# Patient Record
Sex: MALE | Race: OTHER | HISPANIC OR LATINO | URBAN - METROPOLITAN AREA
[De-identification: names, ages, dates, MRNs, and addresses within clinical notes are randomized per-mention and may not be internally consistent; named-entity substitution may affect disease eponyms.]

---

## 2017-05-12 ENCOUNTER — INPATIENT (INPATIENT)
Facility: HOSPITAL | Age: 62
LOS: 1 days | Discharge: HOME | DRG: 446 | End: 2017-05-14
Attending: SURGERY | Admitting: SURGERY
Payer: COMMERCIAL

## 2017-05-12 VITALS
HEART RATE: 80 BPM | OXYGEN SATURATION: 100 % | SYSTOLIC BLOOD PRESSURE: 153 MMHG | HEIGHT: 68 IN | DIASTOLIC BLOOD PRESSURE: 64 MMHG | TEMPERATURE: 98 F | RESPIRATION RATE: 17 BRPM | WEIGHT: 160.06 LBS

## 2017-05-12 DIAGNOSIS — K81.0 ACUTE CHOLECYSTITIS: ICD-10-CM

## 2017-05-12 DIAGNOSIS — Z98.890 OTHER SPECIFIED POSTPROCEDURAL STATES: Chronic | ICD-10-CM

## 2017-05-12 LAB
ALBUMIN SERPL ELPH-MCNC: 2.6 G/DL — LOW (ref 3.5–5)
ALP SERPL-CCNC: 240 U/L — HIGH (ref 40–120)
ALT FLD-CCNC: 25 U/L DA — SIGNIFICANT CHANGE UP (ref 10–60)
ANION GAP SERPL CALC-SCNC: 8 MMOL/L — SIGNIFICANT CHANGE UP (ref 5–17)
APTT BLD: 38.7 SEC — HIGH (ref 27.5–37.4)
AST SERPL-CCNC: 24 U/L — SIGNIFICANT CHANGE UP (ref 10–40)
BILIRUB SERPL-MCNC: 0.4 MG/DL — SIGNIFICANT CHANGE UP (ref 0.2–1.2)
BUN SERPL-MCNC: 24 MG/DL — HIGH (ref 7–18)
CALCIUM SERPL-MCNC: 8.8 MG/DL — SIGNIFICANT CHANGE UP (ref 8.4–10.5)
CHLORIDE SERPL-SCNC: 103 MMOL/L — SIGNIFICANT CHANGE UP (ref 96–108)
CO2 SERPL-SCNC: 27 MMOL/L — SIGNIFICANT CHANGE UP (ref 22–31)
CREAT SERPL-MCNC: 1.65 MG/DL — HIGH (ref 0.5–1.3)
GLUCOSE SERPL-MCNC: 151 MG/DL — HIGH (ref 70–99)
HCT VFR BLD CALC: 31.5 % — LOW (ref 39–50)
HGB BLD-MCNC: 10.3 G/DL — LOW (ref 13–17)
INR BLD: 1.26 RATIO — HIGH (ref 0.88–1.16)
LIDOCAIN IGE QN: 295 U/L — SIGNIFICANT CHANGE UP (ref 73–393)
MCHC RBC-ENTMCNC: 29.2 PG — SIGNIFICANT CHANGE UP (ref 27–34)
MCHC RBC-ENTMCNC: 32.7 GM/DL — SIGNIFICANT CHANGE UP (ref 32–36)
MCV RBC AUTO: 89.4 FL — SIGNIFICANT CHANGE UP (ref 80–100)
PLATELET # BLD AUTO: 448 K/UL — HIGH (ref 150–400)
POTASSIUM SERPL-MCNC: 4.1 MMOL/L — SIGNIFICANT CHANGE UP (ref 3.5–5.3)
POTASSIUM SERPL-SCNC: 4.1 MMOL/L — SIGNIFICANT CHANGE UP (ref 3.5–5.3)
PROT SERPL-MCNC: 7.2 G/DL — SIGNIFICANT CHANGE UP (ref 6–8.3)
PROTHROM AB SERPL-ACNC: 13.8 SEC — HIGH (ref 9.8–12.7)
RBC # BLD: 3.52 M/UL — LOW (ref 4.2–5.8)
RBC # FLD: 12.4 % — SIGNIFICANT CHANGE UP (ref 10.3–14.5)
SODIUM SERPL-SCNC: 138 MMOL/L — SIGNIFICANT CHANGE UP (ref 135–145)
WBC # BLD: 13.8 K/UL — HIGH (ref 3.8–10.5)
WBC # FLD AUTO: 13.8 K/UL — HIGH (ref 3.8–10.5)

## 2017-05-12 PROCEDURE — 99285 EMERGENCY DEPT VISIT HI MDM: CPT

## 2017-05-12 PROCEDURE — 76705 ECHO EXAM OF ABDOMEN: CPT | Mod: 26

## 2017-05-12 PROCEDURE — 99223 1ST HOSP IP/OBS HIGH 75: CPT | Mod: AI

## 2017-05-12 RX ORDER — METRONIDAZOLE 500 MG
500 TABLET ORAL ONCE
Qty: 0 | Refills: 0 | Status: COMPLETED | OUTPATIENT
Start: 2017-05-12 | End: 2017-05-12

## 2017-05-12 RX ORDER — MORPHINE SULFATE 50 MG/1
2 CAPSULE, EXTENDED RELEASE ORAL EVERY 4 HOURS
Qty: 0 | Refills: 0 | Status: DISCONTINUED | OUTPATIENT
Start: 2017-05-12 | End: 2017-05-14

## 2017-05-12 RX ORDER — INSULIN LISPRO 100/ML
VIAL (ML) SUBCUTANEOUS
Qty: 0 | Refills: 0 | Status: DISCONTINUED | OUTPATIENT
Start: 2017-05-12 | End: 2017-05-14

## 2017-05-12 RX ORDER — METRONIDAZOLE 500 MG
TABLET ORAL
Qty: 0 | Refills: 0 | Status: DISCONTINUED | OUTPATIENT
Start: 2017-05-12 | End: 2017-05-14

## 2017-05-12 RX ORDER — DEXTROSE 50 % IN WATER 50 %
1 SYRINGE (ML) INTRAVENOUS ONCE
Qty: 0 | Refills: 0 | Status: DISCONTINUED | OUTPATIENT
Start: 2017-05-12 | End: 2017-05-14

## 2017-05-12 RX ORDER — PIPERACILLIN AND TAZOBACTAM 4; .5 G/20ML; G/20ML
3.38 INJECTION, POWDER, LYOPHILIZED, FOR SOLUTION INTRAVENOUS ONCE
Qty: 0 | Refills: 0 | Status: COMPLETED | OUTPATIENT
Start: 2017-05-12 | End: 2017-05-12

## 2017-05-12 RX ORDER — GLUCAGON INJECTION, SOLUTION 0.5 MG/.1ML
1 INJECTION, SOLUTION SUBCUTANEOUS ONCE
Qty: 0 | Refills: 0 | Status: DISCONTINUED | OUTPATIENT
Start: 2017-05-12 | End: 2017-05-14

## 2017-05-12 RX ORDER — SODIUM CHLORIDE 9 MG/ML
1000 INJECTION, SOLUTION INTRAVENOUS
Qty: 0 | Refills: 0 | Status: DISCONTINUED | OUTPATIENT
Start: 2017-05-12 | End: 2017-05-14

## 2017-05-12 RX ORDER — DEXTROSE 50 % IN WATER 50 %
25 SYRINGE (ML) INTRAVENOUS ONCE
Qty: 0 | Refills: 0 | Status: DISCONTINUED | OUTPATIENT
Start: 2017-05-12 | End: 2017-05-14

## 2017-05-12 RX ORDER — PANTOPRAZOLE SODIUM 20 MG/1
40 TABLET, DELAYED RELEASE ORAL
Qty: 0 | Refills: 0 | Status: DISCONTINUED | OUTPATIENT
Start: 2017-05-12 | End: 2017-05-14

## 2017-05-12 RX ORDER — ENOXAPARIN SODIUM 100 MG/ML
40 INJECTION SUBCUTANEOUS EVERY 24 HOURS
Qty: 0 | Refills: 0 | Status: DISCONTINUED | OUTPATIENT
Start: 2017-05-12 | End: 2017-05-14

## 2017-05-12 RX ORDER — DEXTROSE 50 % IN WATER 50 %
12.5 SYRINGE (ML) INTRAVENOUS ONCE
Qty: 0 | Refills: 0 | Status: DISCONTINUED | OUTPATIENT
Start: 2017-05-12 | End: 2017-05-14

## 2017-05-12 RX ORDER — METRONIDAZOLE 500 MG
500 TABLET ORAL EVERY 8 HOURS
Qty: 0 | Refills: 0 | Status: DISCONTINUED | OUTPATIENT
Start: 2017-05-13 | End: 2017-05-14

## 2017-05-12 RX ORDER — CIPROFLOXACIN LACTATE 400MG/40ML
VIAL (ML) INTRAVENOUS
Qty: 0 | Refills: 0 | Status: DISCONTINUED | OUTPATIENT
Start: 2017-05-12 | End: 2017-05-14

## 2017-05-12 RX ORDER — METRONIDAZOLE 500 MG
500 TABLET ORAL EVERY 8 HOURS
Qty: 0 | Refills: 0 | Status: DISCONTINUED | OUTPATIENT
Start: 2017-05-12 | End: 2017-05-12

## 2017-05-12 RX ORDER — CIPROFLOXACIN LACTATE 400MG/40ML
400 VIAL (ML) INTRAVENOUS ONCE
Qty: 0 | Refills: 0 | Status: COMPLETED | OUTPATIENT
Start: 2017-05-12 | End: 2017-05-12

## 2017-05-12 RX ORDER — ONDANSETRON 8 MG/1
4 TABLET, FILM COATED ORAL EVERY 6 HOURS
Qty: 0 | Refills: 0 | Status: DISCONTINUED | OUTPATIENT
Start: 2017-05-12 | End: 2017-05-14

## 2017-05-12 RX ORDER — SODIUM CHLORIDE 9 MG/ML
1000 INJECTION INTRAMUSCULAR; INTRAVENOUS; SUBCUTANEOUS
Qty: 0 | Refills: 0 | Status: DISCONTINUED | OUTPATIENT
Start: 2017-05-12 | End: 2017-05-14

## 2017-05-12 RX ORDER — CIPROFLOXACIN LACTATE 400MG/40ML
400 VIAL (ML) INTRAVENOUS EVERY 12 HOURS
Qty: 0 | Refills: 0 | Status: DISCONTINUED | OUTPATIENT
Start: 2017-05-13 | End: 2017-05-14

## 2017-05-12 RX ADMIN — PIPERACILLIN AND TAZOBACTAM 200 GRAM(S): 4; .5 INJECTION, POWDER, LYOPHILIZED, FOR SOLUTION INTRAVENOUS at 18:47

## 2017-05-12 RX ADMIN — SODIUM CHLORIDE 1000 MILLILITER(S): 9 INJECTION INTRAMUSCULAR; INTRAVENOUS; SUBCUTANEOUS at 17:02

## 2017-05-12 RX ADMIN — Medication 100 MILLIGRAM(S): at 21:02

## 2017-05-12 RX ADMIN — Medication 200 MILLIGRAM(S): at 21:07

## 2017-05-12 NOTE — ED PROVIDER NOTE - MEDICAL DECISION MAKING DETAILS
3 d of ruq abd pain, nontender abdomen now. sono shows acute danica. labs show leukocytosis and transaminitis. gave zosyn. admit to surgery for further mgmt

## 2017-05-12 NOTE — H&P ADULT - PROBLEM SELECTOR PLAN 1
1- admit to Dr Farmer, Surgery  2- npo/ivf  3- cipro/flagyl  4- observation over the weekend  5- possible lap danica monday 5/15 if infection is improved  6- d/w dr farmer and agrees

## 2017-05-12 NOTE — ED PROVIDER NOTE - OBJECTIVE STATEMENT
61 y/o M pt with PMHx of R inguinal hernia repair presents to the ED c/o epigastric and RUQ pain x days. Pt also notes multiple episodes of diarrhea. [facility] reports a possible inflammation of the gallbladder seen on an US. Pt denies nausea, vomiting, fever, chills or any other complaints at this time. NKDA. 63 y/o M pt with PMHx of R inguinal hernia repair presents to the ED c/o epigastric and RUQ pain x 3 days. [facility] reports a possible inflammation of the gallbladder seen on an US done as outpt. Pt denies nausea, vomiting, fever, chills or any other complaints at this time. NKDA.

## 2017-05-12 NOTE — H&P ADULT - NSHPLABSRESULTS_GEN_ALL_CORE
10.3   13.8  )-----------( 448      ( 12 May 2017 16:42 )             31.5   05-12    138  |  103  |  24<H>  ----------------------------<  151<H>  4.1   |  27  |  1.65<H>    Ca    8.8      12 May 2017 16:42    TPro  7.2  /  Alb  2.6<L>  /  TBili  0.4  /  DBili  x   /  AST  24  /  ALT  25  /  AlkPhos  240<H>  05-12    ultrasound:  IMPRESSION: Gallstones and gallbladder sludge identified. There is   gallbladder wall thickening measuring up to8 mm and gallbladder wall   edema is noted. A significant volume of pericholecystic fluid is   identified. Findings are highly suspicious for cholecystitis; correlate   clinically.

## 2017-05-12 NOTE — ED PROVIDER NOTE - CONDUCTED A DETAILED DISCUSSION WITH PATIENT AND/OR GUARDIAN REGARDING, MDM
return to ED if symptoms worsen, persist or questions arise radiology results/need to admit/lab results

## 2017-05-12 NOTE — H&P ADULT - HISTORY OF PRESENT ILLNESS
62 year old male presents c/o RUQ abd pain for the last 10 days. 10 days ago he was admitted to a hospital [facility] for acute danica, and was discharged after 3 days. He came back to [facility] but pain has still persisted.    pmhx includes HTN and DM, however he does not recall all of his home meds and has been instructed to have a family member bring them to the hospital

## 2017-05-12 NOTE — PATIENT PROFILE ADULT. - VISION (WITH CORRECTIVE LENSES IF THE PATIENT USUALLY WEARS THEM):
Partially impaired: cannot see medication labels or newsprint, but can see obstacles in path, and the surrounding layout; can count fingers at arm's length/blurry cant see fine print

## 2017-05-13 DIAGNOSIS — I10 ESSENTIAL (PRIMARY) HYPERTENSION: ICD-10-CM

## 2017-05-13 DIAGNOSIS — E11.9 TYPE 2 DIABETES MELLITUS WITHOUT COMPLICATIONS: ICD-10-CM

## 2017-05-13 LAB
ALBUMIN SERPL ELPH-MCNC: 2.2 G/DL — LOW (ref 3.5–5)
ALP SERPL-CCNC: 211 U/L — HIGH (ref 40–120)
ALT FLD-CCNC: 18 U/L DA — SIGNIFICANT CHANGE UP (ref 10–60)
ANION GAP SERPL CALC-SCNC: 8 MMOL/L — SIGNIFICANT CHANGE UP (ref 5–17)
AST SERPL-CCNC: 16 U/L — SIGNIFICANT CHANGE UP (ref 10–40)
BASOPHILS # BLD AUTO: 0.1 K/UL — SIGNIFICANT CHANGE UP (ref 0–0.2)
BASOPHILS NFR BLD AUTO: 0.8 % — SIGNIFICANT CHANGE UP (ref 0–2)
BILIRUB SERPL-MCNC: 0.4 MG/DL — SIGNIFICANT CHANGE UP (ref 0.2–1.2)
BUN SERPL-MCNC: 17 MG/DL — SIGNIFICANT CHANGE UP (ref 7–18)
CALCIUM SERPL-MCNC: 8.3 MG/DL — LOW (ref 8.4–10.5)
CHLORIDE SERPL-SCNC: 108 MMOL/L — SIGNIFICANT CHANGE UP (ref 96–108)
CO2 SERPL-SCNC: 27 MMOL/L — SIGNIFICANT CHANGE UP (ref 22–31)
CREAT SERPL-MCNC: 1.27 MG/DL — SIGNIFICANT CHANGE UP (ref 0.5–1.3)
EOSINOPHIL # BLD AUTO: 0.5 K/UL — SIGNIFICANT CHANGE UP (ref 0–0.5)
EOSINOPHIL NFR BLD AUTO: 4.8 % — SIGNIFICANT CHANGE UP (ref 0–6)
GLUCOSE SERPL-MCNC: 100 MG/DL — HIGH (ref 70–99)
HBA1C BLD-MCNC: 7 % — HIGH (ref 4–5.6)
HCT VFR BLD CALC: 28.7 % — LOW (ref 39–50)
HGB BLD-MCNC: 9.5 G/DL — LOW (ref 13–17)
LYMPHOCYTES # BLD AUTO: 1.8 K/UL — SIGNIFICANT CHANGE UP (ref 1–3.3)
LYMPHOCYTES # BLD AUTO: 15.9 % — SIGNIFICANT CHANGE UP (ref 13–44)
MCHC RBC-ENTMCNC: 30 PG — SIGNIFICANT CHANGE UP (ref 27–34)
MCHC RBC-ENTMCNC: 33.1 GM/DL — SIGNIFICANT CHANGE UP (ref 32–36)
MCV RBC AUTO: 90.8 FL — SIGNIFICANT CHANGE UP (ref 80–100)
MONOCYTES # BLD AUTO: 0.7 K/UL — SIGNIFICANT CHANGE UP (ref 0–0.9)
MONOCYTES NFR BLD AUTO: 6.1 % — SIGNIFICANT CHANGE UP (ref 2–14)
NEUTROPHILS # BLD AUTO: 8.2 K/UL — HIGH (ref 1.8–7.4)
NEUTROPHILS NFR BLD AUTO: 72.4 % — SIGNIFICANT CHANGE UP (ref 43–77)
PLATELET # BLD AUTO: 425 K/UL — HIGH (ref 150–400)
POTASSIUM SERPL-MCNC: 3.9 MMOL/L — SIGNIFICANT CHANGE UP (ref 3.5–5.3)
POTASSIUM SERPL-SCNC: 3.9 MMOL/L — SIGNIFICANT CHANGE UP (ref 3.5–5.3)
PROT SERPL-MCNC: 6.1 G/DL — SIGNIFICANT CHANGE UP (ref 6–8.3)
RBC # BLD: 3.17 M/UL — LOW (ref 4.2–5.8)
RBC # FLD: 12.4 % — SIGNIFICANT CHANGE UP (ref 10.3–14.5)
SODIUM SERPL-SCNC: 143 MMOL/L — SIGNIFICANT CHANGE UP (ref 135–145)
WBC # BLD: 11.4 K/UL — HIGH (ref 3.8–10.5)
WBC # FLD AUTO: 11.4 K/UL — HIGH (ref 3.8–10.5)

## 2017-05-13 PROCEDURE — 99232 SBSQ HOSP IP/OBS MODERATE 35: CPT

## 2017-05-13 RX ADMIN — Medication 100 MILLIGRAM(S): at 05:35

## 2017-05-13 RX ADMIN — Medication 100 MILLIGRAM(S): at 21:43

## 2017-05-13 RX ADMIN — Medication 200 MILLIGRAM(S): at 17:00

## 2017-05-13 RX ADMIN — Medication 100 MILLIGRAM(S): at 13:14

## 2017-05-13 RX ADMIN — ENOXAPARIN SODIUM 40 MILLIGRAM(S): 100 INJECTION SUBCUTANEOUS at 05:35

## 2017-05-13 RX ADMIN — Medication 200 MILLIGRAM(S): at 05:35

## 2017-05-13 RX ADMIN — PANTOPRAZOLE SODIUM 40 MILLIGRAM(S): 20 TABLET, DELAYED RELEASE ORAL at 05:36

## 2017-05-13 NOTE — PROGRESS NOTE ADULT - ASSESSMENT
62M with DM, HTN 2 weeks into cholecystitis. Was sent in by the radiologist who performed the ultrasound based on findings. He currently denies any pain. He had no nausea or vomiting for fever or chills at home. He is in subacute phase of the inflammatory process. I explained to him that it would be safer to try to manage non-operatively at this time and perform interval cholecystectomy in a few weeks. Currently, he is asymptomatic. Surgery at this time carried significantly increased risk of bleeding, open procedure and other complications such as bile duct injury. He agrees with non-operative plan. C/o bilateral calcaneal pain/tingling. Denies trauma.    1) continue cipro flagyl  2) clears  3) xray feet  4) ambulate  3) ambulate

## 2017-05-13 NOTE — PROGRESS NOTE ADULT - SUBJECTIVE AND OBJECTIVE BOX
SUBJECTIVE    C/o bilateral mild calcaneal pain--denies trauma, fall, jump.  Nausea: [ ] YES [X] NO           Vomiting: [ ] YES [X] NO  Flatus: [X] YES [ ] NO             Voiding normally: [ ]YES [X]NO  Currently has NO pain  NPO  Ambulated: [X] YES [ ]NO    VITALS  Vital Signs Last 24 Hrs  T(C): 36.8, Max: 36.8 (05-12 @ 19:32)  T(F): 98.2, Max: 98.3 (05-12 @ 19:32)  HR: 73 (73 - 86)  BP: 135/62 (135/62 - 183/74)  BP(mean): --  RR: 17 (17 - 18)  SpO2: 100% (98% - 100%)    I&O's Summary      PHYSICAL EXAMINATION    Abdomen: soft, NT, ND  Extremities: bilateral feet without any skin changes or signs of trauma      LABS                        9.5    11.4  )-----------( 425      ( 13 May 2017 06:25 )             28.7     05-13    143  |  108  |  17  ----------------------------<  100<H>  3.9   |  27  |  1.27    Ca    8.3<L>      13 May 2017 06:25    TPro  6.1  /  Alb  2.2<L>  /  TBili  0.4  /  DBili  x   /  AST  16  /  ALT  18  /  AlkPhos  211<H>  05-13     LIVER FUNCTIONS - ( 13 May 2017 06:25 )  Alb: 2.2 g/dL / Pro: 6.1 g/dL / ALK PHOS: 211 U/L / ALT: 18 U/L DA / AST: 16 U/L / GGT: x             MEDICATIONS  MEDICATIONS  (STANDING):  sodium chloride 0.9%. 1000milliLiter(s) IV Continuous <Continuous>  enoxaparin Injectable 40milliGRAM(s) SubCutaneous every 24 hours  lactated ringers. 1000milliLiter(s) IV Continuous <Continuous>  pantoprazole    Tablet 40milliGRAM(s) Oral before breakfast  ciprofloxacin   IVPB  IV Intermittent   insulin lispro (HumaLOG) corrective regimen sliding scale  SubCutaneous three times a day before meals  dextrose 5%. 1000milliLiter(s) IV Continuous <Continuous>  dextrose 50% Injectable 12.5Gram(s) IV Push once  ciprofloxacin   IVPB 400milliGRAM(s) IV Intermittent every 12 hours  metroNIDAZOLE  IVPB 500milliGRAM(s) IV Intermittent every 8 hours  dextrose 50% Injectable 25Gram(s) IV Push once  dextrose 50% Injectable 25Gram(s) IV Push once  metroNIDAZOLE  IVPB  IV Intermittent     MEDICATIONS  (PRN):  oxyCODONE  5 mG/acetaminophen 325 mG 1Tablet(s) Oral every 4 hours PRN Moderate Pain  ondansetron Injectable 4milliGRAM(s) IV Push every 6 hours PRN Nausea  morphine  - Injectable 2milliGRAM(s) IV Push every 4 hours PRN Severe Pain (7 - 10)  dextrose Gel 1Dose(s) Oral once PRN Blood Glucose LESS THAN 70 milliGRAM(s)/deciliter  glucagon  Injectable 1milliGRAM(s) IntraMuscular once PRN Glucose LESS THAN 70 milligrams/deciliter

## 2017-05-14 VITALS
SYSTOLIC BLOOD PRESSURE: 164 MMHG | RESPIRATION RATE: 17 BRPM | TEMPERATURE: 99 F | OXYGEN SATURATION: 100 % | HEART RATE: 74 BPM | DIASTOLIC BLOOD PRESSURE: 75 MMHG

## 2017-05-14 DIAGNOSIS — E11.9 TYPE 2 DIABETES MELLITUS WITHOUT COMPLICATIONS: ICD-10-CM

## 2017-05-14 PROCEDURE — 85730 THROMBOPLASTIN TIME PARTIAL: CPT

## 2017-05-14 PROCEDURE — 83036 HEMOGLOBIN GLYCOSYLATED A1C: CPT

## 2017-05-14 PROCEDURE — 76705 ECHO EXAM OF ABDOMEN: CPT

## 2017-05-14 PROCEDURE — 85027 COMPLETE CBC AUTOMATED: CPT

## 2017-05-14 PROCEDURE — 99285 EMERGENCY DEPT VISIT HI MDM: CPT | Mod: 25

## 2017-05-14 PROCEDURE — 86900 BLOOD TYPING SEROLOGIC ABO: CPT

## 2017-05-14 PROCEDURE — 80053 COMPREHEN METABOLIC PANEL: CPT

## 2017-05-14 PROCEDURE — 86901 BLOOD TYPING SEROLOGIC RH(D): CPT

## 2017-05-14 PROCEDURE — 83690 ASSAY OF LIPASE: CPT

## 2017-05-14 PROCEDURE — 86850 RBC ANTIBODY SCREEN: CPT

## 2017-05-14 PROCEDURE — 99232 SBSQ HOSP IP/OBS MODERATE 35: CPT

## 2017-05-14 PROCEDURE — 85610 PROTHROMBIN TIME: CPT

## 2017-05-14 RX ORDER — METRONIDAZOLE 500 MG
1 TABLET ORAL
Qty: 42 | Refills: 0
Start: 2017-05-14 | End: 2017-05-28

## 2017-05-14 RX ORDER — MOXIFLOXACIN HYDROCHLORIDE TABLETS, 400 MG 400 MG/1
1 TABLET, FILM COATED ORAL
Qty: 28 | Refills: 0
Start: 2017-05-14 | End: 2017-05-28

## 2017-05-14 RX ORDER — MOXIFLOXACIN HYDROCHLORIDE TABLETS, 400 MG 400 MG/1
1 TABLET, FILM COATED ORAL
Qty: 28 | Refills: 0 | OUTPATIENT
Start: 2017-05-14 | End: 2017-05-28

## 2017-05-14 RX ORDER — METRONIDAZOLE 500 MG
1 TABLET ORAL
Qty: 42 | Refills: 0 | OUTPATIENT
Start: 2017-05-14 | End: 2017-05-28

## 2017-05-14 RX ADMIN — Medication 100 MILLIGRAM(S): at 05:12

## 2017-05-14 RX ADMIN — ENOXAPARIN SODIUM 40 MILLIGRAM(S): 100 INJECTION SUBCUTANEOUS at 05:11

## 2017-05-14 RX ADMIN — PANTOPRAZOLE SODIUM 40 MILLIGRAM(S): 20 TABLET, DELAYED RELEASE ORAL at 08:07

## 2017-05-14 RX ADMIN — SODIUM CHLORIDE 125 MILLILITER(S): 9 INJECTION, SOLUTION INTRAVENOUS at 05:12

## 2017-05-14 RX ADMIN — Medication 200 MILLIGRAM(S): at 17:53

## 2017-05-14 RX ADMIN — Medication 200 MILLIGRAM(S): at 05:12

## 2017-05-14 RX ADMIN — Medication 100 MILLIGRAM(S): at 13:37

## 2017-05-14 RX ADMIN — Medication 2: at 16:53

## 2017-05-14 NOTE — DISCHARGE NOTE ADULT - CARE PLAN
Principal Discharge DX:	Acute cholecystitis  Goal:	Disease improvement  Instructions for follow-up, activity and diet:	Eat Low fat diet  Continue antibiotics x 2 weeks  Follow up as outpatient 1 - 2 weeks.

## 2017-05-14 NOTE — PROGRESS NOTE ADULT - SUBJECTIVE AND OBJECTIVE BOX
SUBJECTIVE    Nausea: [ ] YES [X ] NO           Vomiting: [ ] YES [X ] NO  Flatus: [X ] YES [ ] NO             Voiding normally: [X ]YES [ ]NO  No Pain  NPO  Ambulated: [X ] YES [ ]NO      VITALS  Vital Signs Last 24 Hrs  T(C): 36.6, Max: 37.1 (05-13 @ 14:49)  T(F): 97.8, Max: 98.8 (05-13 @ 14:49)  HR: 82 (78 - 82)  BP: 162/71 (155/70 - 162/71)  BP(mean): --  RR: 18 (17 - 18)  SpO2: 97% (97% - 99%)    I&O's Summary    I & Os for current day (as of 14 May 2017 08:48)  =============================================  IN: 1575 ml / OUT: 0 ml / NET: 1575 ml      PHYSICAL EXAMINATION    Abdomen: soft, NT, ND      LABS                        9.5    11.4  )-----------( 425      ( 13 May 2017 06:25 )             28.7     05-13    143  |  108  |  17  ----------------------------<  100<H>  3.9   |  27  |  1.27    Ca    8.3<L>      13 May 2017 06:25    TPro  6.1  /  Alb  2.2<L>  /  TBili  0.4  /  DBili  x   /  AST  16  /  ALT  18  /  AlkPhos  211<H>  05-13     LIVER FUNCTIONS - ( 13 May 2017 06:25 )  Alb: 2.2 g/dL / Pro: 6.1 g/dL / ALK PHOS: 211 U/L / ALT: 18 U/L DA / AST: 16 U/L / GGT: x             MEDICATIONS  MEDICATIONS  (STANDING):  sodium chloride 0.9%. 1000milliLiter(s) IV Continuous <Continuous>  enoxaparin Injectable 40milliGRAM(s) SubCutaneous every 24 hours  lactated ringers. 1000milliLiter(s) IV Continuous <Continuous>  pantoprazole    Tablet 40milliGRAM(s) Oral before breakfast  ciprofloxacin   IVPB  IV Intermittent   insulin lispro (HumaLOG) corrective regimen sliding scale  SubCutaneous three times a day before meals  dextrose 5%. 1000milliLiter(s) IV Continuous <Continuous>  dextrose 50% Injectable 12.5Gram(s) IV Push once  ciprofloxacin   IVPB 400milliGRAM(s) IV Intermittent every 12 hours  metroNIDAZOLE  IVPB 500milliGRAM(s) IV Intermittent every 8 hours  dextrose 50% Injectable 25Gram(s) IV Push once  dextrose 50% Injectable 25Gram(s) IV Push once  metroNIDAZOLE  IVPB  IV Intermittent     MEDICATIONS  (PRN):  oxyCODONE  5 mG/acetaminophen 325 mG 1Tablet(s) Oral every 4 hours PRN Moderate Pain  ondansetron Injectable 4milliGRAM(s) IV Push every 6 hours PRN Nausea  morphine  - Injectable 2milliGRAM(s) IV Push every 4 hours PRN Severe Pain (7 - 10)  dextrose Gel 1Dose(s) Oral once PRN Blood Glucose LESS THAN 70 milliGRAM(s)/deciliter  glucagon  Injectable 1milliGRAM(s) IntraMuscular once PRN Glucose LESS THAN 70 milligrams/deciliter

## 2017-05-14 NOTE — DISCHARGE NOTE ADULT - PLAN OF CARE
Disease improvement Eat Low fat diet  Continue antibiotics x 2 weeks  Follow up as outpatient 1 - 2 weeks.

## 2017-05-14 NOTE — DISCHARGE NOTE ADULT - VISION (WITH CORRECTIVE LENSES IF THE PATIENT USUALLY WEARS THEM):
blurry cant see fine print/Partially impaired: cannot see medication labels or newsprint, but can see obstacles in path, and the surrounding layout; can count fingers at arm's length

## 2017-05-14 NOTE — PROGRESS NOTE ADULT - ASSESSMENT
62M with resolving acute on chronic cholecystitis.   1) advance diet  2) if tolerates with no pain, D/C on cipro flagyl for 2 weeks  3) interval cholecystectomy in 4 - 6 weeks.

## 2017-05-14 NOTE — DISCHARGE NOTE ADULT - PATIENT PORTAL LINK FT
“You can access the [facility] Patient Portal, offered by [facility], by registering with the following website: [email or web address]”

## 2017-05-14 NOTE — DISCHARGE NOTE ADULT - MEDICATION SUMMARY - MEDICATIONS TO TAKE
I will START or STAY ON the medications listed below when I get home from the hospital:    Flagyl 500 mg oral tablet  -- 1 tab(s) by mouth every 8 hours  -- Do not drink alcoholic beverages when taking this medication.  Finish all this medication unless otherwise directed by prescriber.  May discolor urine or feces.    -- Indication: For Acute cholecystitis    Cipro 500 mg oral tablet  -- 1 tab(s) by mouth every 12 hours  -- Avoid prolonged or excessive exposure to direct and/or artificial sunlight while taking this medication.  Check with your doctor before becoming pregnant.  Do not take dairy products, antacids, or iron preparations within one hour of this medication.  Finish all this medication unless otherwise directed by prescriber.  Medication should be taken with plenty of water.    -- Indication: For Acute cholecystitis

## 2017-05-14 NOTE — DISCHARGE NOTE ADULT - MEDICATION SUMMARY - MEDICATIONS TO CHANGE
I will SWITCH the dose or number of times a day I take the medications listed below when I get home from the hospital:    Cipro 500 mg oral tablet  -- 1 tab(s) by mouth every 12 hours  -- Avoid prolonged or excessive exposure to direct and/or artificial sunlight while taking this medication.  Check with your doctor before becoming pregnant.  Do not take dairy products, antacids, or iron preparations within one hour of this medication.  Finish all this medication unless otherwise directed by prescriber.  Medication should be taken with plenty of water.    Flagyl 500 mg oral tablet  -- 1 tab(s) by mouth every 8 hours for 2 weeks  -- Do not drink alcoholic beverages when taking this medication.  Finish all this medication unless otherwise directed by prescriber.  May discolor urine or feces.

## 2017-05-14 NOTE — DISCHARGE NOTE ADULT - CARE PROVIDER_API CALL
Vinay Luevano (MD), Surgery  [address] [place] [address]  Phone: [phone]  Fax: [phone]

## 2017-05-16 DIAGNOSIS — K81.1 CHRONIC CHOLECYSTITIS: ICD-10-CM

## 2017-05-16 DIAGNOSIS — K81.0 ACUTE CHOLECYSTITIS: ICD-10-CM

## 2017-05-16 DIAGNOSIS — I10 ESSENTIAL (PRIMARY) HYPERTENSION: ICD-10-CM

## 2017-05-16 DIAGNOSIS — E11.9 TYPE 2 DIABETES MELLITUS WITHOUT COMPLICATIONS: ICD-10-CM

## 2017-06-27 ENCOUNTER — TRANSCRIPTION ENCOUNTER (OUTPATIENT)
Age: 62
End: 2017-06-27

## 2017-06-27 ENCOUNTER — OUTPATIENT (OUTPATIENT)
Dept: OUTPATIENT SERVICES | Facility: HOSPITAL | Age: 62
LOS: 1 days | End: 2017-06-27
Payer: COMMERCIAL

## 2017-06-27 VITALS
DIASTOLIC BLOOD PRESSURE: 74 MMHG | HEIGHT: 67 IN | HEART RATE: 72 BPM | TEMPERATURE: 98 F | RESPIRATION RATE: 18 BRPM | OXYGEN SATURATION: 99 % | SYSTOLIC BLOOD PRESSURE: 149 MMHG | WEIGHT: 177.91 LBS

## 2017-06-27 DIAGNOSIS — K81.0 ACUTE CHOLECYSTITIS: ICD-10-CM

## 2017-06-27 DIAGNOSIS — Z98.890 OTHER SPECIFIED POSTPROCEDURAL STATES: Chronic | ICD-10-CM

## 2017-06-27 DIAGNOSIS — Z01.818 ENCOUNTER FOR OTHER PREPROCEDURAL EXAMINATION: ICD-10-CM

## 2017-06-27 PROCEDURE — G0463: CPT

## 2017-06-27 NOTE — H&P PST ADULT - NSALCOHOLUSECOMMENT_GEN_ALL_CORE_FT
Patient state he used to drink on the weekends 1-2botlles but has since quit alcoholic beverages in April 2017

## 2017-06-27 NOTE — H&P PST ADULT - NSANTHOSAYNRD_GEN_A_CORE
No. KRISH screening performed.  STOP BANG Legend: 0-2 = LOW Risk; 3-4 = INTERMEDIATE Risk; 5-8 = HIGH Risk

## 2017-06-27 NOTE — H&P PST ADULT - HISTORY OF PRESENT ILLNESS
62year old male with IDDM Type 2, hypertension, hypothyroidism and elevated PTT levels presents today for presurgical testing for scheduled laparoscopic cholecystectomy, possible open on 6/28/17

## 2017-06-27 NOTE — H&P PST ADULT - VTE RISK COMMENTS
DVT Prophylaxis - Patient with hx of elevated PTT levels  Had hematology consult/clearance presurgery

## 2017-06-28 ENCOUNTER — INPATIENT (INPATIENT)
Facility: HOSPITAL | Age: 62
LOS: 5 days | Discharge: HOME | DRG: 415 | End: 2017-07-04
Attending: SURGERY | Admitting: SURGERY
Payer: COMMERCIAL

## 2017-06-28 VITALS
HEART RATE: 73 BPM | HEIGHT: 67 IN | TEMPERATURE: 97 F | DIASTOLIC BLOOD PRESSURE: 57 MMHG | WEIGHT: 177.91 LBS | RESPIRATION RATE: 14 BRPM | SYSTOLIC BLOOD PRESSURE: 143 MMHG | OXYGEN SATURATION: 100 %

## 2017-06-28 DIAGNOSIS — E11.9 TYPE 2 DIABETES MELLITUS WITHOUT COMPLICATIONS: ICD-10-CM

## 2017-06-28 DIAGNOSIS — K81.0 ACUTE CHOLECYSTITIS: ICD-10-CM

## 2017-06-28 DIAGNOSIS — Z98.890 OTHER SPECIFIED POSTPROCEDURAL STATES: Chronic | ICD-10-CM

## 2017-06-28 DIAGNOSIS — E03.9 HYPOTHYROIDISM, UNSPECIFIED: ICD-10-CM

## 2017-06-28 DIAGNOSIS — K81.9 CHOLECYSTITIS, UNSPECIFIED: ICD-10-CM

## 2017-06-28 PROCEDURE — 88304 TISSUE EXAM BY PATHOLOGIST: CPT | Mod: 26

## 2017-06-28 PROCEDURE — 47562 LAPAROSCOPIC CHOLECYSTECTOMY: CPT | Mod: AS

## 2017-06-28 RX ORDER — MORPHINE SULFATE 50 MG/1
4 CAPSULE, EXTENDED RELEASE ORAL EVERY 4 HOURS
Qty: 0 | Refills: 0 | Status: DISCONTINUED | OUTPATIENT
Start: 2017-06-28 | End: 2017-06-30

## 2017-06-28 RX ORDER — CEFAZOLIN SODIUM 1 G
2000 VIAL (EA) INJECTION EVERY 8 HOURS
Qty: 0 | Refills: 0 | Status: COMPLETED | OUTPATIENT
Start: 2017-06-28 | End: 2017-06-28

## 2017-06-28 RX ORDER — SODIUM CHLORIDE 9 MG/ML
1000 INJECTION, SOLUTION INTRAVENOUS
Qty: 0 | Refills: 0 | Status: DISCONTINUED | OUTPATIENT
Start: 2017-06-28 | End: 2017-07-04

## 2017-06-28 RX ORDER — SODIUM CHLORIDE 9 MG/ML
1000 INJECTION, SOLUTION INTRAVENOUS
Qty: 0 | Refills: 0 | Status: DISCONTINUED | OUTPATIENT
Start: 2017-06-28 | End: 2017-06-28

## 2017-06-28 RX ORDER — ONDANSETRON 8 MG/1
4 TABLET, FILM COATED ORAL EVERY 6 HOURS
Qty: 0 | Refills: 0 | Status: DISCONTINUED | OUTPATIENT
Start: 2017-06-28 | End: 2017-07-04

## 2017-06-28 RX ORDER — LEVOTHYROXINE SODIUM 125 MCG
50 TABLET ORAL DAILY
Qty: 0 | Refills: 0 | Status: DISCONTINUED | OUTPATIENT
Start: 2017-06-28 | End: 2017-07-04

## 2017-06-28 RX ORDER — ACETAMINOPHEN 500 MG
650 TABLET ORAL EVERY 6 HOURS
Qty: 0 | Refills: 0 | Status: DISCONTINUED | OUTPATIENT
Start: 2017-06-28 | End: 2017-07-04

## 2017-06-28 RX ORDER — DEXTROSE 50 % IN WATER 50 %
25 SYRINGE (ML) INTRAVENOUS ONCE
Qty: 0 | Refills: 0 | Status: DISCONTINUED | OUTPATIENT
Start: 2017-06-28 | End: 2017-07-04

## 2017-06-28 RX ORDER — GLUCAGON INJECTION, SOLUTION 0.5 MG/.1ML
1 INJECTION, SOLUTION SUBCUTANEOUS ONCE
Qty: 0 | Refills: 0 | Status: DISCONTINUED | OUTPATIENT
Start: 2017-06-28 | End: 2017-07-04

## 2017-06-28 RX ORDER — DEXTROSE 50 % IN WATER 50 %
12.5 SYRINGE (ML) INTRAVENOUS ONCE
Qty: 0 | Refills: 0 | Status: DISCONTINUED | OUTPATIENT
Start: 2017-06-28 | End: 2017-07-04

## 2017-06-28 RX ORDER — INSULIN LISPRO 100/ML
VIAL (ML) SUBCUTANEOUS
Qty: 0 | Refills: 0 | Status: DISCONTINUED | OUTPATIENT
Start: 2017-06-28 | End: 2017-07-04

## 2017-06-28 RX ORDER — DEXTROSE 50 % IN WATER 50 %
1 SYRINGE (ML) INTRAVENOUS ONCE
Qty: 0 | Refills: 0 | Status: DISCONTINUED | OUTPATIENT
Start: 2017-06-28 | End: 2017-07-04

## 2017-06-28 RX ORDER — INSULIN LISPRO 100/ML
10 VIAL (ML) SUBCUTANEOUS
Qty: 0 | Refills: 0 | Status: DISCONTINUED | OUTPATIENT
Start: 2017-06-28 | End: 2017-07-04

## 2017-06-28 RX ORDER — HEPARIN SODIUM 5000 [USP'U]/ML
5000 INJECTION INTRAVENOUS; SUBCUTANEOUS EVERY 8 HOURS
Qty: 0 | Refills: 0 | Status: DISCONTINUED | OUTPATIENT
Start: 2017-06-28 | End: 2017-07-04

## 2017-06-28 RX ORDER — FENTANYL CITRATE 50 UG/ML
25 INJECTION INTRAVENOUS
Qty: 0 | Refills: 0 | Status: DISCONTINUED | OUTPATIENT
Start: 2017-06-28 | End: 2017-06-28

## 2017-06-28 RX ORDER — SODIUM CHLORIDE 9 MG/ML
1000 INJECTION, SOLUTION INTRAVENOUS
Qty: 0 | Refills: 0 | Status: DISCONTINUED | OUTPATIENT
Start: 2017-06-28 | End: 2017-06-30

## 2017-06-28 RX ADMIN — SODIUM CHLORIDE 125 MILLILITER(S): 9 INJECTION, SOLUTION INTRAVENOUS at 10:55

## 2017-06-28 RX ADMIN — Medication 100 MILLIGRAM(S): at 14:58

## 2017-06-28 RX ADMIN — FENTANYL CITRATE 25 MICROGRAM(S): 50 INJECTION INTRAVENOUS at 10:58

## 2017-06-28 RX ADMIN — HEPARIN SODIUM 5000 UNIT(S): 5000 INJECTION INTRAVENOUS; SUBCUTANEOUS at 21:13

## 2017-06-28 RX ADMIN — MORPHINE SULFATE 4 MILLIGRAM(S): 50 CAPSULE, EXTENDED RELEASE ORAL at 19:57

## 2017-06-28 RX ADMIN — FENTANYL CITRATE 25 MICROGRAM(S): 50 INJECTION INTRAVENOUS at 11:19

## 2017-06-28 RX ADMIN — Medication 50 MICROGRAM(S): at 17:33

## 2017-06-28 RX ADMIN — Medication 10 UNIT(S): at 17:32

## 2017-06-28 RX ADMIN — MORPHINE SULFATE 4 MILLIGRAM(S): 50 CAPSULE, EXTENDED RELEASE ORAL at 20:20

## 2017-06-28 RX ADMIN — FENTANYL CITRATE 25 MICROGRAM(S): 50 INJECTION INTRAVENOUS at 12:22

## 2017-06-28 RX ADMIN — Medication 2: at 17:31

## 2017-06-28 RX ADMIN — FENTANYL CITRATE 25 MICROGRAM(S): 50 INJECTION INTRAVENOUS at 11:13

## 2017-06-28 RX ADMIN — HEPARIN SODIUM 5000 UNIT(S): 5000 INJECTION INTRAVENOUS; SUBCUTANEOUS at 14:58

## 2017-06-28 NOTE — PROGRESS NOTE ADULT - SUBJECTIVE AND OBJECTIVE BOX
Surgery Post Op    Pt resting comfortably. c/o incisional pain. Tolerating clear liquid diet. Denies N/V. Voided small amount     Vital Signs Last 24 Hrs  T(C): 36.9 (28 Jun 2017 14:51), Max: 36.9 (28 Jun 2017 14:51)  T(F): 98.5 (28 Jun 2017 14:51), Max: 98.5 (28 Jun 2017 14:51)  HR: 96 (28 Jun 2017 14:51) (73 - 96)  BP: 116/54 (28 Jun 2017 14:51) (116/54 - 169/85)  BP(mean): 100 (28 Jun 2017 12:19) (100 - 108)  RR: 16 (28 Jun 2017 14:51) (11 - 16)  SpO2: 95% (28 Jun 2017 14:51) (95% - 100%)    Physical:  General: A&Ox3. NAD.  Abdomen: Soft nondistended, Dressing C/D/I. THEODORE dressing with good suction.  CAMILO: serosanguinous fluid

## 2017-06-28 NOTE — ASU PATIENT PROFILE, ADULT - PAIN SCALE PREFERRED, PROFILE
none Patient has dementia, currently confused and agitated, posing risk to himself and others, requiring haldol for sedation with minimal effect.    dx: delirium superimposed on dementia, with behavioral disturbance    recommend:  1) continue haldol 5mg IM q6h prn agitation  2) start ativan 1mg IM/IV q6h prn agitation (to be given with haldol), can prolong delirium but may be required for short term use for bouts of aggression  3) continue risperidone 1mg po bid

## 2017-06-28 NOTE — ASU PATIENT PROFILE, ADULT - PMH
Cholecystitis    DM (diabetes mellitus)    HTN (hypertension)    Hypothyroidism    Unilateral inguinal hernia without obstruction or gangrene, recurrence not specified

## 2017-06-29 DIAGNOSIS — R10.9 UNSPECIFIED ABDOMINAL PAIN: ICD-10-CM

## 2017-06-29 DIAGNOSIS — Z90.49 ACQUIRED ABSENCE OF OTHER SPECIFIED PARTS OF DIGESTIVE TRACT: ICD-10-CM

## 2017-06-29 LAB
ANION GAP SERPL CALC-SCNC: 9 MMOL/L — SIGNIFICANT CHANGE UP (ref 5–17)
BASOPHILS # BLD AUTO: 0.1 K/UL — SIGNIFICANT CHANGE UP (ref 0–0.2)
BASOPHILS NFR BLD AUTO: 0.8 % — SIGNIFICANT CHANGE UP (ref 0–2)
BUN SERPL-MCNC: 18 MG/DL — SIGNIFICANT CHANGE UP (ref 7–18)
CALCIUM SERPL-MCNC: 8.9 MG/DL — SIGNIFICANT CHANGE UP (ref 8.4–10.5)
CHLORIDE SERPL-SCNC: 104 MMOL/L — SIGNIFICANT CHANGE UP (ref 96–108)
CO2 SERPL-SCNC: 27 MMOL/L — SIGNIFICANT CHANGE UP (ref 22–31)
CREAT SERPL-MCNC: 1.19 MG/DL — SIGNIFICANT CHANGE UP (ref 0.5–1.3)
EOSINOPHIL # BLD AUTO: 0.3 K/UL — SIGNIFICANT CHANGE UP (ref 0–0.5)
EOSINOPHIL NFR BLD AUTO: 3.1 % — SIGNIFICANT CHANGE UP (ref 0–6)
GLUCOSE SERPL-MCNC: 110 MG/DL — HIGH (ref 70–99)
HCT VFR BLD CALC: 33.6 % — LOW (ref 39–50)
HGB BLD-MCNC: 11.2 G/DL — LOW (ref 13–17)
LYMPHOCYTES # BLD AUTO: 2.2 K/UL — SIGNIFICANT CHANGE UP (ref 1–3.3)
LYMPHOCYTES # BLD AUTO: 21.8 % — SIGNIFICANT CHANGE UP (ref 13–44)
MCHC RBC-ENTMCNC: 29.2 PG — SIGNIFICANT CHANGE UP (ref 27–34)
MCHC RBC-ENTMCNC: 33.3 GM/DL — SIGNIFICANT CHANGE UP (ref 32–36)
MCV RBC AUTO: 87.5 FL — SIGNIFICANT CHANGE UP (ref 80–100)
MONOCYTES # BLD AUTO: 0.8 K/UL — SIGNIFICANT CHANGE UP (ref 0–0.9)
MONOCYTES NFR BLD AUTO: 7.8 % — SIGNIFICANT CHANGE UP (ref 2–14)
NEUTROPHILS # BLD AUTO: 6.8 K/UL — SIGNIFICANT CHANGE UP (ref 1.8–7.4)
NEUTROPHILS NFR BLD AUTO: 66.5 % — SIGNIFICANT CHANGE UP (ref 43–77)
PLATELET # BLD AUTO: 212 K/UL — SIGNIFICANT CHANGE UP (ref 150–400)
POTASSIUM SERPL-MCNC: 4.4 MMOL/L — SIGNIFICANT CHANGE UP (ref 3.5–5.3)
POTASSIUM SERPL-SCNC: 4.4 MMOL/L — SIGNIFICANT CHANGE UP (ref 3.5–5.3)
RBC # BLD: 3.83 M/UL — LOW (ref 4.2–5.8)
RBC # FLD: 13.7 % — SIGNIFICANT CHANGE UP (ref 10.3–14.5)
SODIUM SERPL-SCNC: 140 MMOL/L — SIGNIFICANT CHANGE UP (ref 135–145)
WBC # BLD: 10.2 K/UL — SIGNIFICANT CHANGE UP (ref 3.8–10.5)
WBC # FLD AUTO: 10.2 K/UL — SIGNIFICANT CHANGE UP (ref 3.8–10.5)

## 2017-06-29 PROCEDURE — 99232 SBSQ HOSP IP/OBS MODERATE 35: CPT

## 2017-06-29 RX ORDER — LISINOPRIL 2.5 MG/1
10 TABLET ORAL DAILY
Qty: 0 | Refills: 0 | Status: DISCONTINUED | OUTPATIENT
Start: 2017-06-29 | End: 2017-06-30

## 2017-06-29 RX ADMIN — Medication 10 UNIT(S): at 16:30

## 2017-06-29 RX ADMIN — MORPHINE SULFATE 4 MILLIGRAM(S): 50 CAPSULE, EXTENDED RELEASE ORAL at 01:40

## 2017-06-29 RX ADMIN — MORPHINE SULFATE 4 MILLIGRAM(S): 50 CAPSULE, EXTENDED RELEASE ORAL at 22:20

## 2017-06-29 RX ADMIN — HEPARIN SODIUM 5000 UNIT(S): 5000 INJECTION INTRAVENOUS; SUBCUTANEOUS at 21:58

## 2017-06-29 RX ADMIN — Medication 50 MICROGRAM(S): at 05:08

## 2017-06-29 RX ADMIN — MORPHINE SULFATE 4 MILLIGRAM(S): 50 CAPSULE, EXTENDED RELEASE ORAL at 01:25

## 2017-06-29 RX ADMIN — LISINOPRIL 10 MILLIGRAM(S): 2.5 TABLET ORAL at 10:00

## 2017-06-29 RX ADMIN — HEPARIN SODIUM 5000 UNIT(S): 5000 INJECTION INTRAVENOUS; SUBCUTANEOUS at 12:45

## 2017-06-29 RX ADMIN — Medication 10 UNIT(S): at 07:30

## 2017-06-29 RX ADMIN — HEPARIN SODIUM 5000 UNIT(S): 5000 INJECTION INTRAVENOUS; SUBCUTANEOUS at 05:08

## 2017-06-29 RX ADMIN — MORPHINE SULFATE 4 MILLIGRAM(S): 50 CAPSULE, EXTENDED RELEASE ORAL at 21:57

## 2017-06-29 NOTE — PROGRESS NOTE ADULT - SUBJECTIVE AND OBJECTIVE BOX
Chief Complaint: abdominal pain    HPI:   62y Male s/p open choley, POD#1.  Pt c/o lower abdominal pain.  No nausea or vomiting  Pt ambulating around unit to no assist.        PAIN SCORE:    4/10   SCALE USED: (1-10 VNRS)    Allergies    No Known Allergies    Intolerances      MEDICATIONS  (STANDING):  heparin  Injectable 5000 Unit(s) SubCutaneous every 8 hours  sodium chloride 0.45%. 1000 milliLiter(s) (125 mL/Hr) IV Continuous <Continuous>  levothyroxine 50 MICROGram(s) Oral daily  insulin lispro Injectable (HumaLOG) 10 Unit(s) SubCutaneous three times a day before meals  insulin lispro (HumaLOG) corrective regimen sliding scale   SubCutaneous three times a day before meals  dextrose 5%. 1000 milliLiter(s) (50 mL/Hr) IV Continuous <Continuous>  dextrose 50% Injectable 12.5 Gram(s) IV Push once  dextrose 50% Injectable 25 Gram(s) IV Push once  dextrose 50% Injectable 25 Gram(s) IV Push once  lisinopril 10 milliGRAM(s) Oral daily    MEDICATIONS  (PRN):  acetaminophen   Tablet 650 milliGRAM(s) Oral every 6 hours PRN For Temp greater than 38.5 C (101.3 F)  oxyCODONE  5 mG/acetaminophen 325 mG 1 Tablet(s) Oral every 4 hours PRN Moderate Pain  morphine  - Injectable 4 milliGRAM(s) IV Push every 4 hours PRN Severe Pain  ondansetron Injectable 4 milliGRAM(s) IV Push every 6 hours PRN Nausea  dextrose Gel 1 Dose(s) Oral once PRN Blood Glucose LESS THAN 70 milliGRAM(s)/deciliter  glucagon  Injectable 1 milliGRAM(s) IntraMuscular once PRN Glucose LESS THAN 70 milligrams/deciliter      PHYSICAL EXAM:    Vital Signs Last 24 Hrs  T(C): 36.7 (29 Jun 2017 14:43), Max: 37.4 (29 Jun 2017 05:12)  T(F): 98 (29 Jun 2017 14:43), Max: 99.3 (29 Jun 2017 05:12)  HR: 78 (29 Jun 2017 14:43) (78 - 96)  BP: 135/54 (29 Jun 2017 14:43) (135/54 - 178/85)  BP(mean): --  RR: 17 (29 Jun 2017 14:43) (16 - 18)  SpO2: 100% (29 Jun 2017 14:43) (98% - 100%)             LABS:                          11.2   10.2  )-----------( 212      ( 29 Jun 2017 07:43 )             33.6     06-29    140  |  104  |  18  ----------------------------<  110<H>  4.4   |  27  |  1.19    Ca    8.9      29 Jun 2017 07:43            Drug Screen:        RADIOLOGY:

## 2017-06-29 NOTE — PROGRESS NOTE ADULT - SUBJECTIVE AND OBJECTIVE BOX
POD#1  62y Male with no overnight complaints. Tolerating clears. tang was inserted last night after pt was retaining urine    Vital Signs:  T(C): 37.4 (06-29-17 @ 05:12), Max: 37.4 (06-29-17 @ 05:12)  HR: 93 (06-29-17 @ 05:12) (82 - 96)  BP: 178/85 (06-29-17 @ 05:12) (116/54 - 178/85)  RR: 18 (06-29-17 @ 05:12) (11 - 18)  SpO2: 100% (06-29-17 @ 05:12) (95% - 100%)  Wt(kg): --    Physical Exam:  General: NAD comfortable  Abdomen: THEODORE dressing in place. clean and dry. CAMILO drain to self suction    Ins/Outs:    Bulb: 80 mL    Indwelling Catheter - Urethral: 2050 mL

## 2017-06-30 LAB
ALBUMIN SERPL ELPH-MCNC: 3.1 G/DL — LOW (ref 3.5–5)
ALP SERPL-CCNC: 158 U/L — HIGH (ref 40–120)
ALT FLD-CCNC: 21 U/L DA — SIGNIFICANT CHANGE UP (ref 10–60)
ANION GAP SERPL CALC-SCNC: 9 MMOL/L — SIGNIFICANT CHANGE UP (ref 5–17)
AST SERPL-CCNC: 20 U/L — SIGNIFICANT CHANGE UP (ref 10–40)
BILIRUB SERPL-MCNC: 0.6 MG/DL — SIGNIFICANT CHANGE UP (ref 0.2–1.2)
BUN SERPL-MCNC: 22 MG/DL — HIGH (ref 7–18)
CALCIUM SERPL-MCNC: 9.1 MG/DL — SIGNIFICANT CHANGE UP (ref 8.4–10.5)
CHLORIDE SERPL-SCNC: 101 MMOL/L — SIGNIFICANT CHANGE UP (ref 96–108)
CO2 SERPL-SCNC: 27 MMOL/L — SIGNIFICANT CHANGE UP (ref 22–31)
CREAT SERPL-MCNC: 1.38 MG/DL — HIGH (ref 0.5–1.3)
GLUCOSE SERPL-MCNC: 148 MG/DL — HIGH (ref 70–99)
HCT VFR BLD CALC: 34 % — LOW (ref 39–50)
HGB BLD-MCNC: 11.7 G/DL — LOW (ref 13–17)
MCHC RBC-ENTMCNC: 29.9 PG — SIGNIFICANT CHANGE UP (ref 27–34)
MCHC RBC-ENTMCNC: 34.5 GM/DL — SIGNIFICANT CHANGE UP (ref 32–36)
MCV RBC AUTO: 86.8 FL — SIGNIFICANT CHANGE UP (ref 80–100)
PLATELET # BLD AUTO: 231 K/UL — SIGNIFICANT CHANGE UP (ref 150–400)
POTASSIUM SERPL-MCNC: 4.5 MMOL/L — SIGNIFICANT CHANGE UP (ref 3.5–5.3)
POTASSIUM SERPL-SCNC: 4.5 MMOL/L — SIGNIFICANT CHANGE UP (ref 3.5–5.3)
PROT SERPL-MCNC: 7.6 G/DL — SIGNIFICANT CHANGE UP (ref 6–8.3)
RBC # BLD: 3.91 M/UL — LOW (ref 4.2–5.8)
RBC # FLD: 13.4 % — SIGNIFICANT CHANGE UP (ref 10.3–14.5)
SODIUM SERPL-SCNC: 137 MMOL/L — SIGNIFICANT CHANGE UP (ref 135–145)
SURGICAL PATHOLOGY FINAL REPORT - CH: SIGNIFICANT CHANGE UP
WBC # BLD: 11 K/UL — HIGH (ref 3.8–10.5)
WBC # FLD AUTO: 11 K/UL — HIGH (ref 3.8–10.5)

## 2017-06-30 PROCEDURE — 78226 HEPATOBILIARY SYSTEM IMAGING: CPT | Mod: 26

## 2017-06-30 RX ORDER — LISINOPRIL 2.5 MG/1
20 TABLET ORAL DAILY
Qty: 0 | Refills: 0 | Status: DISCONTINUED | OUTPATIENT
Start: 2017-06-30 | End: 2017-06-30

## 2017-06-30 RX ORDER — LISINOPRIL 2.5 MG/1
10 TABLET ORAL ONCE
Qty: 0 | Refills: 0 | Status: COMPLETED | OUTPATIENT
Start: 2017-06-30 | End: 2017-06-30

## 2017-06-30 RX ORDER — KETOROLAC TROMETHAMINE 30 MG/ML
30 SYRINGE (ML) INJECTION EVERY 8 HOURS
Qty: 0 | Refills: 0 | Status: DISCONTINUED | OUTPATIENT
Start: 2017-06-30 | End: 2017-07-04

## 2017-06-30 RX ORDER — LISINOPRIL 2.5 MG/1
20 TABLET ORAL DAILY
Qty: 0 | Refills: 0 | Status: DISCONTINUED | OUTPATIENT
Start: 2017-07-01 | End: 2017-07-04

## 2017-06-30 RX ORDER — AMLODIPINE BESYLATE 2.5 MG/1
5 TABLET ORAL DAILY
Qty: 0 | Refills: 0 | Status: DISCONTINUED | OUTPATIENT
Start: 2017-06-30 | End: 2017-07-04

## 2017-06-30 RX ADMIN — HEPARIN SODIUM 5000 UNIT(S): 5000 INJECTION INTRAVENOUS; SUBCUTANEOUS at 15:02

## 2017-06-30 RX ADMIN — Medication 30 MILLIGRAM(S): at 21:45

## 2017-06-30 RX ADMIN — Medication 10 UNIT(S): at 17:17

## 2017-06-30 RX ADMIN — HEPARIN SODIUM 5000 UNIT(S): 5000 INJECTION INTRAVENOUS; SUBCUTANEOUS at 21:44

## 2017-06-30 RX ADMIN — LISINOPRIL 10 MILLIGRAM(S): 2.5 TABLET ORAL at 05:20

## 2017-06-30 RX ADMIN — AMLODIPINE BESYLATE 5 MILLIGRAM(S): 2.5 TABLET ORAL at 17:17

## 2017-06-30 RX ADMIN — HEPARIN SODIUM 5000 UNIT(S): 5000 INJECTION INTRAVENOUS; SUBCUTANEOUS at 05:20

## 2017-06-30 RX ADMIN — LISINOPRIL 10 MILLIGRAM(S): 2.5 TABLET ORAL at 10:14

## 2017-06-30 RX ADMIN — Medication 50 MICROGRAM(S): at 05:20

## 2017-06-30 RX ADMIN — Medication 30 MILLIGRAM(S): at 22:20

## 2017-06-30 RX ADMIN — Medication 10 UNIT(S): at 07:53

## 2017-06-30 NOTE — CONSULT NOTE ADULT - SUBJECTIVE AND OBJECTIVE BOX
62y old  Male s/p lap to open cholecystectomy leon drainage moderate bilious volume, with + hida scan for bile leak.  Stable with no major complaints at present.  No nausea, vomiting or diarrhea.  No melena.  No pruritus    PAST MEDICAL & SURGICAL HISTORY:  Unilateral inguinal hernia without obstruction or gangrene, recurrence not specified  Cholecystitis  Hypothyroidism  HTN (hypertension)  DM (diabetes mellitus)  H/O right inguinal hernia repair      MEDICATIONS  (STANDING):  heparin  Injectable 5000 Unit(s) SubCutaneous every 8 hours  levothyroxine 50 MICROGram(s) Oral daily  insulin lispro Injectable (HumaLOG) 10 Unit(s) SubCutaneous three times a day before meals  insulin lispro (HumaLOG) corrective regimen sliding scale   SubCutaneous three times a day before meals  dextrose 5%. 1000 milliLiter(s) (50 mL/Hr) IV Continuous <Continuous>  dextrose 50% Injectable 12.5 Gram(s) IV Push once  dextrose 50% Injectable 25 Gram(s) IV Push once  dextrose 50% Injectable 25 Gram(s) IV Push once    MEDICATIONS  (PRN):  acetaminophen   Tablet 650 milliGRAM(s) Oral every 6 hours PRN For Temp greater than 38.5 C (101.3 F)  oxyCODONE  5 mG/acetaminophen 325 mG 1 Tablet(s) Oral every 4 hours PRN Moderate Pain  morphine  - Injectable 4 milliGRAM(s) IV Push every 4 hours PRN Severe Pain  ondansetron Injectable 4 milliGRAM(s) IV Push every 6 hours PRN Nausea  dextrose Gel 1 Dose(s) Oral once PRN Blood Glucose LESS THAN 70 milliGRAM(s)/deciliter  glucagon  Injectable 1 milliGRAM(s) IntraMuscular once PRN Glucose LESS THAN 70 milligrams/deciliter      Allergies    No Known Allergies    Intolerances        SOCIAL HISTORY:    FAMILY HISTORY:  No pertinent family history in first degree relatives      REVIEW OF SYSTEMS:    CONSTITUTIONAL: No weakness, fevers or chills  EYES/ENT: No visual changes;  No vertigo or throat pain   NECK: No pain or stiffness  RESPIRATORY: No cough, wheezing, hemoptysis; No shortness of breath  CARDIOVASCULAR: No chest pain or palpitations  GENITOURINARY: No dysuria, frequency or hematuria  NEUROLOGICAL: No numbness or weakness  SKIN: No itching, burning, rashes, or lesions   All other review of systems is negative unless indicated above.    Vital Signs Last 24 Hrs  T(C): 37.4 (30 Jun 2017 13:18), Max: 37.4 (30 Jun 2017 13:18)  T(F): 99.4 (30 Jun 2017 13:18), Max: 99.4 (30 Jun 2017 13:18)  HR: 83 (30 Jun 2017 13:18) (83 - 88)  BP: 125/67 (30 Jun 2017 13:18) (125/67 - 166/73)  BP(mean): --  RR: 17 (30 Jun 2017 13:18) (16 - 17)  SpO2: 96% (30 Jun 2017 13:18) (96% - 100%)    PHYSICAL EXAM:    Constitutional: NAD  Respiratory: CTA and P  Cardiovascular: S1 and S2,  Gastrointestinal: BS+, soft, leon drain + bilious  Extremities: No peripheral edema, neg clubing, cyanosis  Vascular: 2+ peripheral pulses  Neurological: A/O x 3,     LABS:  CBC Full  -  ( 30 Jun 2017 10:28 )  WBC Count : 11.0 K/uL  Hemoglobin : 11.7 g/dL  Hematocrit : 34.0 %  Platelet Count - Automated : 231 K/uL  Mean Cell Volume : 86.8 fl  Mean Cell Hemoglobin : 29.9 pg  Mean Cell Hemoglobin Concentration : 34.5 gm/dL  Auto Neutrophil # : x  Auto Lymphocyte # : x  Auto Monocyte # : x  Auto Eosinophil # : x  Auto Basophil # : x  Auto Neutrophil % : x  Auto Lymphocyte % : x  Auto Monocyte % : x  Auto Eosinophil % : x  Auto Basophil % : x    06-30    137  |  101  |  22<H>  ----------------------------<  148<H>  4.5   |  27  |  1.38<H>    Ca    9.1      30 Jun 2017 10:28    TPro  7.6  /  Alb  3.1<L>  /  TBili  0.6  /  DBili  x   /  AST  20  /  ALT  21  /  AlkPhos  158<H>  06-30            RADIOLOGY & ADDITIONAL STUDIES:

## 2017-06-30 NOTE — PROGRESS NOTE ADULT - ASSESSMENT
s/p lap to open cholecystectemy  leon drainage bile tinged  wounds healing well. no evidence of infection or hematoma  tolerating diet

## 2017-06-30 NOTE — PROGRESS NOTE ADULT - SUBJECTIVE AND OBJECTIVE BOX
s/p attempted laparoscopic cholecystectomy converted to open cholecystectomy - gangrenous GB.  No c/o except incisional pain.    VSS: stable; afebrile.    Abdomen:  soft; non distended; incisional tenderness in RUQ; CAMILO drain with serosanguinous bile tinged.  Ext:  No edema or calf tenderness.    WBC 11.2K  Lytes stable.    HIDA scan reveals a bile leak from GB fossa; ? ducts; there is flow into the duodenum.

## 2017-06-30 NOTE — PROGRESS NOTE ADULT - SUBJECTIVE AND OBJECTIVE BOX
pt s- new complaints. no cp no sob. abd pain improved  VSS a/f  A&Ox3  abd: soft minimal incisional tenderness. wounds CDI, THEODORE dressing in place.  CAMILO=65cc bile colored serous drainage.  Ext: No calf swelling or erythema

## 2017-06-30 NOTE — CONSULT NOTE ADULT - ASSESSMENT
Bile leak s/p Open Cholecystectomy s/p CAMILO drain    - npo after 12 am sunday  - ercp monday  - abx  - risks and benefits discussed

## 2017-07-01 LAB
ALBUMIN SERPL ELPH-MCNC: 2.9 G/DL — LOW (ref 3.5–5)
ALP SERPL-CCNC: 135 U/L — HIGH (ref 40–120)
ALT FLD-CCNC: 19 U/L DA — SIGNIFICANT CHANGE UP (ref 10–60)
ANION GAP SERPL CALC-SCNC: 10 MMOL/L — SIGNIFICANT CHANGE UP (ref 5–17)
AST SERPL-CCNC: 14 U/L — SIGNIFICANT CHANGE UP (ref 10–40)
BILIRUB SERPL-MCNC: 0.5 MG/DL — SIGNIFICANT CHANGE UP (ref 0.2–1.2)
BUN SERPL-MCNC: 25 MG/DL — HIGH (ref 7–18)
CALCIUM SERPL-MCNC: 9 MG/DL — SIGNIFICANT CHANGE UP (ref 8.4–10.5)
CHLORIDE SERPL-SCNC: 103 MMOL/L — SIGNIFICANT CHANGE UP (ref 96–108)
CO2 SERPL-SCNC: 24 MMOL/L — SIGNIFICANT CHANGE UP (ref 22–31)
CREAT SERPL-MCNC: 1.38 MG/DL — HIGH (ref 0.5–1.3)
GLUCOSE SERPL-MCNC: 111 MG/DL — HIGH (ref 70–99)
POTASSIUM SERPL-MCNC: 4.3 MMOL/L — SIGNIFICANT CHANGE UP (ref 3.5–5.3)
POTASSIUM SERPL-SCNC: 4.3 MMOL/L — SIGNIFICANT CHANGE UP (ref 3.5–5.3)
PROT SERPL-MCNC: 7.1 G/DL — SIGNIFICANT CHANGE UP (ref 6–8.3)
SODIUM SERPL-SCNC: 137 MMOL/L — SIGNIFICANT CHANGE UP (ref 135–145)

## 2017-07-01 RX ADMIN — Medication 10 UNIT(S): at 16:05

## 2017-07-01 RX ADMIN — HEPARIN SODIUM 5000 UNIT(S): 5000 INJECTION INTRAVENOUS; SUBCUTANEOUS at 06:21

## 2017-07-01 RX ADMIN — Medication: at 16:05

## 2017-07-01 RX ADMIN — HEPARIN SODIUM 5000 UNIT(S): 5000 INJECTION INTRAVENOUS; SUBCUTANEOUS at 21:12

## 2017-07-01 RX ADMIN — Medication 30 MILLIGRAM(S): at 21:48

## 2017-07-01 RX ADMIN — HEPARIN SODIUM 5000 UNIT(S): 5000 INJECTION INTRAVENOUS; SUBCUTANEOUS at 13:01

## 2017-07-01 RX ADMIN — Medication 10 UNIT(S): at 09:23

## 2017-07-01 RX ADMIN — Medication 50 MICROGRAM(S): at 06:21

## 2017-07-01 RX ADMIN — Medication 10 UNIT(S): at 11:45

## 2017-07-01 RX ADMIN — LISINOPRIL 20 MILLIGRAM(S): 2.5 TABLET ORAL at 06:21

## 2017-07-01 RX ADMIN — Medication: at 11:45

## 2017-07-01 RX ADMIN — Medication 30 MILLIGRAM(S): at 21:12

## 2017-07-01 RX ADMIN — AMLODIPINE BESYLATE 5 MILLIGRAM(S): 2.5 TABLET ORAL at 06:21

## 2017-07-01 NOTE — CHART NOTE - NSCHARTNOTEFT_GEN_A_CORE
ERCP on Monday.    Pt w/o complaint. Tolerating clear liquid diet. ERCP on Monday.    Pt w/o complaint. Tolerating reg diet.

## 2017-07-02 LAB
ANION GAP SERPL CALC-SCNC: 9 MMOL/L — SIGNIFICANT CHANGE UP (ref 5–17)
BASOPHILS # BLD AUTO: 0.1 K/UL — SIGNIFICANT CHANGE UP (ref 0–0.2)
BASOPHILS NFR BLD AUTO: 0.9 % — SIGNIFICANT CHANGE UP (ref 0–2)
BUN SERPL-MCNC: 31 MG/DL — HIGH (ref 7–18)
CALCIUM SERPL-MCNC: 8.8 MG/DL — SIGNIFICANT CHANGE UP (ref 8.4–10.5)
CHLORIDE SERPL-SCNC: 105 MMOL/L — SIGNIFICANT CHANGE UP (ref 96–108)
CO2 SERPL-SCNC: 26 MMOL/L — SIGNIFICANT CHANGE UP (ref 22–31)
CREAT SERPL-MCNC: 1.53 MG/DL — HIGH (ref 0.5–1.3)
EOSINOPHIL # BLD AUTO: 0.7 K/UL — HIGH (ref 0–0.5)
EOSINOPHIL NFR BLD AUTO: 6.4 % — HIGH (ref 0–6)
GLUCOSE SERPL-MCNC: 104 MG/DL — HIGH (ref 70–99)
HCT VFR BLD CALC: 30.9 % — LOW (ref 39–50)
HGB BLD-MCNC: 10.6 G/DL — LOW (ref 13–17)
LYMPHOCYTES # BLD AUTO: 19.8 % — SIGNIFICANT CHANGE UP (ref 13–44)
LYMPHOCYTES # BLD AUTO: 2.2 K/UL — SIGNIFICANT CHANGE UP (ref 1–3.3)
MCHC RBC-ENTMCNC: 29.9 PG — SIGNIFICANT CHANGE UP (ref 27–34)
MCHC RBC-ENTMCNC: 34.2 GM/DL — SIGNIFICANT CHANGE UP (ref 32–36)
MCV RBC AUTO: 87.5 FL — SIGNIFICANT CHANGE UP (ref 80–100)
MONOCYTES # BLD AUTO: 0.9 K/UL — SIGNIFICANT CHANGE UP (ref 0–0.9)
MONOCYTES NFR BLD AUTO: 8.4 % — SIGNIFICANT CHANGE UP (ref 2–14)
NEUTROPHILS # BLD AUTO: 7.2 K/UL — SIGNIFICANT CHANGE UP (ref 1.8–7.4)
NEUTROPHILS NFR BLD AUTO: 64.5 % — SIGNIFICANT CHANGE UP (ref 43–77)
PLATELET # BLD AUTO: 221 K/UL — SIGNIFICANT CHANGE UP (ref 150–400)
POTASSIUM SERPL-MCNC: 4.5 MMOL/L — SIGNIFICANT CHANGE UP (ref 3.5–5.3)
POTASSIUM SERPL-SCNC: 4.5 MMOL/L — SIGNIFICANT CHANGE UP (ref 3.5–5.3)
RBC # BLD: 3.54 M/UL — LOW (ref 4.2–5.8)
RBC # FLD: 13.3 % — SIGNIFICANT CHANGE UP (ref 10.3–14.5)
SODIUM SERPL-SCNC: 140 MMOL/L — SIGNIFICANT CHANGE UP (ref 135–145)
WBC # BLD: 11.1 K/UL — HIGH (ref 3.8–10.5)
WBC # FLD AUTO: 11.1 K/UL — HIGH (ref 3.8–10.5)

## 2017-07-02 RX ADMIN — Medication 30 MILLIGRAM(S): at 16:45

## 2017-07-02 RX ADMIN — Medication 50 MICROGRAM(S): at 05:20

## 2017-07-02 RX ADMIN — HEPARIN SODIUM 5000 UNIT(S): 5000 INJECTION INTRAVENOUS; SUBCUTANEOUS at 13:46

## 2017-07-02 RX ADMIN — HEPARIN SODIUM 5000 UNIT(S): 5000 INJECTION INTRAVENOUS; SUBCUTANEOUS at 21:50

## 2017-07-02 RX ADMIN — Medication 10 UNIT(S): at 11:37

## 2017-07-02 RX ADMIN — AMLODIPINE BESYLATE 5 MILLIGRAM(S): 2.5 TABLET ORAL at 05:20

## 2017-07-02 RX ADMIN — HEPARIN SODIUM 5000 UNIT(S): 5000 INJECTION INTRAVENOUS; SUBCUTANEOUS at 05:20

## 2017-07-02 RX ADMIN — Medication 30 MILLIGRAM(S): at 17:51

## 2017-07-02 RX ADMIN — Medication 10 UNIT(S): at 07:58

## 2017-07-02 RX ADMIN — Medication 10 UNIT(S): at 16:35

## 2017-07-02 RX ADMIN — LISINOPRIL 20 MILLIGRAM(S): 2.5 TABLET ORAL at 05:20

## 2017-07-02 NOTE — PROGRESS NOTE ADULT - SUBJECTIVE AND OBJECTIVE BOX
Post Operative Day #: 5    SUBJECTIVE: 62y Male s/p lap converted to open cholecystectomy 6/28 with leak on HIDA.    INTERVAL HPI/OVERNIGHT EVENTS:    Flatus:        y    Bowel Movement: y    Pain Control Adequate: y    Nausea:      n     Vomiting: n    Diet: reg, tolerating      MEDICATIONS  (STANDING):  heparin  Injectable 5000 Unit(s) SubCutaneous every 8 hours  levothyroxine 50 MICROGram(s) Oral daily  insulin lispro Injectable (HumaLOG) 10 Unit(s) SubCutaneous three times a day before meals  insulin lispro (HumaLOG) corrective regimen sliding scale   SubCutaneous three times a day before meals  dextrose 5%. 1000 milliLiter(s) (50 mL/Hr) IV Continuous <Continuous>  dextrose 50% Injectable 12.5 Gram(s) IV Push once  dextrose 50% Injectable 25 Gram(s) IV Push once  dextrose 50% Injectable 25 Gram(s) IV Push once  lisinopril 20 milliGRAM(s) Oral daily  amLODIPine   Tablet 5 milliGRAM(s) Oral daily    MEDICATIONS  (PRN):  acetaminophen   Tablet 650 milliGRAM(s) Oral every 6 hours PRN For Temp greater than 38.5 C (101.3 F)  ondansetron Injectable 4 milliGRAM(s) IV Push every 6 hours PRN Nausea  dextrose Gel 1 Dose(s) Oral once PRN Blood Glucose LESS THAN 70 milliGRAM(s)/deciliter  glucagon  Injectable 1 milliGRAM(s) IntraMuscular once PRN Glucose LESS THAN 70 milligrams/deciliter  ketorolac   Injectable 30 milliGRAM(s) IV Push every 8 hours PRN Moderate Pain (4 - 6)      OBJECTIVE:  Vital Signs Last 24 Hrs  T(C): 36.7 (02 Jul 2017 06:00), Max: 37.3 (01 Jul 2017 22:17)  T(F): 98.1 (02 Jul 2017 06:00), Max: 99.2 (01 Jul 2017 22:17)  HR: 82 (02 Jul 2017 06:00) (82 - 84)  BP: 152/57 (02 Jul 2017 06:00) (141/62 - 152/57)  BP(mean): --  RR: 16 (02 Jul 2017 06:00) (16 - 16)  SpO2: 98% (02 Jul 2017 06:00) (98% - 100%)  I&O's Detail    01 Jul 2017 07:01  -  02 Jul 2017 07:00  --------------------------------------------------------  IN:  Total IN: 0 mL    OUT:    Bulb: 180 mL  Total OUT: 180 mL    Total NET: -180 mL                                10.6   11.1  )-----------( 221      ( 02 Jul 2017 07:44 )             30.9     02 Jul 2017 07:44    140    |  105    |  31     ----------------------------<  104    4.5     |  26     |  1.53   01 Jul 2017 08:24    137    |  103    |  25     ----------------------------<  111    4.3     |  24     |  1.38   30 Jun 2017 10:28    137    |  101    |  22     ----------------------------<  148    4.5     |  27     |  1.38     Ca    8.8        02 Jul 2017 07:44  Ca    9.0        01 Jul 2017 08:24  Ca    9.1        30 Jun 2017 10:28    TPro  7.1    /  Alb  2.9    /  TBili  0.5    /  DBili  x      /  AST  14     /  ALT  19     /  AlkPhos  135    01 Jul 2017 08:24  TPro  7.6    /  Alb  3.1    /  TBili  0.6    /  DBili  x      /  AST  20     /  ALT  21     /  AlkPhos  158    30 Jun 2017 10:28        Physical Exam:    Gen: AAOx 3  Abdomen: THEODORE dressing in place, surgical wounds dressed. CAMILO bilious 180ml  Extremities: warm to touch no c/ce Post Operative Day #: 4    SUBJECTIVE: 62y Male s/p lap converted to open cholecystectomy 6/28 with leak on HIDA.    INTERVAL HPI/OVERNIGHT EVENTS:    Flatus:        y    Bowel Movement: y    Pain Control Adequate: y    Nausea:      n     Vomiting: n    Diet: reg, tolerating      MEDICATIONS  (STANDING):  heparin  Injectable 5000 Unit(s) SubCutaneous every 8 hours  levothyroxine 50 MICROGram(s) Oral daily  insulin lispro Injectable (HumaLOG) 10 Unit(s) SubCutaneous three times a day before meals  insulin lispro (HumaLOG) corrective regimen sliding scale   SubCutaneous three times a day before meals  dextrose 5%. 1000 milliLiter(s) (50 mL/Hr) IV Continuous <Continuous>  dextrose 50% Injectable 12.5 Gram(s) IV Push once  dextrose 50% Injectable 25 Gram(s) IV Push once  dextrose 50% Injectable 25 Gram(s) IV Push once  lisinopril 20 milliGRAM(s) Oral daily  amLODIPine   Tablet 5 milliGRAM(s) Oral daily    MEDICATIONS  (PRN):  acetaminophen   Tablet 650 milliGRAM(s) Oral every 6 hours PRN For Temp greater than 38.5 C (101.3 F)  ondansetron Injectable 4 milliGRAM(s) IV Push every 6 hours PRN Nausea  dextrose Gel 1 Dose(s) Oral once PRN Blood Glucose LESS THAN 70 milliGRAM(s)/deciliter  glucagon  Injectable 1 milliGRAM(s) IntraMuscular once PRN Glucose LESS THAN 70 milligrams/deciliter  ketorolac   Injectable 30 milliGRAM(s) IV Push every 8 hours PRN Moderate Pain (4 - 6)      OBJECTIVE:  Vital Signs Last 24 Hrs  T(C): 36.7 (02 Jul 2017 06:00), Max: 37.3 (01 Jul 2017 22:17)  T(F): 98.1 (02 Jul 2017 06:00), Max: 99.2 (01 Jul 2017 22:17)  HR: 82 (02 Jul 2017 06:00) (82 - 84)  BP: 152/57 (02 Jul 2017 06:00) (141/62 - 152/57)  BP(mean): --  RR: 16 (02 Jul 2017 06:00) (16 - 16)  SpO2: 98% (02 Jul 2017 06:00) (98% - 100%)  I&O's Detail    01 Jul 2017 07:01  -  02 Jul 2017 07:00  --------------------------------------------------------  IN:  Total IN: 0 mL    OUT:    Bulb: 180 mL  Total OUT: 180 mL    Total NET: -180 mL                                10.6   11.1  )-----------( 221      ( 02 Jul 2017 07:44 )             30.9     02 Jul 2017 07:44    140    |  105    |  31     ----------------------------<  104    4.5     |  26     |  1.53   01 Jul 2017 08:24    137    |  103    |  25     ----------------------------<  111    4.3     |  24     |  1.38   30 Jun 2017 10:28    137    |  101    |  22     ----------------------------<  148    4.5     |  27     |  1.38     Ca    8.8        02 Jul 2017 07:44  Ca    9.0        01 Jul 2017 08:24  Ca    9.1        30 Jun 2017 10:28    TPro  7.1    /  Alb  2.9    /  TBili  0.5    /  DBili  x      /  AST  14     /  ALT  19     /  AlkPhos  135    01 Jul 2017 08:24  TPro  7.6    /  Alb  3.1    /  TBili  0.6    /  DBili  x      /  AST  20     /  ALT  21     /  AlkPhos  158    30 Jun 2017 10:28        Physical Exam:    Gen: AAOx 3  Abdomen: THEODORE dressing in place, surgical wounds dressed. CAMILO bilious 180ml  Extremities: warm to touch no c/ce

## 2017-07-03 LAB
ANION GAP SERPL CALC-SCNC: 10 MMOL/L — SIGNIFICANT CHANGE UP (ref 5–17)
APTT BLD: 36.5 SEC — SIGNIFICANT CHANGE UP (ref 27.5–37.4)
BASOPHILS # BLD AUTO: 0.1 K/UL — SIGNIFICANT CHANGE UP (ref 0–0.2)
BASOPHILS NFR BLD AUTO: 0.8 % — SIGNIFICANT CHANGE UP (ref 0–2)
BUN SERPL-MCNC: 36 MG/DL — HIGH (ref 7–18)
CALCIUM SERPL-MCNC: 8.7 MG/DL — SIGNIFICANT CHANGE UP (ref 8.4–10.5)
CHLORIDE SERPL-SCNC: 106 MMOL/L — SIGNIFICANT CHANGE UP (ref 96–108)
CO2 SERPL-SCNC: 23 MMOL/L — SIGNIFICANT CHANGE UP (ref 22–31)
CREAT SERPL-MCNC: 1.52 MG/DL — HIGH (ref 0.5–1.3)
EOSINOPHIL # BLD AUTO: 0.8 K/UL — HIGH (ref 0–0.5)
EOSINOPHIL NFR BLD AUTO: 7.6 % — HIGH (ref 0–6)
GLUCOSE SERPL-MCNC: 89 MG/DL — SIGNIFICANT CHANGE UP (ref 70–99)
HCT VFR BLD CALC: 31.8 % — LOW (ref 39–50)
HGB BLD-MCNC: 10.8 G/DL — LOW (ref 13–17)
INR BLD: 1.04 RATIO — SIGNIFICANT CHANGE UP (ref 0.88–1.16)
LYMPHOCYTES # BLD AUTO: 2.3 K/UL — SIGNIFICANT CHANGE UP (ref 1–3.3)
LYMPHOCYTES # BLD AUTO: 23.1 % — SIGNIFICANT CHANGE UP (ref 13–44)
MCHC RBC-ENTMCNC: 29.6 PG — SIGNIFICANT CHANGE UP (ref 27–34)
MCHC RBC-ENTMCNC: 33.9 GM/DL — SIGNIFICANT CHANGE UP (ref 32–36)
MCV RBC AUTO: 87.4 FL — SIGNIFICANT CHANGE UP (ref 80–100)
MONOCYTES # BLD AUTO: 0.7 K/UL — SIGNIFICANT CHANGE UP (ref 0–0.9)
MONOCYTES NFR BLD AUTO: 7.2 % — SIGNIFICANT CHANGE UP (ref 2–14)
NEUTROPHILS # BLD AUTO: 6.2 K/UL — SIGNIFICANT CHANGE UP (ref 1.8–7.4)
NEUTROPHILS NFR BLD AUTO: 61.3 % — SIGNIFICANT CHANGE UP (ref 43–77)
PLATELET # BLD AUTO: 226 K/UL — SIGNIFICANT CHANGE UP (ref 150–400)
POTASSIUM SERPL-MCNC: 4.5 MMOL/L — SIGNIFICANT CHANGE UP (ref 3.5–5.3)
POTASSIUM SERPL-SCNC: 4.5 MMOL/L — SIGNIFICANT CHANGE UP (ref 3.5–5.3)
PROTHROM AB SERPL-ACNC: 11.4 SEC — SIGNIFICANT CHANGE UP (ref 9.8–12.7)
RBC # BLD: 3.64 M/UL — LOW (ref 4.2–5.8)
RBC # FLD: 13.6 % — SIGNIFICANT CHANGE UP (ref 10.3–14.5)
SODIUM SERPL-SCNC: 139 MMOL/L — SIGNIFICANT CHANGE UP (ref 135–145)
WBC # BLD: 10.1 K/UL — SIGNIFICANT CHANGE UP (ref 3.8–10.5)
WBC # FLD AUTO: 10.1 K/UL — SIGNIFICANT CHANGE UP (ref 3.8–10.5)

## 2017-07-03 RX ORDER — GLUCAGON INJECTION, SOLUTION 0.5 MG/.1ML
1 INJECTION, SOLUTION SUBCUTANEOUS ONCE
Qty: 0 | Refills: 0 | Status: DISCONTINUED | OUTPATIENT
Start: 2017-07-03 | End: 2017-07-04

## 2017-07-03 RX ORDER — HYDROMORPHONE HYDROCHLORIDE 2 MG/ML
0.5 INJECTION INTRAMUSCULAR; INTRAVENOUS; SUBCUTANEOUS
Qty: 0 | Refills: 0 | Status: DISCONTINUED | OUTPATIENT
Start: 2017-07-03 | End: 2017-07-03

## 2017-07-03 RX ORDER — ONDANSETRON 8 MG/1
4 TABLET, FILM COATED ORAL ONCE
Qty: 0 | Refills: 0 | Status: DISCONTINUED | OUTPATIENT
Start: 2017-07-03 | End: 2017-07-03

## 2017-07-03 RX ORDER — HYDRALAZINE HCL 50 MG
7.5 TABLET ORAL ONCE
Qty: 0 | Refills: 0 | Status: COMPLETED | OUTPATIENT
Start: 2017-07-03 | End: 2017-07-03

## 2017-07-03 RX ORDER — SODIUM CHLORIDE 9 MG/ML
1000 INJECTION, SOLUTION INTRAVENOUS
Qty: 0 | Refills: 0 | Status: DISCONTINUED | OUTPATIENT
Start: 2017-07-03 | End: 2017-07-04

## 2017-07-03 RX ORDER — INDOMETHACIN 50 MG
100 CAPSULE ORAL ONCE
Qty: 0 | Refills: 0 | Status: COMPLETED | OUTPATIENT
Start: 2017-07-03 | End: 2017-07-03

## 2017-07-03 RX ORDER — SODIUM CHLORIDE 9 MG/ML
1000 INJECTION INTRAMUSCULAR; INTRAVENOUS; SUBCUTANEOUS
Qty: 0 | Refills: 0 | Status: DISCONTINUED | OUTPATIENT
Start: 2017-07-03 | End: 2017-07-03

## 2017-07-03 RX ADMIN — Medication 2: at 17:49

## 2017-07-03 RX ADMIN — LISINOPRIL 20 MILLIGRAM(S): 2.5 TABLET ORAL at 05:37

## 2017-07-03 RX ADMIN — Medication 100 MILLIGRAM(S): at 15:48

## 2017-07-03 RX ADMIN — HEPARIN SODIUM 5000 UNIT(S): 5000 INJECTION INTRAVENOUS; SUBCUTANEOUS at 05:37

## 2017-07-03 RX ADMIN — HYDROMORPHONE HYDROCHLORIDE 0.5 MILLIGRAM(S): 2 INJECTION INTRAMUSCULAR; INTRAVENOUS; SUBCUTANEOUS at 15:46

## 2017-07-03 RX ADMIN — HYDROMORPHONE HYDROCHLORIDE 0.5 MILLIGRAM(S): 2 INJECTION INTRAMUSCULAR; INTRAVENOUS; SUBCUTANEOUS at 15:10

## 2017-07-03 RX ADMIN — SODIUM CHLORIDE 125 MILLILITER(S): 9 INJECTION, SOLUTION INTRAVENOUS at 20:57

## 2017-07-03 RX ADMIN — SODIUM CHLORIDE 100 MILLILITER(S): 9 INJECTION INTRAMUSCULAR; INTRAVENOUS; SUBCUTANEOUS at 15:46

## 2017-07-03 RX ADMIN — Medication 7.5 MILLIGRAM(S): at 16:18

## 2017-07-03 RX ADMIN — AMLODIPINE BESYLATE 5 MILLIGRAM(S): 2.5 TABLET ORAL at 05:37

## 2017-07-03 RX ADMIN — Medication 50 MICROGRAM(S): at 05:37

## 2017-07-03 RX ADMIN — Medication 30 MILLIGRAM(S): at 18:30

## 2017-07-03 RX ADMIN — Medication 30 MILLIGRAM(S): at 17:50

## 2017-07-03 RX ADMIN — HEPARIN SODIUM 5000 UNIT(S): 5000 INJECTION INTRAVENOUS; SUBCUTANEOUS at 21:18

## 2017-07-03 NOTE — PROGRESS NOTE ADULT - SUBJECTIVE AND OBJECTIVE BOX
Post Operative Day #: 5    SUBJECTIVE: 62y Male s/p lap converted to open cholecystectomy 6/28 with leak on HIDA.    INTERVAL HPI/OVERNIGHT EVENTS:    Flatus:        y    Bowel Movement: y    Pain Control Adequate: y    Nausea:      n     Vomiting: n    Diet: reg, tolerating      MEDICATIONS  (STANDING):  heparin  Injectable 5000 Unit(s) SubCutaneous every 8 hours  levothyroxine 50 MICROGram(s) Oral daily  insulin lispro Injectable (HumaLOG) 10 Unit(s) SubCutaneous three times a day before meals  insulin lispro (HumaLOG) corrective regimen sliding scale   SubCutaneous three times a day before meals  dextrose 5%. 1000 milliLiter(s) (50 mL/Hr) IV Continuous <Continuous>  dextrose 50% Injectable 12.5 Gram(s) IV Push once  dextrose 50% Injectable 25 Gram(s) IV Push once  dextrose 50% Injectable 25 Gram(s) IV Push once  lisinopril 20 milliGRAM(s) Oral daily  amLODIPine   Tablet 5 milliGRAM(s) Oral daily    MEDICATIONS  (PRN):  acetaminophen   Tablet 650 milliGRAM(s) Oral every 6 hours PRN For Temp greater than 38.5 C (101.3 F)  ondansetron Injectable 4 milliGRAM(s) IV Push every 6 hours PRN Nausea  dextrose Gel 1 Dose(s) Oral once PRN Blood Glucose LESS THAN 70 milliGRAM(s)/deciliter  glucagon  Injectable 1 milliGRAM(s) IntraMuscular once PRN Glucose LESS THAN 70 milligrams/deciliter  ketorolac   Injectable 30 milliGRAM(s) IV Push every 8 hours PRN Moderate Pain (4 - 6)      OBJECTIVE:  Vital Signs Last 24 Hrs  T(C): 36.9 (03 Jul 2017 05:55), Max: 36.9 (02 Jul 2017 14:14)  T(F): 98.5 (03 Jul 2017 05:55), Max: 98.5 (03 Jul 2017 05:55)  HR: 79 (03 Jul 2017 05:55) (73 - 79)  BP: 156/65 (03 Jul 2017 05:55) (114/57 - 156/65)  BP(mean): --  RR: 16 (03 Jul 2017 05:55) (16 - 16)  SpO2: 97% (03 Jul 2017 05:55) (97% - 100%)                              10.8   10.1  )-----------( 226      ( 03 Jul 2017 06:25 )             31.8   07-03    139  |  106  |  36<H>  ----------------------------<  89  4.5   |  23  |  1.52<H>    Ca    8.7      03 Jul 2017 06:25    TPro  7.1  /  Alb  2.9<L>  /  TBili  0.5  /  DBili  x   /  AST  14  /  ALT  19  /  AlkPhos  135<H>  07-01        Physical Exam:    Gen: AAOx 3  Abdomen: THEODORE dressing removed, surgical wounds dressed. CAMILO bilious 140ml  Extremities: warm to touch no c/ce

## 2017-07-04 VITALS
TEMPERATURE: 99 F | OXYGEN SATURATION: 100 % | RESPIRATION RATE: 17 BRPM | DIASTOLIC BLOOD PRESSURE: 63 MMHG | HEART RATE: 80 BPM | SYSTOLIC BLOOD PRESSURE: 160 MMHG

## 2017-07-04 LAB
ALBUMIN SERPL ELPH-MCNC: 3.1 G/DL — LOW (ref 3.5–5)
ALP SERPL-CCNC: 236 U/L — HIGH (ref 40–120)
ALT FLD-CCNC: 37 U/L DA — SIGNIFICANT CHANGE UP (ref 10–60)
ANION GAP SERPL CALC-SCNC: 9 MMOL/L — SIGNIFICANT CHANGE UP (ref 5–17)
AST SERPL-CCNC: 26 U/L — SIGNIFICANT CHANGE UP (ref 10–40)
BILIRUB SERPL-MCNC: 0.5 MG/DL — SIGNIFICANT CHANGE UP (ref 0.2–1.2)
BUN SERPL-MCNC: 24 MG/DL — HIGH (ref 7–18)
CALCIUM SERPL-MCNC: 8.8 MG/DL — SIGNIFICANT CHANGE UP (ref 8.4–10.5)
CHLORIDE SERPL-SCNC: 103 MMOL/L — SIGNIFICANT CHANGE UP (ref 96–108)
CO2 SERPL-SCNC: 24 MMOL/L — SIGNIFICANT CHANGE UP (ref 22–31)
CREAT SERPL-MCNC: 1.24 MG/DL — SIGNIFICANT CHANGE UP (ref 0.5–1.3)
GLUCOSE SERPL-MCNC: 175 MG/DL — HIGH (ref 70–99)
HCT VFR BLD CALC: 31.5 % — LOW (ref 39–50)
HGB BLD-MCNC: 10.7 G/DL — LOW (ref 13–17)
MCHC RBC-ENTMCNC: 29.6 PG — SIGNIFICANT CHANGE UP (ref 27–34)
MCHC RBC-ENTMCNC: 34.1 GM/DL — SIGNIFICANT CHANGE UP (ref 32–36)
MCV RBC AUTO: 86.8 FL — SIGNIFICANT CHANGE UP (ref 80–100)
PLATELET # BLD AUTO: 288 K/UL — SIGNIFICANT CHANGE UP (ref 150–400)
POTASSIUM SERPL-MCNC: 4.2 MMOL/L — SIGNIFICANT CHANGE UP (ref 3.5–5.3)
POTASSIUM SERPL-SCNC: 4.2 MMOL/L — SIGNIFICANT CHANGE UP (ref 3.5–5.3)
PROT SERPL-MCNC: 7.3 G/DL — SIGNIFICANT CHANGE UP (ref 6–8.3)
RBC # BLD: 3.62 M/UL — LOW (ref 4.2–5.8)
RBC # FLD: 13 % — SIGNIFICANT CHANGE UP (ref 10.3–14.5)
SODIUM SERPL-SCNC: 136 MMOL/L — SIGNIFICANT CHANGE UP (ref 135–145)
WBC # BLD: 10.3 K/UL — SIGNIFICANT CHANGE UP (ref 3.8–10.5)
WBC # FLD AUTO: 10.3 K/UL — SIGNIFICANT CHANGE UP (ref 3.8–10.5)

## 2017-07-04 PROCEDURE — 76000 FLUOROSCOPY <1 HR PHYS/QHP: CPT

## 2017-07-04 PROCEDURE — 85027 COMPLETE CBC AUTOMATED: CPT

## 2017-07-04 PROCEDURE — A9537: CPT

## 2017-07-04 PROCEDURE — 85610 PROTHROMBIN TIME: CPT

## 2017-07-04 PROCEDURE — 85730 THROMBOPLASTIN TIME PARTIAL: CPT

## 2017-07-04 PROCEDURE — 80053 COMPREHEN METABOLIC PANEL: CPT

## 2017-07-04 PROCEDURE — C1769: CPT

## 2017-07-04 PROCEDURE — C1889: CPT

## 2017-07-04 PROCEDURE — 78226 HEPATOBILIARY SYSTEM IMAGING: CPT

## 2017-07-04 PROCEDURE — C1874: CPT

## 2017-07-04 PROCEDURE — 80048 BASIC METABOLIC PNL TOTAL CA: CPT

## 2017-07-04 PROCEDURE — 88304 TISSUE EXAM BY PATHOLOGIST: CPT

## 2017-07-04 RX ORDER — OXYCODONE HYDROCHLORIDE 5 MG/1
1 TABLET ORAL
Qty: 16 | Refills: 0
Start: 2017-07-04 | End: 2017-07-08

## 2017-07-04 RX ADMIN — LISINOPRIL 20 MILLIGRAM(S): 2.5 TABLET ORAL at 05:52

## 2017-07-04 RX ADMIN — SODIUM CHLORIDE 125 MILLILITER(S): 9 INJECTION, SOLUTION INTRAVENOUS at 05:55

## 2017-07-04 RX ADMIN — Medication 10 UNIT(S): at 08:08

## 2017-07-04 RX ADMIN — Medication 2: at 08:08

## 2017-07-04 RX ADMIN — HEPARIN SODIUM 5000 UNIT(S): 5000 INJECTION INTRAVENOUS; SUBCUTANEOUS at 05:52

## 2017-07-04 RX ADMIN — Medication 50 MICROGRAM(S): at 05:52

## 2017-07-04 RX ADMIN — AMLODIPINE BESYLATE 5 MILLIGRAM(S): 2.5 TABLET ORAL at 05:52

## 2017-07-04 NOTE — DISCHARGE NOTE ADULT - PLAN OF CARE
resume diet and nl activities f/u with Dr. Sellers on Monday at [place] [facility]. You can walk in. No need for an appointment.  Ambulate as tolerated. Avoid heavy lifting x 3 weeks. Empty and measure CAMILO contents daily. Eat a healthy well balanced diet

## 2017-07-04 NOTE — DISCHARGE NOTE ADULT - MEDICATION SUMMARY - MEDICATIONS TO TAKE
I will START or STAY ON the medications listed below when I get home from the hospital:    acetaminophen-oxycodone 325 mg-5 mg oral tablet  -- 1 tab(s) by mouth every 6 hours, As Needed -for moderate pain MDD:4 tablets  -- Caution federal law prohibits the transfer of this drug to any person other  than the person for whom it was prescribed.  May cause drowsiness.  Alcohol may intensify this effect.  Use care when operating dangerous machinery.  This prescription cannot be refilled.  This product contains acetaminophen.  Do not use  with any other product containing acetaminophen to prevent possible liver damage.  Using more of this medication than prescribed may cause serious breathing problems.    -- Indication: For Acute cholecystitis    insulin aspart 100 units/mL subcutaneous solution  -- 10 unit(s) subcutaneous 3 times a day (with meals)  -- Indication: For As prescribed    Januvia 50 mg oral tablet  -- 1 tab(s) by mouth once a day  -- Indication: For As prescribed    levothyroxine 50 mcg (0.05 mg) oral tablet  -- 1 tab(s) by mouth once a day  -- Indication: For As prescribed

## 2017-07-04 NOTE — PROGRESS NOTE ADULT - ASSESSMENT
h/o gangrenous cholecystitis s/p lap converted to open cholecystectomy 6/28 c/b bile leak s/p ERCP 7/3. Decreased output from CAMILO

## 2017-07-04 NOTE — DISCHARGE NOTE ADULT - CARE PLAN
Principal Discharge DX:	Cholecystitis  Goal:	resume diet and nl activities  Instructions for follow-up, activity and diet:	f/u with Dr. Sellers on Monday at [place] [facility]. You can walk in. No need for an appointment.  Ambulate as tolerated. Avoid heavy lifting x 3 weeks. Empty and measure CAMILO contents daily. Eat a healthy well balanced diet Principal Discharge DX:	Cholecystitis  Goal:	resume diet and nl activities  Instructions for follow-up, activity and diet:	f/u with Dr. Sellers on Monday at [place] [facility]. You can walk in. No need for an appointment.  Ambulate as tolerated. Avoid heavy lifting x 3 weeks. Empty and measure CAMILO contents daily. Eat a healthy well balanced diet

## 2017-07-04 NOTE — DISCHARGE NOTE ADULT - CARE PROVIDER_API CALL
Oswald Sellers), ColonRectal Surgery; Surgery  [id number] [address] [place] [address]  Phone: [phone]  Fax: [phone]

## 2017-07-04 NOTE — DISCHARGE NOTE ADULT - HOSPITAL COURSE
h/o cholecystitis s/p lap converted to open cholecystectomy for gangrenous cholecystitis on 6/28. Pt developed hepatobiliary leak post-operatively diagnosed via HIDA scan. Pt is s/p ERCP with sphincterotomy and stent placement.    Pt tolerated regular diet and is stable for discharge home with  CAMILO

## 2017-07-04 NOTE — DISCHARGE NOTE ADULT - PATIENT PORTAL LINK FT
“You can access the Follow[facility] Patient Portal, offered by [facility], by registering with the following website: [email or web address]”

## 2017-07-04 NOTE — PROGRESS NOTE ADULT - SUBJECTIVE AND OBJECTIVE BOX
INTERVAL HPI/OVERNIGHT EVENTS:  s/p ERCP with sphincterotomy and wall stent placement 7/3  Pt states feeling well. Tolerating diet. +flatus/BM      MEDICATIONS  (STANDING):  heparin  Injectable 5000 Unit(s) SubCutaneous every 8 hours  levothyroxine 50 MICROGram(s) Oral daily  insulin lispro Injectable (HumaLOG) 10 Unit(s) SubCutaneous three times a day before meals  insulin lispro (HumaLOG) corrective regimen sliding scale   SubCutaneous three times a day before meals  dextrose 5%. 1000 milliLiter(s) (50 mL/Hr) IV Continuous <Continuous>  dextrose 50% Injectable 12.5 Gram(s) IV Push once  dextrose 50% Injectable 25 Gram(s) IV Push once  dextrose 50% Injectable 25 Gram(s) IV Push once  lisinopril 20 milliGRAM(s) Oral daily  amLODIPine   Tablet 5 milliGRAM(s) Oral daily  dextrose 5% + sodium chloride 0.45% 1000 milliLiter(s) (125 mL/Hr) IV Continuous <Continuous>  glucagon  Injectable 1 milliGRAM(s) IV Push once    MEDICATIONS  (PRN):  acetaminophen   Tablet 650 milliGRAM(s) Oral every 6 hours PRN For Temp greater than 38.5 C (101.3 F)  ondansetron Injectable 4 milliGRAM(s) IV Push every 6 hours PRN Nausea  dextrose Gel 1 Dose(s) Oral once PRN Blood Glucose LESS THAN 70 milliGRAM(s)/deciliter  glucagon  Injectable 1 milliGRAM(s) IntraMuscular once PRN Glucose LESS THAN 70 milligrams/deciliter  ketorolac   Injectable 30 milliGRAM(s) IV Push every 8 hours PRN Moderate Pain (4 - 6)      OBJECTIVE:  Vital Signs Last 24 Hrs  T(C): 36.8 (04 Jul 2017 05:16), Max: 37 (03 Jul 2017 14:15)  T(F): 98.2 (04 Jul 2017 05:16), Max: 98.6 (03 Jul 2017 14:15)  HR: 71 (04 Jul 2017 05:16) (71 - 93)  BP: 163/69 (04 Jul 2017 05:16) (142/59 - 179/78)  BP(mean): --  RR: 16 (04 Jul 2017 05:16) (14 - 19)  SpO2: 98% (04 Jul 2017 05:16) (98% - 100%)  I&O's Detail    03 Jul 2017 07:01  -  04 Jul 2017 07:00  --------------------------------------------------------  IN:    Lactated Ringers IV Bolus: 700 mL  Total IN: 700 mL    OUT:    Bulb: 100 mL  Total OUT: 100 mL    Total NET: 600 mL                                10.7   10.3  )-----------( 288      ( 04 Jul 2017 07:32 )             31.5     04 Jul 2017 07:32    136    |  103    |  24     ----------------------------<  175    4.2     |  24     |  1.24   03 Jul 2017 06:25    139    |  106    |  36     ----------------------------<  89     4.5     |  23     |  1.52   02 Jul 2017 07:44    140    |  105    |  31     ----------------------------<  104    4.5     |  26     |  1.53     Ca    8.8        04 Jul 2017 07:32  Ca    8.7        03 Jul 2017 06:25  Ca    8.8        02 Jul 2017 07:44    TPro  7.3    /  Alb  3.1    /  TBili  0.5    /  DBili  x      /  AST  26     /  ALT  37     /  AlkPhos  236    04 Jul 2017 07:32    PT/INR - ( 03 Jul 2017 06:25 )   PT: 11.4 sec;   INR: 1.04 ratio         PTT - ( 03 Jul 2017 06:25 )  PTT:36.5 sec    Physical Exam:    Gen: awake, alert oriented NAD  Abdomen: CAMILO in place bilious output (100cc), Abdominal wound stapled c/d/i  Extremities: warm to touch no c/c/e

## 2017-07-04 NOTE — PROGRESS NOTE ADULT - PROBLEM SELECTOR PLAN 1
1. NPO  2.ERCP  3.IVF hydration for acute renal insufficiency  4 DVT prophylaxis
1- d/c tang for TOV  2- clear liquids, possibly adv later today  3- oob/ambulate
1. check coags in AM  2. NPO after midnight  3. keep CAMILO  4 DVT prophylaxis
1. for discharge home today with CAMILO  2. f/u with Dr Arreola (GI) in 2 months  3. d/w Dr. Sellers and agreed.
Clear liquid diet, advance In AM  Pain control prn  incentive spirometry  DVT ppx  keep THEODORE dressing to self suction  CAMILO drain care
f/u labs  may GI consult   consider HIDA to r/o bile leak
- percocet po prn  - oob and ambulate  - stool softeners prn

## 2017-07-04 NOTE — PROGRESS NOTE ADULT - PROBLEM SELECTOR PROBLEM 1
S/P cholecystectomy
Cholecystitis
Cholecystitis
S/P cholecystectomy
Postoperative abdominal pain

## 2017-07-10 DIAGNOSIS — I10 ESSENTIAL (PRIMARY) HYPERTENSION: ICD-10-CM

## 2017-07-10 DIAGNOSIS — K40.90 UNILATERAL INGUINAL HERNIA, WITHOUT OBSTRUCTION OR GANGRENE, NOT SPECIFIED AS RECURRENT: ICD-10-CM

## 2017-07-10 DIAGNOSIS — K81.0 ACUTE CHOLECYSTITIS: ICD-10-CM

## 2017-07-10 DIAGNOSIS — R10.9 UNSPECIFIED ABDOMINAL PAIN: ICD-10-CM

## 2017-07-10 DIAGNOSIS — E11.9 TYPE 2 DIABETES MELLITUS WITHOUT COMPLICATIONS: ICD-10-CM

## 2017-07-10 DIAGNOSIS — E03.9 HYPOTHYROIDISM, UNSPECIFIED: ICD-10-CM

## 2017-07-13 DIAGNOSIS — K91.89 OTHER POSTPROCEDURAL COMPLICATIONS AND DISORDERS OF DIGESTIVE SYSTEM: ICD-10-CM
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
## 2017-07-13 DIAGNOSIS — N28.9 DISORDER OF KIDNEY AND URETER, UNSPECIFIED: ICD-10-CM

## 2017-07-13 DIAGNOSIS — K82.8 OTHER SPECIFIED DISEASES OF GALLBLADDER: ICD-10-CM

## 2017-12-04 NOTE — ED ADULT NURSE NOTE - ED STAT RN HANDOFF DETAILS
HPI: Pt seen and examined this am in follow up for sx and GOC. Pt notes she is feeling ok. She has some pain, 5/10 in her back, not aware that she could have pain medications q4h (though admits that she likely forgot this). She is open to having dose after encounter. She denies any other complaints.     Pt asked to call  during encounter as he was waiting to speak with team. Spoke to Farzad and he noted he wants to move forward with home hospice referral for VNS. Reminded him that we held off because he wanted to speak with Dr. Warren first. he acknowledged this, but also said that he and his wife understand the nature of her disease and believe that taking her home and focusing on her comfort for whatever time she has is a preferred plan. Thus, he was ok with us placing referral today for plan to dc home as soon as any equipment that is needed can be delivered. Pt agreed with this plan, noting that she had preference of where hospital bed should go, as she would like to be in living room around all her family, recalling recent remodeling of house would allow for this. Reassured her that we would work with her family to ensure best plan moving forward.       PAIN: yes  Location- back  Intensity- mild- mod  Quality- aching  Aggravating Factors- certain movements, sometimes nothing  Alleviating Factors- pain meds  Timing- sporadic    DYSPNEA: denies      ROS:    Anxiety- at times  Constipation- denies, last bm yesterday, no issues  Fatigue- yes  Weakness- yes    All other systems reviewed and negative      PHYSICAL EXAM:    Vital Signs Last 24 Hrs  T(C): 36.7 (04 Dec 2017 05:29), Max: 36.7 (04 Dec 2017 05:29)  T(F): 98 (04 Dec 2017 05:29), Max: 98 (04 Dec 2017 05:29)  HR: 68 (04 Dec 2017 05:29) (68 - 90)  BP: 167/70 (04 Dec 2017 05:29) (129/62 - 167/70)  BP(mean): --  RR: 16 (04 Dec 2017 05:29) (16 - 16)  SpO2: 97% (04 Dec 2017 05:29) (97% - 98%)  Daily     Daily     PPSV2:  30 %  FAST: 4    General: Elderly female, thin, sitting up in bed, pleasant, NAD  Mental Status: AOx3   HEENT: dmm, perrl, eomi, some temporal wasting  Lungs: dec at bases  Cardiac: +s1 s2 rrr  GI: soft nt nd +bs  : voids independent  Ext: no edema  Neuro: 4/5 strength LE    LABS:                        8.5    10.2  )-----------( 276      ( 03 Dec 2017 06:14 )             24.8       Albumin: Albumin, Serum: 2.5 g/dL (11-29 @ 06:29)      Allergies    erythromycin (Unknown)  latex (Rash)    Intolerances      MEDICATIONS  (STANDING):  artificial  tears Solution 1 Drop(s) Both EYES two times a day  dexamethasone     Tablet 4 milliGRAM(s) Oral every 8 hours  enoxaparin Injectable 40 milliGRAM(s) SubCutaneous every 24 hours  HYDROmorphone  Injectable 0.5 milliGRAM(s) IV Push once  influenza   Vaccine 0.5 milliLiter(s) IntraMuscular once  levothyroxine 112 MICROGram(s) Oral daily  metoprolol succinate ER 50 milliGRAM(s) Oral daily  pantoprazole    Tablet 40 milliGRAM(s) Oral two times a day before meals  sertraline 50 milliGRAM(s) Oral daily  sodium chloride 0.9%. 1000 milliLiter(s) (50 mL/Hr) IV Continuous <Continuous>    MEDICATIONS  (PRN):  ALPRAZolam 0.25 milliGRAM(s) Oral two times a day PRN anxiety  oxyCODONE    5 mG/acetaminophen 325 mG 1 Tablet(s) Oral every 6 hours PRN Moderate Pain (4 - 6)  oxyCODONE    IR 5 milliGRAM(s) Oral every 4 hours PRN Severe Pain (7 - 10)      RADIOLOGY: pt Ao x3 no acute distress, admitted, endorsed to nurse Hemphill in [facility], monitoring continues.

## 2019-12-10 NOTE — ASU PREOP CHECKLIST - WARM FLUIDS/WARM BLANKETS
Neurology Outpateint Follow-up Note    Navdeep Garcia is a 38year old female. HPI:     Patient being seen in follow-up. I saw her in clinic last in February. Unfortunately, headaches have not improved. In fact, they have become worse.   They ar Application topically daily. Apply a thin film to the affected area(s). 45 g 0   • Multiple Vitamins-Minerals (MULTI-VITAMIN/MINERALS) Oral Tab Take 1 tablet by mouth daily.          Allergies:    Sulfa Antibiotics       RASH      ROS:   GENERAL: no weight (around 3/10/2020).     Sergio Damian MD no

## 2020-02-21 PROBLEM — Z00.00 ENCOUNTER FOR PREVENTIVE HEALTH EXAMINATION: Status: ACTIVE | Noted: 2020-02-21

## 2020-02-22 PROBLEM — K40.90 UNILATERAL INGUINAL HERNIA, WITHOUT OBSTRUCTION OR GANGRENE, NOT SPECIFIED AS RECURRENT: Chronic | Status: ACTIVE | Noted: 2017-06-27

## 2020-02-22 PROBLEM — I10 ESSENTIAL (PRIMARY) HYPERTENSION: Chronic | Status: ACTIVE | Noted: 2017-05-12

## 2020-02-22 PROBLEM — E03.9 HYPOTHYROIDISM, UNSPECIFIED: Chronic | Status: ACTIVE | Noted: 2017-06-27

## 2020-02-22 PROBLEM — K81.9 CHOLECYSTITIS, UNSPECIFIED: Chronic | Status: ACTIVE | Noted: 2017-06-27

## 2020-04-14 ENCOUNTER — APPOINTMENT (OUTPATIENT)
Dept: UROLOGY | Facility: CLINIC | Age: 65
End: 2020-04-14

## 2020-06-11 ENCOUNTER — APPOINTMENT (OUTPATIENT)
Dept: UROLOGY | Facility: CLINIC | Age: 65
End: 2020-06-11
Payer: COMMERCIAL

## 2020-06-11 VITALS
BODY MASS INDEX: 28.72 KG/M2 | TEMPERATURE: 98.3 F | HEART RATE: 76 BPM | WEIGHT: 183 LBS | SYSTOLIC BLOOD PRESSURE: 168 MMHG | HEIGHT: 67 IN | DIASTOLIC BLOOD PRESSURE: 70 MMHG

## 2020-06-11 DIAGNOSIS — Z86.39 PERSONAL HISTORY OF OTHER ENDOCRINE, NUTRITIONAL AND METABOLIC DISEASE: ICD-10-CM

## 2020-06-11 DIAGNOSIS — Z78.9 OTHER SPECIFIED HEALTH STATUS: ICD-10-CM

## 2020-06-11 PROCEDURE — 99204 OFFICE O/P NEW MOD 45 MIN: CPT

## 2020-06-11 RX ORDER — FUROSEMIDE 40 MG/1
40 TABLET ORAL
Refills: 0 | Status: ACTIVE | COMMUNITY

## 2020-06-11 RX ORDER — FERRIC CITRATE 210 MG/1
1 GM TABLET, COATED ORAL
Refills: 0 | Status: ACTIVE | COMMUNITY

## 2020-06-11 RX ORDER — BACITRACIN ZINC, NEOMYCIN SULFATE, POLYMYXIN B SULFATE 400; 3.5; 5 [IU]/G; MG/G; [IU]/G
3.5-400-5 OINTMENT TOPICAL
Refills: 0 | Status: ACTIVE | COMMUNITY

## 2020-06-11 RX ORDER — FERROUS SULFATE 325(65) MG
325 TABLET ORAL
Refills: 0 | Status: ACTIVE | COMMUNITY

## 2020-06-11 RX ORDER — NIFEDIPINE 60 MG
60 TABLET, EXTENDED RELEASE ORAL
Refills: 0 | Status: ACTIVE | COMMUNITY

## 2020-06-11 RX ORDER — BETAMETHASONE DIPROPIONATE 0.5 MG/G
0.05 CREAM TOPICAL
Refills: 0 | Status: ACTIVE | COMMUNITY

## 2020-06-11 RX ORDER — MOMETASONE FUROATE 1 MG/G
0.1 CREAM TOPICAL
Refills: 0 | Status: ACTIVE | COMMUNITY

## 2020-06-11 RX ORDER — LISINOPRIL 10 MG/1
10 TABLET ORAL
Refills: 0 | Status: ACTIVE | COMMUNITY

## 2020-06-11 RX ORDER — LEVOTHYROXINE SODIUM 0.17 MG/1
TABLET ORAL
Refills: 0 | Status: ACTIVE | COMMUNITY

## 2020-06-11 RX ORDER — HYDRALAZINE HYDROCHLORIDE 100 MG/1
100 TABLET ORAL
Refills: 0 | Status: ACTIVE | COMMUNITY

## 2020-06-11 RX ORDER — METRONIDAZOLE 7.5 MG/G
0.75 GEL TOPICAL
Refills: 0 | Status: ACTIVE | COMMUNITY

## 2020-06-11 RX ORDER — ATORVASTATIN CALCIUM 40 MG/1
40 TABLET, FILM COATED ORAL
Refills: 0 | Status: ACTIVE | COMMUNITY

## 2020-06-11 RX ORDER — ALLOPURINOL 100 MG/1
100 TABLET ORAL
Refills: 0 | Status: ACTIVE | COMMUNITY

## 2020-06-11 NOTE — REVIEW OF SYSTEMS
[Negative] : Heme/Lymph [Pain during urination] : denies pain during urination [Urine Infection (bladder/kidney)] : denies bladder/kidney infections [Pain at onset of urination] : denies pain during onset of urination [Pain after urination] : denies pain after urination [Told you have blood in urine on a urine test] : denies being told that blood was present in a urine test [Blood in urine that you can see] : denies seeing blood in urine [History of kidney stones] : denies history of kidney stones [Discharge from urine canal] : denies discharge from urine canal [Urine retention] : denies urine retention [Wake up at night to urinate  How many times?  ___] : denies waking up at night to urinate [Bladder pressure] : denies bladder pressure [Strong urge to urinate] : denies strong urge to urinate [Strain or push to urinate] : denies straining or pushing to urinate [Wait a long time to urinate] : denies waiting a long time to urinate [Interrupted urine stream] : denies interrupted urine stream [Slow urine stream] : denies slow urine stream [Bladder fullness after urinating] : denies bladder fullness after urinating [Increased pain/discomfort with bladder filling] : denies increased pain/discomfort with bladder filling [Bladder problems as child. If yes, describe..] : denies bladder problems as child [Leakage of urine with straining, coughing, laughing] : denies leakage of urine with straining, coughing, and/or laughing [Unaware of when urine is leaking] : aware of when urine is leaking

## 2020-06-11 NOTE — HISTORY OF PRESENT ILLNESS
[FreeTextEntry1] : Elevated PSA \par Patient is here with an elevated PSA. He has no personal history and no family history of prostate cancer.He has no prior genitourinary history of hematuria, hematospermia, prostatitis, UTI, erectile dysfunction, urolithiasis, epididymal orchitis. \par C/o frequency nocturia x4 slow flow but empties well \par No dysuria or hematuria\par \par psa 4.92\par 2018 3.42

## 2020-06-11 NOTE — PHYSICAL EXAM
[General Appearance - Well Developed] : well developed [General Appearance - Well Nourished] : well nourished [Normal Appearance] : normal appearance [General Appearance - In No Acute Distress] : no acute distress [Well Groomed] : well groomed [Edema] : no peripheral edema [Exaggerated Use Of Accessory Muscles For Inspiration] : no accessory muscle use [Respiration, Rhythm And Depth] : normal respiratory rhythm and effort [Abdomen Soft] : soft [Abdomen Tenderness] : non-tender [Costovertebral Angle Tenderness] : no ~M costovertebral angle tenderness [Urethral Meatus] : meatus normal [Scrotum] : the scrotum was normal [Urinary Bladder Findings] : the bladder was normal on palpation [Testes Mass (___cm)] : there were no testicular masses [No Prostate Nodules] : no prostate nodules [Normal Station and Gait] : the gait and station were normal for the patient's age [] : no rash [No Focal Deficits] : no focal deficits [Oriented To Time, Place, And Person] : oriented to person, place, and time [Affect] : the affect was normal [Not Anxious] : not anxious [Mood] : the mood was normal [No Palpable Adenopathy] : no palpable adenopathy

## 2020-06-15 LAB — BACTERIA UR CULT: ABNORMAL

## 2020-06-15 RX ORDER — CIPROFLOXACIN HYDROCHLORIDE 500 MG/1
500 TABLET, FILM COATED ORAL TWICE DAILY
Qty: 10 | Refills: 0 | Status: ACTIVE | COMMUNITY
Start: 2020-06-15 | End: 1900-01-01

## 2020-06-18 ENCOUNTER — APPOINTMENT (OUTPATIENT)
Dept: UROLOGY | Facility: CLINIC | Age: 65
End: 2020-06-18

## 2020-07-14 ENCOUNTER — APPOINTMENT (OUTPATIENT)
Dept: UROLOGY | Facility: CLINIC | Age: 65
End: 2020-07-14
Payer: COMMERCIAL

## 2020-07-14 DIAGNOSIS — N30.90 CYSTITIS, UNSPECIFIED W/OUT HEMATURIA: ICD-10-CM

## 2020-07-14 PROCEDURE — 99213 OFFICE O/P EST LOW 20 MIN: CPT

## 2020-07-23 PROBLEM — N30.90 CYSTITIS: Status: ACTIVE | Noted: 2020-07-23

## 2020-07-23 LAB
BACTERIA UR CULT: NORMAL
PSA SERPL-MCNC: 4.71 NG/ML

## 2020-07-23 NOTE — PHYSICAL EXAM
[General Appearance - Well Developed] : well developed [General Appearance - Well Nourished] : well nourished [Normal Appearance] : normal appearance [Well Groomed] : well groomed [General Appearance - In No Acute Distress] : no acute distress [Abdomen Soft] : soft [Abdomen Tenderness] : non-tender [Costovertebral Angle Tenderness] : no ~M costovertebral angle tenderness [Urethral Meatus] : meatus normal [Urinary Bladder Findings] : the bladder was normal on palpation [Scrotum] : the scrotum was normal [Testes Mass (___cm)] : there were no testicular masses [Edema] : no peripheral edema [] : no respiratory distress [Exaggerated Use Of Accessory Muscles For Inspiration] : no accessory muscle use [Respiration, Rhythm And Depth] : normal respiratory rhythm and effort [Mood] : the mood was normal [Oriented To Time, Place, And Person] : oriented to person, place, and time [Affect] : the affect was normal [Not Anxious] : not anxious [Normal Station and Gait] : the gait and station were normal for the patient's age [No Focal Deficits] : no focal deficits [No Palpable Adenopathy] : no palpable adenopathy

## 2020-07-23 NOTE — HISTORY OF PRESENT ILLNESS
[FreeTextEntry1] : Elevated PSA \par Patient is here with an elevated PSA. He has no personal history and no family history of prostate cancer.He has no prior genitourinary history of hematuria, hematospermia, prostatitis, UTI, erectile dysfunction, urolithiasis, epididymal orchitis.  \par No dysuria or hematuria\par ucx low count ecoli completed abx \par \par psa 4.92\par 2018 3.42

## 2020-08-12 ENCOUNTER — APPOINTMENT (OUTPATIENT)
Dept: UROLOGY | Facility: CLINIC | Age: 65
End: 2020-08-12
Payer: MEDICARE

## 2020-08-12 ENCOUNTER — APPOINTMENT (OUTPATIENT)
Dept: UROLOGY | Facility: CLINIC | Age: 65
End: 2020-08-12

## 2020-08-12 VITALS
SYSTOLIC BLOOD PRESSURE: 176 MMHG | HEART RATE: 65 BPM | DIASTOLIC BLOOD PRESSURE: 65 MMHG | RESPIRATION RATE: 15 BRPM | TEMPERATURE: 98.8 F

## 2020-08-12 VITALS — DIASTOLIC BLOOD PRESSURE: 72 MMHG | SYSTOLIC BLOOD PRESSURE: 164 MMHG

## 2020-08-12 PROCEDURE — 76872 US TRANSRECTAL: CPT

## 2020-08-12 PROCEDURE — 55700: CPT

## 2020-08-12 PROCEDURE — 76942 ECHO GUIDE FOR BIOPSY: CPT | Mod: 59

## 2020-08-18 ENCOUNTER — APPOINTMENT (OUTPATIENT)
Dept: UROLOGY | Facility: CLINIC | Age: 65
End: 2020-08-18
Payer: MEDICARE

## 2020-08-18 PROCEDURE — 99213 OFFICE O/P EST LOW 20 MIN: CPT

## 2020-08-18 NOTE — ASSESSMENT
[FreeTextEntry1] : path reviewed\par benign \par reviewed risk of ca not found on biopsy \par will repeat psa in 4 months if still elevated will get MRI\par

## 2020-08-19 LAB — CORE LAB BIOPSY: NORMAL

## 2021-01-20 ENCOUNTER — INPATIENT (INPATIENT)
Facility: HOSPITAL | Age: 66
LOS: 1 days | Discharge: HOME | DRG: 812 | End: 2021-01-22
Attending: INTERNAL MEDICINE | Admitting: INTERNAL MEDICINE
Payer: COMMERCIAL

## 2021-01-20 VITALS
TEMPERATURE: 98 F | HEART RATE: 92 BPM | OXYGEN SATURATION: 98 % | DIASTOLIC BLOOD PRESSURE: 50 MMHG | WEIGHT: 180.78 LBS | SYSTOLIC BLOOD PRESSURE: 157 MMHG | RESPIRATION RATE: 18 BRPM | HEIGHT: 67 IN

## 2021-01-20 DIAGNOSIS — E11.9 TYPE 2 DIABETES MELLITUS WITHOUT COMPLICATIONS: ICD-10-CM

## 2021-01-20 DIAGNOSIS — E03.9 HYPOTHYROIDISM, UNSPECIFIED: ICD-10-CM

## 2021-01-20 DIAGNOSIS — I10 ESSENTIAL (PRIMARY) HYPERTENSION: ICD-10-CM

## 2021-01-20 DIAGNOSIS — Z29.9 ENCOUNTER FOR PROPHYLACTIC MEASURES, UNSPECIFIED: ICD-10-CM

## 2021-01-20 DIAGNOSIS — Z98.890 OTHER SPECIFIED POSTPROCEDURAL STATES: Chronic | ICD-10-CM

## 2021-01-20 DIAGNOSIS — D64.9 ANEMIA, UNSPECIFIED: ICD-10-CM

## 2021-01-20 DIAGNOSIS — N18.9 CHRONIC KIDNEY DISEASE, UNSPECIFIED: ICD-10-CM

## 2021-01-20 LAB
ALBUMIN SERPL ELPH-MCNC: 3.3 G/DL — LOW (ref 3.5–5)
ALP SERPL-CCNC: 124 U/L — HIGH (ref 40–120)
ALT FLD-CCNC: 21 U/L DA — SIGNIFICANT CHANGE UP (ref 10–60)
ANION GAP SERPL CALC-SCNC: 9 MMOL/L — SIGNIFICANT CHANGE UP (ref 5–17)
ANISOCYTOSIS BLD QL: SLIGHT — SIGNIFICANT CHANGE UP
APPEARANCE UR: CLEAR — SIGNIFICANT CHANGE UP
AST SERPL-CCNC: 8 U/L — LOW (ref 10–40)
BACTERIA # UR AUTO: ABNORMAL /HPF
BASOPHILS # BLD AUTO: 0.04 K/UL — SIGNIFICANT CHANGE UP (ref 0–0.2)
BASOPHILS NFR BLD AUTO: 0.6 % — SIGNIFICANT CHANGE UP (ref 0–2)
BILIRUB SERPL-MCNC: 0.2 MG/DL — SIGNIFICANT CHANGE UP (ref 0.2–1.2)
BILIRUB UR-MCNC: NEGATIVE — SIGNIFICANT CHANGE UP
BLD GP AB SCN SERPL QL: SIGNIFICANT CHANGE UP
BUN SERPL-MCNC: 46 MG/DL — HIGH (ref 7–18)
CALCIUM SERPL-MCNC: 8.2 MG/DL — LOW (ref 8.4–10.5)
CHLORIDE SERPL-SCNC: 104 MMOL/L — SIGNIFICANT CHANGE UP (ref 96–108)
CO2 SERPL-SCNC: 26 MMOL/L — SIGNIFICANT CHANGE UP (ref 22–31)
COLOR SPEC: YELLOW — SIGNIFICANT CHANGE UP
CREAT SERPL-MCNC: 3.06 MG/DL — HIGH (ref 0.5–1.3)
DIFF PNL FLD: NEGATIVE — SIGNIFICANT CHANGE UP
ELLIPTOCYTES BLD QL SMEAR: SLIGHT — SIGNIFICANT CHANGE UP
EOSINOPHIL # BLD AUTO: 0.31 K/UL — SIGNIFICANT CHANGE UP (ref 0–0.5)
EOSINOPHIL NFR BLD AUTO: 5 % — SIGNIFICANT CHANGE UP (ref 0–6)
EPI CELLS # UR: SIGNIFICANT CHANGE UP /HPF
GLUCOSE SERPL-MCNC: 192 MG/DL — HIGH (ref 70–99)
GLUCOSE UR QL: NEGATIVE — SIGNIFICANT CHANGE UP
HCT VFR BLD CALC: 17.7 % — CRITICAL LOW (ref 39–50)
HCT VFR BLD CALC: 24 % — LOW (ref 39–50)
HGB BLD-MCNC: 5.1 G/DL — CRITICAL LOW (ref 13–17)
HGB BLD-MCNC: 7.5 G/DL — LOW (ref 13–17)
HYPOCHROMIA BLD QL: SIGNIFICANT CHANGE UP
IMM GRANULOCYTES NFR BLD AUTO: 0.3 % — SIGNIFICANT CHANGE UP (ref 0–1.5)
INR BLD: 1.22 RATIO — HIGH (ref 0.88–1.16)
IRON SATN MFR SERPL: 10 UG/DL — LOW (ref 65–170)
IRON SATN MFR SERPL: 3 % — LOW (ref 20–55)
KETONES UR-MCNC: NEGATIVE — SIGNIFICANT CHANGE UP
LEUKOCYTE ESTERASE UR-ACNC: NEGATIVE — SIGNIFICANT CHANGE UP
LYMPHOCYTES # BLD AUTO: 1.16 K/UL — SIGNIFICANT CHANGE UP (ref 1–3.3)
LYMPHOCYTES # BLD AUTO: 18.8 % — SIGNIFICANT CHANGE UP (ref 13–44)
MANUAL SMEAR VERIFICATION: SIGNIFICANT CHANGE UP
MCHC RBC-ENTMCNC: 23.5 PG — LOW (ref 27–34)
MCHC RBC-ENTMCNC: 25.3 PG — LOW (ref 27–34)
MCHC RBC-ENTMCNC: 28.8 GM/DL — LOW (ref 32–36)
MCHC RBC-ENTMCNC: 31.3 GM/DL — LOW (ref 32–36)
MCV RBC AUTO: 81.1 FL — SIGNIFICANT CHANGE UP (ref 80–100)
MCV RBC AUTO: 81.6 FL — SIGNIFICANT CHANGE UP (ref 80–100)
MICROCYTES BLD QL: SLIGHT — SIGNIFICANT CHANGE UP
MONOCYTES # BLD AUTO: 0.55 K/UL — SIGNIFICANT CHANGE UP (ref 0–0.9)
MONOCYTES NFR BLD AUTO: 8.9 % — SIGNIFICANT CHANGE UP (ref 2–14)
NEUTROPHILS # BLD AUTO: 4.08 K/UL — SIGNIFICANT CHANGE UP (ref 1.8–7.4)
NEUTROPHILS NFR BLD AUTO: 66.4 % — SIGNIFICANT CHANGE UP (ref 43–77)
NITRITE UR-MCNC: NEGATIVE — SIGNIFICANT CHANGE UP
NRBC # BLD: 0 /100 WBCS — SIGNIFICANT CHANGE UP (ref 0–0)
NRBC # BLD: 0 /100 WBCS — SIGNIFICANT CHANGE UP (ref 0–0)
OVALOCYTES BLD QL SMEAR: SLIGHT — SIGNIFICANT CHANGE UP
PH UR: 7 — SIGNIFICANT CHANGE UP (ref 5–8)
PLAT MORPH BLD: NORMAL — SIGNIFICANT CHANGE UP
PLATELET # BLD AUTO: 269 K/UL — SIGNIFICANT CHANGE UP (ref 150–400)
PLATELET # BLD AUTO: 275 K/UL — SIGNIFICANT CHANGE UP (ref 150–400)
PLATELET COUNT - ESTIMATE: NORMAL — SIGNIFICANT CHANGE UP
POIKILOCYTOSIS BLD QL AUTO: SLIGHT — SIGNIFICANT CHANGE UP
POLYCHROMASIA BLD QL SMEAR: SLIGHT — SIGNIFICANT CHANGE UP
POTASSIUM SERPL-MCNC: 3.9 MMOL/L — SIGNIFICANT CHANGE UP (ref 3.5–5.3)
POTASSIUM SERPL-SCNC: 3.9 MMOL/L — SIGNIFICANT CHANGE UP (ref 3.5–5.3)
PROT SERPL-MCNC: 6.5 G/DL — SIGNIFICANT CHANGE UP (ref 6–8.3)
PROT UR-MCNC: 100
PROTHROM AB SERPL-ACNC: 14.4 SEC — HIGH (ref 10.6–13.6)
RBC # BLD: 2.12 M/UL — LOW (ref 4.2–5.8)
RBC # BLD: 2.17 M/UL — LOW (ref 4.2–5.8)
RBC # BLD: 2.96 M/UL — LOW (ref 4.2–5.8)
RBC # FLD: 14.8 % — HIGH (ref 10.3–14.5)
RBC # FLD: 16.1 % — HIGH (ref 10.3–14.5)
RBC BLD AUTO: ABNORMAL
RBC CASTS # UR COMP ASSIST: ABNORMAL /HPF (ref 0–2)
RETICS #: 52.8 K/UL — SIGNIFICANT CHANGE UP (ref 25–125)
RETICS/RBC NFR: 2.5 % — SIGNIFICANT CHANGE UP (ref 0.5–2.5)
SARS-COV-2 RNA SPEC QL NAA+PROBE: SIGNIFICANT CHANGE UP
SCHISTOCYTES BLD QL AUTO: SLIGHT — SIGNIFICANT CHANGE UP
SODIUM SERPL-SCNC: 139 MMOL/L — SIGNIFICANT CHANGE UP (ref 135–145)
SP GR SPEC: 1 — LOW (ref 1.01–1.02)
TIBC SERPL-MCNC: 372 UG/DL — SIGNIFICANT CHANGE UP (ref 250–450)
UIBC SERPL-MCNC: 362 UG/DL — SIGNIFICANT CHANGE UP (ref 110–370)
UROBILINOGEN FLD QL: NEGATIVE — SIGNIFICANT CHANGE UP
WBC # BLD: 6.16 K/UL — SIGNIFICANT CHANGE UP (ref 3.8–10.5)
WBC # BLD: 7.15 K/UL — SIGNIFICANT CHANGE UP (ref 3.8–10.5)
WBC # FLD AUTO: 6.16 K/UL — SIGNIFICANT CHANGE UP (ref 3.8–10.5)
WBC # FLD AUTO: 7.15 K/UL — SIGNIFICANT CHANGE UP (ref 3.8–10.5)
WBC UR QL: SIGNIFICANT CHANGE UP /HPF (ref 0–5)

## 2021-01-20 PROCEDURE — 99291 CRITICAL CARE FIRST HOUR: CPT | Mod: 25

## 2021-01-20 PROCEDURE — 99223 1ST HOSP IP/OBS HIGH 75: CPT | Mod: GC

## 2021-01-20 RX ORDER — GLUCAGON INJECTION, SOLUTION 0.5 MG/.1ML
1 INJECTION, SOLUTION SUBCUTANEOUS ONCE
Refills: 0 | Status: DISCONTINUED | OUTPATIENT
Start: 2021-01-20 | End: 2021-01-22

## 2021-01-20 RX ORDER — DEXTROSE 50 % IN WATER 50 %
12.5 SYRINGE (ML) INTRAVENOUS ONCE
Refills: 0 | Status: DISCONTINUED | OUTPATIENT
Start: 2021-01-20 | End: 2021-01-20

## 2021-01-20 RX ORDER — HYDRALAZINE HCL 50 MG
10 TABLET ORAL THREE TIMES A DAY
Refills: 0 | Status: DISCONTINUED | OUTPATIENT
Start: 2021-01-20 | End: 2021-01-22

## 2021-01-20 RX ORDER — DEXTROSE 50 % IN WATER 50 %
25 SYRINGE (ML) INTRAVENOUS ONCE
Refills: 0 | Status: DISCONTINUED | OUTPATIENT
Start: 2021-01-20 | End: 2021-01-20

## 2021-01-20 RX ORDER — INSULIN LISPRO 100/ML
VIAL (ML) SUBCUTANEOUS AT BEDTIME
Refills: 0 | Status: DISCONTINUED | OUTPATIENT
Start: 2021-01-20 | End: 2021-01-22

## 2021-01-20 RX ORDER — SODIUM CHLORIDE 9 MG/ML
1000 INJECTION, SOLUTION INTRAVENOUS
Refills: 0 | Status: DISCONTINUED | OUTPATIENT
Start: 2021-01-20 | End: 2021-01-20

## 2021-01-20 RX ORDER — PANTOPRAZOLE SODIUM 20 MG/1
40 TABLET, DELAYED RELEASE ORAL DAILY
Refills: 0 | Status: DISCONTINUED | OUTPATIENT
Start: 2021-01-20 | End: 2021-01-22

## 2021-01-20 RX ORDER — LEVOTHYROXINE SODIUM 125 MCG
50 TABLET ORAL DAILY
Refills: 0 | Status: DISCONTINUED | OUTPATIENT
Start: 2021-01-20 | End: 2021-01-22

## 2021-01-20 RX ORDER — INSULIN LISPRO 100/ML
VIAL (ML) SUBCUTANEOUS
Refills: 0 | Status: DISCONTINUED | OUTPATIENT
Start: 2021-01-20 | End: 2021-01-22

## 2021-01-20 RX ORDER — FUROSEMIDE 40 MG
40 TABLET ORAL DAILY
Refills: 0 | Status: DISCONTINUED | OUTPATIENT
Start: 2021-01-20 | End: 2021-01-22

## 2021-01-20 RX ORDER — DEXTROSE 50 % IN WATER 50 %
15 SYRINGE (ML) INTRAVENOUS ONCE
Refills: 0 | Status: DISCONTINUED | OUTPATIENT
Start: 2021-01-20 | End: 2021-01-20

## 2021-01-20 NOTE — H&P ADULT - HISTORY OF PRESENT ILLNESS
65yo M from home  year old male with  PMHx of HTN, HLD, DM, CKD, and anemia of chronic disease with iron deficiency and PSHx of cholecystectomy was sent in for abnormal  hemoglobin levels at around 5. Patient labs were normal a couple of months ago around 9-10. patinet denies any active bleeding from anywhere, (blood in stool , hematuria, achymosis , petechia),  fever , weight loss,--------    last Colonoscopy done 6 months ago also normal. Denies any active bleeding anywhere.         ED course :   Vital Signs Last 24 Hrs  T(C): 35.6 (20 Jan 2021 14:03), Max: 36.7 (20 Jan 2021 10:36)  T(F): 96.1 (20 Jan 2021 14:03), Max: 98 (20 Jan 2021 10:36)  HR: 80 (20 Jan 2021 14:03) (79 - 92)  BP: 165/75 (20 Jan 2021 14:03) (157/50 - 166/74)  BP(mean): --  RR: 20 (20 Jan 2021 14:03) (18 - 20)  SpO2: 99% (20 Jan 2021 14:03) (98% - 99%)    Anemia panel was sent and pt  received 2 PRBC  67yo M from home  year old male with  PMHx of HTN, HLD, DM, CKD, and anemia of chronic disease with iron deficiency and PSHx of cholecystectomy was sent in for abnormal  hemoglobin levels at around 5. Patient labs were normal a couple of months ago around 9-10. patinet denies any active bleeding from anywhere, (blood in stool , hematuria, achymosis , petechia),  fever , weight loss or any other acute complaints. patient received Venofer  weekly over past 2-3 month on weekly basis. BMBx / BMA has not been done for patient in past.   last Colonoscopy done 6 months ago also normal. Denies any active bleeding anywhere.         ED course :   Vital Signs Last 24 Hrs  T(C): 35.6 (20 Jan 2021 14:03), Max: 36.7 (20 Jan 2021 10:36)  T(F): 96.1 (20 Jan 2021 14:03), Max: 98 (20 Jan 2021 10:36)  HR: 80 (20 Jan 2021 14:03) (79 - 92)  BP: 165/75 (20 Jan 2021 14:03) (157/50 - 166/74)  BP(mean): --  RR: 20 (20 Jan 2021 14:03) (18 - 20)  SpO2: 99% (20 Jan 2021 14:03) (98% - 99%)    Anemia panel was sent and pt  received 2 PRBC     GOC : FULL CODE 65yo M from home  year old male with  PMHx of HTN, HLD, DM, CKD, and anemia of chronic disease with iron deficiency and PSHx of cholecystectomy was sent in for abnormal  hemoglobin levels at around 5. Patient labs were normal a couple of months ago around 9-10. patinet denies any active bleeding from anywhere, (blood in stool , hematuria, achymosis , petechia),  fever , weight loss or any other acute complaints. patient received Venofer weekly for 5 times. BMBx / BMA has not been done for patient in past.   Denies any active bleeding anywhere.         ED course :   Vital Signs Last 24 Hrs  T(C): 35.6 (20 Jan 2021 14:03), Max: 36.7 (20 Jan 2021 10:36)  T(F): 96.1 (20 Jan 2021 14:03), Max: 98 (20 Jan 2021 10:36)  HR: 80 (20 Jan 2021 14:03) (79 - 92)  BP: 165/75 (20 Jan 2021 14:03) (157/50 - 166/74)  BP(mean): --  RR: 20 (20 Jan 2021 14:03) (18 - 20)  SpO2: 99% (20 Jan 2021 14:03) (98% - 99%)    Anemia panel was sent and pt  received 2 PRBC     GOC : FULL CODE

## 2021-01-20 NOTE — H&P ADULT - PROBLEM SELECTOR PLAN 2
CKD stage 4 likely due to DM/HTN  -   -Concitnue renal diet  -Avoid Nephrotoxic Meds/ Agents such as (NSAIDs, IV contrast, Aminoglycosides such as gentamicin, -Gadolinium contrast, Phosphate containing enemas) CKD stage 4 likely due to DM/HTN  - Outpatient nephrology Dr Lara  - baseline Scr unknown ,primary team to obtain record from PCP or Dr Rod  -Continue renal diet   - Monitor BMP and electrolytes  -Avoid Nephrotoxic Meds/ Agents such as (NSAIDs, IV contrast, Aminoglycosides such as gentamicin, -Gadolinium contrast, Phosphate containing enemas)

## 2021-01-20 NOTE — H&P ADULT - NSHPPHYSICALEXAM_GEN_ALL_CORE
CONSTITUTIONAL: pale   ENMT: Airway patent, Nasal mucosa clear. Mouth with normal mucosa. Throat has no vesicles, no oropharyngeal exudates and uvula is midline.  EYES: Clear bilaterally, pupils equal, round and reactive to light. EOMI.  CARDIAC: Normal rate, regular rhythm.  Heart sounds S1, S2.  No murmurs, rubs or gallops   RESPIRATORY: Breath sounds clear and equal bilaterally. No wheezes, rhales or rhonchi  MUSCULOSKELETAL: Spine appears normal, range of motion is not limited, no muscle or joint tenderness  EXTREMITIES: No edema, cyanosis or deformity   NEUROLOGICAL: Alert and oriented, no focal deficits, no motor or sensory deficits.  SKIN: No rash, skin turgor    ABD : NT , ND , Soft

## 2021-01-20 NOTE — ED PROVIDER NOTE - OBJECTIVE STATEMENT
66 year old male with PSHx of cholecystectomy and PMHx of HTN, HLD, DM, CKD, and anemia of chronic disease with iron deficiency per Dr Romero sent in for hemoglobin levels at around 5. Patient labs were normal a couple of months ago. Colonoscopy done a few months ago also normal. Denies any active bleeding anywhere. No reported fevers, chills, bloody stools, or any other acute complaints.

## 2021-01-20 NOTE — ED PROVIDER NOTE - CRITICAL CARE PROVIDED
direct patient care (not related to procedure)/additional history taking/consultation with other physicians

## 2021-01-20 NOTE — ED PROVIDER NOTE - NS_ATTENDINGSCRIBE_ED_ALL_ED
MODERATE
I personally performed the service described in the documentation recorded by the scribe in my presence, and it accurately and completely records my words and actions.

## 2021-01-20 NOTE — H&P ADULT - PROBLEM SELECTOR PLAN 3
Primary team to follow up with the pharmacy in the morning to complete medication reconciliation.   will resume synthroid per old med rec Primary team to follow up with the pharmacy in the morning to complete medication reconciliation.   will resume synthroid

## 2021-01-20 NOTE — H&P ADULT - PROBLEM SELECTOR PLAN 6
will hold off given anemia for now will hold off given anemia for now    Protonix for GI prophylaxis

## 2021-01-20 NOTE — H&P ADULT - ASSESSMENT
65yo M from home  year old male with  PMHx of HTN, HLD, DM, CKD, and anemia of chronic disease with iron deficiency and PSHx of cholecystectomy was sent in for abnormal  hemoglobin levels at around 5. Patient labs were normal a couple of months ago around 9-10.admitted for blood transfusion and Anemia work up.     Attempts made to call wife multiple times.   Primary team to follow up with wife at [phone] or pharmacy , emergency contact is patient's father, does not know about medication.    67yo M from home  year old male with  PMHx of HTN, HLD, DM, CKD, and anemia of chronic disease with iron deficiency and PSHx of cholecystectomy was sent in for abnormal  hemoglobin levels at around 5. Patient labs were normal a couple of months ago around 9-10.admitted for blood transfusion and Anemia work up.     Attempts made to call wife multiple times.   Primary team to follow up with wife at [phone] or pharmacy , emergency contact is patient's father, does not know about medication.

## 2021-01-20 NOTE — ED PROVIDER NOTE - CLINICAL SUMMARY MEDICAL DECISION MAKING FREE TEXT BOX
66 year old male presenting to the ED with anemia. Unknown cause of acute drop in hemoglobin levels. Will order basic blood and admit.

## 2021-01-20 NOTE — H&P ADULT - NSICDXPASTMEDICALHX_GEN_ALL_CORE_FT
PAST MEDICAL HISTORY:  Cholecystitis     DM (diabetes mellitus)     HTN (hypertension)     Hypothyroidism     Unilateral inguinal hernia without obstruction or gangrene, recurrence not specified

## 2021-01-20 NOTE — H&P ADULT - ATTENDING COMMENTS
Patient seen and examined in Ed. discussed with admitting resident Dr. Muñiz.    67yo M from home with  PMHx of HTN, HLD, T2DM, CKD, and anemia of chronic renal disease was sent in for low hb by pt's hematologist Dr. Romero. Pts been on procrit previously and his baseline hb around 8, his recent labs consistent with Iron deficiency, had received IV iron with Dr. Romero, underwent EGD about 5 months ago which was unremarkable and undergone colonoscopy in 2018, found to have polyp, suppose to follow up with repeat colonoscopy but has not done. Labs in ED consistent with iron deficiency anemia. Received 2 PRBC in ED. Severe iron deficiency with component of anemia of CKD asymptomatic at present likely due to chronic blood loss , warrants GI evaluation, occult blood, IV Protonix, CT A/P, close monitoring of CBC.

## 2021-01-20 NOTE — H&P ADULT - PROBLEM SELECTOR PLAN 1
p/w HB 5.1 , hemodynamic stable , no active bleeding, likely in the setting of anemia of chronic disease in setting of CKD mixed with iron deficiency anemia  - last EGD , colonoscopy 6month ago unremarkable  - f/u FOBT  - Anemia panel compatible with AKD +HORACE , Retic percent 2.5 . retic index<2  c/w hyperproliferation   - will consult hem-onc Dr erwin      - s/p 2PRBC in ED   - f/u post transfusion H/H p/w HB 5.1 , hemodynamic stable , no active bleeding, likely in the setting of anemia of chronic disease in setting of CKD mixed with iron deficiency anemia  - pt will need GI work up   - f/u FOBT  - Anemia panel compatible with AKD +HORACE , Retic percent 2.5 . retic index<2  c/w hyperproliferation   - will consult hem-onc Dr erwin      - s/p 2PRBC in ED   - f/u post transfusion H/H

## 2021-01-20 NOTE — ED ADULT NURSE REASSESSMENT NOTE - NS ED NURSE REASSESS COMMENT FT1
Patient A&OX4, breathing regular and unlabored. Received patient with blood transfusion infusing, denies any c/o itchiness, back pain or SOB. Transfusion of PRBC's completed. no adverse reaction noted.

## 2021-01-21 DIAGNOSIS — E78.5 HYPERLIPIDEMIA, UNSPECIFIED: ICD-10-CM

## 2021-01-21 LAB
A1C WITH ESTIMATED AVERAGE GLUCOSE RESULT: 5.7 % — HIGH (ref 4–5.6)
ALBUMIN SERPL ELPH-MCNC: 3.1 G/DL — LOW (ref 3.5–5)
ALP SERPL-CCNC: 124 U/L — HIGH (ref 40–120)
ALT FLD-CCNC: 19 U/L DA — SIGNIFICANT CHANGE UP (ref 10–60)
ANION GAP SERPL CALC-SCNC: 9 MMOL/L — SIGNIFICANT CHANGE UP (ref 5–17)
APTT BLD: 38.5 SEC — HIGH (ref 27.5–35.5)
AST SERPL-CCNC: 9 U/L — LOW (ref 10–40)
BASOPHILS # BLD AUTO: 0.08 K/UL — SIGNIFICANT CHANGE UP (ref 0–0.2)
BASOPHILS NFR BLD AUTO: 1.4 % — SIGNIFICANT CHANGE UP (ref 0–2)
BILIRUB SERPL-MCNC: 0.4 MG/DL — SIGNIFICANT CHANGE UP (ref 0.2–1.2)
BUN SERPL-MCNC: 37 MG/DL — HIGH (ref 7–18)
CALCIUM SERPL-MCNC: 8.7 MG/DL — SIGNIFICANT CHANGE UP (ref 8.4–10.5)
CHLORIDE SERPL-SCNC: 108 MMOL/L — SIGNIFICANT CHANGE UP (ref 96–108)
CHOLEST SERPL-MCNC: 122 MG/DL — SIGNIFICANT CHANGE UP
CO2 SERPL-SCNC: 25 MMOL/L — SIGNIFICANT CHANGE UP (ref 22–31)
CREAT SERPL-MCNC: 2.66 MG/DL — HIGH (ref 0.5–1.3)
EOSINOPHIL # BLD AUTO: 0.46 K/UL — SIGNIFICANT CHANGE UP (ref 0–0.5)
EOSINOPHIL NFR BLD AUTO: 8 % — HIGH (ref 0–6)
ESTIMATED AVERAGE GLUCOSE: 117 MG/DL — HIGH (ref 68–114)
FERRITIN SERPL-MCNC: 11 NG/ML — LOW (ref 30–400)
FERRITIN SERPL-MCNC: 9 NG/ML — LOW (ref 30–400)
FOLATE SERPL-MCNC: 9.4 NG/ML — SIGNIFICANT CHANGE UP
FOLATE SERPL-MCNC: 9.6 NG/ML — SIGNIFICANT CHANGE UP
GLUCOSE BLDC GLUCOMTR-MCNC: 133 MG/DL — HIGH (ref 70–99)
GLUCOSE BLDC GLUCOMTR-MCNC: 138 MG/DL — HIGH (ref 70–99)
GLUCOSE BLDC GLUCOMTR-MCNC: 150 MG/DL — HIGH (ref 70–99)
GLUCOSE BLDC GLUCOMTR-MCNC: 164 MG/DL — HIGH (ref 70–99)
GLUCOSE SERPL-MCNC: 87 MG/DL — SIGNIFICANT CHANGE UP (ref 70–99)
HCT VFR BLD CALC: 24.5 % — LOW (ref 39–50)
HCT VFR BLD CALC: 27.3 % — LOW (ref 39–50)
HCV AB S/CO SERPL IA: 0.06 S/CO — SIGNIFICANT CHANGE UP (ref 0–0.99)
HCV AB SERPL-IMP: SIGNIFICANT CHANGE UP
HDLC SERPL-MCNC: 33 MG/DL — LOW
HGB BLD-MCNC: 7.6 G/DL — LOW (ref 13–17)
HGB BLD-MCNC: 8.3 G/DL — LOW (ref 13–17)
IMM GRANULOCYTES NFR BLD AUTO: 0.2 % — SIGNIFICANT CHANGE UP (ref 0–1.5)
INR BLD: 1.19 RATIO — HIGH (ref 0.88–1.16)
IRON SATN MFR SERPL: 11 UG/DL — LOW (ref 65–170)
IRON SATN MFR SERPL: 3 % — LOW (ref 20–55)
LIPID PNL WITH DIRECT LDL SERPL: 59 MG/DL — SIGNIFICANT CHANGE UP
LYMPHOCYTES # BLD AUTO: 1.11 K/UL — SIGNIFICANT CHANGE UP (ref 1–3.3)
LYMPHOCYTES # BLD AUTO: 19.3 % — SIGNIFICANT CHANGE UP (ref 13–44)
MAGNESIUM SERPL-MCNC: 2.1 MG/DL — SIGNIFICANT CHANGE UP (ref 1.6–2.6)
MCHC RBC-ENTMCNC: 25 PG — LOW (ref 27–34)
MCHC RBC-ENTMCNC: 25.2 PG — LOW (ref 27–34)
MCHC RBC-ENTMCNC: 30.4 GM/DL — LOW (ref 32–36)
MCHC RBC-ENTMCNC: 31 GM/DL — LOW (ref 32–36)
MCV RBC AUTO: 81.4 FL — SIGNIFICANT CHANGE UP (ref 80–100)
MCV RBC AUTO: 82.2 FL — SIGNIFICANT CHANGE UP (ref 80–100)
MONOCYTES # BLD AUTO: 0.58 K/UL — SIGNIFICANT CHANGE UP (ref 0–0.9)
MONOCYTES NFR BLD AUTO: 10.1 % — SIGNIFICANT CHANGE UP (ref 2–14)
NEUTROPHILS # BLD AUTO: 3.52 K/UL — SIGNIFICANT CHANGE UP (ref 1.8–7.4)
NEUTROPHILS NFR BLD AUTO: 61 % — SIGNIFICANT CHANGE UP (ref 43–77)
NON HDL CHOLESTEROL: 89 MG/DL — SIGNIFICANT CHANGE UP
NRBC # BLD: 0 /100 WBCS — SIGNIFICANT CHANGE UP (ref 0–0)
NRBC # BLD: 0 /100 WBCS — SIGNIFICANT CHANGE UP (ref 0–0)
OB PNL STL: NEGATIVE — SIGNIFICANT CHANGE UP
PHOSPHATE SERPL-MCNC: 4.3 MG/DL — SIGNIFICANT CHANGE UP (ref 2.5–4.5)
PLATELET # BLD AUTO: 272 K/UL — SIGNIFICANT CHANGE UP (ref 150–400)
PLATELET # BLD AUTO: 299 K/UL — SIGNIFICANT CHANGE UP (ref 150–400)
POTASSIUM SERPL-MCNC: 3.8 MMOL/L — SIGNIFICANT CHANGE UP (ref 3.5–5.3)
POTASSIUM SERPL-SCNC: 3.8 MMOL/L — SIGNIFICANT CHANGE UP (ref 3.5–5.3)
PROT SERPL-MCNC: 6.3 G/DL — SIGNIFICANT CHANGE UP (ref 6–8.3)
PROTHROM AB SERPL-ACNC: 14.1 SEC — HIGH (ref 10.6–13.6)
RBC # BLD: 3.01 M/UL — LOW (ref 4.2–5.8)
RBC # BLD: 3.32 M/UL — LOW (ref 4.2–5.8)
RBC # FLD: 15.1 % — HIGH (ref 10.3–14.5)
RBC # FLD: 15.3 % — HIGH (ref 10.3–14.5)
SARS-COV-2 RNA SPEC QL NAA+PROBE: SIGNIFICANT CHANGE UP
SODIUM SERPL-SCNC: 142 MMOL/L — SIGNIFICANT CHANGE UP (ref 135–145)
TIBC SERPL-MCNC: 362 UG/DL — SIGNIFICANT CHANGE UP (ref 250–450)
TRIGL SERPL-MCNC: 149 MG/DL — SIGNIFICANT CHANGE UP
TROPONIN I SERPL-MCNC: <0.015 NG/ML — SIGNIFICANT CHANGE UP (ref 0–0.04)
TSH SERPL-MCNC: 2.77 UU/ML — SIGNIFICANT CHANGE UP (ref 0.34–4.82)
UIBC SERPL-MCNC: 351 UG/DL — SIGNIFICANT CHANGE UP (ref 110–370)
VIT B12 SERPL-MCNC: 494 PG/ML — SIGNIFICANT CHANGE UP (ref 232–1245)
VIT B12 SERPL-MCNC: 545 PG/ML — SIGNIFICANT CHANGE UP (ref 232–1245)
WBC # BLD: 5.76 K/UL — SIGNIFICANT CHANGE UP (ref 3.8–10.5)
WBC # BLD: 7.01 K/UL — SIGNIFICANT CHANGE UP (ref 3.8–10.5)
WBC # FLD AUTO: 5.76 K/UL — SIGNIFICANT CHANGE UP (ref 3.8–10.5)
WBC # FLD AUTO: 7.01 K/UL — SIGNIFICANT CHANGE UP (ref 3.8–10.5)

## 2021-01-21 PROCEDURE — 74176 CT ABD & PELVIS W/O CONTRAST: CPT | Mod: 26

## 2021-01-21 PROCEDURE — 99232 SBSQ HOSP IP/OBS MODERATE 35: CPT

## 2021-01-21 PROCEDURE — 99233 SBSQ HOSP IP/OBS HIGH 50: CPT

## 2021-01-21 RX ORDER — IRON SUCROSE 20 MG/ML
100 INJECTION, SOLUTION INTRAVENOUS
Refills: 0 | Status: COMPLETED | OUTPATIENT
Start: 2021-01-21 | End: 2021-01-22

## 2021-01-21 RX ORDER — SITAGLIPTIN 50 MG/1
1 TABLET, FILM COATED ORAL
Qty: 0 | Refills: 0 | DISCHARGE

## 2021-01-21 RX ORDER — INSULIN ASPART 100 [IU]/ML
10 INJECTION, SOLUTION SUBCUTANEOUS
Qty: 0 | Refills: 0 | DISCHARGE

## 2021-01-21 RX ORDER — CHLORHEXIDINE GLUCONATE 213 G/1000ML
1 SOLUTION TOPICAL
Refills: 0 | Status: DISCONTINUED | OUTPATIENT
Start: 2021-01-21 | End: 2021-01-21

## 2021-01-21 RX ORDER — SOD SULF/SODIUM/NAHCO3/KCL/PEG
1 SOLUTION, RECONSTITUTED, ORAL ORAL ONCE
Refills: 0 | Status: COMPLETED | OUTPATIENT
Start: 2021-01-22 | End: 2021-01-22

## 2021-01-21 RX ORDER — SOD SULF/SODIUM/NAHCO3/KCL/PEG
1 SOLUTION, RECONSTITUTED, ORAL ORAL ONCE
Refills: 0 | Status: COMPLETED | OUTPATIENT
Start: 2021-01-21 | End: 2021-01-21

## 2021-01-21 RX ADMIN — Medication 1: at 17:15

## 2021-01-21 RX ADMIN — PANTOPRAZOLE SODIUM 40 MILLIGRAM(S): 20 TABLET, DELAYED RELEASE ORAL at 12:57

## 2021-01-21 RX ADMIN — Medication 10 MILLIGRAM(S): at 21:45

## 2021-01-21 RX ADMIN — Medication 50 MICROGRAM(S): at 06:08

## 2021-01-21 RX ADMIN — Medication 10 MILLIGRAM(S): at 00:38

## 2021-01-21 RX ADMIN — CHLORHEXIDINE GLUCONATE 1 APPLICATION(S): 213 SOLUTION TOPICAL at 06:08

## 2021-01-21 RX ADMIN — Medication 10 MILLIGRAM(S): at 16:17

## 2021-01-21 RX ADMIN — Medication 1 LITER(S): at 18:32

## 2021-01-21 RX ADMIN — Medication 40 MILLIGRAM(S): at 06:08

## 2021-01-21 RX ADMIN — IRON SUCROSE 210 MILLIGRAM(S): 20 INJECTION, SOLUTION INTRAVENOUS at 18:05

## 2021-01-21 RX ADMIN — Medication 10 MILLIGRAM(S): at 06:08

## 2021-01-21 NOTE — PROGRESS NOTE ADULT - PROBLEM SELECTOR PLAN 6
will hold off given anemia for now    Protonix for GI prophylaxis - on Januvia, last time he refilled this prescription was in 2019  - A1C 5.7   - continue ISS per protocol  - Accuchecks AC, HS  - BGL's remain in the 160's - 130's

## 2021-01-21 NOTE — PROGRESS NOTE ADULT - ATTENDING COMMENTS
Patient seen and examined. Plan of care discussed with NP.   Appropriate response to PRBC transfusion. Hemodynamically stable without evidence of active plan. Seen by GI, plan for EGD and colonoscopy in am. repeat CBC in the evening.   Rest of plan as outlined as above.

## 2021-01-21 NOTE — PROGRESS NOTE ADULT - SUBJECTIVE AND OBJECTIVE BOX
NP Note discussed with  Primary Attending    Patient is a 66y old  Male who presents with a chief complaint of Anemia (2021 10:12)      INTERVAL HPI/OVERNIGHT EVENTS: no new complaints    MEDICATIONS  (STANDING):  furosemide    Tablet 40 milliGRAM(s) Oral daily  glucagon  Injectable 1 milliGRAM(s) IntraMuscular once  hydrALAZINE 10 milliGRAM(s) Oral three times a day  insulin lispro (ADMELOG) corrective regimen sliding scale   SubCutaneous three times a day before meals  insulin lispro (ADMELOG) corrective regimen sliding scale   SubCutaneous at bedtime  iron sucrose IVPB 100 milliGRAM(s) IV Intermittent <User Schedule>  levothyroxine 50 MICROGram(s) Oral daily  pantoprazole  Injectable 40 milliGRAM(s) IV Push daily    MEDICATIONS  (PRN):      __________________________________________________  REVIEW OF SYSTEMS:    CONSTITUTIONAL: No fever,   EYES: no acute visual disturbances  NECK: No pain or stiffness  RESPIRATORY: No cough; No shortness of breath  CARDIOVASCULAR: No chest pain, no palpitations  GASTROINTESTINAL: No pain. No nausea or vomiting; No diarrhea   NEUROLOGICAL: No headache or numbness, no tremors  MUSCULOSKELETAL: No joint pain, no muscle pain  GENITOURINARY: no dysuria, no frequency, no hesitancy  PSYCHIATRY: no depression , no anxiety  ALL OTHER  ROS negative        Vital Signs Last 24 Hrs  T(C): 36.7 (2021 14:00), Max: 36.9 (2021 05:11)  T(F): 98.1 (2021 14:00), Max: 98.5 (2021 05:11)  HR: 81 (2021 14:00) (72 - 81)  BP: 156/52 (2021 14:00) (156/52 - 175/83)  BP(mean): 79 (2021 14:00) (79 - 79)  RR: 16 (2021 14:00) (16 - 20)  SpO2: 100% (2021 14:00) (97% - 100%)    ________________________________________________  PHYSICAL EXAM:  GENERAL: NAD  HEENT: Normocephalic;  conjunctivae and sclerae clear; moist mucous membranes;   NECK : supple  CHEST/LUNG: Clear to auscultation bilaterally with good air entry   HEART: S1 S2  regular; no murmurs, gallops or rubs  ABDOMEN: Soft, Nontender, Nondistended; Bowel sounds present  EXTREMITIES: no cyanosis; no edema; no calf tenderness  SKIN: warm and dry; no rash  NERVOUS SYSTEM:  Awake and alert; Oriented  to place, person and time ; no new deficits    _________________________________________________  LABS:                        7.6    5.76  )-----------( 272      ( 2021 07:14 )             24.5         142  |  108  |  37<H>  ----------------------------<  87  3.8   |  25  |  2.66<H>    Ca    8.7      2021 07:14  Phos  4.3       Mg     2.1         TPro  6.3  /  Alb  3.1<L>  /  TBili  0.4  /  DBili  x   /  AST  9<L>  /  ALT  19  /  AlkPhos  124<H>      PT/INR - ( 2021 07:14 )   PT: 14.1 sec;   INR: 1.19 ratio         PTT - ( 2021 07:14 )  PTT:38.5 sec  Urinalysis Basic - ( 2021 17:04 )    Color: Yellow / Appearance: Clear / S.005 / pH: x  Gluc: x / Ketone: Negative  / Bili: Negative / Urobili: Negative   Blood: x / Protein: 100 / Nitrite: Negative   Leuk Esterase: Negative / RBC: 2-5 /HPF / WBC 0-2 /HPF   Sq Epi: x / Non Sq Epi: Few /HPF / Bacteria: Trace /HPF      CAPILLARY BLOOD GLUCOSE      POCT Blood Glucose.: 138 mg/dL (2021 11:50)  POCT Blood Glucose.: 150 mg/dL (2021 08:30)        RADIOLOGY & ADDITIONAL TESTS:    Imaging  Reviewed:  YES  < from: CT Abdomen and Pelvis No Cont (21 @ 11:17) >  IMPRESSION:  Limited evaluation for GI bleed without IV contrast.     Grossly unremarkable bowel.    Enlarged prostate.    Incidental right iliopsoas bursitis.    < end of copied text >      Consultant(s) Notes Reviewed:   YES      Plan of care was discussed with patient and /or primary care giver; all questions and concerns were addressed  NP Note discussed with  Primary Attending    Patient is a 66y old  Male who presents with a chief complaint of Anemia (2021 10:12)      INTERVAL HPI/OVERNIGHT EVENTS: no new complaints    MEDICATIONS  (STANDING):  furosemide    Tablet 40 milliGRAM(s) Oral daily  glucagon  Injectable 1 milliGRAM(s) IntraMuscular once  hydrALAZINE 10 milliGRAM(s) Oral three times a day  insulin lispro (ADMELOG) corrective regimen sliding scale   SubCutaneous three times a day before meals  insulin lispro (ADMELOG) corrective regimen sliding scale   SubCutaneous at bedtime  iron sucrose IVPB 100 milliGRAM(s) IV Intermittent <User Schedule>  levothyroxine 50 MICROGram(s) Oral daily  pantoprazole  Injectable 40 milliGRAM(s) IV Push daily    MEDICATIONS  (PRN):      __________________________________________________  REVIEW OF SYSTEMS:    CONSTITUTIONAL: No fever,   EYES: no acute visual disturbances  NECK: No pain or stiffness  RESPIRATORY: No cough; No shortness of breath  CARDIOVASCULAR: No chest pain, no palpitations  GASTROINTESTINAL: No pain. No nausea or vomiting; No diarrhea   NEUROLOGICAL: No headache or numbness, no tremors  MUSCULOSKELETAL: No joint pain, no muscle pain  GENITOURINARY: no dysuria, no frequency, no hesitancy  PSYCHIATRY: no depression , no anxiety  ALL OTHER  ROS negative        Vital Signs Last 24 Hrs  T(C): 36.7 (2021 14:00), Max: 36.9 (2021 05:11)  T(F): 98.1 (2021 14:00), Max: 98.5 (2021 05:11)  HR: 81 (2021 14:00) (72 - 81)  BP: 156/52 (2021 14:00) (156/52 - 175/83)  BP(mean): 79 (2021 14:00) (79 - 79)  RR: 16 (2021 14:00) (16 - 20)  SpO2: 100% (2021 14:00) (97% - 100%)    ________________________________________________  PHYSICAL EXAM:  GENERAL: well developed, well nourished male lying comfortable in hospital bed  HEENT: Normocephalic;  conjunctivae and sclerae clear; moist mucous membranes;   NECK : supple  CHEST/LUNG: Clear to auscultation bilaterally with good air entry   HEART: S1 S2  regular; no murmurs, gallops or rubs  ABDOMEN: obese, Soft, Nontender, Nondistended; Bowel sounds present  EXTREMITIES: no cyanosis; no edema; no calf tenderness  SKIN: warm and dry; no rash  NERVOUS SYSTEM:  Awake and alert; Oriented  to place, person and time ; no new deficits    _________________________________________________  LABS:                        7.6    5.76  )-----------( 272      ( 2021 07:14 )             24.5         142  |  108  |  37<H>  ----------------------------<  87  3.8   |  25  |  2.66<H>    Ca    8.7      2021 07:14  Phos  4.3       Mg     2.1         TPro  6.3  /  Alb  3.1<L>  /  TBili  0.4  /  DBili  x   /  AST  9<L>  /  ALT  19  /  AlkPhos  124<H>      PT/INR - ( 2021 07:14 )   PT: 14.1 sec;   INR: 1.19 ratio         PTT - ( 2021 07:14 )  PTT:38.5 sec  Urinalysis Basic - ( 2021 17:04 )    Color: Yellow / Appearance: Clear / S.005 / pH: x  Gluc: x / Ketone: Negative  / Bili: Negative / Urobili: Negative   Blood: x / Protein: 100 / Nitrite: Negative   Leuk Esterase: Negative / RBC: 2-5 /HPF / WBC 0-2 /HPF   Sq Epi: x / Non Sq Epi: Few /HPF / Bacteria: Trace /HPF      CAPILLARY BLOOD GLUCOSE      POCT Blood Glucose.: 138 mg/dL (2021 11:50)  POCT Blood Glucose.: 150 mg/dL (2021 08:30)        RADIOLOGY & ADDITIONAL TESTS:    Imaging  Reviewed:  YES  < from: CT Abdomen and Pelvis No Cont (21 @ 11:17) >  IMPRESSION:  Limited evaluation for GI bleed without IV contrast.     Grossly unremarkable bowel.    Enlarged prostate.    Incidental right iliopsoas bursitis.    < end of copied text >      Consultant(s) Notes Reviewed:   YES      Plan of care was discussed with patient and /or primary care giver; all questions and concerns were addressed  NP Note discussed with  Primary Attending    Patient is a 66y old  Male who presents with a chief complaint of Anemia (2021 10:12)      INTERVAL HPI/OVERNIGHT EVENTS: no new complaints    MEDICATIONS  (STANDING):  furosemide    Tablet 40 milliGRAM(s) Oral daily  glucagon  Injectable 1 milliGRAM(s) IntraMuscular once  hydrALAZINE 10 milliGRAM(s) Oral three times a day  insulin lispro (ADMELOG) corrective regimen sliding scale   SubCutaneous three times a day before meals  insulin lispro (ADMELOG) corrective regimen sliding scale   SubCutaneous at bedtime  iron sucrose IVPB 100 milliGRAM(s) IV Intermittent <User Schedule>  levothyroxine 50 MICROGram(s) Oral daily  pantoprazole  Injectable 40 milliGRAM(s) IV Push daily    MEDICATIONS  (PRN):      __________________________________________________  REVIEW OF SYSTEMS:    CONSTITUTIONAL: No fever,   EYES: no acute visual disturbances  NECK: No pain or stiffness  RESPIRATORY: No cough; No shortness of breath  CARDIOVASCULAR: No chest pain, no palpitations  GASTROINTESTINAL: No pain. No nausea or vomiting; No diarrhea   NEUROLOGICAL: No headache or numbness, no tremors  MUSCULOSKELETAL: No joint pain, no muscle pain  GENITOURINARY: no dysuria, no frequency, no hesitancy  PSYCHIATRY: no depression , no anxiety  ALL OTHER  ROS negative        Vital Signs Last 24 Hrs  T(C): 36.7 (2021 14:00), Max: 36.9 (2021 05:11)  T(F): 98.1 (2021 14:00), Max: 98.5 (2021 05:11)  HR: 81 (2021 14:00) (72 - 81)  BP: 156/52 (2021 14:00) (156/52 - 175/83)  BP(mean): 79 (2021 14:00) (79 - 79)  RR: 16 (2021 14:00) (16 - 20)  SpO2: 100% (2021 14:00) (97% - 100%)    ________________________________________________  PHYSICAL EXAM:  GENERAL: well developed, well nourished male lying comfortable in hospital bed  HEENT: Normocephalic;  conjunctivae pale and sclerae clear; moist mucous membranes;   NECK : supple  CHEST/LUNG: Clear to auscultation bilaterally with good air entry   HEART: S1 S2  regular; no murmurs, gallops or rubs  ABDOMEN: obese, Soft, Nontender, Nondistended; Bowel sounds present  EXTREMITIES: no cyanosis; no edema; no calf tenderness  SKIN: warm and dry; no rash  NERVOUS SYSTEM:  Awake and alert; Oriented  to place, person and time ; no new deficits    _________________________________________________  LABS:                        7.6    5.76  )-----------( 272      ( 2021 07:14 )             24.5         142  |  108  |  37<H>  ----------------------------<  87  3.8   |  25  |  2.66<H>    Ca    8.7      2021 07:14  Phos  4.3       Mg     2.1         TPro  6.3  /  Alb  3.1<L>  /  TBili  0.4  /  DBili  x   /  AST  9<L>  /  ALT  19  /  AlkPhos  124<H>      PT/INR - ( 2021 07:14 )   PT: 14.1 sec;   INR: 1.19 ratio         PTT - ( 2021 07:14 )  PTT:38.5 sec  Urinalysis Basic - ( 2021 17:04 )    Color: Yellow / Appearance: Clear / S.005 / pH: x  Gluc: x / Ketone: Negative  / Bili: Negative / Urobili: Negative   Blood: x / Protein: 100 / Nitrite: Negative   Leuk Esterase: Negative / RBC: 2-5 /HPF / WBC 0-2 /HPF   Sq Epi: x / Non Sq Epi: Few /HPF / Bacteria: Trace /HPF      CAPILLARY BLOOD GLUCOSE      POCT Blood Glucose.: 138 mg/dL (2021 11:50)  POCT Blood Glucose.: 150 mg/dL (2021 08:30)        RADIOLOGY & ADDITIONAL TESTS:    Imaging  Reviewed:  YES  < from: CT Abdomen and Pelvis No Cont (21 @ 11:17) >  IMPRESSION:  Limited evaluation for GI bleed without IV contrast.     Grossly unremarkable bowel.    Enlarged prostate.    Incidental right iliopsoas bursitis.    < end of copied text >      Consultant(s) Notes Reviewed:   YES      Plan of care was discussed with patient and /or primary care giver; all questions and concerns were addressed

## 2021-01-21 NOTE — PROGRESS NOTE ADULT - PROBLEM SELECTOR PLAN 2
CKD stage 4 likely due to DM/HTN  - Outpatient nephrology Dr Lara  - baseline Scr unknown ,primary team to obtain record from PCP or Dr Rod  -Continue renal diet   - Monitor BMP and electrolytes  -Avoid Nephrotoxic Meds/ Agents such as (NSAIDs, IV contrast, Aminoglycosides such as gentamicin, -Gadolinium contrast, Phosphate containing enemas) - CKD stage 4 likely hypertensive nephropathy   - baseline Scr unknown, PCP Dr. Rod  -Continue renal diet  - Monitor BMP and electrolytes  - will send urine lytes   - Avoid nephrotoxic agents, renally dose   - Outpatient nephrology Dr Lara

## 2021-01-21 NOTE — PROGRESS NOTE ADULT - ASSESSMENT
67yo M from home  year old male with  PMHx of HTN, HLD, DM, CKD, and anemia of chronic disease with iron deficiency and PSHx of cholecystectomy was sent in for abnormal  hemoglobin levels at around 5. Patient labs were normal a couple of months ago around 9-10. patinet denies any active bleeding from anywhere, (blood in stool , hematuria, achymosis , petechia),  fever , weight loss or any other acute complaints. patient received Venofer weekly for 5 times. BMBx / BMA has not been done for patient in past.   Denies any active bleeding anywhere.     <<<<INCOMPLETE>>>>> 66 year old male with a past medical history of HTN, HLD, DM, CKD, and anemia of chronic disease/iron deficiency, and cholecystectomy who presented to the Emergency Department on 1/20 after having routine lab work with his hematologist Dr. Romero which revealed a hemoglobin of 5. Patient denies any associated symptoms including shortness of breath, dizziness, melena, hematemesis or BRBPR. He reports a normal EGD approximately 5 months ago and a colonoscopy in 2018 with polyps. He has never had a bone marrow biopsy , but he has received Venofer infusions in the past. He is now s/p 2 units of PRBC's with improvement of his H/H to 7/24. GI has been consulted and patient will have EGD/Colonoscopy tomorrow for evaluation of this significant anemia.      66 year old male with a past medical history of HTN, HLD, DM, CKD, and anemia of chronic disease/iron deficiency, and cholecystectomy who presented to the Emergency Department on 1/20 after having routine lab work with his hematologist Dr. Romero which revealed a hemoglobin of 5. Patient denies any associated symptoms including shortness of breath, dizziness, melena, hematemesis or BRBPR. He reports a normal EGD approximately 5 months ago and a colonoscopy in 2018 with polyps. He has never had a bone marrow biopsy , but he has received Venofer infusions in the past. He is now s/p 2 units of PRBC's with improvement of his H/H to 7/24. GI has been consulted and patient will have EGD/Colonoscopy tomorrow for evaluation of this significant anemia. Non-contrast CT A/P unremarkable.

## 2021-01-21 NOTE — PROGRESS NOTE ADULT - PROBLEM SELECTOR PLAN 3
Primary team to follow up with the pharmacy in the morning to complete medication reconciliation.   will resume synthroid - continue home synthroid  - TSH normal

## 2021-01-21 NOTE — CONSULT NOTE ADULT - ASSESSMENT
67yo M from home with  PMHx of HTN, HLD, DM, CKD, anemia of chronic disease with iron deficiency and PSHx of cholecystectomy sent in by Dr. Romero for abnormal  hemoglobin level 5.1. Admitted for blood transfusion and Anemia work up. Patient seen and examined sitting in bed in NAD eating breakfast. Denies chest pain, dizziness, SOB or palpitations. He denies bloody stools, urine or vomitus. He states he take iron tabs and so therefore his stools are  always dark.

## 2021-01-21 NOTE — PROGRESS NOTE ADULT - PROBLEM SELECTOR PLAN 5
home meds needs to be confirmed   will start on HSS   Monitor Fs and check A1c - resume home Lipitor 80 mg

## 2021-01-21 NOTE — CONSULT NOTE ADULT - PROBLEM SELECTOR RECOMMENDATION 2
CKD stage 4 likely due to DM/HTN  Continue renal diet  Monitor BMP and electrolytes  Avoid Nephrotoxic Meds/ Agents such as (NSAIDs, IV contrast, Aminoglycosides such as gentamicin, -Gadolinium contrast, Phosphate containing enemas). CKD stage 4 likely due to DM/HTN  as per patient he receives weekly procrit injections   Continue renal diet  Monitor BMP and electrolytes  Avoid Nephrotoxic Meds/ Agents such as (NSAIDs, IV contrast, Aminoglycosides such as gentamicin, -Gadolinium contrast, Phosphate containing enemas).

## 2021-01-21 NOTE — PROGRESS NOTE ADULT - PROBLEM SELECTOR PLAN 4
home meds needs to be confirmed    will resume meds per old med rec for now    monitor BP - confirmed home meds with patient's pharmacy  - home regimen: lisinopril, amlodipine, hydralazine, nifedipine & furosemide   - will resume furosemide & hydralazine   - holding ACEi given renal function   - hold meds for SBP < 110 mm/Hg - confirmed home meds with patient's pharmacy  - home regimen: lisinopril, amlodipine, hydralazine, nifedipine & furosemide   - will resume furosemide & hydralazine   - holding ACEi given renal function, creatinine clearance 14  - hold meds for SBP < 110 mm/Hg - confirmed home meds with patient's pharmacy  - home regimen: lisinopril, amlodipine, hydralazine, nifedipine & furosemide   - will resume furosemide & hydralazine   - holding ACEi given renal function, creatinine clearance 27  - hold meds for SBP < 110 mm/Hg

## 2021-01-21 NOTE — CONSULT NOTE ADULT - PROBLEM SELECTOR RECOMMENDATION 9
Anemia in the setting of anemia of chronic disease (CKD-IV) mixed with iron deficiency anemia  hgb 5.1-->7.5-->7.6 s/p 2 units PRBC's  no obvious signs of bleeding  f/u FOBT Anemia in the setting of anemia of chronic disease (CKD-IV) mixed with iron deficiency anemia - needs IV iron  hgb 5.1-->7.5-->7.6 s/p 2 units PRBC's  no obvious signs of bleeding  f/u FOBT  plan for EGD/colon tomorrow AM  Clear liquid diet  NPO at 7A tomorrow

## 2021-01-21 NOTE — PROGRESS NOTE ADULT - PROBLEM SELECTOR PLAN 1
p/w HB 5.1 , hemodynamic stable , no active bleeding, likely in the setting of anemia of chronic disease in setting of CKD mixed with iron deficiency anemia  - pt will need GI work up   - f/u FOBT  - Anemia panel compatible with AKD +HORACE , Retic percent 2.5 . retic index<2  c/w hyperproliferation   - will consult hem-onc Dr erwin      - s/p 2PRBC in ED   - f/u post transfusion H/H - hemoglobin 5.1, now 7 after two units PRBC's  - likely 2/2 iron deficiency and CKD  - GI consulted, plan for EGD/Colonoscopy tomorrow  - bowel prep ordered  - CT A/P with no acute findings   - Anemia panel compatible with AKD +HORACE , Retic percent 2.5 . retic index<2  c/w hyperproliferation  - TIBC: 362, Total Iron 11, Ferritin 9, MCV 81.4  - started on Venofer infusions   - Heme/Onc Dr. Nascimento

## 2021-01-21 NOTE — CONSULT NOTE ADULT - SUBJECTIVE AND OBJECTIVE BOX
[facility]    Patient is a 66y old  Male who presents with a chief complaint of Anemia (20 Jan 2021 15:49)    HPI:  67yo M from home  year old male with  PMHx of HTN, HLD, DM, CKD, and anemia of chronic disease with iron deficiency and PSHx of cholecystectomy was sent in for abnormal  hemoglobin levels at around 5. Patient labs were normal a couple of months ago around 9-10. patinet denies any active bleeding from anywhere, (blood in stool , hematuria, achymosis , petechia),  fever , weight loss or any other acute complaints. patient received Venofer weekly for 5 times. BMBx / BMA has not been done for patient in past.   Denies any active bleeding anywhere.         ED course :   Vital Signs Last 24 Hrs  T(C): 35.6 (20 Jan 2021 14:03), Max: 36.7 (20 Jan 2021 10:36)  T(F): 96.1 (20 Jan 2021 14:03), Max: 98 (20 Jan 2021 10:36)  HR: 80 (20 Jan 2021 14:03) (79 - 92)  BP: 165/75 (20 Jan 2021 14:03) (157/50 - 166/74)  BP(mean): --  RR: 20 (20 Jan 2021 14:03) (18 - 20)  SpO2: 99% (20 Jan 2021 14:03) (98% - 99%)    Anemia panel was sent and pt  received 2 PRBC     GOC : FULL CODE (20 Jan 2021 15:49)    PMH/PSH:  PAST MEDICAL & SURGICAL HISTORY:  Unilateral inguinal hernia without obstruction or gangrene, recurrence not specified    Cholecystitis    Hypothyroidism    HTN (hypertension)    DM (diabetes mellitus)    H/O right inguinal hernia repair      FH:  FAMILY HISTORY:  No pertinent family history in first degree relatives        MEDS:  MEDICATIONS  (STANDING):  furosemide    Tablet 40 milliGRAM(s) Oral daily  glucagon  Injectable 1 milliGRAM(s) IntraMuscular once  hydrALAZINE 10 milliGRAM(s) Oral three times a day  insulin lispro (ADMELOG) corrective regimen sliding scale   SubCutaneous three times a day before meals  insulin lispro (ADMELOG) corrective regimen sliding scale   SubCutaneous at bedtime  levothyroxine 50 MICROGram(s) Oral daily  pantoprazole  Injectable 40 milliGRAM(s) IV Push daily    MEDICATIONS  (PRN):    Allergies    No Known Allergies    Intolerances            CONSTITUTIONAL:  No weight loss, fever, chills, weakness or fatigue.  HEENT:  Eyes:  No visual loss, blurred vision, double vision or yellow sclerae. Ears, Nose, Throat:  No hearing loss, sneezing, congestion, runny nose or sore throat.  SKIN:  No rash or itching.  CARDIOVASCULAR:  No chest pain, chest pressure or chest discomfort. No palpitations or edema.  RESPIRATORY:  No shortness of breath, cough or sputum.  GASTROINTESTINAL:  SEE HPI  GENITOURINARY:  No dysuria, hematuria, urinary frequency  NEUROLOGICAL:  No headache, dizziness, syncope, paralysis, ataxia, numbness or tingling in the extremities. No change in bowel or bladder control.  MUSCULOSKELETAL:  No muscle, back pain, joint pain or stiffness.  HEMATOLOGIC:  No anemia, bleeding or bruising.  LYMPHATICS:  No enlarged nodes. No history of splenectomy.  PSYCHIATRIC:  No history of depression or anxiety.  ENDOCRINOLOGIC:  No reports of sweating, cold or heat intolerance. No polyuria or polydipsia.      ______________________________________________________________________  PHYSICAL EXAM:  T(C): 36.9 (01-21-21 @ 05:11), Max: 36.9 (01-21-21 @ 05:11)  HR: 74 (01-21-21 @ 05:11)  BP: 159/62 (01-21-21 @ 05:11)  RR: 18 (01-21-21 @ 05:11)  SpO2: 97% (01-21-21 @ 05:11)  Wt(kg): --      GEN: NAD, normocephalic  CVS: S1S2+  CHEST: clear to auscultation  ABD: soft , nontender, nondistended, bowel sounds present  EXTR: no cyanosis, no clubbing, no edema  NEURO: Awake and alert; oriented .....  SKIN:  warm;  non icteric    ______________________________________________________________________  LABS:                        7.6    5.76  )-----------( 272      ( 21 Jan 2021 07:14 )             24.5     01-21    142  |  108  |  37<H>  ----------------------------<  87  3.8   |  25  |  2.66<H>    Ca    8.7      21 Jan 2021 07:14  Phos  4.3     01-21  Mg     2.1     01-21    TPro  6.3  /  Alb  3.1<L>  /  TBili  0.4  /  DBili  x   /  AST  9<L>  /  ALT  19  /  AlkPhos  124<H>  01-21    LIVER FUNCTIONS - ( 21 Jan 2021 07:14 )  Alb: 3.1 g/dL / Pro: 6.3 g/dL / ALK PHOS: 124 U/L / ALT: 19 U/L DA / AST: 9 U/L / GGT: x           PT/INR - ( 21 Jan 2021 07:14 )   PT: 14.1 sec;   INR: 1.19 ratio         PTT - ( 21 Jan 2021 07:14 )  PTT:38.5 sec  ____________________________________________              [facility]    Patient is a 66y old  Male who presents with a chief complaint of Anemia (20 Jan 2021 15:49)    HPI:  67yo M from home  year old male with  PMHx of HTN, HLD, DM, CKD, and anemia of chronic disease with iron deficiency and PSHx of cholecystectomy was sent in for abnormal  hemoglobin levels at around 5. Patient labs were normal a couple of months ago around 9-10. patinet denies any active bleeding from anywhere, (blood in stool , hematuria, achymosis , petechia),  fever , weight loss or any other acute complaints. patient received Venofer weekly for 5 times. BMBx / BMA has not been done for patient in past.   Denies any active bleeding anywhere.         ED course :   Vital Signs Last 24 Hrs  T(C): 35.6 (20 Jan 2021 14:03), Max: 36.7 (20 Jan 2021 10:36)  T(F): 96.1 (20 Jan 2021 14:03), Max: 98 (20 Jan 2021 10:36)  HR: 80 (20 Jan 2021 14:03) (79 - 92)  BP: 165/75 (20 Jan 2021 14:03) (157/50 - 166/74)  BP(mean): --  RR: 20 (20 Jan 2021 14:03) (18 - 20)  SpO2: 99% (20 Jan 2021 14:03) (98% - 99%)    Anemia panel was sent and pt  received 2 PRBC     GOC : FULL CODE (20 Jan 2021 15:49)    PMH/PSH:  PAST MEDICAL & SURGICAL HISTORY:  Unilateral inguinal hernia without obstruction or gangrene, recurrence not specified    Cholecystitis    Hypothyroidism    HTN (hypertension)    DM (diabetes mellitus)    H/O right inguinal hernia repair      FH:  FAMILY HISTORY:  No pertinent family history in first degree relatives        MEDS:  MEDICATIONS  (STANDING):  furosemide    Tablet 40 milliGRAM(s) Oral daily  glucagon  Injectable 1 milliGRAM(s) IntraMuscular once  hydrALAZINE 10 milliGRAM(s) Oral three times a day  insulin lispro (ADMELOG) corrective regimen sliding scale   SubCutaneous three times a day before meals  insulin lispro (ADMELOG) corrective regimen sliding scale   SubCutaneous at bedtime  levothyroxine 50 MICROGram(s) Oral daily  pantoprazole  Injectable 40 milliGRAM(s) IV Push daily    MEDICATIONS  (PRN):    Allergies    No Known Allergies    Intolerances            CONSTITUTIONAL:  No weight loss, fever, chills, weakness or fatigue.  HEENT:  Eyes:  No visual loss, blurred vision, double vision or yellow sclerae. Ears, Nose, Throat:  No hearing loss, sneezing, congestion, runny nose or sore throat.  SKIN:  No rash or itching.  CARDIOVASCULAR:  No chest pain, chest pressure or chest discomfort. No palpitations or edema.  RESPIRATORY:  No shortness of breath, cough or sputum.  GASTROINTESTINAL:  SEE HPI  GENITOURINARY:  No dysuria, hematuria, urinary frequency  NEUROLOGICAL:  No headache, dizziness, syncope, paralysis, ataxia, numbness or tingling in the extremities. No change in bowel or bladder control.  MUSCULOSKELETAL:  No muscle, back pain, joint pain or stiffness.  HEMATOLOGIC:  No anemia, bleeding or bruising.  LYMPHATICS:  No enlarged nodes. No history of splenectomy.  PSYCHIATRIC:  No history of depression or anxiety.  ENDOCRINOLOGIC:  No reports of sweating, cold or heat intolerance. No polyuria or polydipsia.      ______________________________________________________________________  PHYSICAL EXAM:  T(C): 36.9 (01-21-21 @ 05:11), Max: 36.9 (01-21-21 @ 05:11)  HR: 74 (01-21-21 @ 05:11)  BP: 159/62 (01-21-21 @ 05:11)  RR: 18 (01-21-21 @ 05:11)  SpO2: 97% (01-21-21 @ 05:11)  Wt(kg): --      GEN: NAD, normocephalic  CVS: S1S2+  CHEST: clear to auscultation  ABD: soft , nontender, nondistended, bowel sounds present  EXTR: no cyanosis, no clubbing, no edema  NEURO: Awake and alert; oriented to person, place time and situation   SKIN:  warm;  non icteric    ______________________________________________________________________  LABS:                        7.6    5.76  )-----------( 272      ( 21 Jan 2021 07:14 )             24.5     01-21    142  |  108  |  37<H>  ----------------------------<  87  3.8   |  25  |  2.66<H>    Ca    8.7      21 Jan 2021 07:14  Phos  4.3     01-21  Mg     2.1     01-21    TPro  6.3  /  Alb  3.1<L>  /  TBili  0.4  /  DBili  x   /  AST  9<L>  /  ALT  19  /  AlkPhos  124<H>  01-21    LIVER FUNCTIONS - ( 21 Jan 2021 07:14 )  Alb: 3.1 g/dL / Pro: 6.3 g/dL / ALK PHOS: 124 U/L / ALT: 19 U/L DA / AST: 9 U/L / GGT: x           PT/INR - ( 21 Jan 2021 07:14 )   PT: 14.1 sec;   INR: 1.19 ratio         PTT - ( 21 Jan 2021 07:14 )  PTT:38.5 sec  ____________________________________________

## 2021-01-22 ENCOUNTER — RESULT REVIEW (OUTPATIENT)
Age: 66
End: 2021-01-22

## 2021-01-22 ENCOUNTER — TRANSCRIPTION ENCOUNTER (OUTPATIENT)
Age: 66
End: 2021-01-22

## 2021-01-22 VITALS
DIASTOLIC BLOOD PRESSURE: 54 MMHG | OXYGEN SATURATION: 100 % | SYSTOLIC BLOOD PRESSURE: 160 MMHG | RESPIRATION RATE: 18 BRPM | HEART RATE: 82 BPM

## 2021-01-22 LAB
ALBUMIN SERPL ELPH-MCNC: 3.2 G/DL — LOW (ref 3.5–5)
ALP SERPL-CCNC: 125 U/L — HIGH (ref 40–120)
ALT FLD-CCNC: 19 U/L DA — SIGNIFICANT CHANGE UP (ref 10–60)
ANION GAP SERPL CALC-SCNC: 9 MMOL/L — SIGNIFICANT CHANGE UP (ref 5–17)
APTT BLD: 37.5 SEC — HIGH (ref 27.5–35.5)
AST SERPL-CCNC: 8 U/L — LOW (ref 10–40)
BASOPHILS # BLD AUTO: 0.06 K/UL — SIGNIFICANT CHANGE UP (ref 0–0.2)
BASOPHILS NFR BLD AUTO: 0.8 % — SIGNIFICANT CHANGE UP (ref 0–2)
BILIRUB SERPL-MCNC: 0.3 MG/DL — SIGNIFICANT CHANGE UP (ref 0.2–1.2)
BUN SERPL-MCNC: 32 MG/DL — HIGH (ref 7–18)
CALCIUM SERPL-MCNC: 8.5 MG/DL — SIGNIFICANT CHANGE UP (ref 8.4–10.5)
CHLORIDE SERPL-SCNC: 106 MMOL/L — SIGNIFICANT CHANGE UP (ref 96–108)
CHLORIDE UR-SCNC: 71 MMOL/L — SIGNIFICANT CHANGE UP
CO2 SERPL-SCNC: 27 MMOL/L — SIGNIFICANT CHANGE UP (ref 22–31)
CREAT ?TM UR-MCNC: 74 MG/DL — SIGNIFICANT CHANGE UP
CREAT SERPL-MCNC: 2.62 MG/DL — HIGH (ref 0.5–1.3)
EOSINOPHIL # BLD AUTO: 0.49 K/UL — SIGNIFICANT CHANGE UP (ref 0–0.5)
EOSINOPHIL NFR BLD AUTO: 6.7 % — HIGH (ref 0–6)
GLUCOSE BLDC GLUCOMTR-MCNC: 107 MG/DL — HIGH (ref 70–99)
GLUCOSE BLDC GLUCOMTR-MCNC: 126 MG/DL — HIGH (ref 70–99)
GLUCOSE BLDC GLUCOMTR-MCNC: 191 MG/DL — HIGH (ref 70–99)
GLUCOSE SERPL-MCNC: 95 MG/DL — SIGNIFICANT CHANGE UP (ref 70–99)
HCT VFR BLD CALC: 27.4 % — LOW (ref 39–50)
HGB BLD-MCNC: 8.5 G/DL — LOW (ref 13–17)
IMM GRANULOCYTES NFR BLD AUTO: 0.3 % — SIGNIFICANT CHANGE UP (ref 0–1.5)
INR BLD: 1.16 RATIO — SIGNIFICANT CHANGE UP (ref 0.88–1.16)
LYMPHOCYTES # BLD AUTO: 1.79 K/UL — SIGNIFICANT CHANGE UP (ref 1–3.3)
LYMPHOCYTES # BLD AUTO: 24.4 % — SIGNIFICANT CHANGE UP (ref 13–44)
MAGNESIUM SERPL-MCNC: 2 MG/DL — SIGNIFICANT CHANGE UP (ref 1.6–2.6)
MCHC RBC-ENTMCNC: 25.5 PG — LOW (ref 27–34)
MCHC RBC-ENTMCNC: 31 GM/DL — LOW (ref 32–36)
MCV RBC AUTO: 82.3 FL — SIGNIFICANT CHANGE UP (ref 80–100)
MONOCYTES # BLD AUTO: 0.74 K/UL — SIGNIFICANT CHANGE UP (ref 0–0.9)
MONOCYTES NFR BLD AUTO: 10.1 % — SIGNIFICANT CHANGE UP (ref 2–14)
NEUTROPHILS # BLD AUTO: 4.24 K/UL — SIGNIFICANT CHANGE UP (ref 1.8–7.4)
NEUTROPHILS NFR BLD AUTO: 57.7 % — SIGNIFICANT CHANGE UP (ref 43–77)
NRBC # BLD: 0 /100 WBCS — SIGNIFICANT CHANGE UP (ref 0–0)
PHOSPHATE SERPL-MCNC: 3.6 MG/DL — SIGNIFICANT CHANGE UP (ref 2.5–4.5)
PLATELET # BLD AUTO: 308 K/UL — SIGNIFICANT CHANGE UP (ref 150–400)
POTASSIUM SERPL-MCNC: 4 MMOL/L — SIGNIFICANT CHANGE UP (ref 3.5–5.3)
POTASSIUM SERPL-SCNC: 4 MMOL/L — SIGNIFICANT CHANGE UP (ref 3.5–5.3)
PROT SERPL-MCNC: 6.6 G/DL — SIGNIFICANT CHANGE UP (ref 6–8.3)
PROTHROM AB SERPL-ACNC: 13.7 SEC — HIGH (ref 10.6–13.6)
RBC # BLD: 3.33 M/UL — LOW (ref 4.2–5.8)
RBC # FLD: 15.4 % — HIGH (ref 10.3–14.5)
SARS-COV-2 IGG SERPL QL IA: NEGATIVE — SIGNIFICANT CHANGE UP
SARS-COV-2 IGM SERPL IA-ACNC: <0.1 INDEX — SIGNIFICANT CHANGE UP
SODIUM SERPL-SCNC: 142 MMOL/L — SIGNIFICANT CHANGE UP (ref 135–145)
SODIUM UR-SCNC: 86 MMOL/L — SIGNIFICANT CHANGE UP
WBC # BLD: 7.34 K/UL — SIGNIFICANT CHANGE UP (ref 3.8–10.5)
WBC # FLD AUTO: 7.34 K/UL — SIGNIFICANT CHANGE UP (ref 3.8–10.5)

## 2021-01-22 PROCEDURE — 86923 COMPATIBILITY TEST ELECTRIC: CPT

## 2021-01-22 PROCEDURE — 85730 THROMBOPLASTIN TIME PARTIAL: CPT

## 2021-01-22 PROCEDURE — 84300 ASSAY OF URINE SODIUM: CPT

## 2021-01-22 PROCEDURE — 99239 HOSP IP/OBS DSCHRG MGMT >30: CPT

## 2021-01-22 PROCEDURE — 83036 HEMOGLOBIN GLYCOSYLATED A1C: CPT

## 2021-01-22 PROCEDURE — 83735 ASSAY OF MAGNESIUM: CPT

## 2021-01-22 PROCEDURE — 83550 IRON BINDING TEST: CPT

## 2021-01-22 PROCEDURE — 82570 ASSAY OF URINE CREATININE: CPT

## 2021-01-22 PROCEDURE — 82272 OCCULT BLD FECES 1-3 TESTS: CPT

## 2021-01-22 PROCEDURE — 36415 COLL VENOUS BLD VENIPUNCTURE: CPT

## 2021-01-22 PROCEDURE — 45385 COLONOSCOPY W/LESION REMOVAL: CPT

## 2021-01-22 PROCEDURE — 86850 RBC ANTIBODY SCREEN: CPT

## 2021-01-22 PROCEDURE — 84443 ASSAY THYROID STIM HORMONE: CPT

## 2021-01-22 PROCEDURE — 93005 ELECTROCARDIOGRAM TRACING: CPT

## 2021-01-22 PROCEDURE — 80061 LIPID PANEL: CPT

## 2021-01-22 PROCEDURE — 74176 CT ABD & PELVIS W/O CONTRAST: CPT

## 2021-01-22 PROCEDURE — 88305 TISSUE EXAM BY PATHOLOGIST: CPT | Mod: 26

## 2021-01-22 PROCEDURE — 88312 SPECIAL STAINS GROUP 1: CPT | Mod: 26

## 2021-01-22 PROCEDURE — 86769 SARS-COV-2 COVID-19 ANTIBODY: CPT

## 2021-01-22 PROCEDURE — 82607 VITAMIN B-12: CPT

## 2021-01-22 PROCEDURE — 86901 BLOOD TYPING SEROLOGIC RH(D): CPT

## 2021-01-22 PROCEDURE — U0005: CPT

## 2021-01-22 PROCEDURE — 85027 COMPLETE CBC AUTOMATED: CPT

## 2021-01-22 PROCEDURE — 82962 GLUCOSE BLOOD TEST: CPT

## 2021-01-22 PROCEDURE — 80053 COMPREHEN METABOLIC PANEL: CPT

## 2021-01-22 PROCEDURE — 43239 EGD BIOPSY SINGLE/MULTIPLE: CPT | Mod: 59

## 2021-01-22 PROCEDURE — 85610 PROTHROMBIN TIME: CPT

## 2021-01-22 PROCEDURE — 82746 ASSAY OF FOLIC ACID SERUM: CPT

## 2021-01-22 PROCEDURE — 86803 HEPATITIS C AB TEST: CPT

## 2021-01-22 PROCEDURE — 81001 URINALYSIS AUTO W/SCOPE: CPT

## 2021-01-22 PROCEDURE — 84484 ASSAY OF TROPONIN QUANT: CPT

## 2021-01-22 PROCEDURE — U0003: CPT

## 2021-01-22 PROCEDURE — 99291 CRITICAL CARE FIRST HOUR: CPT

## 2021-01-22 PROCEDURE — 88305 TISSUE EXAM BY PATHOLOGIST: CPT

## 2021-01-22 PROCEDURE — 84100 ASSAY OF PHOSPHORUS: CPT

## 2021-01-22 PROCEDURE — 85025 COMPLETE CBC W/AUTO DIFF WBC: CPT

## 2021-01-22 PROCEDURE — 87635 SARS-COV-2 COVID-19 AMP PRB: CPT

## 2021-01-22 PROCEDURE — 36430 TRANSFUSION BLD/BLD COMPNT: CPT

## 2021-01-22 PROCEDURE — 82728 ASSAY OF FERRITIN: CPT

## 2021-01-22 PROCEDURE — 82436 ASSAY OF URINE CHLORIDE: CPT

## 2021-01-22 PROCEDURE — P9040: CPT

## 2021-01-22 PROCEDURE — C1889: CPT

## 2021-01-22 PROCEDURE — 86900 BLOOD TYPING SEROLOGIC ABO: CPT

## 2021-01-22 PROCEDURE — 88312 SPECIAL STAINS GROUP 1: CPT

## 2021-01-22 PROCEDURE — 83540 ASSAY OF IRON: CPT

## 2021-01-22 PROCEDURE — 85045 AUTOMATED RETICULOCYTE COUNT: CPT

## 2021-01-22 PROCEDURE — 43251 EGD REMOVE LESION SNARE: CPT

## 2021-01-22 RX ORDER — PANTOPRAZOLE SODIUM 20 MG/1
1 TABLET, DELAYED RELEASE ORAL
Qty: 30 | Refills: 0
Start: 2021-01-22 | End: 2021-02-20

## 2021-01-22 RX ORDER — FUROSEMIDE 40 MG
1 TABLET ORAL
Qty: 0 | Refills: 0 | DISCHARGE
Start: 2021-01-22

## 2021-01-22 RX ADMIN — Medication 50 MICROGRAM(S): at 05:39

## 2021-01-22 RX ADMIN — IRON SUCROSE 210 MILLIGRAM(S): 20 INJECTION, SOLUTION INTRAVENOUS at 17:36

## 2021-01-22 RX ADMIN — Medication 10 MILLIGRAM(S): at 13:01

## 2021-01-22 RX ADMIN — Medication 1 LITER(S): at 05:39

## 2021-01-22 RX ADMIN — Medication 10 MILLIGRAM(S): at 05:39

## 2021-01-22 RX ADMIN — Medication 1: at 16:45

## 2021-01-22 RX ADMIN — Medication 40 MILLIGRAM(S): at 05:39

## 2021-01-22 RX ADMIN — PANTOPRAZOLE SODIUM 40 MILLIGRAM(S): 20 TABLET, DELAYED RELEASE ORAL at 11:46

## 2021-01-22 NOTE — DISCHARGE NOTE PROVIDER - HOSPITAL COURSE
66 year old male with a past medical history of HTN, HLD, DM, CKD, and anemia of chronic disease/iron deficiency, and cholecystectomy who presented to the Emergency Department on 1/20 after having routine lab work with his hematologist Dr. Romero which revealed a hemoglobin of 5. Patient denies any associated symptoms including shortness of breath, dizziness, melena, hematemesis or BRBPR. He reports a normal EGD approximately 5 months ago and a colonoscopy in 2018 with polyps. He has never had a bone marrow biopsy , but he has received Venofer infusions in the past. He is now s/p 2 units of PRBC's with improvement of his H/H to 7/24. GI has been consulted and patient will have EGD/Colonoscopy tomorrow for evaluation of this significant anemia. Non-contrast CT A/P unremarkable. EGD with gastric and duodenal nodules - biopsied, colonoscopy with polypectomy, no active signs of gastrointestinal bleeding. Patient will need to follow up with GI & hematology as outpatient for biopsy results and management of his iron deficiency anemia. Tolerating diet after procedure, plan for discharge home today.    Discussed above with attending and agrees with plan. 66 year old male with a past medical history of HTN, HLD, DM, CKD, and anemia of chronic disease/iron deficiency, and cholecystectomy who presented to the Emergency Department on 1/20 after having routine lab work with his hematologist Dr. Romero which revealed a hemoglobin of 5. Patient denies any associated symptoms including shortness of breath, dizziness, melena, hematemesis or BRBPR. He reports a normal EGD approximately 5 months ago and a colonoscopy in 2018 with polyps. He has never had a bone marrow biopsy , but he has received Venofer infusions in the past. He is now s/p 2 units of PRBC's with improvement of his H/H to 7/24. GI has been consulted. Non-contrast CT A/P unremarkable. EGD with gastric and duodenal nodules - biopsied, colonoscopy with polypectomy, no active signs of gastrointestinal bleeding. Patient will need to follow up with GI & hematology as outpatient for biopsy results and management of his iron deficiency anemia. Tolerating diet after procedure, plan for discharge home today.    Discussed above with attending and agrees with plan.

## 2021-01-22 NOTE — DISCHARGE NOTE PROVIDER - ATTENDING COMMENTS
Seen and examined. s/p EGD and Colonoscopy today. EGD noted with 2 nodules and biopsied. Colonoscopy with 3 cm multilobulated sessile polyp in Rt colon. Patient advised follow up with GI for biopsy results and repeat colonoscopy and Hematology Dr. Handy for IV iron and further work up.

## 2021-01-22 NOTE — PROGRESS NOTE ADULT - SUBJECTIVE AND OBJECTIVE BOX
Esophagogastroduodenoscopy Report  Indication: Anemia  Referring MD:   Instrument:  #[id number]  Anesthesia: MAC  Consent:  Informed consent was obtained from the patient after providing any opportunity for questions  Procedure: The gastroscope was gently passed through the incisoral orifice into the oral cavity and under direct visualization the esophagus was intubated. The endoscope was passed down the esophagus, through the stomach and into proximal jejunum. Color, texture, mucosa and anatomy of the esophagus, stomach, and duodenum were carefully examined with the scope. The patient tolerated the procedure well. After completion of the examination, the patient was transferred to the recovery room.   Preparation: NPO   Findings:   Oropharynx	Normal  Esophagus	Normal  EG-junction	Normal  Cardia	normal  Body	Gastric nodule biopsy and fulgarated   Antrum	Normal  Pylorus	Normal.  Duodenal Bulb	Normal   2nd portion	Small nodule with ? avm on it biopsied and fulgarated with APC,duodenum biopsied as well  3rd portion	Normal.  Date and time:  EBL:0  Impression: Gastric and duodenal nodules, Fulgarated    Plan: F/u biopsies and f/u as outpatient for SB workup                              Procedure Start Time: 9:30  Procedure End Time:  9:38                 Vicente Caballero MD

## 2021-01-22 NOTE — DISCHARGE NOTE NURSING/CASE MANAGEMENT/SOCIAL WORK - PATIENT PORTAL LINK FT
You can access the FollowMyHealth Patient Portal offered by [facility] by registering at the following website: [email or web address]. By joining [facility]’s FollowMyHealth portal, you will also be able to view your health information using other applications (apps) compatible with our system.

## 2021-01-22 NOTE — DISCHARGE NOTE PROVIDER - NSDCMRMEDTOKEN_GEN_ALL_CORE_FT
allopurinol 100 mg oral tablet: 1 tab(s) orally once a day, As Needed  amLODIPine 5 mg oral tablet: 1 tab(s) orally once a day  atorvastatin 80 mg oral tablet: 1 tab(s) orally once a day  hydrALAZINE 100 mg oral tablet: 1 tab(s) orally 3 times a day  Januvia 25 mg oral tablet: 1 tab(s) orally once a day  levothyroxine 50 mcg (0.05 mg) oral tablet: 1 tab(s) orally once a day  lisinopril 10 mg oral tablet: 1 tab(s) orally once a day  NIFEdipine 60 mg oral tablet, extended release: 1 tab(s) orally once a day   allopurinol 100 mg oral tablet: 1 tab(s) orally once a day, As Needed  amLODIPine 5 mg oral tablet: 1 tab(s) orally once a day  atorvastatin 80 mg oral tablet: 1 tab(s) orally once a day  furosemide 40 mg oral tablet: 1 tab(s) orally once a day  hydrALAZINE 100 mg oral tablet: 1 tab(s) orally 3 times a day  Januvia 25 mg oral tablet: 1 tab(s) orally once a day  levothyroxine 50 mcg (0.05 mg) oral tablet: 1 tab(s) orally once a day  lisinopril 10 mg oral tablet: 1 tab(s) orally once a day  NIFEdipine 60 mg oral tablet, extended release: 1 tab(s) orally once a day  Protonix 40 mg oral delayed release tablet: 1 tab(s) orally once a day

## 2021-01-22 NOTE — PROGRESS NOTE ADULT - SUBJECTIVE AND OBJECTIVE BOX
Colonoscopy Report  Indication: Anemia  Referring:   Instrument:  [id number]  Anesthesia: MAC  Consent:  Informed consent was obtained from the patient after providing any opportunity for questions  Procedure: After placing the patient in the left lateral decubitus position, the colonoscope was gently inserted into the rectum and advanced to the cecum. Color, texture, mucosa, and anatomy of the colon were carefully examined with the scope. The patient tolerated the procedure well. After completion of the exam, the patient was transferred to the recovery room.     Preparation:  Findings:   Anal Canal	Normal  Rectum	Normal  Sigmoid Colon 	Normal  Descending Colon	Normal  Splenic Flexure	Normal  Transverse Colon	Normal  Hepatic Flexure	Normal  Ascending Colon  3cm multilobulated sessile polyp lifted with ORISE and removed in piecemeal fashion. Bottom of polyp fulgarated with APC and area tattooed with ink  Cecum	Normal  Ileo-cecal Valve	Normal  Ileum 	Normal  Date and time:   EBL:0    Impression:  3CM polyp removed , tattooed and fulgarated in Right colon. remainder of colon normal    Plan   repeat colon 3 months as f/u  SB w/u for anemia as outpatient  IV iron here AND AS OUTPATIENT  Re check cbc as out patient                      Procedure Start Time:  9:49  Cecum Reached Time: 9:53  Procedure End Time:10:33     Total Withdrawal Time:                 Vicente Caballero MD

## 2021-01-22 NOTE — DISCHARGE NOTE PROVIDER - PROVIDER TOKENS
PROVIDER:[TOKEN:[[id number]],FOLLOWUP:[1 week]],PROVIDER:[TOKEN:[[id number]],FOLLOWUP:[2 weeks]] PROVIDER:[TOKEN:[[id number]],FOLLOWUP:[1 week]],PROVIDER:[TOKEN:[[id number]],FOLLOWUP:[2 weeks]],PROVIDER:[TOKEN:[[id number]],FOLLOWUP:[1 week]]

## 2021-01-22 NOTE — DISCHARGE NOTE PROVIDER - CARE PROVIDER_API CALL
Kirill Rod  INTERNAL MEDICINE  [address] [place] [address]  Phone: [phone]  Fax: [phone]  Follow Up Time: 1 week    Vicente Caballero)  Gastroenterology  [address] [place] [address]  Phone: [phone]  Fax: [phone]  Follow Up Time: 2 weeks   Kirill Rod  INTERNAL MEDICINE  [address] [place] [address]  Phone: [phone]  Fax: [phone]  Follow Up Time: 1 week    Vicente Caballero)  Gastroenterology  [address] [place] [address]  Phone: [phone]  Fax: [phone]  Follow Up Time: 2 weeks    Sima Romero)  Internal Medicine; Medical Oncology  [address] [place] [address]  Phone: [phone]  Fax: [phone]  Follow Up Time: 1 week

## 2021-01-22 NOTE — DISCHARGE NOTE PROVIDER - CARE PROVIDERS DIRECT ADDRESSES
[email or web address]

## 2021-01-26 LAB — SURGICAL PATHOLOGY STUDY: SIGNIFICANT CHANGE UP

## 2021-04-22 ENCOUNTER — APPOINTMENT (OUTPATIENT)
Dept: GASTROENTEROLOGY | Facility: CLINIC | Age: 66
End: 2021-04-22

## 2021-04-26 ENCOUNTER — INPATIENT (INPATIENT)
Facility: HOSPITAL | Age: 66
LOS: 7 days | Discharge: HOME | DRG: 811 | End: 2021-05-04
Attending: GENERAL PRACTICE | Admitting: GENERAL PRACTICE
Payer: COMMERCIAL

## 2021-04-26 VITALS
HEART RATE: 72 BPM | TEMPERATURE: 98 F | DIASTOLIC BLOOD PRESSURE: 63 MMHG | SYSTOLIC BLOOD PRESSURE: 151 MMHG | HEIGHT: 67 IN | RESPIRATION RATE: 18 BRPM | OXYGEN SATURATION: 98 % | WEIGHT: 190.26 LBS

## 2021-04-26 DIAGNOSIS — K92.2 GASTROINTESTINAL HEMORRHAGE, UNSPECIFIED: ICD-10-CM

## 2021-04-26 DIAGNOSIS — N17.9 ACUTE KIDNEY FAILURE, UNSPECIFIED: ICD-10-CM

## 2021-04-26 DIAGNOSIS — U07.1 COVID-19: ICD-10-CM

## 2021-04-26 DIAGNOSIS — I10 ESSENTIAL (PRIMARY) HYPERTENSION: ICD-10-CM

## 2021-04-26 DIAGNOSIS — D64.9 ANEMIA, UNSPECIFIED: ICD-10-CM

## 2021-04-26 DIAGNOSIS — E03.9 HYPOTHYROIDISM, UNSPECIFIED: ICD-10-CM

## 2021-04-26 DIAGNOSIS — Z71.89 OTHER SPECIFIED COUNSELING: ICD-10-CM

## 2021-04-26 DIAGNOSIS — Z29.9 ENCOUNTER FOR PROPHYLACTIC MEASURES, UNSPECIFIED: ICD-10-CM

## 2021-04-26 DIAGNOSIS — E11.9 TYPE 2 DIABETES MELLITUS WITHOUT COMPLICATIONS: ICD-10-CM

## 2021-04-26 DIAGNOSIS — Z98.890 OTHER SPECIFIED POSTPROCEDURAL STATES: Chronic | ICD-10-CM

## 2021-04-26 LAB
ALBUMIN SERPL ELPH-MCNC: 2.6 G/DL — LOW (ref 3.5–5)
ALP SERPL-CCNC: 202 U/L — HIGH (ref 40–120)
ALT FLD-CCNC: 49 U/L DA — SIGNIFICANT CHANGE UP (ref 10–60)
ANION GAP SERPL CALC-SCNC: 9 MMOL/L — SIGNIFICANT CHANGE UP (ref 5–17)
APPEARANCE UR: CLEAR — SIGNIFICANT CHANGE UP
APTT BLD: 38.3 SEC — HIGH (ref 27.5–35.5)
AST SERPL-CCNC: 25 U/L — SIGNIFICANT CHANGE UP (ref 10–40)
BASOPHILS # BLD AUTO: 0.02 K/UL — SIGNIFICANT CHANGE UP (ref 0–0.2)
BASOPHILS NFR BLD AUTO: 0.3 % — SIGNIFICANT CHANGE UP (ref 0–2)
BILIRUB SERPL-MCNC: 0.3 MG/DL — SIGNIFICANT CHANGE UP (ref 0.2–1.2)
BILIRUB UR-MCNC: NEGATIVE — SIGNIFICANT CHANGE UP
BUN SERPL-MCNC: 84 MG/DL — HIGH (ref 7–18)
CALCIUM SERPL-MCNC: 8 MG/DL — LOW (ref 8.4–10.5)
CHLORIDE SERPL-SCNC: 106 MMOL/L — SIGNIFICANT CHANGE UP (ref 96–108)
CK SERPL-CCNC: 123 U/L — SIGNIFICANT CHANGE UP (ref 35–232)
CO2 SERPL-SCNC: 21 MMOL/L — LOW (ref 22–31)
COLOR SPEC: YELLOW — SIGNIFICANT CHANGE UP
CREAT ?TM UR-MCNC: 65 MG/DL — SIGNIFICANT CHANGE UP
CREAT SERPL-MCNC: 3.21 MG/DL — HIGH (ref 0.5–1.3)
DIFF PNL FLD: NEGATIVE — SIGNIFICANT CHANGE UP
EOSINOPHIL # BLD AUTO: 0.73 K/UL — HIGH (ref 0–0.5)
EOSINOPHIL NFR BLD AUTO: 9.2 % — HIGH (ref 0–6)
GLUCOSE SERPL-MCNC: 185 MG/DL — HIGH (ref 70–99)
GLUCOSE UR QL: NEGATIVE — SIGNIFICANT CHANGE UP
HCT VFR BLD CALC: 12.8 % — CRITICAL LOW (ref 39–50)
HCT VFR BLD CALC: 17.6 % — CRITICAL LOW (ref 39–50)
HGB BLD-MCNC: 3.9 G/DL — CRITICAL LOW (ref 13–17)
HGB BLD-MCNC: 5.7 G/DL — CRITICAL LOW (ref 13–17)
IMM GRANULOCYTES NFR BLD AUTO: 0.4 % — SIGNIFICANT CHANGE UP (ref 0–1.5)
INR BLD: 1.19 RATIO — HIGH (ref 0.88–1.16)
KETONES UR-MCNC: NEGATIVE — SIGNIFICANT CHANGE UP
LACTATE SERPL-SCNC: 0.8 MMOL/L — SIGNIFICANT CHANGE UP (ref 0.7–2)
LEUKOCYTE ESTERASE UR-ACNC: NEGATIVE — SIGNIFICANT CHANGE UP
LYMPHOCYTES # BLD AUTO: 1.68 K/UL — SIGNIFICANT CHANGE UP (ref 1–3.3)
LYMPHOCYTES # BLD AUTO: 21.1 % — SIGNIFICANT CHANGE UP (ref 13–44)
MCHC RBC-ENTMCNC: 25.8 PG — LOW (ref 27–34)
MCHC RBC-ENTMCNC: 27.7 PG — SIGNIFICANT CHANGE UP (ref 27–34)
MCHC RBC-ENTMCNC: 30.5 GM/DL — LOW (ref 32–36)
MCHC RBC-ENTMCNC: 32.4 GM/DL — SIGNIFICANT CHANGE UP (ref 32–36)
MCV RBC AUTO: 84.8 FL — SIGNIFICANT CHANGE UP (ref 80–100)
MCV RBC AUTO: 85.4 FL — SIGNIFICANT CHANGE UP (ref 80–100)
MONOCYTES # BLD AUTO: 0.63 K/UL — SIGNIFICANT CHANGE UP (ref 0–0.9)
MONOCYTES NFR BLD AUTO: 7.9 % — SIGNIFICANT CHANGE UP (ref 2–14)
NEUTROPHILS # BLD AUTO: 4.88 K/UL — SIGNIFICANT CHANGE UP (ref 1.8–7.4)
NEUTROPHILS NFR BLD AUTO: 61.1 % — SIGNIFICANT CHANGE UP (ref 43–77)
NITRITE UR-MCNC: NEGATIVE — SIGNIFICANT CHANGE UP
NRBC # BLD: 0 /100 WBCS — SIGNIFICANT CHANGE UP (ref 0–0)
NRBC # BLD: 0 /100 WBCS — SIGNIFICANT CHANGE UP (ref 0–0)
OB PNL STL: POSITIVE
PH UR: 5 — SIGNIFICANT CHANGE UP (ref 5–8)
PLATELET # BLD AUTO: 232 K/UL — SIGNIFICANT CHANGE UP (ref 150–400)
PLATELET # BLD AUTO: 235 K/UL — SIGNIFICANT CHANGE UP (ref 150–400)
POTASSIUM SERPL-MCNC: 5.1 MMOL/L — SIGNIFICANT CHANGE UP (ref 3.5–5.3)
POTASSIUM SERPL-SCNC: 5.1 MMOL/L — SIGNIFICANT CHANGE UP (ref 3.5–5.3)
PROT SERPL-MCNC: 6.2 G/DL — SIGNIFICANT CHANGE UP (ref 6–8.3)
PROT UR-MCNC: 100
PROTHROM AB SERPL-ACNC: 14 SEC — HIGH (ref 10.6–13.6)
RBC # BLD: 1.51 M/UL — LOW (ref 4.2–5.8)
RBC # BLD: 2.06 M/UL — LOW (ref 4.2–5.8)
RBC # FLD: 17.2 % — HIGH (ref 10.3–14.5)
RBC # FLD: 20.7 % — HIGH (ref 10.3–14.5)
SARS-COV-2 RNA SPEC QL NAA+PROBE: DETECTED
SODIUM SERPL-SCNC: 136 MMOL/L — SIGNIFICANT CHANGE UP (ref 135–145)
SODIUM UR-SCNC: 42 MMOL/L — SIGNIFICANT CHANGE UP
SP GR SPEC: 1.01 — SIGNIFICANT CHANGE UP (ref 1.01–1.02)
TROPONIN I SERPL-MCNC: <0.015 NG/ML — SIGNIFICANT CHANGE UP (ref 0–0.04)
UROBILINOGEN FLD QL: NEGATIVE — SIGNIFICANT CHANGE UP
WBC # BLD: 6.75 K/UL — SIGNIFICANT CHANGE UP (ref 3.8–10.5)
WBC # BLD: 7.97 K/UL — SIGNIFICANT CHANGE UP (ref 3.8–10.5)
WBC # FLD AUTO: 6.75 K/UL — SIGNIFICANT CHANGE UP (ref 3.8–10.5)
WBC # FLD AUTO: 7.97 K/UL — SIGNIFICANT CHANGE UP (ref 3.8–10.5)

## 2021-04-26 PROCEDURE — 71045 X-RAY EXAM CHEST 1 VIEW: CPT | Mod: 26

## 2021-04-26 PROCEDURE — 99291 CRITICAL CARE FIRST HOUR: CPT

## 2021-04-26 RX ORDER — AMLODIPINE BESYLATE 2.5 MG/1
1 TABLET ORAL
Qty: 0 | Refills: 0 | DISCHARGE

## 2021-04-26 RX ORDER — INSULIN LISPRO 100/ML
VIAL (ML) SUBCUTANEOUS
Refills: 0 | Status: DISCONTINUED | OUTPATIENT
Start: 2021-04-26 | End: 2021-04-27

## 2021-04-26 RX ORDER — HYDRALAZINE HCL 50 MG
100 TABLET ORAL THREE TIMES A DAY
Refills: 0 | Status: DISCONTINUED | OUTPATIENT
Start: 2021-04-26 | End: 2021-05-04

## 2021-04-26 RX ORDER — ALLOPURINOL 300 MG
100 TABLET ORAL DAILY
Refills: 0 | Status: DISCONTINUED | OUTPATIENT
Start: 2021-04-26 | End: 2021-05-04

## 2021-04-26 RX ORDER — SODIUM CHLORIDE 9 MG/ML
1000 INJECTION INTRAMUSCULAR; INTRAVENOUS; SUBCUTANEOUS
Refills: 0 | Status: DISCONTINUED | OUTPATIENT
Start: 2021-04-26 | End: 2021-05-02

## 2021-04-26 RX ORDER — AMLODIPINE BESYLATE 2.5 MG/1
10 TABLET ORAL DAILY
Refills: 0 | Status: DISCONTINUED | OUTPATIENT
Start: 2021-04-26 | End: 2021-05-04

## 2021-04-26 RX ORDER — PANTOPRAZOLE SODIUM 20 MG/1
8 TABLET, DELAYED RELEASE ORAL
Qty: 80 | Refills: 0 | Status: DISCONTINUED | OUTPATIENT
Start: 2021-04-26 | End: 2021-04-28

## 2021-04-26 RX ORDER — SITAGLIPTIN 50 MG/1
1 TABLET, FILM COATED ORAL
Qty: 0 | Refills: 0 | DISCHARGE
End: 2019-11-30

## 2021-04-26 RX ORDER — CARVEDILOL PHOSPHATE 80 MG/1
12.5 CAPSULE, EXTENDED RELEASE ORAL EVERY 12 HOURS
Refills: 0 | Status: DISCONTINUED | OUTPATIENT
Start: 2021-04-26 | End: 2021-05-04

## 2021-04-26 RX ORDER — ACETAMINOPHEN 500 MG
650 TABLET ORAL EVERY 6 HOURS
Refills: 0 | Status: DISCONTINUED | OUTPATIENT
Start: 2021-04-26 | End: 2021-05-04

## 2021-04-26 RX ORDER — ATORVASTATIN CALCIUM 80 MG/1
80 TABLET, FILM COATED ORAL AT BEDTIME
Refills: 0 | Status: DISCONTINUED | OUTPATIENT
Start: 2021-04-26 | End: 2021-05-04

## 2021-04-26 RX ORDER — ONDANSETRON 8 MG/1
4 TABLET, FILM COATED ORAL EVERY 6 HOURS
Refills: 0 | Status: DISCONTINUED | OUTPATIENT
Start: 2021-04-26 | End: 2021-05-04

## 2021-04-26 RX ORDER — SODIUM CHLORIDE 9 MG/ML
2700 INJECTION INTRAMUSCULAR; INTRAVENOUS; SUBCUTANEOUS ONCE
Refills: 0 | Status: COMPLETED | OUTPATIENT
Start: 2021-04-26 | End: 2021-04-26

## 2021-04-26 RX ORDER — LEVOTHYROXINE SODIUM 125 MCG
50 TABLET ORAL DAILY
Refills: 0 | Status: DISCONTINUED | OUTPATIENT
Start: 2021-04-26 | End: 2021-05-04

## 2021-04-26 RX ORDER — PANTOPRAZOLE SODIUM 20 MG/1
80 TABLET, DELAYED RELEASE ORAL ONCE
Refills: 0 | Status: COMPLETED | OUTPATIENT
Start: 2021-04-26 | End: 2021-04-26

## 2021-04-26 RX ADMIN — PANTOPRAZOLE SODIUM 10 MG/HR: 20 TABLET, DELAYED RELEASE ORAL at 12:12

## 2021-04-26 RX ADMIN — Medication 100 MILLIGRAM(S): at 21:34

## 2021-04-26 RX ADMIN — Medication 1: at 17:52

## 2021-04-26 RX ADMIN — PANTOPRAZOLE SODIUM 80 MILLIGRAM(S): 20 TABLET, DELAYED RELEASE ORAL at 12:01

## 2021-04-26 RX ADMIN — ATORVASTATIN CALCIUM 80 MILLIGRAM(S): 80 TABLET, FILM COATED ORAL at 21:34

## 2021-04-26 RX ADMIN — CARVEDILOL PHOSPHATE 12.5 MILLIGRAM(S): 80 CAPSULE, EXTENDED RELEASE ORAL at 18:17

## 2021-04-26 RX ADMIN — SODIUM CHLORIDE 80 MILLILITER(S): 9 INJECTION INTRAMUSCULAR; INTRAVENOUS; SUBCUTANEOUS at 21:34

## 2021-04-26 RX ADMIN — SODIUM CHLORIDE 2700 MILLILITER(S): 9 INJECTION INTRAMUSCULAR; INTRAVENOUS; SUBCUTANEOUS at 11:00

## 2021-04-26 NOTE — H&P ADULT - NSHPSOCIALHISTORY_GEN_ALL_CORE
Patient lives at home with his wife. Former smoker. Occasional alcohol use. Denies any illicit drug use.

## 2021-04-26 NOTE — H&P ADULT - NSICDXPASTMEDICALHX_GEN_ALL_CORE_FT
PAST MEDICAL HISTORY:  Anemia     Cholecystitis     DM (diabetes mellitus)     HTN (hypertension)     Hypothyroidism     Unilateral inguinal hernia without obstruction or gangrene, recurrence not specified

## 2021-04-26 NOTE — H&P ADULT - PROBLEM SELECTOR PLAN 6
start SSI while in hospital  f/u A1c  monitor FS q6 while NPO  monitor BS and adjust insulin as needed

## 2021-04-26 NOTE — ED PROVIDER NOTE - CONSTITUTIONAL, MLM
normal... Well appearing, awake, alert, oriented to person, place, time/situation and in no apparent distress. mildly ill appearing with malaise, awake, alert, oriented to person, place, time/situation and in no apparent distress.

## 2021-04-26 NOTE — H&P ADULT - PROBLEM SELECTOR PLAN 4
noted to have creatinine of 3.21 on admission noted to have creatinine of 3.21 on admission  patient has history of CKD  last creatinine was 2.62  patient given 2.7L NS bolus in ED   f/u urine lytes  hold home med of lisinopril  monitor BMP daily noted to have creatinine of 3.21 on admission  patient has history of CKD  last creatinine was 2.62  patient given 2.7L NS bolus in ED   f/u urine lytes  hold home med of lisinopril  monitor BMP daily  if not better: renal eval

## 2021-04-26 NOTE — ED ADULT NURSE NOTE - OBJECTIVE STATEMENT
Pt AOx4, ambulatory, c/o BL LE weakness x 3 days, pt endorses dark stools. EKG done, pt placed on cardiac monitor. Pt denies fever, recent travel, CP, SOB, cough, and/or sick contacts. No neuro deficit or distress noted.

## 2021-04-26 NOTE — H&P ADULT - ATTENDING COMMENTS
Patient seen, examined, and interviewed by me, case discussed with me, chart reviewed, agree with above H/P as reviewed.  See  full note above.  Dr. GABI Manuel ([phone])

## 2021-04-26 NOTE — H&P ADULT - NSTOBACCOSCREENHP_GEN_A_CS
LLE swelling, Missed HD ESRD , Missed HD LLE swelling, Missed HD LLE swelling, Missed HD LLE swelling, Missed HD LLE swelling, Missed HD LLE swelling, Missed HD LLE swelling, Missed HD LLE swelling, Missed HD LLE swelling, Missed HD No

## 2021-04-26 NOTE — CHART NOTE - NSCHARTNOTEFT_GEN_A_CORE
EVENT: Notified by nurse re: hgb 5.7 s/p 2 units PRBC    SUBJECTIVE:    OBJECTIVE:  Vital Signs Last 24 Hrs  T(C): 36.5 (26 Apr 2021 19:20), Max: 36.8 (26 Apr 2021 09:29)  T(F): 97.7 (26 Apr 2021 19:20), Max: 98.3 (26 Apr 2021 09:29)  HR: 59 (26 Apr 2021 19:20) (59 - 72)  BP: 151/63 (26 Apr 2021 19:20) (149/74 - 157/63)  BP(mean): --  RR: 18 (26 Apr 2021 19:20) (18 - 19)  SpO2: 98% (26 Apr 2021 19:20) (96% - 100%)    LABS:                        5.7    6.75  )-----------( 232      ( 26 Apr 2021 22:34 )             17.6   CARDIAC MARKERS ( 26 Apr 2021 11:13 )  <0.015 ng/mL / x     / 123 U/L / x     / x        04-26    136  |  106  |  84<H>  ----------------------------<  185<H>  5.1   |  21<L>  |  3.21<H>    Ca    8.0<L>      26 Apr 2021 11:13    TPro  6.2  /  Alb  2.6<L>  /  TBili  0.3  /  DBili  x   /  AST  25  /  ALT  49  /  AlkPhos  202<H>  04-26      EKG:   IMGAGING:    ASSESSMENT:  HPI:  Patient is a 66 year old male from home AAO x3, with PMH of DM, anemia, CKD, HTN and hypothyroidism, who presented to the ED due to generalized weakness.   Patient states that he recently completed his second dose of the Moderna vaccine. States that since he received the first dose, he has been feeling weak. Patient completed second dose last Monday. States that this morning he was feeling so weak that he wasn't able to get up from bed. Patient also states that he has been having on and off black stools for the past 6 years and last had an episode of black stool about 3-4 days ago. Denies any hematochezia, hematemesis or coughing up blood. Patient denies any history of COVID or exposure to COVID. Denies any respiratory issues. Of note, patient was in ED in January 2021 for anemia with Hgb of 5 and received 2 PRBCs and had EGD/colonoscopy done by Dr. Caballero which showed 2 nodules with possible AVM on EGD and 3cm multilobulated sessile polyp in right colon noted on colonoscopy.   in ED pt with Hemoglobin: 3.9, and + COVID-19    (26 Apr 2021 15:25)      PLAN: EVENT: Notified by nurse re: hgb 5.7 s/p 2 units PRBC    Patient is a 66 year old male from home AAO x3, with PMH of DM, anemia, CKD, HTN and hypothyroidism, who presented to the ED due to generalized weakness. Hgb noted to be 3.9. Creatinine of 3.21. FOBT positive. Lactate of 0.8. Troponin negative x1. COVID positive. CXR clear. Given 2.7L NS bolus, protonix 80mg and started on PPI drip. Patient was in ED in January 2021 for anemia with Hgb of 5 and received 2 PRBCs and had EGD/colonoscopy done by Dr. Caballero which showed 2 nodules with possible AVM on EGD and 3cm multilobulated sessile polyp in right colon noted on colonoscopy. Patient admitted for GI bleed.   4/26 - Post-transfusion CBC resulted 5.7.      OBJECTIVE:  Vital Signs Last 24 Hrs  T(C): 36.5 (26 Apr 2021 19:20), Max: 36.8 (26 Apr 2021 09:29)  T(F): 97.7 (26 Apr 2021 19:20), Max: 98.3 (26 Apr 2021 09:29)  HR: 59 (26 Apr 2021 19:20) (59 - 72)  BP: 151/63 (26 Apr 2021 19:20) (149/74 - 157/63)  BP(mean): --  RR: 18 (26 Apr 2021 19:20) (18 - 19)  SpO2: 98% (26 Apr 2021 19:20) (96% - 100%)    LABS:                        5.7    6.75  )-----------( 232      ( 26 Apr 2021 22:34 )             17.6   CARDIAC MARKERS ( 26 Apr 2021 11:13 )  <0.015 ng/mL / x     / 123 U/L / x     / x        04-26    136  |  106  |  84<H>  ----------------------------<  185<H>  5.1   |  21<L>  |  3.21<H>    Ca    8.0<L>      26 Apr 2021 11:13    TPro  6.2  /  Alb  2.6<L>  /  TBili  0.3  /  DBili  x   /  AST  25  /  ALT  49  /  AlkPhos  202<H>  04-26      EKG:   IMGAGING:    PROBLEM: GI bleed - post transfusion hgb 5.7  1 unit PRBC ordered   f/u post transfusion CBC   monitor CBC closely

## 2021-04-26 NOTE — H&P ADULT - PROBLEM SELECTOR PLAN 5
patient on lisinopril, coreg, norvasc, hydralazine and nifedipine  will hold lisinopril in setting of CLOTILDE   continue coreg, norvasc and hydralazine  monitor BP and adjust meds as needed

## 2021-04-26 NOTE — H&P ADULT - PROBLEM SELECTOR PLAN 3
noted to be COVID positive   saturating well on room air   monitor O2 sats  isolation precautions   f/u COVID ab and markers  hold off on decadron since not on oxygen

## 2021-04-26 NOTE — H&P ADULT - NSHPPHYSICALEXAM_GEN_ALL_CORE
T(C): 36.5 (04-26-21 @ 19:20), Max: 36.8 (04-26-21 @ 09:29)  HR: 59 (04-26-21 @ 19:20) (59 - 72)  BP: 151/63 (04-26-21 @ 19:20) (149/74 - 157/63)  BP(mean): --  RR: 18 (04-26-21 @ 19:20) (18 - 19)  SpO2: 98% (04-26-21 @ 19:20) (96% - 100%)     CAPILLARY BLOOD GLUCOSE  167 (26 Apr 2021 16:10)      POCT Blood Glucose.: 236 mg/dL (26 Apr 2021 09:51)    I&O's Summary    26 Apr 2021 07:01  -  26 Apr 2021 22:20  --------------------------------------------------------  IN: 0 mL / OUT: 400 mL / NET: -400 mL

## 2021-04-26 NOTE — H&P ADULT - HISTORY OF PRESENT ILLNESS
Patient is a 66 year old male from home AAO x3, with PMH of DM, anemia, CKD, HTN and hypothyroidism, who presented to the ED due to weakness.  Patient is a 66 year old male from home AAO x3, with PMH of DM, anemia, CKD, HTN and hypothyroidism, who presented to the ED due to weakness. Patient states that he recently completed his second dose of the Moderna vaccine. States that since he received the first dose, he has been feeling weak. Patient completed second dose last Monday. States that this morning he was feeling so weak that he wasn't able to get up from bed. Patient also states that he has been having on and off black stools for the past 6 years and last had an episode of black stool about 3-4 days ago. Denies any hematochezia, hematemesis or coughing up blood. Patient denies any history of COVID or exposure to COVID. Denies any respiratory issues. Of note, patient was in ED in January 2021 for anemia with Hgb of 5 and received 2 PRBCs and had EGD/colonoscopy done by Dr. Caballero which showed 2 nodules with possible AVM on EGD and 3cm multilobulated sessile polyp in right colon noted on colonoscopy.  Patient is a 66 year old male from home AAO x3, with PMH of DM, anemia, CKD, HTN and hypothyroidism, who presented to the ED due to generalized weakness.   Patient states that he recently completed his second dose of the Moderna vaccine. States that since he received the first dose, he has been feeling weak. Patient completed second dose last Monday. States that this morning he was feeling so weak that he wasn't able to get up from bed. Patient also states that he has been having on and off black stools for the past 6 years and last had an episode of black stool about 3-4 days ago. Denies any hematochezia, hematemesis or coughing up blood. Patient denies any history of COVID or exposure to COVID. Denies any respiratory issues. Of note, patient was in ED in January 2021 for anemia with Hgb of 5 and received 2 PRBCs and had EGD/colonoscopy done by Dr. Caballero which showed 2 nodules with possible AVM on EGD and 3cm multilobulated sessile polyp in right colon noted on colonoscopy.   in ED pt with Hemoglobin: 3.9, and + COVID-19

## 2021-04-26 NOTE — ED PROVIDER NOTE - OBJECTIVE STATEMENT
65 y/o M pt with a PMH of cholecystitis, DM, HTN, hypothyroidism presents to the ED with complaints of leg weakness from yesterday. Notes he received the 2nd Moderna vaccine last week. Patient reports this morning he was unable to get up because he felt weak in the legs. Notes he had 1 episode of black stool 3 days ago. Patient denies chest pain, dizziness, back pain, abdominal pain, nausea, vomiting, or diarrhea. 67 y/o M pt with a PMH of cholecystitis, DM, HTN, hypothyroidism presents to the ED with complaints of bilateral leg weakness from yesterday. Notes he received the 2nd Moderna vaccine last week. Patient reports this morning he was unable to get up because he felt weak in the legs. Notes he had 1 episode of black stool 3 days ago. Patient denies chest pain, dizziness, back pain, abdominal pain, nausea, vomiting, or diarrhea.

## 2021-04-26 NOTE — ED PROVIDER NOTE - CLINICAL SUMMARY MEDICAL DECISION MAKING FREE TEXT BOX
Patient with generalized weakness complaining specifically of leg weakness. No objective findings on exam. Patient with overall malaise. Will do labs and reassess.

## 2021-04-26 NOTE — H&P ADULT - ASSESSMENT
Patient is a 66 year old male from home AAO x3, with PMH of DM, anemia, CKD, HTN and hypothyroidism, who presented to the ED due to weakness.  Patient is a 66 year old male from home AAO x3, with PMH of DM, anemia, CKD, HTN and hypothyroidism, who presented to the ED due to weakness.     Hgb noted to be 3.9. Creatinine of 3.21. FOBT positive. Lactate of 0.8. Troponin negative x1. COVID positive. CXR clear. Given 2.7L NS bolus, protonix 80mg and started on PPI drip.  Patient is a 66 year old male from home AAO x3, with PMH of DM, anemia, CKD, HTN and hypothyroidism, who presented to the ED due to generalized weakness.     Hgb noted to be 3.9. Creatinine of 3.21. FOBT positive. Lactate of 0.8. Troponin negative x1. COVID positive. CXR clear. Given 2.7L NS bolus, protonix 80mg and started on PPI drip.

## 2021-04-26 NOTE — ED PROVIDER NOTE - PROGRESS NOTE DETAILS
Patients hemoglobin is critically low. Will transfuse and admit. Patients hemoglobin is critically low. Will transfuse and admit. Patient took an aspirin yesterday. No bowel movement in 3 days. rectal exam with trace amount of dark stool

## 2021-04-27 LAB
24R-OH-CALCIDIOL SERPL-MCNC: 13.2 NG/ML — LOW (ref 30–80)
A1C WITH ESTIMATED AVERAGE GLUCOSE RESULT: 6 % — HIGH (ref 4–5.6)
ANION GAP SERPL CALC-SCNC: 11 MMOL/L — SIGNIFICANT CHANGE UP (ref 5–17)
APTT BLD: 37 SEC — HIGH (ref 27.5–35.5)
BASOPHILS # BLD AUTO: 0.04 K/UL — SIGNIFICANT CHANGE UP (ref 0–0.2)
BASOPHILS NFR BLD AUTO: 0.6 % — SIGNIFICANT CHANGE UP (ref 0–2)
BUN SERPL-MCNC: 65 MG/DL — HIGH (ref 7–18)
BURR CELLS BLD QL SMEAR: PRESENT — SIGNIFICANT CHANGE UP
CALCIUM SERPL-MCNC: 7.7 MG/DL — LOW (ref 8.4–10.5)
CHLORIDE SERPL-SCNC: 111 MMOL/L — HIGH (ref 96–108)
CHOLEST SERPL-MCNC: 102 MG/DL — SIGNIFICANT CHANGE UP
CO2 SERPL-SCNC: 17 MMOL/L — LOW (ref 22–31)
COVID-19 NUCLEOCAPSID GAM AB INTERP: POSITIVE
COVID-19 NUCLEOCAPSID TOTAL GAM ANTIBODY RESULT: 19.9 INDEX — HIGH
COVID-19 SPIKE DOMAIN AB INTERP: POSITIVE
COVID-19 SPIKE DOMAIN ANTIBODY RESULT: >250 U/ML — HIGH
CREAT SERPL-MCNC: 2.79 MG/DL — HIGH (ref 0.5–1.3)
CRP SERPL-MCNC: 20 MG/L — HIGH
CULTURE RESULTS: SIGNIFICANT CHANGE UP
ELLIPTOCYTES BLD QL SMEAR: SLIGHT — SIGNIFICANT CHANGE UP
EOSINOPHIL # BLD AUTO: 0.79 K/UL — HIGH (ref 0–0.5)
EOSINOPHIL NFR BLD AUTO: 12 % — HIGH (ref 0–6)
ERYTHROCYTE [SEDIMENTATION RATE] IN BLOOD: 34 MM/HR — HIGH (ref 0–20)
ESTIMATED AVERAGE GLUCOSE: 126 MG/DL — HIGH (ref 68–114)
FERRITIN SERPL-MCNC: 34 NG/ML — SIGNIFICANT CHANGE UP (ref 30–400)
FOLATE SERPL-MCNC: 8.2 NG/ML — SIGNIFICANT CHANGE UP
GLUCOSE BLDC GLUCOMTR-MCNC: 142 MG/DL — HIGH (ref 70–99)
GLUCOSE BLDC GLUCOMTR-MCNC: 189 MG/DL — HIGH (ref 70–99)
GLUCOSE BLDC GLUCOMTR-MCNC: 189 MG/DL — HIGH (ref 70–99)
GLUCOSE BLDC GLUCOMTR-MCNC: 234 MG/DL — HIGH (ref 70–99)
GLUCOSE SERPL-MCNC: 128 MG/DL — HIGH (ref 70–99)
HCT VFR BLD CALC: 20.6 % — CRITICAL LOW (ref 39–50)
HCT VFR BLD CALC: 25.4 % — LOW (ref 39–50)
HDLC SERPL-MCNC: 28 MG/DL — LOW
HGB BLD-MCNC: 6.7 G/DL — CRITICAL LOW (ref 13–17)
HGB BLD-MCNC: 8.3 G/DL — LOW (ref 13–17)
HYPOCHROMIA BLD QL: SLIGHT — SIGNIFICANT CHANGE UP
IMM GRANULOCYTES NFR BLD AUTO: 0.5 % — SIGNIFICANT CHANGE UP (ref 0–1.5)
INR BLD: 1.19 RATIO — HIGH (ref 0.88–1.16)
IRON SATN MFR SERPL: 16 UG/DL — LOW (ref 65–170)
IRON SATN MFR SERPL: 6 % — LOW (ref 20–55)
LG PLATELETS BLD QL AUTO: SLIGHT — SIGNIFICANT CHANGE UP
LIPID PNL WITH DIRECT LDL SERPL: 51 MG/DL — SIGNIFICANT CHANGE UP
LYMPHOCYTES # BLD AUTO: 1.37 K/UL — SIGNIFICANT CHANGE UP (ref 1–3.3)
LYMPHOCYTES # BLD AUTO: 20.9 % — SIGNIFICANT CHANGE UP (ref 13–44)
MACROCYTES BLD QL: SLIGHT — SIGNIFICANT CHANGE UP
MAGNESIUM SERPL-MCNC: 2.5 MG/DL — SIGNIFICANT CHANGE UP (ref 1.6–2.6)
MANUAL SMEAR VERIFICATION: SIGNIFICANT CHANGE UP
MCHC RBC-ENTMCNC: 27.9 PG — SIGNIFICANT CHANGE UP (ref 27–34)
MCHC RBC-ENTMCNC: 28 PG — SIGNIFICANT CHANGE UP (ref 27–34)
MCHC RBC-ENTMCNC: 32.5 GM/DL — SIGNIFICANT CHANGE UP (ref 32–36)
MCHC RBC-ENTMCNC: 32.7 GM/DL — SIGNIFICANT CHANGE UP (ref 32–36)
MCV RBC AUTO: 85.2 FL — SIGNIFICANT CHANGE UP (ref 80–100)
MCV RBC AUTO: 86.2 FL — SIGNIFICANT CHANGE UP (ref 80–100)
MICROCYTES BLD QL: SLIGHT — SIGNIFICANT CHANGE UP
MONOCYTES # BLD AUTO: 0.46 K/UL — SIGNIFICANT CHANGE UP (ref 0–0.9)
MONOCYTES NFR BLD AUTO: 7 % — SIGNIFICANT CHANGE UP (ref 2–14)
NEUTROPHILS # BLD AUTO: 3.88 K/UL — SIGNIFICANT CHANGE UP (ref 1.8–7.4)
NEUTROPHILS NFR BLD AUTO: 59 % — SIGNIFICANT CHANGE UP (ref 43–77)
NON HDL CHOLESTEROL: 74 MG/DL — SIGNIFICANT CHANGE UP
NRBC # BLD: 0 /100 WBCS — SIGNIFICANT CHANGE UP (ref 0–0)
NRBC # BLD: 0 /100 WBCS — SIGNIFICANT CHANGE UP (ref 0–0)
OVALOCYTES BLD QL SMEAR: SLIGHT — SIGNIFICANT CHANGE UP
PHOSPHATE SERPL-MCNC: 4.6 MG/DL — HIGH (ref 2.5–4.5)
PLAT MORPH BLD: NORMAL — SIGNIFICANT CHANGE UP
PLATELET # BLD AUTO: 227 K/UL — SIGNIFICANT CHANGE UP (ref 150–400)
PLATELET # BLD AUTO: 275 K/UL — SIGNIFICANT CHANGE UP (ref 150–400)
PLATELET COUNT - ESTIMATE: NORMAL — SIGNIFICANT CHANGE UP
POIKILOCYTOSIS BLD QL AUTO: SLIGHT — SIGNIFICANT CHANGE UP
POLYCHROMASIA BLD QL SMEAR: SLIGHT — SIGNIFICANT CHANGE UP
POTASSIUM SERPL-MCNC: 4.6 MMOL/L — SIGNIFICANT CHANGE UP (ref 3.5–5.3)
POTASSIUM SERPL-SCNC: 4.6 MMOL/L — SIGNIFICANT CHANGE UP (ref 3.5–5.3)
PROCALCITONIN SERPL-MCNC: 0.21 NG/ML — HIGH (ref 0.02–0.1)
PROTHROM AB SERPL-ACNC: 14 SEC — HIGH (ref 10.6–13.6)
RBC # BLD: 2.39 M/UL — LOW (ref 4.2–5.8)
RBC # BLD: 2.98 M/UL — LOW (ref 4.2–5.8)
RBC # FLD: 16.6 % — HIGH (ref 10.3–14.5)
RBC # FLD: 16.9 % — HIGH (ref 10.3–14.5)
RBC BLD AUTO: ABNORMAL
SARS-COV-2 IGG+IGM SERPL QL IA: 19.9 INDEX — HIGH
SARS-COV-2 IGG+IGM SERPL QL IA: >250 U/ML — HIGH
SARS-COV-2 IGG+IGM SERPL QL IA: POSITIVE
SARS-COV-2 IGG+IGM SERPL QL IA: POSITIVE
SODIUM SERPL-SCNC: 139 MMOL/L — SIGNIFICANT CHANGE UP (ref 135–145)
SPECIMEN SOURCE: SIGNIFICANT CHANGE UP
TIBC SERPL-MCNC: 283 UG/DL — SIGNIFICANT CHANGE UP (ref 250–450)
TRIGL SERPL-MCNC: 117 MG/DL — SIGNIFICANT CHANGE UP
TSH SERPL-MCNC: 3.66 UU/ML — SIGNIFICANT CHANGE UP (ref 0.34–4.82)
UIBC SERPL-MCNC: 267 UG/DL — SIGNIFICANT CHANGE UP (ref 110–370)
VIT B12 SERPL-MCNC: 787 PG/ML — SIGNIFICANT CHANGE UP (ref 232–1245)
WBC # BLD: 6.57 K/UL — SIGNIFICANT CHANGE UP (ref 3.8–10.5)
WBC # BLD: 8.52 K/UL — SIGNIFICANT CHANGE UP (ref 3.8–10.5)
WBC # FLD AUTO: 6.57 K/UL — SIGNIFICANT CHANGE UP (ref 3.8–10.5)
WBC # FLD AUTO: 8.52 K/UL — SIGNIFICANT CHANGE UP (ref 3.8–10.5)

## 2021-04-27 PROCEDURE — 99221 1ST HOSP IP/OBS SF/LOW 40: CPT

## 2021-04-27 RX ORDER — FERROUS SULFATE 325(65) MG
325 TABLET ORAL DAILY
Refills: 0 | Status: DISCONTINUED | OUTPATIENT
Start: 2021-04-27 | End: 2021-04-28

## 2021-04-27 RX ORDER — INSULIN LISPRO 100/ML
VIAL (ML) SUBCUTANEOUS
Refills: 0 | Status: DISCONTINUED | OUTPATIENT
Start: 2021-04-27 | End: 2021-05-04

## 2021-04-27 RX ADMIN — AMLODIPINE BESYLATE 10 MILLIGRAM(S): 2.5 TABLET ORAL at 05:54

## 2021-04-27 RX ADMIN — ATORVASTATIN CALCIUM 80 MILLIGRAM(S): 80 TABLET, FILM COATED ORAL at 21:17

## 2021-04-27 RX ADMIN — Medication 100 MILLIGRAM(S): at 11:57

## 2021-04-27 RX ADMIN — Medication 50 MICROGRAM(S): at 05:54

## 2021-04-27 RX ADMIN — Medication 2: at 21:18

## 2021-04-27 RX ADMIN — PANTOPRAZOLE SODIUM 10 MG/HR: 20 TABLET, DELAYED RELEASE ORAL at 05:16

## 2021-04-27 RX ADMIN — Medication 100 MILLIGRAM(S): at 13:13

## 2021-04-27 RX ADMIN — Medication 100 MILLIGRAM(S): at 21:17

## 2021-04-27 RX ADMIN — Medication 100 MILLIGRAM(S): at 05:54

## 2021-04-27 RX ADMIN — CARVEDILOL PHOSPHATE 12.5 MILLIGRAM(S): 80 CAPSULE, EXTENDED RELEASE ORAL at 05:54

## 2021-04-27 RX ADMIN — CARVEDILOL PHOSPHATE 12.5 MILLIGRAM(S): 80 CAPSULE, EXTENDED RELEASE ORAL at 17:00

## 2021-04-27 RX ADMIN — PANTOPRAZOLE SODIUM 10 MG/HR: 20 TABLET, DELAYED RELEASE ORAL at 17:01

## 2021-04-27 NOTE — CONSULT NOTE ADULT - SUBJECTIVE AND OBJECTIVE BOX
[facility]    Patient is a 66y old  Male who presents with a chief complaint of weakness (27 Apr 2021 07:36)    HPI:  Patient is a 66 year old male from home AAO x3, with PMH of DM, anemia, CKD, HTN and hypothyroidism, who presented to the ED due to generalized weakness.   Patient states that he recently completed his second dose of the Moderna vaccine. States that since he received the first dose, he has been feeling weak. Patient completed second dose last Monday. States that this morning he was feeling so weak that he wasn't able to get up from bed. Patient also states that he has been having on and off black stools for the past 6 years and last had an episode of black stool about 3-4 days ago. Denies any hematochezia, hematemesis or coughing up blood. Patient denies any history of COVID or exposure to COVID. Denies any respiratory issues. Of note, patient was in ED in January 2021 for anemia with Hgb of 5 and received 2 PRBCs and had EGD/colonoscopy done by Dr. Caballero which showed 2 nodules with possible AVM on EGD and 3cm multilobulated sessile polyp in right colon noted on colonoscopy.   in ED pt with Hemoglobin: 3.9, and + COVID-19    (26 Apr 2021 15:25)    PMH/PSH:  PAST MEDICAL & SURGICAL HISTORY:  DM (diabetes mellitus)    HTN (hypertension)    Hypothyroidism    Cholecystitis    Unilateral inguinal hernia without obstruction or gangrene, recurrence not specified    Anemia    H/O right inguinal hernia repair      FH:  FAMILY HISTORY:      MEDS:  MEDICATIONS  (STANDING):  allopurinol 100 milliGRAM(s) Oral daily  amLODIPine   Tablet 10 milliGRAM(s) Oral daily  atorvastatin 80 milliGRAM(s) Oral at bedtime  carvedilol 12.5 milliGRAM(s) Oral every 12 hours  hydrALAZINE 100 milliGRAM(s) Oral three times a day  insulin lispro (ADMELOG) corrective regimen sliding scale   SubCutaneous Before meals and at bedtime  levothyroxine 50 MICROGram(s) Oral daily  pantoprazole Infusion 8 mG/Hr (10 mL/Hr) IV Continuous <Continuous>  sodium chloride 0.9%. 1000 milliLiter(s) (80 mL/Hr) IV Continuous <Continuous>    MEDICATIONS  (PRN):  acetaminophen   Tablet .. 650 milliGRAM(s) Oral every 6 hours PRN Temp greater or equal to 38C (100.4F), Moderate Pain (4 - 6)  ondansetron Injectable 4 milliGRAM(s) IV Push every 6 hours PRN Nausea and/or Vomiting    Allergies    No Known Allergies    Intolerances    GI HPI:   65yo M from home with PMHx of HTN, HLD, DM, CKD, anemia of chronic disease with iron deficiency and PSHx of cholecystectomy presented to the ED secondary to generalized weakness.  Denies any hematochezia, hematemesis or coughing up blood. In the ED patient found to have Hemoglobin: 3.9, and + COVID-19. He denies chest pain, dizziness, SOB or palpitations. Patient reports having black stool on and off for past couple of years which he associates with taking iron.  This patient is known to GI service as he had EGD/colonoscopy in January 2021. He was advised to f/u for  repeat colonoscopy in 3 months and Small bowel w/u for anemia as outpatient. He received IV iron during that admission and wIV iron here AND AS OUTPATIENT                CONSTITUTIONAL:  No weight loss, fever, chills, weakness or fatigue.  HEENT:  Eyes:  No visual loss, blurred vision, double vision or yellow sclerae. Ears, Nose, Throat:  No hearing loss, sneezing, congestion, runny nose or sore throat.  SKIN:  No rash or itching.  CARDIOVASCULAR:  No chest pain, chest pressure or chest discomfort. No palpitations or edema.  RESPIRATORY:  No shortness of breath, cough or sputum.  GASTROINTESTINAL:  SEE HPI  GENITOURINARY:  No dysuria, hematuria, urinary frequency  NEUROLOGICAL:  No headache, dizziness, syncope, paralysis, ataxia, numbness or tingling in the extremities. No change in bowel or bladder control.  MUSCULOSKELETAL:  No muscle, back pain, joint pain or stiffness.  HEMATOLOGIC:  No anemia, bleeding or bruising.  LYMPHATICS:  No enlarged nodes. No history of splenectomy.  PSYCHIATRIC:  No history of depression or anxiety.  ENDOCRINOLOGIC:  No reports of sweating, cold or heat intolerance. No polyuria or polydipsia.      ______________________________________________________________________  PHYSICAL EXAM:  T(C): 36.7 (04-27-21 @ 05:23), Max: 36.8 (04-26-21 @ 09:29)  HR: 65 (04-27-21 @ 05:23)  BP: 150/63 (04-27-21 @ 05:23)  RR: 18 (04-27-21 @ 05:23)  SpO2: 100% (04-27-21 @ 05:23)  Wt(kg): --    04-26 - 04-27  --------------------------------------------------------  IN:  Total IN: 0 mL    OUT:    Voided (mL): 400 mL  Total OUT: 400 mL    Total NET: -400 mL          GEN: NAD, normocephalic  CVS: S1S2+  CHEST: clear to auscultation  ABD: soft , nontender, nondistended, bowel sounds present  EXTR: no cyanosis, no clubbing, no edema  NEURO: Awake and alert; oriented .....  SKIN:  warm;  non icteric    ______________________________________________________________________  LABS:                        5.7    6.75  )-----------( 232      ( 26 Apr 2021 22:34 )             17.6     04-26    136  |  106  |  84<H>  ----------------------------<  185<H>  5.1   |  21<L>  |  3.21<H>    Ca    8.0<L>      26 Apr 2021 11:13    TPro  6.2  /  Alb  2.6<L>  /  TBili  0.3  /  DBili  x   /  AST  25  /  ALT  49  /  AlkPhos  202<H>  04-26    LIVER FUNCTIONS - ( 26 Apr 2021 11:13 )  Alb: 2.6 g/dL / Pro: 6.2 g/dL / ALK PHOS: 202 U/L / ALT: 49 U/L DA / AST: 25 U/L / GGT: x           PT/INR - ( 26 Apr 2021 11:13 )   PT: 14.0 sec;   INR: 1.19 ratio         PTT - ( 26 Apr 2021 11:13 )  PTT:38.3 sec      [facility]    Patient is a 66y old  Male who presents with a chief complaint of weakness (27 Apr 2021 07:36)    HPI:  Patient is a 66 year old male from home AAO x3, with PMH of DM, anemia, CKD, HTN and hypothyroidism, who presented to the ED due to generalized weakness.   Patient states that he recently completed his second dose of the Moderna vaccine. States that since he received the first dose, he has been feeling weak. Patient completed second dose last Monday. States that this morning he was feeling so weak that he wasn't able to get up from bed. Patient also states that he has been having on and off black stools for the past 6 years and last had an episode of black stool about 3-4 days ago. Denies any hematochezia, hematemesis or coughing up blood. Patient denies any history of COVID or exposure to COVID. Denies any respiratory issues. Of note, patient was in ED in January 2021 for anemia with Hgb of 5 and received 2 PRBCs and had EGD/colonoscopy done by Dr. Caballero which showed 2 nodules with possible AVM on EGD and 3cm multilobulated sessile polyp in right colon noted on colonoscopy.   in ED pt with Hemoglobin: 3.9, and + COVID-19    (26 Apr 2021 15:25)    PMH/PSH:  PAST MEDICAL & SURGICAL HISTORY:  DM (diabetes mellitus)    HTN (hypertension)    Hypothyroidism    Cholecystitis    Unilateral inguinal hernia without obstruction or gangrene, recurrence not specified    Anemia    H/O right inguinal hernia repair      FH:  FAMILY HISTORY:      MEDS:  MEDICATIONS  (STANDING):  allopurinol 100 milliGRAM(s) Oral daily  amLODIPine   Tablet 10 milliGRAM(s) Oral daily  atorvastatin 80 milliGRAM(s) Oral at bedtime  carvedilol 12.5 milliGRAM(s) Oral every 12 hours  hydrALAZINE 100 milliGRAM(s) Oral three times a day  insulin lispro (ADMELOG) corrective regimen sliding scale   SubCutaneous Before meals and at bedtime  levothyroxine 50 MICROGram(s) Oral daily  pantoprazole Infusion 8 mG/Hr (10 mL/Hr) IV Continuous <Continuous>  sodium chloride 0.9%. 1000 milliLiter(s) (80 mL/Hr) IV Continuous <Continuous>    MEDICATIONS  (PRN):  acetaminophen   Tablet .. 650 milliGRAM(s) Oral every 6 hours PRN Temp greater or equal to 38C (100.4F), Moderate Pain (4 - 6)  ondansetron Injectable 4 milliGRAM(s) IV Push every 6 hours PRN Nausea and/or Vomiting    Allergies    No Known Allergies    Intolerances    GI HPI:   65yo M from home with PMHx of HTN, HLD, DM, CKD, anemia of chronic disease with iron deficiency and PSHx of cholecystectomy presented to the ED secondary to generalized weakness. In the ED patient found to have Hemoglobin: 3.9, and + COVID-19. He denies chest pain, dizziness, SOB or palpitations. Patient reports having black stool on and off for past couple of years which he associates with taking iron.  This patient is known to GI service as he had EGD/colonoscopy in January 2021. He was advised to f/u for  repeat colonoscopy in 3 months and Small bowel w/u for anemia as outpatient. Patient did not follow up. He received IV iron during that admission and was advised to continue with IV iron as outpatient. Patient states he was receiving IV iron with his outpatient hematologist,  Dr. Romero but missed a couple of weeks 2/2 not feeling well.  He denies any hematochezia, hematemesis or coughing up blood.       CONSTITUTIONAL:  No weight loss, fever, chills, weakness or fatigue.  HEENT:  Eyes:  No visual loss, blurred vision, double vision or yellow sclerae. Ears, Nose, Throat:  No hearing loss, sneezing, congestion, runny nose or sore throat.  SKIN:  No rash or itching.  CARDIOVASCULAR:  No chest pain, chest pressure or chest discomfort. No palpitations or edema.  RESPIRATORY:  No shortness of breath, cough or sputum.  GASTROINTESTINAL:  SEE HPI  GENITOURINARY:  No dysuria, hematuria, urinary frequency  NEUROLOGICAL:  No headache, dizziness, syncope, paralysis, ataxia, numbness or tingling in the extremities. No change in bowel or bladder control.  MUSCULOSKELETAL:  No muscle, back pain, joint pain or stiffness.  HEMATOLOGIC:  No anemia, bleeding or bruising.  LYMPHATICS:  No enlarged nodes. No history of splenectomy.  PSYCHIATRIC:  No history of depression or anxiety.  ENDOCRINOLOGIC:  No reports of sweating, cold or heat intolerance. No polyuria or polydipsia.      ______________________________________________________________________  PHYSICAL EXAM:  T(C): 36.7 (04-27-21 @ 05:23), Max: 36.8 (04-26-21 @ 09:29)  HR: 65 (04-27-21 @ 05:23)  BP: 150/63 (04-27-21 @ 05:23)  RR: 18 (04-27-21 @ 05:23)  SpO2: 100% (04-27-21 @ 05:23)  Wt(kg): --    04-26 - 04-27  --------------------------------------------------------  IN:  Total IN: 0 mL    OUT:    Voided (mL): 400 mL  Total OUT: 400 mL    Total NET: -400 mL          GEN: NAD, normocephalic  CVS: S1S2+  CHEST: clear to auscultation  ABD: soft , nontender, nondistended, bowel sounds present, no rebound, no guarding  EXTR: no cyanosis, no clubbing, no edema  NEURO: Awake and alert; oriented to person, place, time, and situation   SKIN:  warm;  non icteric    ______________________________________________________________________  LABS:                        5.7    6.75  )-----------( 232      ( 26 Apr 2021 22:34 )             17.6     04-26    136  |  106  |  84<H>  ----------------------------<  185<H>  5.1   |  21<L>  |  3.21<H>    Ca    8.0<L>      26 Apr 2021 11:13    TPro  6.2  /  Alb  2.6<L>  /  TBili  0.3  /  DBili  x   /  AST  25  /  ALT  49  /  AlkPhos  202<H>  04-26    LIVER FUNCTIONS - ( 26 Apr 2021 11:13 )  Alb: 2.6 g/dL / Pro: 6.2 g/dL / ALK PHOS: 202 U/L / ALT: 49 U/L DA / AST: 25 U/L / GGT: x           PT/INR - ( 26 Apr 2021 11:13 )   PT: 14.0 sec;   INR: 1.19 ratio         PTT - ( 26 Apr 2021 11:13 )  PTT:38.3 sec      [facility]    Patient is a 66y old  Male who presents with a chief complaint of weakness (27 Apr 2021 07:36)    HPI:  Patient is a 66 year old male from home AAO x3, with PMH of DM, anemia, CKD, HTN and hypothyroidism, who presented to the ED due to generalized weakness.   Patient states that he recently completed his second dose of the Moderna vaccine. States that since he received the first dose, he has been feeling weak. Patient completed second dose last Monday. States that this morning he was feeling so weak that he wasn't able to get up from bed. Patient also states that he has been having on and off black stools for the past 6 years and last had an episode of black stool about 3-4 days ago. Denies any hematochezia, hematemesis or coughing up blood. Patient denies any history of COVID or exposure to COVID. Denies any respiratory issues. Of note, patient was in ED in January 2021 for anemia with Hgb of 5 and received 2 PRBCs and had EGD/colonoscopy done by Dr. Caballero which showed 2 nodules with possible AVM on EGD and 3cm multilobulated sessile polyp in right colon noted on colonoscopy.   in ED pt with Hemoglobin: 3.9, and + COVID-19    (26 Apr 2021 15:25)    PMH/PSH:  PAST MEDICAL & SURGICAL HISTORY:  DM (diabetes mellitus)    HTN (hypertension)    Hypothyroidism    Cholecystitis    Unilateral inguinal hernia without obstruction or gangrene, recurrence not specified    Anemia    H/O right inguinal hernia repair      FH:  FAMILY HISTORY:      MEDS:  MEDICATIONS  (STANDING):  allopurinol 100 milliGRAM(s) Oral daily  amLODIPine   Tablet 10 milliGRAM(s) Oral daily  atorvastatin 80 milliGRAM(s) Oral at bedtime  carvedilol 12.5 milliGRAM(s) Oral every 12 hours  hydrALAZINE 100 milliGRAM(s) Oral three times a day  insulin lispro (ADMELOG) corrective regimen sliding scale   SubCutaneous Before meals and at bedtime  levothyroxine 50 MICROGram(s) Oral daily  pantoprazole Infusion 8 mG/Hr (10 mL/Hr) IV Continuous <Continuous>  sodium chloride 0.9%. 1000 milliLiter(s) (80 mL/Hr) IV Continuous <Continuous>    MEDICATIONS  (PRN):  acetaminophen   Tablet .. 650 milliGRAM(s) Oral every 6 hours PRN Temp greater or equal to 38C (100.4F), Moderate Pain (4 - 6)  ondansetron Injectable 4 milliGRAM(s) IV Push every 6 hours PRN Nausea and/or Vomiting    Allergies    No Known Allergies    Intolerances    GI HPI:   65yo M from home with PMHx of HTN, HLD, DM, CKD, anemia of chronic disease with iron deficiency and PSHx of cholecystectomy presented to the ED secondary to generalized weakness. In the ED patient found to have Hemoglobin: 3.9, and + COVID-19. He denies chest pain, dizziness, SOB or palpitations. Patient reports having black stool on and off for past couple of years which he associates with taking iron.  This patient is known to GI service as he had EGD/colonoscopy in January 2021. He was advised to f/u for  repeat colonoscopy in 3 months and Small bowel w/u for anemia as outpatient. Patient did not follow up. He received IV iron during that admission and was advised to continue with IV iron as outpatient. Patient states he was receiving IV iron with his outpatient hematologist,  Dr. Romero but missed a couple of weeks 2/2 not feeling well.  He denies any hematochezia, hematemesis or coughing up blood.       CONSTITUTIONAL:  No weight loss, fever, chills, weakness or fatigue.  HEENT:  Eyes:  No visual loss, blurred vision, double vision or yellow sclerae. Ears, Nose, Throat:  No hearing loss, sneezing, congestion, runny nose or sore throat.  SKIN:  No rash or itching.  CARDIOVASCULAR:  No chest pain, chest pressure or chest discomfort. No palpitations or edema.  RESPIRATORY:  No shortness of breath, cough or sputum.  GASTROINTESTINAL:  SEE HPI  GENITOURINARY:  No dysuria, hematuria, urinary frequency  NEUROLOGICAL:  No headache, dizziness, syncope, paralysis, ataxia, numbness or tingling in the extremities. No change in bowel or bladder control.  MUSCULOSKELETAL:  No muscle, back pain, joint pain or stiffness.  HEMATOLOGIC:  No anemia, bleeding or bruising.  LYMPHATICS:  No enlarged nodes. No history of splenectomy.  PSYCHIATRIC:  No history of depression or anxiety.  ENDOCRINOLOGIC:  No reports of sweating, cold or heat intolerance. No polyuria or polydipsia.      ______________________________________________________________________  PHYSICAL EXAM:  T(C): 36.7 (04-27-21 @ 05:23), Max: 36.8 (04-26-21 @ 09:29)  HR: 65 (04-27-21 @ 05:23)  BP: 150/63 (04-27-21 @ 05:23)  RR: 18 (04-27-21 @ 05:23)  SpO2: 100% (04-27-21 @ 05:23)  Wt(kg): --    04-26 - 04-27  --------------------------------------------------------  IN:  Total IN: 0 mL    OUT:    Voided (mL): 400 mL  Total OUT: 400 mL    Total NET: -400 mL          GEN: NAD, normocephalic  CVS: S1S2+  CHEST: clear to auscultation  ABD: soft , nontender, nondistended, bowel sounds present, no rebound, no guarding  EXTR: no cyanosis, no clubbing, no edema  NEURO: Awake and alert; oriented to person, place, time, and situation   SKIN:  warm;  non icteric    ______________________________________________________________________  LABS:                        5.7    6.75  )-----------( 232      ( 26 Apr 2021 22:34 )             17.6     04-26    136  |  106  |  84<H>  ----------------------------<  185<H>  5.1   |  21<L>  |  3.21<H>    Ca    8.0<L>      26 Apr 2021 11:13    TPro  6.2  /  Alb  2.6<L>  /  TBili  0.3  /  DBili  x   /  AST  25  /  ALT  49  /  AlkPhos  202<H>  04-26    LIVER FUNCTIONS - ( 26 Apr 2021 11:13 )  Alb: 2.6 g/dL / Pro: 6.2 g/dL / ALK PHOS: 202 U/L / ALT: 49 U/L DA / AST: 25 U/L / GGT: x           PT/INR - ( 26 Apr 2021 11:13 )   PT: 14.0 sec;   INR: 1.19 ratio         PTT - ( 26 Apr 2021 11:13 )  PTT:38.3 sec   [place] and Schistocytes noted on smear

## 2021-04-27 NOTE — CONSULT NOTE ADULT - PROBLEM SELECTOR RECOMMENDATION 9
hgb 3.9->5.7->6.7 s/p 3 units PRBC's  hgb 8.5 upon discharge in January  FOBT +  f/u iron panel  monitor CBC  will need small bowel work up  c/w PPI  clear liquid as tolerated hgb 3.9->5.7->6.7 s/p 3 units PRBC's  hgb 8.5 upon discharge in January  FOBT +   iron panel c/w HORACE - need iron supplementation  monitor CBC  will need small bowel work up  c/w PPI  clear liquid as tolerated hgb 3.9->5.7->6.7 s/p 3 units PRBC's  hgb 8.5 upon discharge in January  FOBT +   iron panel c/w HORACE - need iron supplementation  monitor CBC  will need small bowel work up as outpatient  recommend hematology r/o hemolysis - [place] and Schistocytes noted on smear  send LDH and haptoglobin   c/w PPI  clear liquid as tolerated hgb 3.9->5.7->6.7 s/p 3 units PRBC's  hgb 8.5 upon discharge in January  FOBT +   iron panel c/w HORACE - need iron supplementation  monitor CBC  will need small bowel work up as outpatient  recommend hematology r/o hemolysis - [place] and Schistocytes noted on smear  send LDH, haptoglobin and urine hemosiderin   c/w PPI  clear liquid as tolerated

## 2021-04-27 NOTE — PROGRESS NOTE ADULT - ASSESSMENT
_________________________________________________________________________________________  ========>>  M E D I C A L   A T T E N D I N G    F O L L O W  U P  N O T E  <<=========  -----------------------------------------------------------------------------------------------------    - Patient seen and examined by me earlier today.   - In summary,  MOLLY SILVEIRA is a 66y year old man admitted with symptomatic anemia, COVID  - Patient today overall doing ok, comfortable, eating OK.  stronger and improved post transfusion     ==================>> REVIEW OF SYSTEM <<=================    GEN: no fever, no chills, no pain  RESP: no SOB, no cough, no sputum  CVS: no chest pain, no palpitations, no edema  GI: no abdominal pain, no nausea, + dark BM  : no dysuria, no frequency, no hematuria  Neuro: no headache, no dizziness  Derm : no itching, no rash    ==================>> PHYSICAL EXAM <<=================    GEN: A&O X 3 , NAD , comfortable, pleasant, calm   HEENT: NCAT, PERRL, MMM, hearing intact  Neck: supple , no JVD appreciated  CVS: S1S2 , regular , No M/R/G appreciated  PULM: CTA B/L,  no W/R/R appreciated  ABD.: soft. non tender, non distended,  bowel sounds present  Extrem: intact pulses , no edema   PSYCH : normal mood,  not anxious                            ( Note Written / Date of service :  21 )    ==================>> MEDICATIONS <<====================    MEDICATIONS  (STANDING):  allopurinol 100 milliGRAM(s) Oral daily  amLODIPine   Tablet 10 milliGRAM(s) Oral daily  atorvastatin 80 milliGRAM(s) Oral at bedtime  carvedilol 12.5 milliGRAM(s) Oral every 12 hours  ferrous    sulfate 325 milliGRAM(s) Oral daily  hydrALAZINE 100 milliGRAM(s) Oral three times a day  insulin lispro (ADMELOG) corrective regimen sliding scale   SubCutaneous Before meals and at bedtime  levothyroxine 50 MICROGram(s) Oral daily  pantoprazole Infusion 8 mG/Hr (10 mL/Hr) IV Continuous <Continuous>  sodium chloride 0.9%. 1000 milliLiter(s) (80 mL/Hr) IV Continuous <Continuous>    MEDICATIONS  (PRN):  acetaminophen   Tablet .. 650 milliGRAM(s) Oral every 6 hours PRN Temp greater or equal to 38C (100.4F), Moderate Pain (4 - 6)  ondansetron Injectable 4 milliGRAM(s) IV Push every 6 hours PRN Nausea and/or Vomiting    ___________  Active diet:  Diet, Clear Liquid  ___________________    ==================>> VITAL SIGNS <<==================    T(C): 36.6 (21 @ 15:49), Max: 36.7 (21 @ 05:23)  HR: 58 (21 @ 15:49) (58 - 65)  BP: 148/66 (21 @ 15:49) (130/45 - 150/63)  BP(mean): 76 (21 @ 13:15)  RR: 18 (21 @ 15:49) (17 - 18)  SpO2: 98% (21 @ 15:49) (98% - 100%)     POCT Blood Glucose.: 189 mg/dL (2021 16:53)  POCT Blood Glucose.: 234 mg/dL (2021 11:21)  POCT Blood Glucose.: 142 mg/dL (2021 07:54)    I&O's Summary    2021 07:01  -  2021 07:00  --------------------------------------------------------  IN: 0 mL / OUT: 400 mL / NET: -400 mL     ==================>> LAB AND IMAGING <<==================                        8.3    8.52  )-----------( 275      ( 2021 18:51 )             25.4        Hemoglobin:   8.3 <<==,  6.7 <<==,  5.7 <<==,  3.9 <<==    -27    139  |  111<H>  |  65<H>  ----------------------------<  128<H>  4.6   |  17<L>  |  2.79<H>    Creatinine:  2.79  <<==, 3.21  <<==    Ca    7.7<L>      2021 08:15  Phos  4.6       Mg     2.5         TPro  6.2  /  Alb  2.6<L>  /  TBili  0.3  /  DBili  x   /  AST  25  /  ALT  49  /  AlkPhos  202<H>  26    PT/INR - ( 2021 08:15 )   PT: 14.0 sec;   INR: 1.19 ratio    PTT - ( 2021 08:15 )  PTT:37.0 sec              CARDIAC MARKERS ( 2021 11:13 )  <0.015 ng/mL / x     / 123 U/L / x     / x        Urinalysis Basic - ( 2021 16:37 )  Color: Yellow / Appearance: Clear / S.015 / pH: x  Gluc: x / Ketone: Negative  / Bili: Negative / Urobili: Negative   Blood: x / Protein: 100 / Nitrite: Negative   Leuk Esterase: Negative / RBC: 0-2 /HPF / WBC 0-2 /HPF   Sq Epi: x / Non Sq Epi: x / Bacteria: Few /HPF    TSH:      3.66   (21)           Lipid profile:  (21)     Total: 102     LDL  : (p)     HDL  :28     TG   :117     HgA1C:   (21)          (21)      6.0,   (21)          (21)      5.7    ___________________________________________________________________________________  ===============>>  A S S E S S M E N T   A N D   P L A N <<===============  ------------------------------------------------------------------------------------------    · Assessment      Patient is a 66 year old male from home AAO x3, with PMH of DM, anemia, CKD, HTN and hypothyroidism, who presented to the ED due to generalized weakness.     Hgb noted to be 3.9. Creatinine of 3.21. FOBT positive. Lactate of 0.8. Troponin negative x1. COVID positive. CXR clear. Given 2.7L NS bolus, protonix 80mg and started on PPI drip.     Problem/Plan - 1:  ·  Problem: GI bleed.  Plan: patient presented due to generalized weakness  noted to have Hgb of 3.9  doing better post PRBC  FOBT positive  monitor CBC closely   continue PPI drip   will keep on clears while pending ? GI workup ?     if no GI workup in patient >advance diet   GI following>> will nee Small bowel workup as OP     Problem/Plan - 2:  ·  Problem: Anemia.  Plan: Hgb of 3.9  Hgb around 8 on prior admission   plan as above  monitor CBC.   hematology consulted as well     bili normal but + slight schistocytes present      pt just post COVID vaccine > relation ?     Problem/Plan - 3:  ·  Problem: COVID-19.  Plan: noted to be COVID positive   saturating well on room air   monitor O2 sats  isolation precautions   f/u COVID ab and markers  hold off on decadron since not on oxygen.     Problem/Plan - 4:  ·  Problem: CLOTILDE on CKD IV, improved     creatinine of 3.21 on admission  IVH / PO hydration   hold home med of lisinopril  monitor BMP daily  renal eval as needed     Problem/Plan - 5:  ·  Problem: HTN (hypertension).  Plan: patient on lisinopril, coreg, norvasc, hydralazine and nifedipine  will hold lisinopril in setting of CLOTILDE   continue coreg, norvasc and hydralazine  monitor BP and adjust meds as needed.     Problem/Plan - 6:  Problem: DM (diabetes mellitus). Plan: start SSI while in hospital  A1c:  6  monitor FS   monitor BS and adjust insulin as needed.    Problem/Plan - 7:  ·  Problem: Hypothyroidism.  Plan: continue home med of synthroid  TSH. 3.6    Problem/Plan - 8:  ·  Problem: Need for prophylactic measure.  Plan: no chemical prophylaxis in setting of GI bleed  SCDs for now.     --------------------------------------------  Case discussed with pt, NP  Education given on findings and plan of care  ___________________________  HDenzel Manuel D.O.  Pager: [phone]

## 2021-04-28 ENCOUNTER — TRANSCRIPTION ENCOUNTER (OUTPATIENT)
Age: 66
End: 2021-04-28

## 2021-04-28 DIAGNOSIS — D64.9 ANEMIA, UNSPECIFIED: ICD-10-CM

## 2021-04-28 DIAGNOSIS — N17.9 ACUTE KIDNEY FAILURE, UNSPECIFIED: ICD-10-CM

## 2021-04-28 DIAGNOSIS — Z02.9 ENCOUNTER FOR ADMINISTRATIVE EXAMINATIONS, UNSPECIFIED: ICD-10-CM

## 2021-04-28 LAB
ANION GAP SERPL CALC-SCNC: 10 MMOL/L — SIGNIFICANT CHANGE UP (ref 5–17)
BUN SERPL-MCNC: 52 MG/DL — HIGH (ref 7–18)
CALCIUM SERPL-MCNC: 8.4 MG/DL — SIGNIFICANT CHANGE UP (ref 8.4–10.5)
CHLORIDE SERPL-SCNC: 111 MMOL/L — HIGH (ref 96–108)
CO2 SERPL-SCNC: 18 MMOL/L — LOW (ref 22–31)
CREAT SERPL-MCNC: 2.45 MG/DL — HIGH (ref 0.5–1.3)
GLUCOSE BLDC GLUCOMTR-MCNC: 121 MG/DL — HIGH (ref 70–99)
GLUCOSE BLDC GLUCOMTR-MCNC: 152 MG/DL — HIGH (ref 70–99)
GLUCOSE BLDC GLUCOMTR-MCNC: 180 MG/DL — HIGH (ref 70–99)
GLUCOSE BLDC GLUCOMTR-MCNC: 196 MG/DL — HIGH (ref 70–99)
GLUCOSE SERPL-MCNC: 103 MG/DL — HIGH (ref 70–99)
HCT VFR BLD CALC: 25.8 % — LOW (ref 39–50)
HGB BLD-MCNC: 8.4 G/DL — LOW (ref 13–17)
MAGNESIUM SERPL-MCNC: 2.6 MG/DL — SIGNIFICANT CHANGE UP (ref 1.6–2.6)
MCHC RBC-ENTMCNC: 27.9 PG — SIGNIFICANT CHANGE UP (ref 27–34)
MCHC RBC-ENTMCNC: 32.6 GM/DL — SIGNIFICANT CHANGE UP (ref 32–36)
MCV RBC AUTO: 85.7 FL — SIGNIFICANT CHANGE UP (ref 80–100)
NRBC # BLD: 0 /100 WBCS — SIGNIFICANT CHANGE UP (ref 0–0)
PHOSPHATE SERPL-MCNC: 4.1 MG/DL — SIGNIFICANT CHANGE UP (ref 2.5–4.5)
PLATELET # BLD AUTO: 279 K/UL — SIGNIFICANT CHANGE UP (ref 150–400)
POTASSIUM SERPL-MCNC: 4.6 MMOL/L — SIGNIFICANT CHANGE UP (ref 3.5–5.3)
POTASSIUM SERPL-SCNC: 4.6 MMOL/L — SIGNIFICANT CHANGE UP (ref 3.5–5.3)
RBC # BLD: 3.01 M/UL — LOW (ref 4.2–5.8)
RBC # FLD: 17.3 % — HIGH (ref 10.3–14.5)
SODIUM SERPL-SCNC: 139 MMOL/L — SIGNIFICANT CHANGE UP (ref 135–145)
WBC # BLD: 8.54 K/UL — SIGNIFICANT CHANGE UP (ref 3.8–10.5)
WBC # FLD AUTO: 8.54 K/UL — SIGNIFICANT CHANGE UP (ref 3.8–10.5)

## 2021-04-28 PROCEDURE — 99232 SBSQ HOSP IP/OBS MODERATE 35: CPT

## 2021-04-28 RX ORDER — PANTOPRAZOLE SODIUM 20 MG/1
40 TABLET, DELAYED RELEASE ORAL
Refills: 0 | Status: DISCONTINUED | OUTPATIENT
Start: 2021-04-28 | End: 2021-05-04

## 2021-04-28 RX ORDER — IRON SUCROSE 20 MG/ML
200 INJECTION, SOLUTION INTRAVENOUS EVERY 24 HOURS
Refills: 0 | Status: COMPLETED | OUTPATIENT
Start: 2021-04-28 | End: 2021-04-30

## 2021-04-28 RX ORDER — ACETAMINOPHEN 500 MG
2 TABLET ORAL
Qty: 0 | Refills: 0 | DISCHARGE
Start: 2021-04-28

## 2021-04-28 RX ADMIN — Medication 2: at 12:00

## 2021-04-28 RX ADMIN — CARVEDILOL PHOSPHATE 12.5 MILLIGRAM(S): 80 CAPSULE, EXTENDED RELEASE ORAL at 17:25

## 2021-04-28 RX ADMIN — Medication 100 MILLIGRAM(S): at 11:30

## 2021-04-28 RX ADMIN — AMLODIPINE BESYLATE 10 MILLIGRAM(S): 2.5 TABLET ORAL at 05:30

## 2021-04-28 RX ADMIN — Medication 100 MILLIGRAM(S): at 05:30

## 2021-04-28 RX ADMIN — Medication 2: at 21:02

## 2021-04-28 RX ADMIN — Medication 100 MILLIGRAM(S): at 21:02

## 2021-04-28 RX ADMIN — PANTOPRAZOLE SODIUM 40 MILLIGRAM(S): 20 TABLET, DELAYED RELEASE ORAL at 17:25

## 2021-04-28 RX ADMIN — IRON SUCROSE 110 MILLIGRAM(S): 20 INJECTION, SOLUTION INTRAVENOUS at 11:31

## 2021-04-28 RX ADMIN — ATORVASTATIN CALCIUM 80 MILLIGRAM(S): 80 TABLET, FILM COATED ORAL at 21:02

## 2021-04-28 RX ADMIN — PANTOPRAZOLE SODIUM 10 MG/HR: 20 TABLET, DELAYED RELEASE ORAL at 11:29

## 2021-04-28 RX ADMIN — CARVEDILOL PHOSPHATE 12.5 MILLIGRAM(S): 80 CAPSULE, EXTENDED RELEASE ORAL at 05:30

## 2021-04-28 RX ADMIN — Medication 50 MICROGRAM(S): at 05:30

## 2021-04-28 RX ADMIN — Medication 100 MILLIGRAM(S): at 13:53

## 2021-04-28 RX ADMIN — Medication 2: at 16:51

## 2021-04-28 NOTE — DISCHARGE NOTE PROVIDER - PROVIDER TOKENS
PROVIDER:[TOKEN:[[id number]],FOLLOWUP:[1 week]],PROVIDER:[TOKEN:[[id number]],FOLLOWUP:[1 week]] PROVIDER:[TOKEN:[[id number]],FOLLOWUP:[1 week]],PROVIDER:[TOKEN:[[id number]],FOLLOWUP:[1 week]],PROVIDER:[TOKEN:[[id number]],FOLLOWUP:[1 week]]

## 2021-04-28 NOTE — PROGRESS NOTE ADULT - ASSESSMENT
66 year old male from home AAO x3, with PMH of DM, anemia, CKD, HTN and hypothyroidism, who presented to the ED due to generalized weakness.  Hg 3.9 on admission, +ve FOBT, started on PPI drip. s/p 4 units of PRBC with appropriate response in hg. Found to be covid positive, asymptomatic, CXR clear.   Followed by GI Dr. Caballero, no acute interventions recommended   Heme Dr. Nascimento following, started on IV Venofer

## 2021-04-28 NOTE — DISCHARGE NOTE PROVIDER - HOSPITAL COURSE
66 year old male from home AAO x3, with PMH of DM, anemia, CKD, HTN and hypothyroidism, who presented to the ED due to generalized weakness.  Hg 3.9 on admission, +ve FOBT, started on PPI drip. s/p 4 units of PRBC with appropriate response in hg. Found to be covid positive, asymptomatic, CXR clear.   Followed by GI Dr. Caballero, no acute interventions recommended   Heme Dr. Nascimento following, started on IV Venofer. h/h remained stable.   Diet advanced and pt tolerating.   Medically optimized for discharge with outpt follow up   Please note that this a brief summary of hospital course please refer to daily progress notes and consult notes for full course and events

## 2021-04-28 NOTE — DISCHARGE NOTE PROVIDER - NSDCCPCAREPLAN_GEN_ALL_CORE_FT
PRINCIPAL DISCHARGE DIAGNOSIS  Diagnosis: Severe anemia  Assessment and Plan of Treatment: Your blood count was very low on admission, you did not have any sign or symptoms of acute bleeding. You received total of 4 units of blood. your blood count improved and remained stable   You were evaluated by GI specialist and out patient follow up was recommended   You were also evaluated by Hematologist and were started on IV iron   Avoid NSAIDs unless your Health Care Provider tells you that it is ok (Aspirin, Ibuprofen, Advil, Motrin, Aleve).  Follow up with your Gastroenterologist and PCP  within 1-2 weeks of discharge.        SECONDARY DISCHARGE DIAGNOSES  Diagnosis: COVID-19  Assessment and Plan of Treatment: you were found to have COVID, but you dis not have any symptoms and did not require any treatment. Seek immediate medical attention if you develop shortness of breath. See additional instructions below.      Diagnosis: CLOTILDE (acute kidney injury)  Assessment and Plan of Treatment: your kidney function was elevated more than your baseline likley due to severe anemia.  you were given IV fluids  and blood products   your kidney function improved and remained stable at your baseline  while your kidneys are healing you should avoid agents that can cause kidney injury.  Some of these agents include NSAIDs, Gadolinium contrast, and Phosphate containing enemas.      Diagnosis: HTN (hypertension)  Assessment and Plan of Treatment: Low salt diet  Activity as tolerated.  Take all medication as prescribed.  Follow up with your medical doctor for routine blood pressure monitoring at your next visit.  Notify your doctor if you have any of the following symptoms:   Dizziness, Lightheadedness, Blurry vision, Headache, Chest pain, Shortness of breath       PRINCIPAL DISCHARGE DIAGNOSIS  Diagnosis: Severe anemia  Assessment and Plan of Treatment: Your blood count was very low on admission, you did not have any sign or symptoms of acute bleeding. You received total of 5 units of blood. your blood count improved and remained stable   You were evaluated by GI specialist and out patient follow up was recommended   You were also evaluated by Hematologist and were started on IV iron   Avoid NSAIDs unless your Health Care Provider tells you that it is ok (Aspirin, Ibuprofen, Advil, Motrin, Aleve).  Follow up with your Gastroenterologist and PCP  within 1-2 weeks of discharge.        SECONDARY DISCHARGE DIAGNOSES  Diagnosis: COVID-19  Assessment and Plan of Treatment: you were found to have COVID, but you dis not have any symptoms and did not require any treatment. Seek immediate medical attention if you develop shortness of breath. See additional instructions below.      Diagnosis: HTN (hypertension)  Assessment and Plan of Treatment: Low salt diet  Activity as tolerated.  Take all medication as prescribed.  Follow up with your medical doctor for routine blood pressure monitoring at your next visit.  Notify your doctor if you have any of the following symptoms:   Dizziness, Lightheadedness, Blurry vision, Headache, Chest pain, Shortness of breath      Diagnosis: CLOTILDE (acute kidney injury)  Assessment and Plan of Treatment: your kidney function was elevated more than your baseline likley due to severe anemia.  you were given IV fluids  and blood products   your kidney function improved and remained stable at your baseline  while your kidneys are healing you should avoid agents that can cause kidney injury.  Some of these agents include NSAIDs, Gadolinium contrast, and Phosphate containing enemas.       PRINCIPAL DISCHARGE DIAGNOSIS  Diagnosis: Severe anemia  Assessment and Plan of Treatment: Your blood count was very low on admission, you did not have any sign or symptoms of acute bleeding. You received total of 5 units of blood. your blood count improved and remained stable   You were evaluated by GI specialist and out patient follow up was recommended   You were also evaluated by Hematologist and were started on IV iron   You were given Procrit, and will need to follow up with your nephrologist for continuation of management.   Avoid NSAIDs unless your Health Care Provider tells you that it is ok (Aspirin, Ibuprofen, Advil, Motrin, Aleve).  Follow up with your Gastroenterologist and PCP  within 1-2 weeks of discharge.        SECONDARY DISCHARGE DIAGNOSES  Diagnosis: COVID-19  Assessment and Plan of Treatment: you were found to have COVID, but you dis not have any symptoms and did not require any treatment. Seek immediate medical attention if you develop shortness of breath. See additional instructions below.      Diagnosis: HTN (hypertension)  Assessment and Plan of Treatment: Low salt diet  Activity as tolerated.  Take all medication as prescribed.  Follow up with your medical doctor for routine blood pressure monitoring at your next visit.  Notify your doctor if you have any of the following symptoms:   Dizziness, Lightheadedness, Blurry vision, Headache, Chest pain, Shortness of breath      Diagnosis: CLOTILDE (acute kidney injury)  Assessment and Plan of Treatment: Kidney function was elevated more than your baseline likely due to severe anemia.  You were given IV fluids  and blood products   your kidney function improved and remained stable at your baseline  while your kidneys are healing you should avoid agents that can cause kidney injury.  Some of these agents include NSAIDs, Gadolinium contrast, and Phosphate containing enemas.    Diagnosis: Stage 4 chronic kidney disease  Assessment and Plan of Treatment: You have Stage 4 Chronic Kidney Disease, please follow up with your Nephrologist to repeat BMP to follow up with your BiCarb and Creatinine level. You have metabolic acidosis related to your kidney disease. You are starting on sodium bicarb medication to help increase your BiCard. Please follow up with your Primary Care Phyisician and Nephrologist.    Diagnosis: Elevated brain natriuretic peptide (BNP) level  Assessment and Plan of Treatment: Your BNP was elevated. Please follow up with your Primary Care Physician and Cardiologist in 2 weeks for an ECHO. You did not get an ECHO as this was not an emergency and can be done as a outpatient.

## 2021-04-28 NOTE — CONSULT NOTE ADULT - SUBJECTIVE AND OBJECTIVE BOX
Patient is a 66y old  Male who presents with a chief complaint of weakness (27 Apr 2021 19:50)      HPI:  Patient is a 66 year old male from home AAO x3, with PMH of DM, anemia, CKD, HTN and hypothyroidism, who presented to the ED due to generalized weakness.   Patient states that he recently completed his second dose of the Moderna vaccine. States that since he received the first dose, he has been feeling weak. Patient completed second dose last Monday. States that this morning he was feeling so weak that he wasn't able to get up from bed. Patient also states that he has been having on and off black stools for the past 6 years and last had an episode of black stool about 3-4 days ago. Denies any hematochezia, hematemesis or coughing up blood. Patient denies any history of COVID or exposure to COVID. Denies any respiratory issues. Of note, patient was in ED in January 2021 for anemia with Hgb of 5 and received 2 PRBCs and had EGD/colonoscopy done by Dr. Caballero which showed 2 nodules with possible AVM on EGD and 3cm multilobulated sessile polyp in right colon noted on colonoscopy.   in ED pt with Hemoglobin: 3.9, and + COVID-19    (26 Apr 2021 15:25)       ROS:  Negative except for:    PAST MEDICAL & SURGICAL HISTORY:  DM (diabetes mellitus)    HTN (hypertension)    Hypothyroidism    Cholecystitis    Unilateral inguinal hernia without obstruction or gangrene, recurrence not specified    Anemia    H/O right inguinal hernia repair        SOCIAL HISTORY:    FAMILY HISTORY:      MEDICATIONS  (STANDING):  allopurinol 100 milliGRAM(s) Oral daily  amLODIPine   Tablet 10 milliGRAM(s) Oral daily  atorvastatin 80 milliGRAM(s) Oral at bedtime  carvedilol 12.5 milliGRAM(s) Oral every 12 hours  ferrous    sulfate 325 milliGRAM(s) Oral daily  hydrALAZINE 100 milliGRAM(s) Oral three times a day  insulin lispro (ADMELOG) corrective regimen sliding scale   SubCutaneous Before meals and at bedtime  levothyroxine 50 MICROGram(s) Oral daily  pantoprazole Infusion 8 mG/Hr (10 mL/Hr) IV Continuous <Continuous>  sodium chloride 0.9%. 1000 milliLiter(s) (80 mL/Hr) IV Continuous <Continuous>    MEDICATIONS  (PRN):  acetaminophen   Tablet .. 650 milliGRAM(s) Oral every 6 hours PRN Temp greater or equal to 38C (100.4F), Moderate Pain (4 - 6)  ondansetron Injectable 4 milliGRAM(s) IV Push every 6 hours PRN Nausea and/or Vomiting      Allergies    No Known Allergies    Intolerances        Vital Signs Last 24 Hrs  T(C): 36.2 (28 Apr 2021 05:05), Max: 36.7 (27 Apr 2021 13:15)  T(F): 97.1 (28 Apr 2021 05:05), Max: 98 (27 Apr 2021 13:15)  HR: 64 (28 Apr 2021 05:05) (58 - 64)  BP: 151/66 (28 Apr 2021 05:05) (130/45 - 151/66)  BP(mean): 76 (27 Apr 2021 13:15) (68 - 76)  RR: 18 (28 Apr 2021 05:05) (17 - 18)  SpO2: 97% (28 Apr 2021 05:05) (97% - 100%)    PHYSICAL EXAM  General: adult in NAD  HEENT: clear oropharynx, anicteric sclera, pink conjunctiva  Neck: supple  CV: normal S1/S2 with no murmur rubs or gallops  Lungs: positive air movement b/l ant lungs,clear to auscultation, no wheezes, no rales  Abdomen: soft non-tender non-distended, no hepatosplenomegaly  Ext: no clubbing cyanosis or edema  Skin: no rashes and no petechiae  Neuro: alert and oriented X 4, no focal deficits      LABS:                          8.4    8.54  )-----------( 279      ( 28 Apr 2021 07:45 )             25.8         Mean Cell Volume : 85.7 fl  Mean Cell Hemoglobin : 27.9 pg  Mean Cell Hemoglobin Concentration : 32.6 gm/dL  Auto Neutrophil # : x  Auto Lymphocyte # : x  Auto Monocyte # : x  Auto Eosinophil # : x  Auto Basophil # : x  Auto Neutrophil % : x  Auto Lymphocyte % : x  Auto Monocyte % : x  Auto Eosinophil % : x  Auto Basophil % : x      Serial CBC's  04-28 @ 07:45  Hct-25.8 / Hgb-8.4 / Plat-279 / RBC-3.01 / WBC-8.54  Serial CBC's  04-27 @ 18:51  Hct-25.4 / Hgb-8.3 / Plat-275 / RBC-2.98 / WBC-8.52  Serial CBC's  04-27 @ 08:15  Hct-20.6 / Hgb-6.7 / Plat-227 / RBC-2.39 / WBC-6.57  Serial CBC's  04-26 @ 22:34  Hct-17.6 / Hgb-5.7 / Plat-232 / RBC-2.06 / WBC-6.75  Serial CBC's  04-26 @ 11:13  Hct-12.8 / Hgb-3.9 / Plat-235 / RBC-1.51 / WBC-7.97      04-28    139  |  111<H>  |  52<H>  ----------------------------<  103<H>  4.6   |  18<L>  |  2.45<H>    Ca    8.4      28 Apr 2021 07:45  Phos  4.1     04-28  Mg     2.6     04-28    TPro  6.2  /  Alb  2.6<L>  /  TBili  0.3  /  DBili  x   /  AST  25  /  ALT  49  /  AlkPhos  202<H>  04-26      PT/INR - ( 27 Apr 2021 08:15 )   PT: 14.0 sec;   INR: 1.19 ratio         PTT - ( 27 Apr 2021 08:15 )  PTT:37.0 sec    Ferritin, Serum: 34 ng/mL (04-27 @ 12:02)  Folate, Serum: 8.2 ng/mL (04-27 @ 12:02)  Vitamin B12, Serum: 787 pg/mL (04-27 @ 12:02)  Iron - Total Binding Capacity.: 283 ug/dL (04-27 @ 08:15)              BLOOD SMEAR INTERPRETATION:       RADIOLOGY & ADDITIONAL STUDIES:

## 2021-04-28 NOTE — DISCHARGE NOTE PROVIDER - CARE PROVIDERS DIRECT ADDRESSES
[email or web address],[email or web address],DirectAddress_Unknown

## 2021-04-28 NOTE — DISCHARGE NOTE PROVIDER - NSDCFUADDAPPT_GEN_ALL_CORE_FT
CORONAVIRUS INSTRUCTIONS: Based on your current clinical status and stability, it has been determined that you no longer need hospitalization and can recover while remaining in self-quarantine at home. You should follow the prevention steps below until a healthcare provider or local or state health department says you can return to your normal activities.   1. You should restrict activities outside your home, except for getting medical care.   2. Do not go to work, school, or public areas.   3. Avoid using public transportation, ride-sharing, or taxis.   4. Separate yourself from other people and animals in your home as much as possible.  When you are around other people (e.g., sharing a room or vehicle) you should wear a facemask.  5. Wash your hands often with soap and water for at least 20 seconds, especially after blowing your nose, coughing, or sneezing; going to the bathroom; and before eating or preparing food.  6. Cover your mouth and nose with a tissue when you cough or sneeze. Throw used tissues in a lined trash can. Immediately wash your hands with soap and water for at least 20 seconds  7. High touch surfaces include counters, tabletops, doorknobs, bathroom fixtures, toilets, phones, keyboards, tablets, and bedside tables  8. Avoid sharing dishes, drinking glasses, cups, eating utensils, towels, or bedding with other people or pets in your home. After using these items, they should be washed thoroughly with soap and water. You are strongly advised to seek prompt medical attention if your illness worsens or you develop new symptoms like fever or difficulty breathing.

## 2021-04-28 NOTE — PROGRESS NOTE ADULT - PROBLEM SELECTOR PLAN 1
hgb at baseline s/p 4 units PRBC's  8.4  f/u LDH, haptoglobin and urine hemosiderin  also recommend TTE to evaluate cardiac status  Iron supplementation - Venofer  c/w PPI  monitor CBC  advance diet as tolerated hgb at baseline s/p 4 units PRBC's  8.4  f/u LDH, haptoglobin and urine hemosiderin  also recommend TTE to evaluate cardiac status (valves)  Iron supplementation - Venofer  PPI BID can dc drip  advance diet as tolerated  monitor CBC

## 2021-04-28 NOTE — PROGRESS NOTE ADULT - ASSESSMENT
_________________________________________________________________________________________  ========>>  M E D I C A L   A T T E N D I N G    F O L L O W  U P  N O T E  <<=========  -----------------------------------------------------------------------------------------------------    - Patient seen and examined by me earlier today.   - In summary,  MOLLY SILVEIRA is a 66y year old man admitted with symptomatic anemia, COVID  - Patient today overall doing ok, comfortable, eating OK.  stronger and improved post transfusion     ==================>> REVIEW OF SYSTEM <<=================    GEN: no fever, no chills, no pain  RESP: no SOB, no cough, no sputum  CVS: no chest pain, no palpitations  GI: no abdominal pain, no nausea,  : no dysuria, no frequency, no hematuria  Neuro: no headache, no dizziness  Derm : no itching, no rash    ==================>> PHYSICAL EXAM <<=================    GEN: A&O X 2 , NAD , comfortable, pleasant, calm,  a bit forgetful at times   HEENT: NCAT, PERRL, MMM, hearing intact  Neck: supple , no JVD appreciated  CVS: S1S2 , regular , No M/R/G appreciated  PULM: CTA B/L,  no W/R/R appreciated  ABD.: soft. non tender, non distended,  bowel sounds present  Extrem: intact pulses , no edema   PSYCH : normal mood,  not anxious                             ( Note written / Date of service 04-28-21 )    ==================>> MEDICATIONS <<====================    allopurinol 100 milliGRAM(s) Oral daily  amLODIPine   Tablet 10 milliGRAM(s) Oral daily  atorvastatin 80 milliGRAM(s) Oral at bedtime  carvedilol 12.5 milliGRAM(s) Oral every 12 hours  hydrALAZINE 100 milliGRAM(s) Oral three times a day  insulin lispro (ADMELOG) corrective regimen sliding scale   SubCutaneous Before meals and at bedtime  iron sucrose IVPB 200 milliGRAM(s) IV Intermittent every 24 hours  levothyroxine 50 MICROGram(s) Oral daily  pantoprazole    Tablet 40 milliGRAM(s) Oral two times a day  sodium chloride 0.9%. 1000 milliLiter(s) IV Continuous <Continuous>    MEDICATIONS  (PRN):  acetaminophen   Tablet .. 650 milliGRAM(s) Oral every 6 hours PRN Temp greater or equal to 38C (100.4F), Moderate Pain (4 - 6)  ondansetron Injectable 4 milliGRAM(s) IV Push every 6 hours PRN Nausea and/or Vomiting    ___________  Active diet:  Diet, DASH/TLC:   Sodium & Cholesterol Restricted  Consistent Carbohydrate Evening Snacks  ___________________    ==================>> VITAL SIGNS <<==================  Height (cm): 170.2  Weight (kg): 86.3  BMI (kg/m2): 29.8  Vital Signs Last 24 HrsT(C): 36.9 (04-28-21 @ 17:02)  T(F): 98.4 (04-28-21 @ 17:02), Max: 98.4 (04-28-21 @ 17:02)  HR: 60 (04-28-21 @ 17:02) (58 - 64)  BP: 141/64 (04-28-21 @ 17:02)  RR: 18 (04-28-21 @ 17:02) (18 - 18)  SpO2: 98% (04-28-21 @ 17:02) (97% - 100%)      POCT Blood Glucose.: 196 mg/dL (28 Apr 2021 16:44)  POCT Blood Glucose.: 152 mg/dL (28 Apr 2021 11:49)  POCT Blood Glucose.: 121 mg/dL (28 Apr 2021 07:53)  POCT Blood Glucose.: 189 mg/dL (27 Apr 2021 21:01)     ==================>> LAB AND IMAGING <<==================                        8.4    8.54  )-----------( 279      ( 28 Apr 2021 07:45 )             25.8        04-28    139  |  111<H>  |  52<H>  ----------------------------<  103<H>  4.6   |  18<L>  |  2.45<H>    Ca    8.4      28 Apr 2021 07:45  Phos  4.1     04-28  Mg     2.6     04-28    WBC count:   8.54 <<== ,  8.52 <<== ,  6.57 <<== ,  6.75 <<== ,  7.97 <<==   Hemoglobin:   8.4 <<==,  8.3 <<==,  6.7 <<==,  5.7 <<==,  3.9 <<==  platelets:  279 <==, 275 <==, 227 <==, 232 <==, 235 <==    Creatinine:  2.45  <<==, 2.79  <<==, 3.21  <<==  Sodium:   139  <==, 139  <==, 136  <==    ___________________________________________________________________________________  ===============>>  A S S E S S M E N T   A N D   P L A N <<===============  ------------------------------------------------------------------------------------------    · Assessment	  Patient is a 66 year old male from home AAO x3, with PMH of DM, anemia, CKD, HTN and hypothyroidism, who presented to the ED due to generalized weakness.     Problem/Plan - 1:  ·  Problem: GI bleed.  Plan: patient presented due to generalized weakness  noted to have Hgb of 3.9  doing better post PRBC  FOBT positive  monitor CBC closely   continue PPI drip   GI following>> will need Small bowel workup as OP   also recommending Echo and hem eval >> both in process  pt tolerating regular diet     Problem/Plan - 2:  ·  Problem: Anemia.  Plan: Hgb of 3.9  Hgb around 8 on prior admission   plan as above  monitor CBC.   hematology consulted as well     bili normal but + slight schistocytes present      pt just post COVID vaccine > relation ?       check echo    Problem/Plan - 3:  ·  Problem: COVID-19.  Plan: noted to be COVID positive   saturating well on room air   monitor O2 sats  isolation precautions   f/u COVID ab and markers  monitor  supportive care    Problem/Plan - 4:  ·  Problem: CLOTILDE on CKD IV, improved     creatinine of 3.21 on admission  IVH / PO hydration   hold home med of lisinopril  monitor BMP daily  renal eval as needed     Problem/Plan - 5:  ·  Problem: HTN (hypertension).  Plan: patient on lisinopril, coreg, norvasc, hydralazine and nifedipine  will hold lisinopril in setting of CLOTILDE   continue coreg, norvasc and hydralazine  monitor BP and adjust meds as needed.     Problem/Plan - 6:  Problem: DM (diabetes mellitus). Plan: start SSI while in hospital  A1c:  6  monitor FS   monitor BS and adjust insulin as needed.    Problem/Plan - 7:  ·  Problem: Hypothyroidism.  Plan: continue home med of synthroid  TSH. 3.6    Problem/Plan - 8:  ·  Problem: Need for prophylactic measure.  Plan: no chemical prophylaxis in setting of GI bleed  SCDs for now.     --------------------------------------------  Case discussed with pt, NP, GI team / attending.  Education given on findings and plan of care  ___________________________  H. PRASAD Manuel.  Pager: [phone]

## 2021-04-28 NOTE — CONSULT NOTE ADULT - ASSESSMENT
66 year old male with black stool on and off for 6 years.  he had transfusion in Jan.  GI w/u showed ?AVM and 3cm polyp.  he was admitted for weakness and severe anemia with Hb 3.9.  he is also COVID+.    1. severe anemia  due to GI bleeding  will give evenofer  GI on the case    2 CKD with Cr. 2.5  he probably has baseline anemia with Hb around 10  after giving venofer, he may need epo.    3. COVID+  he probably had infection between 2 shots  probably asymptomatic

## 2021-04-28 NOTE — PROGRESS NOTE ADULT - PROBLEM SELECTOR PLAN 1
-Hg 3.9 on admission, multifactorial anemia, low baseline, CKD, GI bleed   -s/p  4 PRBC  -h/h stable, 8.4/25.8 today   -advance diet today   -cont PPI   -cont Venofer   -mon CBC   -GI Dr. Caballero- further work up outpt once covid cleared   -Heme Dr. erwin

## 2021-04-28 NOTE — DISCHARGE NOTE PROVIDER - NSDCMRMEDTOKEN_GEN_ALL_CORE_FT
acetaminophen 325 mg oral tablet: 2 tab(s) orally every 6 hours, As needed, Temp greater or equal to 38C (100.4F), Moderate Pain (4 - 6)  allopurinol 100 mg oral tablet: 1 tab(s) orally once a day  amLODIPine 10 mg oral tablet: 1 tab(s) orally once a day  atorvastatin 80 mg oral tablet: 1 tab(s) orally once a day  carvedilol 12.5 mg oral tablet: 1 tab(s) orally 2 times a day  furosemide 40 mg oral tablet: 1 tab(s) orally once a day  hydrALAZINE 100 mg oral tablet: 1 tab(s) orally 3 times a day  levothyroxine 50 mcg (0.05 mg) oral tablet: 1 tab(s) orally once a day  lisinopril 10 mg oral tablet: 1 tab(s) orally once a day  NIFEdipine 60 mg oral tablet, extended release: 1 tab(s) orally once a day   acetaminophen 325 mg oral tablet: 2 tab(s) orally every 6 hours, As needed, Temp greater or equal to 38C (100.4F), Moderate Pain (4 - 6)  allopurinol 100 mg oral tablet: 1 tab(s) orally once a day  amLODIPine 10 mg oral tablet: 1 tab(s) orally once a day  atorvastatin 80 mg oral tablet: 1 tab(s) orally once a day  carvedilol 12.5 mg oral tablet: 1 tab(s) orally 2 times a day  cholecalciferol oral tablet: 2000 unit(s) orally once a day  hydrALAZINE 100 mg oral tablet: 1 tab(s) orally 3 times a day  levothyroxine 50 mcg (0.05 mg) oral tablet: 1 tab(s) orally once a day  NIFEdipine 60 mg oral tablet, extended release: 1 tab(s) orally once a day  senna oral tablet: 2 tab(s) orally once a day (at bedtime)  sodium bicarbonate 650 mg oral tablet: 2 tab(s) orally 2 times a day

## 2021-04-28 NOTE — DISCHARGE NOTE PROVIDER - CARE PROVIDER_API CALL
Sarah Lopez  INTERNAL MEDICINE  [address] [place] [address]  Phone: [phone]  Fax: [phone]  Follow Up Time: 1 week    Vicente Caballero)  Gastroenterology  [address] [place], [place] [address]  Phone: [phone]  Fax: [phone]  Follow Up Time: 1 week   Sarah Lopez  INTERNAL MEDICINE  [address] [place] [address]  Phone: [phone]  Fax: [phone]  Follow Up Time: 1 week    Vicente Caballero)  Gastroenterology  [address] [place] [address]  Phone: [phone]  Fax: [phone]  Follow Up Time: 1 week    Kateryna Mukherjee)  Internal Medicine  [address] [place] [address]  Phone: [phone]  Fax: [phone]  Follow Up Time: 1 week

## 2021-04-28 NOTE — DISCHARGE NOTE PROVIDER - NSDCCAREPROVSEEN_GEN_ALL_CORE_FT
Freda Manuel Freda Mukherjee  [facility]  Vicente Reyna  Unknown Doctor  Ric Abdullahi  Team [facility] ACP - NP Service

## 2021-04-29 LAB
ALBUMIN SERPL ELPH-MCNC: 2.4 G/DL — LOW (ref 3.5–5)
ALP SERPL-CCNC: 191 U/L — HIGH (ref 40–120)
ALT FLD-CCNC: 41 U/L DA — SIGNIFICANT CHANGE UP (ref 10–60)
ANION GAP SERPL CALC-SCNC: 8 MMOL/L — SIGNIFICANT CHANGE UP (ref 5–17)
AST SERPL-CCNC: 10 U/L — SIGNIFICANT CHANGE UP (ref 10–40)
BILIRUB SERPL-MCNC: 0.4 MG/DL — SIGNIFICANT CHANGE UP (ref 0.2–1.2)
BUN SERPL-MCNC: 47 MG/DL — HIGH (ref 7–18)
CALCIUM SERPL-MCNC: 8.2 MG/DL — LOW (ref 8.4–10.5)
CHLORIDE SERPL-SCNC: 112 MMOL/L — HIGH (ref 96–108)
CO2 SERPL-SCNC: 19 MMOL/L — LOW (ref 22–31)
CREAT SERPL-MCNC: 2.61 MG/DL — HIGH (ref 0.5–1.3)
DIR ANTIGLOB POLYSPECIFIC INTERPRETATION: SIGNIFICANT CHANGE UP
GLUCOSE BLDC GLUCOMTR-MCNC: 129 MG/DL — HIGH (ref 70–99)
GLUCOSE BLDC GLUCOMTR-MCNC: 156 MG/DL — HIGH (ref 70–99)
GLUCOSE BLDC GLUCOMTR-MCNC: 160 MG/DL — HIGH (ref 70–99)
GLUCOSE BLDC GLUCOMTR-MCNC: 167 MG/DL — HIGH (ref 70–99)
GLUCOSE SERPL-MCNC: 126 MG/DL — HIGH (ref 70–99)
HAPTOGLOB SERPL-MCNC: 220 MG/DL — HIGH (ref 34–200)
HCT VFR BLD CALC: 24.2 % — LOW (ref 39–50)
HGB BLD-MCNC: 7.6 G/DL — LOW (ref 13–17)
LDH SERPL L TO P-CCNC: 220 U/L — SIGNIFICANT CHANGE UP (ref 120–225)
MCHC RBC-ENTMCNC: 27 PG — SIGNIFICANT CHANGE UP (ref 27–34)
MCHC RBC-ENTMCNC: 31.4 GM/DL — LOW (ref 32–36)
MCV RBC AUTO: 86.1 FL — SIGNIFICANT CHANGE UP (ref 80–100)
NRBC # BLD: 0 /100 WBCS — SIGNIFICANT CHANGE UP (ref 0–0)
PLATELET # BLD AUTO: 278 K/UL — SIGNIFICANT CHANGE UP (ref 150–400)
POTASSIUM SERPL-MCNC: 4.9 MMOL/L — SIGNIFICANT CHANGE UP (ref 3.5–5.3)
POTASSIUM SERPL-SCNC: 4.9 MMOL/L — SIGNIFICANT CHANGE UP (ref 3.5–5.3)
PROT SERPL-MCNC: 5.6 G/DL — LOW (ref 6–8.3)
RBC # BLD: 2.81 M/UL — LOW (ref 4.2–5.8)
RBC # FLD: 17.1 % — HIGH (ref 10.3–14.5)
SODIUM SERPL-SCNC: 139 MMOL/L — SIGNIFICANT CHANGE UP (ref 135–145)
WBC # BLD: 8.17 K/UL — SIGNIFICANT CHANGE UP (ref 3.8–10.5)
WBC # FLD AUTO: 8.17 K/UL — SIGNIFICANT CHANGE UP (ref 3.8–10.5)

## 2021-04-29 PROCEDURE — 99232 SBSQ HOSP IP/OBS MODERATE 35: CPT

## 2021-04-29 RX ADMIN — Medication 100 MILLIGRAM(S): at 05:17

## 2021-04-29 RX ADMIN — Medication 2: at 21:29

## 2021-04-29 RX ADMIN — IRON SUCROSE 110 MILLIGRAM(S): 20 INJECTION, SOLUTION INTRAVENOUS at 11:30

## 2021-04-29 RX ADMIN — ATORVASTATIN CALCIUM 80 MILLIGRAM(S): 80 TABLET, FILM COATED ORAL at 21:29

## 2021-04-29 RX ADMIN — AMLODIPINE BESYLATE 10 MILLIGRAM(S): 2.5 TABLET ORAL at 05:17

## 2021-04-29 RX ADMIN — Medication 100 MILLIGRAM(S): at 21:29

## 2021-04-29 RX ADMIN — CARVEDILOL PHOSPHATE 12.5 MILLIGRAM(S): 80 CAPSULE, EXTENDED RELEASE ORAL at 05:17

## 2021-04-29 RX ADMIN — PANTOPRAZOLE SODIUM 40 MILLIGRAM(S): 20 TABLET, DELAYED RELEASE ORAL at 17:30

## 2021-04-29 RX ADMIN — Medication 50 MICROGRAM(S): at 05:17

## 2021-04-29 RX ADMIN — Medication 100 MILLIGRAM(S): at 14:16

## 2021-04-29 RX ADMIN — Medication 2: at 11:57

## 2021-04-29 RX ADMIN — Medication 2: at 17:29

## 2021-04-29 RX ADMIN — CARVEDILOL PHOSPHATE 12.5 MILLIGRAM(S): 80 CAPSULE, EXTENDED RELEASE ORAL at 17:30

## 2021-04-29 RX ADMIN — Medication 100 MILLIGRAM(S): at 11:22

## 2021-04-29 RX ADMIN — PANTOPRAZOLE SODIUM 40 MILLIGRAM(S): 20 TABLET, DELAYED RELEASE ORAL at 05:17

## 2021-04-29 NOTE — PROGRESS NOTE ADULT - PROBLEM SELECTOR PLAN 1
hgb 7.6 this am - no obvious evidence of bleeding  f/u LDH, heptoglobin and angelica profile   f/u TTE  c/w venofer hgb 7.6 this am - no obvious evidence of bleeding  f/u LDH, heptoglobin and angelica profile   f/u TTE  c/w venofer  outpatient GI f/u

## 2021-04-29 NOTE — PROGRESS NOTE ADULT - PROBLEM SELECTOR PLAN 8
-dvt ppx- hold chemo ppx in setting of GIB, SCDs  -gi ppx- cont PPI
Hold chemical VTE prophylaxis in the setting of anemia  Continue with SCDs
-dvt ppx- hold chemo ppx in setting of anemia SCDs  -gi ppx- cont PPI

## 2021-04-29 NOTE — PROGRESS NOTE ADULT - ASSESSMENT
· Assessment	  66 year old male with black stool on and off for 6 years.  he had transfusion in Jan.  GI w/u showed ?AVM and 3cm polyp.  he was admitted for weakness and severe anemia with Hb 3.9.  he is also COVID+.    1. severe anemia  due to GI bleeding  will give evenofer  GI on the case    2 CKD with Cr. 2.5  he probably has baseline anemia with Hb around 10  after giving venofer, he may need epo.  kimberly cell can be from renal insuff    3. COVID+  he probably had infection between 2 shots  probably asymptomatic

## 2021-04-29 NOTE — PROGRESS NOTE ADULT - PROBLEM SELECTOR PLAN 9
-discharge disposition home pending stabilization in hg.
-discharge disposition home likely tomorrow if tolerates advanced diet and h/h remains stable

## 2021-04-29 NOTE — PROGRESS NOTE ADULT - ASSESSMENT
_________________________________________________________________________________________  ========>>  M E D I C A L   A T T E N D I N G    F O L L O W  U P  N O T E  <<=========  -----------------------------------------------------------------------------------------------------    - Patient evaluated by me, chart reviewed.   - In summary,  MOLLY SILVEIRA is a 66y year old man admitted with symptomatic anemia, COVID  - Patient today overall doing ok, comfortable, eating OK.  stronger and improved post transfusion     ==================>> REVIEW OF SYSTEM <<=================    GEN: no fever, no chills, no pain  RESP: no SOB, no cough, no sputum  CVS: no chest pain, no palpitations  GI: no abdominal pain, no nausea,  : no dysuria, no frequency, no hematuria  Neuro: no headache, no dizziness  Derm : no itching, no rash    ==================>> PHYSICAL EXAM <<=================    GEN: A&O X 2 , NAD , comfortable, pleasant, calm,  a bit forgetful at times   HEENT: NCAT, PERRL, MMM, hearing intact  Neck: supple , no JVD appreciated  CVS: S1S2 , regular , No M/R/G appreciated  PULM: CTA B/L,  no W/R/R appreciated  ABD.: soft. non tender, non distended,  bowel sounds present  Extrem: intact pulses , no edema   PSYCH : normal mood,  not anxious                             ( Note written / Date of service 04-29-21 )    ==================>> MEDICATIONS <<====================    allopurinol 100 milliGRAM(s) Oral daily  amLODIPine   Tablet 10 milliGRAM(s) Oral daily  atorvastatin 80 milliGRAM(s) Oral at bedtime  carvedilol 12.5 milliGRAM(s) Oral every 12 hours  hydrALAZINE 100 milliGRAM(s) Oral three times a day  insulin lispro (ADMELOG) corrective regimen sliding scale   SubCutaneous Before meals and at bedtime  iron sucrose IVPB 200 milliGRAM(s) IV Intermittent every 24 hours  levothyroxine 50 MICROGram(s) Oral daily  pantoprazole    Tablet 40 milliGRAM(s) Oral two times a day  sodium chloride 0.9%. 1000 milliLiter(s) IV Continuous <Continuous>    MEDICATIONS  (PRN):  acetaminophen   Tablet .. 650 milliGRAM(s) Oral every 6 hours PRN Temp greater or equal to 38C (100.4F), Moderate Pain (4 - 6)  ondansetron Injectable 4 milliGRAM(s) IV Push every 6 hours PRN Nausea and/or Vomiting    ___________  Active diet:  Diet, DASH/TLC:   Sodium & Cholesterol Restricted  Consistent Carbohydrate Evening Snacks  ___________________    ==================>> VITAL SIGNS <<==================    Vital Signs Last 24 HrsT(C): 36.6 (04-29-21 @ 14:24)  T(F): 97.9 (04-29-21 @ 14:24), Max: 98.4 (04-29-21 @ 05:09)  HR: 58 (04-29-21 @ 14:24) (58 - 64)  BP: 136/53 (04-29-21 @ 14:24)  RR: 18 (04-29-21 @ 14:24) (18 - 18)  SpO2: 96% (04-29-21 @ 14:24) (96% - 98%)      POCT Blood Glucose.: 156 mg/dL (29 Apr 2021 16:22)  POCT Blood Glucose.: 160 mg/dL (29 Apr 2021 11:49)  POCT Blood Glucose.: 129 mg/dL (29 Apr 2021 07:54)  POCT Blood Glucose.: 180 mg/dL (28 Apr 2021 20:54)     ==================>> LAB AND IMAGING <<==================                        7.6    8.17  )-----------( 278      ( 29 Apr 2021 06:19 )             24.2        139  |  112<H>  |  47<H>  ----------------------------<  126<H>  4.9   |  19<L>  |  2.61<H>    Ca    8.2<L>      29 Apr 2021 06:19  Phos  4.1     04-28  Mg     2.6     04-28    TPro  5.6<L>  /  Alb  2.4<L>  /  TBili  0.4  /  DBili  x   /  AST  10  /  ALT  41  /  AlkPhos  191<H>  04-29    WBC count:   8.17 <<== ,  8.54 <<== ,  8.52 <<== ,  6.57 <<== ,  6.75 <<== ,  7.97 <<==   Hemoglobin:   7.6 <<==,  8.4 <<==,  8.3 <<==,  6.7 <<==,  5.7 <<==,  3.9 <<==  platelets:  278 <==, 279 <==, 275 <==, 227 <==, 232 <==, 235 <==    Creatinine:  2.61  <<==, 2.45  <<==, 2.79  <<==, 3.21  <<==  Sodium:   139  <==, 139  <==, 139  <==, 136  <==       AST:          10 <== , 25 <==      ALT:        41  <== , 49  <==      AP:        191  <=, 202  <=     Bili:        0.4  <=, 0.3  <=    ___________________________________________________________________________________  ===============>>  A S S E S S M E N T   A N D   P L A N <<===============  ------------------------------------------------------------------------------------------    · Assessment	  Patient is a 66 year old male from home AAO x3, with PMH of DM, anemia, CKD, HTN and hypothyroidism, who presented to the ED due to generalized weakness.     Problem/Plan - 1:  ·  Problem: GI bleed.  Plan: patient presented due to generalized weakness  noted to have Hgb of 3.9  doing better post PRBC  FOBT positive  monitor CBC closely   continue PPI drip   GI following>> will need repeat colonoscopy and Small bowel workup as OP ( had a long d/w with GI attending and team: no need for repeat scoping at this time as inpatient )    also recommending Echo and hem eval >> both in process  pt tolerating regular diet     Problem/Plan - 2:  ·  Problem: Anemia.  Plan: Hgb of 3.9  Hgb around 8 on prior admission   plan as above  monitor CBC.   hematology consulted as well     bili normal but + slight schistocytes present      pt just post COVID vaccine > relation ?       check echo    Problem/Plan - 3:  ·  Problem: COVID-19.  Plan: noted to be COVID positive   saturating well on room air   monitor O2 sats  isolation precautions   f/u COVID ab and markers  monitor  supportive care    Problem/Plan - 4:  ·  Problem: CLOTILDE on CKD IV, improved     creatinine of 3.21 on admission  IVH / PO hydration   hold home med of lisinopril  monitor BMP daily  renal eval as needed     Problem/Plan - 5:  ·  Problem: HTN (hypertension).  Plan: patient on lisinopril, coreg, norvasc, hydralazine and nifedipine  will hold lisinopril in setting of CLOTILDE   continue coreg, norvasc and hydralazine  monitor BP and adjust meds as needed.     Problem/Plan - 6:  Problem: DM (diabetes mellitus). Plan: start SSI while in hospital  A1c:  6  monitor FS   monitor BS and adjust insulin as needed.    Problem/Plan - 7:  ·  Problem: Hypothyroidism.  Plan: continue home med of synthroid  TSH. 3.6    Problem/Plan - 8:  ·  Problem: Need for prophylactic measure.  Plan: no chemical prophylaxis in setting of GI bleed  SCDs for now.     --------------------------------------------  Case discussed with pt, NP, GI team / attending.  Education given on findings and plan of care  ___________________________  H. PRASAD Manuel.  Pager: [phone]

## 2021-04-29 NOTE — PROGRESS NOTE ADULT - PROBLEM SELECTOR PLAN 1
-Hg 3.9 on admission, multifactorial anemia, low baseline, CKD, GI bleed   -s/p  4 PRBC  -h/h trending low, no acute sign or symptoms of bleeding   -peripheral smear with no hemolysis as per heme   -cont PPI   -cont Venofer   -mon CBC   -f/u heptoglobin and angelica profile   -Transfuse PRN for hg <7  -GI Dr. Caballero- further work up outpt once covid cleared   -Heme Dr. erwin

## 2021-04-29 NOTE — PROGRESS NOTE ADULT - ASSESSMENT
66 year old male from home AAO x3, with PMH of DM, anemia, CKD, HTN and hypothyroidism, who presented to the ED due to generalized weakness.  Hg 3.9 on admission, +ve FOBT, started on PPI drip. s/p 4 units of PRBC with appropriate response in hg. Found to be covid positive, asymptomatic, CXR clear.   Followed by GI Dr. Caballero, no acute interventions recommended   Heme Dr. Nascimento following, started on IV Venofer. Hg trending low, smear with no hemolysis as per heme

## 2021-04-30 LAB
ALBUMIN SERPL ELPH-MCNC: 2.6 G/DL — LOW (ref 3.5–5)
ALP SERPL-CCNC: 186 U/L — HIGH (ref 40–120)
ALT FLD-CCNC: 39 U/L DA — SIGNIFICANT CHANGE UP (ref 10–60)
ANION GAP SERPL CALC-SCNC: 7 MMOL/L — SIGNIFICANT CHANGE UP (ref 5–17)
AST SERPL-CCNC: 8 U/L — LOW (ref 10–40)
BILIRUB SERPL-MCNC: 0.4 MG/DL — SIGNIFICANT CHANGE UP (ref 0.2–1.2)
BLD GP AB SCN SERPL QL: SIGNIFICANT CHANGE UP
BUN SERPL-MCNC: 46 MG/DL — HIGH (ref 7–18)
CALCIUM SERPL-MCNC: 8.4 MG/DL — SIGNIFICANT CHANGE UP (ref 8.4–10.5)
CHLORIDE SERPL-SCNC: 113 MMOL/L — HIGH (ref 96–108)
CO2 SERPL-SCNC: 19 MMOL/L — LOW (ref 22–31)
CREAT SERPL-MCNC: 2.88 MG/DL — HIGH (ref 0.5–1.3)
GLUCOSE BLDC GLUCOMTR-MCNC: 118 MG/DL — HIGH (ref 70–99)
GLUCOSE BLDC GLUCOMTR-MCNC: 124 MG/DL — HIGH (ref 70–99)
GLUCOSE BLDC GLUCOMTR-MCNC: 154 MG/DL — HIGH (ref 70–99)
GLUCOSE BLDC GLUCOMTR-MCNC: 168 MG/DL — HIGH (ref 70–99)
GLUCOSE SERPL-MCNC: 109 MG/DL — HIGH (ref 70–99)
HCT VFR BLD CALC: 25.5 % — LOW (ref 39–50)
HGB BLD-MCNC: 8.1 G/DL — LOW (ref 13–17)
MCHC RBC-ENTMCNC: 27.5 PG — SIGNIFICANT CHANGE UP (ref 27–34)
MCHC RBC-ENTMCNC: 31.8 GM/DL — LOW (ref 32–36)
MCV RBC AUTO: 86.4 FL — SIGNIFICANT CHANGE UP (ref 80–100)
NRBC # BLD: 0 /100 WBCS — SIGNIFICANT CHANGE UP (ref 0–0)
PLATELET # BLD AUTO: 290 K/UL — SIGNIFICANT CHANGE UP (ref 150–400)
POTASSIUM SERPL-MCNC: 5.1 MMOL/L — SIGNIFICANT CHANGE UP (ref 3.5–5.3)
POTASSIUM SERPL-SCNC: 5.1 MMOL/L — SIGNIFICANT CHANGE UP (ref 3.5–5.3)
PROT SERPL-MCNC: 5.9 G/DL — LOW (ref 6–8.3)
RBC # BLD: 2.95 M/UL — LOW (ref 4.2–5.8)
RBC # FLD: 17.1 % — HIGH (ref 10.3–14.5)
SODIUM SERPL-SCNC: 139 MMOL/L — SIGNIFICANT CHANGE UP (ref 135–145)
WBC # BLD: 7.97 K/UL — SIGNIFICANT CHANGE UP (ref 3.8–10.5)
WBC # FLD AUTO: 7.97 K/UL — SIGNIFICANT CHANGE UP (ref 3.8–10.5)

## 2021-04-30 RX ORDER — CHOLECALCIFEROL (VITAMIN D3) 125 MCG
2000 CAPSULE ORAL DAILY
Refills: 0 | Status: DISCONTINUED | OUTPATIENT
Start: 2021-04-30 | End: 2021-05-04

## 2021-04-30 RX ADMIN — Medication 2: at 12:08

## 2021-04-30 RX ADMIN — AMLODIPINE BESYLATE 10 MILLIGRAM(S): 2.5 TABLET ORAL at 05:20

## 2021-04-30 RX ADMIN — ATORVASTATIN CALCIUM 80 MILLIGRAM(S): 80 TABLET, FILM COATED ORAL at 21:19

## 2021-04-30 RX ADMIN — Medication 2: at 21:21

## 2021-04-30 RX ADMIN — Medication 100 MILLIGRAM(S): at 13:44

## 2021-04-30 RX ADMIN — Medication 2000 UNIT(S): at 11:02

## 2021-04-30 RX ADMIN — PANTOPRAZOLE SODIUM 40 MILLIGRAM(S): 20 TABLET, DELAYED RELEASE ORAL at 05:20

## 2021-04-30 RX ADMIN — Medication 100 MILLIGRAM(S): at 11:02

## 2021-04-30 RX ADMIN — CARVEDILOL PHOSPHATE 12.5 MILLIGRAM(S): 80 CAPSULE, EXTENDED RELEASE ORAL at 05:20

## 2021-04-30 RX ADMIN — PANTOPRAZOLE SODIUM 40 MILLIGRAM(S): 20 TABLET, DELAYED RELEASE ORAL at 17:22

## 2021-04-30 RX ADMIN — Medication 50 MICROGRAM(S): at 05:20

## 2021-04-30 RX ADMIN — Medication 100 MILLIGRAM(S): at 21:19

## 2021-04-30 RX ADMIN — IRON SUCROSE 110 MILLIGRAM(S): 20 INJECTION, SOLUTION INTRAVENOUS at 11:02

## 2021-04-30 RX ADMIN — Medication 100 MILLIGRAM(S): at 05:19

## 2021-04-30 RX ADMIN — CARVEDILOL PHOSPHATE 12.5 MILLIGRAM(S): 80 CAPSULE, EXTENDED RELEASE ORAL at 17:22

## 2021-04-30 NOTE — PROGRESS NOTE ADULT - PROBLEM SELECTOR PLAN 1
- hgb 8.1/25.5 this am - no obvious evidence of bleeding   - noted to have Hgb of 3.9 on admission  - monitor CBC closely, continue PPI BID   - Appreciate GI input, will need repeat colonoscopy and Small bowel workup as OP no need for repeat scoping at this time as inpatient  - FOB -positive  - f/u TTE, Card Dr Pacheco consulted on 4/30   - per Heme recs c/w Venofer IV infusion x3days till 5/1  - outpatient GI and Heme f/u

## 2021-04-30 NOTE — PROGRESS NOTE ADULT - ASSESSMENT
· Assessment	  66 year old male with black stool on and off for 6 years.  he had transfusion in Jan.  GI w/u showed ?AVM and 3cm polyp.  he was admitted for weakness and severe anemia with Hb 3.9.  he is also COVID+.    1. severe anemia  due to GI bleeding  will give evenofer  GI on the case  haptoglobin is normal, unlikely to be hemolysis  very few schistocyte, a few kimberly cells    2 CKD with Cr. 2.5  he probably has baseline anemia with Hb around 10  after giving venofer, he may need epo.  kimebrly cell can be from renal insuff    3. COVID+  he probably had infection between 2 shots  probably asymptomatic

## 2021-04-30 NOTE — PROGRESS NOTE ADULT - ASSESSMENT
Patient is a 66 year old male from home AAO x3, with PMH of DM, anemia, CKD, HTN and hypothyroidism, who presented to the ED due to generalized weakness and symptomatic Anemia s/p 4u PRBC. Awaiting ECHO, Card consulted        CLOTILDE on CKD IV  - creatinine of 3.21 on admission, improved  - held home med of Lisinopril  - monitor BMP daily    HTN   - patient on Lisinopril, Coreg, Norvasc, Hydralazine and Nifedipine  - holding Lisinopril in setting of CLOTILDE   - continue Coreg, Norvasc and Hydralazine  - monitor BP and adjust meds as needed    DM (diabetes mellitus)  - started SSI while in hospital  - HgA1c:  6.0%, monitor FS   - monitor BS and adjust insulin as needed.    Hypothyroidism  - continue home med of synthroid  - TSH. 3.66    Need for prophylactic measure- no chemical prophylaxis in setting of GI bleed  - SCDs for now  - Dispo- home, pending ECHO and Card eval   Patient is a 66 year old male from home AAO x3, with PMH of DM, anemia, CKD, HTN and hypothyroidism, who presented to the ED due to generalized weakness and symptomatic Anemia s/p 4u PRBC. Awaiting ECHO, Card consulted      Anemia 2/2 GIB  - hgb 8.1/25.5 this am - no obvious evidence of bleeding   - noted to have Hgb of 3.9 on admission  - monitor CBC closely, continue PPI BID   - Appreciate GI input, will need repeat colonoscopy and Small bowel workup as OP no need for repeat scoping at this time as inpatient  - FOB -positive  - f/u TTE, Card Dr Pacheco consulted on 4/30   - per Heme recs c/w Venofer IV infusion x3days till 5/1  - outpatient GI and Heme f/u to eval for Epogen injections    Covid 19  - saturating at 97% on RA  - Isolation precautions, supportive care    CLOTILDE on CKD IV  - creatinine of 3.21 on admission, improved  - held home med of Lisinopril  - monitor BMP daily    HTN   - patient on Lisinopril, Coreg, Norvasc, Hydralazine and Nifedipine  - holding Lisinopril in setting of CLOTILDE   - continue Coreg, Norvasc and Hydralazine  - monitor BP and adjust meds as needed    DM (diabetes mellitus)  - started SSI while in hospital  - HgA1c:  6.0%, monitor FS   - monitor BS and adjust insulin as needed.    Hypothyroidism  - continue home med of synthroid  - TSH. 3.66    Need for prophylactic measure- no chemical prophylaxis in setting of GI bleed  - SCDs for now  - Dispo- home, pending ECHO and Card eval

## 2021-04-30 NOTE — PROGRESS NOTE ADULT - ASSESSMENT
_________________________________________________________________________________________  ========>>  M E D I C A L   A T T E N D I N G    F O L L O W  U P  N O T E  <<=========  -----------------------------------------------------------------------------------------------------    - Patient evaluated by me, chart reviewed.   - In summary,  MOLLY SILVEIRA is a 66y year old man admitted with symptomatic anemia, COVID  - Patient today overall doing ok, comfortable, eating OK.  overall doing well, no c./o, no SOB, no cough     ==================>> REVIEW OF SYSTEM <<=================    GEN: no fever, no chills, no pain  RESP: no SOB, no cough, no sputum  CVS: no chest pain, no palpitations  GI: no abdominal pain, no nausea,  : no dysuria, no frequency, no hematuria  Neuro: no headache, no dizziness  Derm : no itching, no rash    ==================>> PHYSICAL EXAM <<=================    GEN: A&O X 2 , NAD , comfortable, pleasant, calm, in chair  a bit forgetful at times   HEENT: NCAT, PERRL, MMM, hearing intact  Neck: supple , no JVD appreciated  CVS: S1S2 , regular , No M/R/G appreciated  PULM: CTA B/L,  no W/R/R appreciated  ABD.: soft. non tender, non distended,  bowel sounds present  Extrem: intact pulses , no edema   PSYCH : normal mood,  not anxious                              ( Note written / Date of service 04-30-21 )    ==================>> MEDICATIONS <<====================    allopurinol 100 milliGRAM(s) Oral daily  amLODIPine   Tablet 10 milliGRAM(s) Oral daily  atorvastatin 80 milliGRAM(s) Oral at bedtime  carvedilol 12.5 milliGRAM(s) Oral every 12 hours  cholecalciferol 2000 Unit(s) Oral daily  hydrALAZINE 100 milliGRAM(s) Oral three times a day  insulin lispro (ADMELOG) corrective regimen sliding scale   SubCutaneous Before meals and at bedtime  levothyroxine 50 MICROGram(s) Oral daily  pantoprazole    Tablet 40 milliGRAM(s) Oral two times a day  sodium chloride 0.9%. 1000 milliLiter(s) IV Continuous <Continuous>    MEDICATIONS  (PRN):  acetaminophen   Tablet .. 650 milliGRAM(s) Oral every 6 hours PRN Temp greater or equal to 38C (100.4F), Moderate Pain (4 - 6)  ondansetron Injectable 4 milliGRAM(s) IV Push every 6 hours PRN Nausea and/or Vomiting    ___________  Active diet:  Diet, DASH/TLC:   Sodium & Cholesterol Restricted  Consistent Carbohydrate Evening Snacks  ___________________    ==================>> VITAL SIGNS <<==================    Vital Signs Last 24 HrsT(C): 36.6 (04-30-21 @ 05:25)  T(F): 97.8 (04-30-21 @ 05:25), Max: 98.1 (04-29-21 @ 20:33)  HR: 64 (04-30-21 @ 05:25) (58 - 64)  BP: 154/65 (04-30-21 @ 05:25)  RR: 20 (04-30-21 @ 05:25) (18 - 20)  SpO2: 97% (04-30-21 @ 05:25) (96% - 97%)      POCT Blood Glucose.: 168 mg/dL (30 Apr 2021 12:03)  POCT Blood Glucose.: 124 mg/dL (30 Apr 2021 07:41)  POCT Blood Glucose.: 167 mg/dL (29 Apr 2021 21:10)  POCT Blood Glucose.: 156 mg/dL (29 Apr 2021 16:22)     ==================>> LAB AND IMAGING <<==================                        8.1    7.97  )-----------( 290      ( 30 Apr 2021 07:00 )             25.5        139  |  113<H>  |  46<H>  ----------------------------<  109<H>  5.1   |  19<L>  |  2.88<H>    Ca    8.4      30 Apr 2021 07:00    TPro  5.9<L>  /  Alb  2.6<L>  /  TBili  0.4  /  DBili  x   /  AST  8<L>  /  ALT  39  /  AlkPhos  186<H>  04-30    WBC count:   7.97 <<== ,  8.17 <<== ,  8.54 <<== ,  8.52 <<== ,  6.57 <<== ,  6.75 <<==   Hemoglobin:   8.1 <<==,  7.6 <<==,  8.4 <<==,  8.3 <<==,  6.7 <<==,  5.7 <<==  platelets:  290 <==, 278 <==, 279 <==, 275 <==, 227 <==, 232 <==, 235 <==    Creatinine:  2.88  <<==, 2.61  <<==, 2.45  <<==, 2.79  <<==, 3.21  <<==  Sodium:   139  <==, 139  <==, 139  <==, 139  <==, 136  <==       AST:          8 <== , 10 <== , 25 <==      ALT:        39  <== , 41  <== , 49  <==      AP:        186  <=, 191  <=, 202  <=     Bili:        0.4  <=, 0.4  <=, 0.3  <=       ___________________________________________________________________________________  ===============>>  A S S E S S M E N T   A N D   P L A N <<===============  ------------------------------------------------------------------------------------------    · Assessment	  Patient is a 66 year old male from home AAO x3, with PMH of DM, anemia, CKD, HTN and hypothyroidism, who presented to the ED due to generalized weakness.     Problem/Plan - 1:  ·  Problem: GI bleed.  Plan: patient presented due to generalized weakness  noted to have Hgb of 3.9  doing better post PRBC  FOBT positive  monitor CBC closely   continue PPI drip   GI following>> will need repeat colonoscopy and Small bowel workup as OP ( had a long d/w with GI attending and team: no need for repeat scoping at this time as inpatient )    also recommending Echo and hem eval >> both in process  pt tolerating regular diet     Problem/Plan - 2:  ·  Problem: Anemia.  Plan: Hgb of 3.9  Hgb around 8 on prior admission   plan as above  monitor CBC.   hematology ob board   echo pending  IV iron / supplements  ? procrit ?     Problem/Plan - 3:  ·  Problem: COVID-19.  Plan: noted to be COVID positive   saturating well on room air   monitor O2 sats  isolation precautions   monitor  supportive care    Problem/Plan - 4:  ·  Problem: CLOTILDE on CKD IV, improved     creatinine of 3.21 on admission  IVH / PO hydration   held home med of lisinopril  monitor BMP daily  renal eval as needed     Problem/Plan - 5:  ·  Problem: HTN (hypertension).  Plan: patient on lisinopril, coreg, norvasc, hydralazine and nifedipine  will hold lisinopril in setting of CLOTILDE   continue coreg, norvasc and hydralazine  monitor BP and adjust meds as needed.     Problem/Plan - 6:  Problem: DM (diabetes mellitus). Plan: start SSI while in hospital  A1c:  6  monitor FS   monitor BS and adjust insulin as needed.    Problem/Plan - 7:  ·  Problem: Hypothyroidism.  Plan: continue home med of synthroid  TSH. 3.6    Problem/Plan - 8:  ·  Problem: Need for prophylactic measure.  Plan: no chemical prophylaxis in setting of GI bleed  SCDs for now.     --------------------------------------------  Case discussed with pt, GI team / attending.  Education given on findings and plan of care  ___________________________  H. PRASAD Manuel.  Pager: [phone]

## 2021-05-01 LAB
ALBUMIN SERPL ELPH-MCNC: 2.6 G/DL — LOW (ref 3.5–5)
ALP SERPL-CCNC: 184 U/L — HIGH (ref 40–120)
ALT FLD-CCNC: 32 U/L DA — SIGNIFICANT CHANGE UP (ref 10–60)
ANION GAP SERPL CALC-SCNC: 7 MMOL/L — SIGNIFICANT CHANGE UP (ref 5–17)
AST SERPL-CCNC: 13 U/L — SIGNIFICANT CHANGE UP (ref 10–40)
BILIRUB SERPL-MCNC: 0.3 MG/DL — SIGNIFICANT CHANGE UP (ref 0.2–1.2)
BUN SERPL-MCNC: 46 MG/DL — HIGH (ref 7–18)
CALCIUM SERPL-MCNC: 8.2 MG/DL — LOW (ref 8.4–10.5)
CHLORIDE SERPL-SCNC: 113 MMOL/L — HIGH (ref 96–108)
CO2 SERPL-SCNC: 17 MMOL/L — LOW (ref 22–31)
CREAT SERPL-MCNC: 3.25 MG/DL — HIGH (ref 0.5–1.3)
CULTURE RESULTS: SIGNIFICANT CHANGE UP
CULTURE RESULTS: SIGNIFICANT CHANGE UP
GLUCOSE BLDC GLUCOMTR-MCNC: 124 MG/DL — HIGH (ref 70–99)
GLUCOSE BLDC GLUCOMTR-MCNC: 137 MG/DL — HIGH (ref 70–99)
GLUCOSE BLDC GLUCOMTR-MCNC: 164 MG/DL — HIGH (ref 70–99)
GLUCOSE BLDC GLUCOMTR-MCNC: 165 MG/DL — HIGH (ref 70–99)
GLUCOSE SERPL-MCNC: 113 MG/DL — HIGH (ref 70–99)
HCT VFR BLD CALC: 26.1 % — LOW (ref 39–50)
HGB BLD-MCNC: 8.2 G/DL — LOW (ref 13–17)
MCHC RBC-ENTMCNC: 27.3 PG — SIGNIFICANT CHANGE UP (ref 27–34)
MCHC RBC-ENTMCNC: 31.4 GM/DL — LOW (ref 32–36)
MCV RBC AUTO: 87 FL — SIGNIFICANT CHANGE UP (ref 80–100)
NRBC # BLD: 0 /100 WBCS — SIGNIFICANT CHANGE UP (ref 0–0)
PLATELET # BLD AUTO: 275 K/UL — SIGNIFICANT CHANGE UP (ref 150–400)
POTASSIUM SERPL-MCNC: 5.3 MMOL/L — SIGNIFICANT CHANGE UP (ref 3.5–5.3)
POTASSIUM SERPL-SCNC: 5.3 MMOL/L — SIGNIFICANT CHANGE UP (ref 3.5–5.3)
PROT SERPL-MCNC: 6.1 G/DL — SIGNIFICANT CHANGE UP (ref 6–8.3)
RBC # BLD: 3 M/UL — LOW (ref 4.2–5.8)
RBC # FLD: 17.2 % — HIGH (ref 10.3–14.5)
SODIUM SERPL-SCNC: 137 MMOL/L — SIGNIFICANT CHANGE UP (ref 135–145)
SPECIMEN SOURCE: SIGNIFICANT CHANGE UP
SPECIMEN SOURCE: SIGNIFICANT CHANGE UP
WBC # BLD: 7.47 K/UL — SIGNIFICANT CHANGE UP (ref 3.8–10.5)
WBC # FLD AUTO: 7.47 K/UL — SIGNIFICANT CHANGE UP (ref 3.8–10.5)

## 2021-05-01 RX ORDER — SODIUM CHLORIDE 9 MG/ML
1000 INJECTION INTRAMUSCULAR; INTRAVENOUS; SUBCUTANEOUS
Refills: 0 | Status: DISCONTINUED | OUTPATIENT
Start: 2021-05-01 | End: 2021-05-02

## 2021-05-01 RX ADMIN — Medication 50 MICROGRAM(S): at 05:29

## 2021-05-01 RX ADMIN — Medication 2: at 12:17

## 2021-05-01 RX ADMIN — SODIUM CHLORIDE 75 MILLILITER(S): 9 INJECTION INTRAMUSCULAR; INTRAVENOUS; SUBCUTANEOUS at 13:09

## 2021-05-01 RX ADMIN — CARVEDILOL PHOSPHATE 12.5 MILLIGRAM(S): 80 CAPSULE, EXTENDED RELEASE ORAL at 16:57

## 2021-05-01 RX ADMIN — Medication 2: at 21:42

## 2021-05-01 RX ADMIN — Medication 100 MILLIGRAM(S): at 11:36

## 2021-05-01 RX ADMIN — CARVEDILOL PHOSPHATE 12.5 MILLIGRAM(S): 80 CAPSULE, EXTENDED RELEASE ORAL at 05:29

## 2021-05-01 RX ADMIN — AMLODIPINE BESYLATE 10 MILLIGRAM(S): 2.5 TABLET ORAL at 05:29

## 2021-05-01 RX ADMIN — PANTOPRAZOLE SODIUM 40 MILLIGRAM(S): 20 TABLET, DELAYED RELEASE ORAL at 05:29

## 2021-05-01 RX ADMIN — Medication 100 MILLIGRAM(S): at 22:04

## 2021-05-01 RX ADMIN — Medication 2000 UNIT(S): at 11:37

## 2021-05-01 RX ADMIN — Medication 100 MILLIGRAM(S): at 05:29

## 2021-05-01 RX ADMIN — ATORVASTATIN CALCIUM 80 MILLIGRAM(S): 80 TABLET, FILM COATED ORAL at 22:04

## 2021-05-01 RX ADMIN — Medication 100 MILLIGRAM(S): at 13:09

## 2021-05-01 RX ADMIN — PANTOPRAZOLE SODIUM 40 MILLIGRAM(S): 20 TABLET, DELAYED RELEASE ORAL at 16:57

## 2021-05-01 NOTE — PHYSICAL THERAPY INITIAL EVALUATION ADULT - ADDITIONAL COMMENTS
Patient lives on 5th floor of apartment building with elevator, was not using device prior and did not need help with ADL's prior. Patient states only has fallen in the snow before.

## 2021-05-01 NOTE — PHYSICAL THERAPY INITIAL EVALUATION ADULT - DIAGNOSIS, PT EVAL
Patient does not present with impairments that necessitate skilled physical therapy intervention in this setting.

## 2021-05-01 NOTE — CONSULT NOTE ADULT - ASSESSMENT
Patient is a 66 year old male from home AAO x3, with PMH of DM, anemia, CKD, HTN and hypothyroidism, who presented to the ED due to generalized weakness.   Patient states that he recently completed his second dose of the Moderna vaccine. States that since he received the first dose, he has been feeling weak. Patient completed second dose last Monday. States that this morning he was feeling so weak that he wasn't able to get up from bed. Patient also states that he has been having on and off black stools for the past 6 years and last had an episode of black stool about 3-4 days ago. Denies any hematochezia, hematemesis or coughing up blood. Patient denies any history of COVID or exposure to COVID. Denies any respiratory issues. Of note, patient was in ED in January 2021 for anemia with Hgb of 5 and received 2 PRBCs and had EGD/colonoscopy done by Dr. Caballero which showed 2 nodules with possible AVM on EGD and 3cm multilobulated sessile polyp in right colon noted on colonoscopy.   in ED pt with Hemoglobin: 3.9, and + COVID-19   pt with sig hx of htn on multiple meds, no chest pain.  pt with hypertensive heart disease with no chf  continue current bp meds  check lipid  CKD check renal ultrasound  will hold on echo for now  dvt prophylaxis  tsh

## 2021-05-01 NOTE — DIETITIAN INITIAL EVALUATION ADULT. - OTHER INFO
Pt is on Airborne isolation. Pt with COVID +.  Observed Pt Via Glass Door. Pt OOB to  chair. Pt ate Good. D/W RN Pt eating good Po tolerated, Pt is on  Room Air.  D/W Pt via Phone Pt is Upper sorbian speaking. Pt is able to make her needs known in  Simple English.  Pt reports DM x ~8 months. Not on any OHA at Home. Per Pt Ht !70 cm, Wt 175 lb. BMI 27. Pt  not able to Recall any  weight loss. Pt admitted with anemia  ( HGB 3.9) S/P Blood transfusion. Offered Nutrient dense snacks but  pt refused.

## 2021-05-01 NOTE — DIETITIAN INITIAL EVALUATION ADULT. - PROBLEM SELECTOR PLAN 1
patient presented due to generalized weakness  noted to have Hgb of 3.9  2 PRBCs ordered to be given in ED  FOBT positive  f/u post transfusion CBC   monitor CBC closely   continue PPI drip   will keep NPO for now > clears if stable   GI, Dr. Caballero consulted  likely for repeat scoping when more stable and COVID cleared

## 2021-05-01 NOTE — DIETITIAN INITIAL EVALUATION ADULT. - PERTINENT LABORATORY DATA
05-01 Na137 mmol/L Glu 113 mg/dL<H> K+ 5.3 mmol/L Cr  3.25 mg/dL<H> BUN 46 mg/dL<H>   04-28 Phos 4.1 mg/dL   05-01 Alb 2.6 g/dL<L>       04-27 Chol 102 mg/dL LDL --    HDL 28 mg/dL<L> Trig 117 mg/dL  04-27-21 @ 09:30 HgbA1C 6.0 [4.0 - 5.6]

## 2021-05-01 NOTE — PHYSICAL THERAPY INITIAL EVALUATION ADULT - GENERAL OBSERVATIONS, REHAB EVAL
Patient received sitting in chair, +chair alarm, +fall precautions, on RA, Sinhala speaking male used  #[id number], NAD.

## 2021-05-01 NOTE — DIETITIAN INITIAL EVALUATION ADULT. - PERTINENT MEDS FT
MEDICATIONS  (STANDING):  allopurinol 100 milliGRAM(s) Oral daily  amLODIPine   Tablet 10 milliGRAM(s) Oral daily  atorvastatin 80 milliGRAM(s) Oral at bedtime  carvedilol 12.5 milliGRAM(s) Oral every 12 hours  cholecalciferol 2000 Unit(s) Oral daily  hydrALAZINE 100 milliGRAM(s) Oral three times a day  insulin lispro (ADMELOG) corrective regimen sliding scale   SubCutaneous Before meals and at bedtime  levothyroxine 50 MICROGram(s) Oral daily  pantoprazole    Tablet 40 milliGRAM(s) Oral two times a day  sodium chloride 0.9%. 1000 milliLiter(s) (80 mL/Hr) IV Continuous <Continuous>  sodium chloride 0.9%. 1000 milliLiter(s) (75 mL/Hr) IV Continuous <Continuous>    MEDICATIONS  (PRN):  acetaminophen   Tablet .. 650 milliGRAM(s) Oral every 6 hours PRN Temp greater or equal to 38C (100.4F), Moderate Pain (4 - 6)  ondansetron Injectable 4 milliGRAM(s) IV Push every 6 hours PRN Nausea and/or Vomiting

## 2021-05-01 NOTE — CONSULT NOTE ADULT - SUBJECTIVE AND OBJECTIVE BOX
CHIEF COMPLAINT:Patient is a 66y old  Male who presents with a chief complaint of weakness (30 Apr 2021 22:10)      HPI:  Patient is a 66 year old male from home AAO x3, with PMH of DM, anemia, CKD, HTN and hypothyroidism, who presented to the ED due to generalized weakness.   Patient states that he recently completed his second dose of the Moderna vaccine. States that since he received the first dose, he has been feeling weak. Patient completed second dose last Monday. States that this morning he was feeling so weak that he wasn't able to get up from bed. Patient also states that he has been having on and off black stools for the past 6 years and last had an episode of black stool about 3-4 days ago. Denies any hematochezia, hematemesis or coughing up blood. Patient denies any history of COVID or exposure to COVID. Denies any respiratory issues. Of note, patient was in ED in January 2021 for anemia with Hgb of 5 and received 2 PRBCs and had EGD/colonoscopy done by Dr. Caballero which showed 2 nodules with possible AVM on EGD and 3cm multilobulated sessile polyp in right colon noted on colonoscopy.   in ED pt with Hemoglobin: 3.9, and + COVID-19   pt with sig hx of htn on multiple meds, no chest pain.        PAST MEDICAL & SURGICAL HISTORY:  DM (diabetes mellitus)    HTN (hypertension)    Hypothyroidism    Cholecystitis    Unilateral inguinal hernia without obstruction or gangrene, recurrence not specified    Anemia    H/O right inguinal hernia repair        MEDICATIONS  (STANDING):  allopurinol 100 milliGRAM(s) Oral daily  amLODIPine   Tablet 10 milliGRAM(s) Oral daily  atorvastatin 80 milliGRAM(s) Oral at bedtime  carvedilol 12.5 milliGRAM(s) Oral every 12 hours  cholecalciferol 2000 Unit(s) Oral daily  hydrALAZINE 100 milliGRAM(s) Oral three times a day  insulin lispro (ADMELOG) corrective regimen sliding scale   SubCutaneous Before meals and at bedtime  levothyroxine 50 MICROGram(s) Oral daily  pantoprazole    Tablet 40 milliGRAM(s) Oral two times a day  sodium chloride 0.9%. 1000 milliLiter(s) (80 mL/Hr) IV Continuous <Continuous>    MEDICATIONS  (PRN):  acetaminophen   Tablet .. 650 milliGRAM(s) Oral every 6 hours PRN Temp greater or equal to 38C (100.4F), Moderate Pain (4 - 6)  ondansetron Injectable 4 milliGRAM(s) IV Push every 6 hours PRN Nausea and/or Vomiting      FAMILY HISTORY:      SOCIAL HISTORY:    [ ] Non-smoker  [ ] Smoker  [ ] Alcohol    Allergies    No Known Allergies    Intolerances    	    REVIEW OF SYSTEMS:  CONSTITUTIONAL: No fever, weight loss, or fatigue  EYES: No eye pain, visual disturbances, or discharge  ENT:  No difficulty hearing, tinnitus, vertigo; No sinus or throat pain  NECK: No pain or stiffness  RESPIRATORY: No cough, wheezing, chills or hemoptysis; No Shortness of Breath  CARDIOVASCULAR: No chest pain, palpitations, passing out, dizziness, or leg swelling  GASTROINTESTINAL: No abdominal or epigastric pain. No nausea, vomiting, or hematemesis; No diarrhea or constipation. No melena or hematochezia.  GENITOURINARY: No dysuria, frequency, hematuria, or incontinence  NEUROLOGICAL: No headaches, memory loss, loss of strength, numbness, or tremors  SKIN: No itching, burning, rashes, or lesions   LYMPH Nodes: No enlarged glands  ENDOCRINE: No heat or cold intolerance; No hair loss  MUSCULOSKELETAL: No joint pain or swelling; No muscle, back, or extremity pain  PSYCHIATRIC: No depression, anxiety, mood swings, or difficulty sleeping  HEME/LYMPH: No easy bruising, or bleeding gums  ALLERGY AND IMMUNOLOGIC: No hives or eczema	    [ ] All others negative	  [ ] Unable to obtain    PHYSICAL EXAM:  T(C): 36.5 (05-01-21 @ 05:07), Max: 36.7 (04-30-21 @ 13:43)  HR: 63 (05-01-21 @ 05:07) (62 - 64)  BP: 146/67 (05-01-21 @ 05:07) (140/53 - 146/67)  RR: 18 (05-01-21 @ 05:07) (18 - 18)  SpO2: 98% (05-01-21 @ 05:07) (95% - 98%)  Wt(kg): --  I&O's Summary      Appearance: Normal	  HEENT:   Normal oral mucosa, PERRL, EOMI	  Lymphatic: No lymphadenopathy  Cardiovascular: Normal S1 S2, No JVD, + murmurs, No edema  Respiratory: Lungs clear to auscultation	  Psychiatry: A & O x 3, Mood & affect appropriate  Gastrointestinal:  Soft, Non-tender, + BS	  Skin: No rashes, No ecchymoses, No cyanosis	  Neurologic: Non-focal  Extremities: Normal range of motion, No clubbing, cyanosis or edema  Vascular: Peripheral pulses palpable 2+ bilaterally    TELEMETRY: 	    ECG:  	  RADIOLOGY:  OTHER: 	  	  LABS:	 	    CARDIAC MARKERS:                              8.2    7.47  )-----------( 275      ( 01 May 2021 07:38 )             26.1     05-01    137  |  113<H>  |  46<H>  ----------------------------<  113<H>  5.3   |  17<L>  |  3.25<H>    Ca    8.2<L>      01 May 2021 07:38    TPro  6.1  /  Alb  2.6<L>  /  TBili  0.3  /  DBili  x   /  AST  13  /  ALT  32  /  AlkPhos  184<H>  05-01    proBNP:   Lipid Profile:   HgA1c:   TSH:       PREVIOUS DIAGNOSTIC TESTING:    < from: 12 Lead ECG (04.26.21 @ 09:44) >  Diagnosis Line *** Poor data quality, interpretation may be adversely affected  Normal sinus rhythm  Right bundle branch block  Abnormal ECG    < from: Xray Chest 1 View-PORTABLE IMMEDIATE (04.26.21 @ 11:33) >  INTERPRETATION:  AP erect chest on April 26, 2021 at 11:32 AM. Patient has sepsis.    COMPARISON: None available.    Heart magnified by technique.    The lung fields and pleural surfaces are unremarkable.    IMPRESSION: No acute finding.    COVID-19 PCR . (04.26.21 @ 11:41)    COVID-19 PCR: Detected: You can help in the fight against COVID-19. [facility] may contact  you to see if you are interested in voluntarily participating in one of  our clinical trials.  This test has been validated by [facility] to be accurate;  though it has not been FDA cleared/approved by the usual pathway  As with all laboratory test, results should be correlated with clinical  findings.  https://www.fda.gov/media/894703/download  https://www.fda.gov/media/752704/download  Test repeated.  < from: US Abdomen Limited (05.12.17 @ 16:13) >  IMPRESSION: Gallstones and gallbladder sludge identified. There is   gallbladder wall thickening measuring up to8 mm and gallbladder wall   edema is noted. A significant volume of pericholecystic fluid is   identified. Findings are highly suspicious for cholecystitis; correlate   clinically.                   CHIEF COMPLAINT:Patient is a 66y old  Male who presents with a chief complaint of weakness (30 Apr 2021 22:10)      HPI:  Patient is a 66 year old male from home AAO x3, with PMH of DM, anemia, CKD, HTN and hypothyroidism, who presented to the ED due to generalized weakness.   Patient states that he recently completed his second dose of the Moderna vaccine. States that since he received the first dose, he has been feeling weak. Patient completed second dose last Monday. States that this morning he was feeling so weak that he wasn't able to get up from bed. Patient also states that he has been having on and off black stools for the past 6 years and last had an episode of black stool about 3-4 days ago. Denies any hematochezia, hematemesis or coughing up blood. Patient denies any history of COVID or exposure to COVID. Denies any respiratory issues. Of note, patient was in ED in January 2021 for anemia with Hgb of 5 and received 2 PRBCs and had EGD/colonoscopy done by Dr. Caballero which showed 2 nodules with possible AVM on EGD and 3cm multilobulated sessile polyp in right colon noted on colonoscopy.   in ED pt with Hemoglobin: 3.9, and + COVID-19   pt with sig hx of htn on multiple meds, no chest pain.    PAST MEDICAL & SURGICAL HISTORY:  DM (diabetes mellitus)    HTN (hypertension)    Hypothyroidism    Cholecystitis    Unilateral inguinal hernia without obstruction or gangrene, recurrence not specified    Anemia    H/O right inguinal hernia repair        MEDICATIONS  (STANDING):  allopurinol 100 milliGRAM(s) Oral daily  amLODIPine   Tablet 10 milliGRAM(s) Oral daily  atorvastatin 80 milliGRAM(s) Oral at bedtime  carvedilol 12.5 milliGRAM(s) Oral every 12 hours  cholecalciferol 2000 Unit(s) Oral daily  hydrALAZINE 100 milliGRAM(s) Oral three times a day  insulin lispro (ADMELOG) corrective regimen sliding scale   SubCutaneous Before meals and at bedtime  levothyroxine 50 MICROGram(s) Oral daily  pantoprazole    Tablet 40 milliGRAM(s) Oral two times a day  sodium chloride 0.9%. 1000 milliLiter(s) (80 mL/Hr) IV Continuous <Continuous>    MEDICATIONS  (PRN):  acetaminophen   Tablet .. 650 milliGRAM(s) Oral every 6 hours PRN Temp greater or equal to 38C (100.4F), Moderate Pain (4 - 6)  ondansetron Injectable 4 milliGRAM(s) IV Push every 6 hours PRN Nausea and/or Vomiting      FAMILY HISTORY:      SOCIAL HISTORY:    [ ] Non-smoker  [ ] Smoker  [ ] Alcohol    Allergies    No Known Allergies    Intolerances    	    REVIEW OF SYSTEMS:  CONSTITUTIONAL: No fever, weight loss, or fatigue  EYES: No eye pain, visual disturbances, or discharge  ENT:  No difficulty hearing, tinnitus, vertigo; No sinus or throat pain  NECK: No pain or stiffness  RESPIRATORY: No cough, wheezing, chills or hemoptysis; No Shortness of Breath  CARDIOVASCULAR: No chest pain, palpitations, passing out, dizziness, or leg swelling  GASTROINTESTINAL: No abdominal or epigastric pain. No nausea, vomiting, or hematemesis; No diarrhea or constipation. No melena or hematochezia.  GENITOURINARY: No dysuria, frequency, hematuria, or incontinence  NEUROLOGICAL: No headaches, memory loss, loss of strength, numbness, or tremors  SKIN: No itching, burning, rashes, or lesions   LYMPH Nodes: No enlarged glands  ENDOCRINE: No heat or cold intolerance; No hair loss  MUSCULOSKELETAL: No joint pain or swelling; No muscle, back, or extremity pain  PSYCHIATRIC: No depression, anxiety, mood swings, or difficulty sleeping  HEME/LYMPH: No easy bruising, or bleeding gums  ALLERGY AND IMMUNOLOGIC: No hives or eczema	    [ ] All others negative	  [ ] Unable to obtain    PHYSICAL EXAM:  T(C): 36.5 (05-01-21 @ 05:07), Max: 36.7 (04-30-21 @ 13:43)  HR: 63 (05-01-21 @ 05:07) (62 - 64)  BP: 146/67 (05-01-21 @ 05:07) (140/53 - 146/67)  RR: 18 (05-01-21 @ 05:07) (18 - 18)  SpO2: 98% (05-01-21 @ 05:07) (95% - 98%)  Wt(kg): --  I&O's Summary      Appearance: Normal	  HEENT:   Normal oral mucosa, PERRL, EOMI	  Lymphatic: No lymphadenopathy  Cardiovascular: Normal S1 S2, No JVD, + murmurs, No edema  Respiratory: Lungs clear to auscultation	  Psychiatry: A & O x 3, Mood & affect appropriate  Gastrointestinal:  Soft, Non-tender, + BS	  Skin: No rashes, No ecchymoses, No cyanosis	  Neurologic: Non-focal  Extremities: Normal range of motion, No clubbing, cyanosis or edema  Vascular: Peripheral pulses palpable 2+ bilaterally    TELEMETRY: 	    ECG:  	  RADIOLOGY:  OTHER: 	  	  LABS:	 	    CARDIAC MARKERS:                              8.2    7.47  )-----------( 275      ( 01 May 2021 07:38 )             26.1     05-01    137  |  113<H>  |  46<H>  ----------------------------<  113<H>  5.3   |  17<L>  |  3.25<H>    Ca    8.2<L>      01 May 2021 07:38    TPro  6.1  /  Alb  2.6<L>  /  TBili  0.3  /  DBili  x   /  AST  13  /  ALT  32  /  AlkPhos  184<H>  05-01    proBNP:   Lipid Profile:   HgA1c:   TSH:       PREVIOUS DIAGNOSTIC TESTING:    < from: 12 Lead ECG (04.26.21 @ 09:44) >  Diagnosis Line *** Poor data quality, interpretation may be adversely affected  Normal sinus rhythm  Right bundle branch block  Abnormal ECG    < from: Xray Chest 1 View-PORTABLE IMMEDIATE (04.26.21 @ 11:33) >  INTERPRETATION:  AP erect chest on April 26, 2021 at 11:32 AM. Patient has sepsis.    COMPARISON: None available.    Heart magnified by technique.    The lung fields and pleural surfaces are unremarkable.    IMPRESSION: No acute finding.    COVID-19 PCR . (04.26.21 @ 11:41)    COVID-19 PCR: Detected: You can help in the fight against COVID-19. [employer] may contact  you to see if you are interested in voluntarily participating in one of  our clinical trials.  This test has been validated by [facility] to be accurate;  though it has not been FDA cleared/approved by the usual pathway  As with all laboratory test, results should be correlated with clinical  findings.  https://www.fda.gov/media/784729/download  https://www.fda.gov/media/283302/download  Test repeated.  < from: US Abdomen Limited (05.12.17 @ 16:13) >  IMPRESSION: Gallstones and gallbladder sludge identified. There is   gallbladder wall thickening measuring up to8 mm and gallbladder wall   edema is noted. A significant volume of pericholecystic fluid is   identified. Findings are highly suspicious for cholecystitis; correlate   clinically.

## 2021-05-01 NOTE — DIETITIAN INITIAL EVALUATION ADULT. - PROBLEM SELECTOR PLAN 4
noted to have creatinine of 3.21 on admission  patient has history of CKD  last creatinine was 2.62  patient given 2.7L NS bolus in ED   f/u urine lytes  hold home med of lisinopril  monitor BMP daily  if not better: renal eval

## 2021-05-01 NOTE — PROGRESS NOTE ADULT - ASSESSMENT
seen nd examined vsstable afebrile physical done  not in any distress, no cp or sob   covering for dr archuleta   denies abd pain or bleed  labs noted hgb 8.2 stable  creat 3.2  GI bleed stable  GI watch hgb   covid inf  in isolation  doing ok without 02  ckd  cont same

## 2021-05-02 LAB
ALBUMIN SERPL ELPH-MCNC: 2.6 G/DL — LOW (ref 3.5–5)
ALP SERPL-CCNC: 179 U/L — HIGH (ref 40–120)
ALT FLD-CCNC: 27 U/L DA — SIGNIFICANT CHANGE UP (ref 10–60)
ANION GAP SERPL CALC-SCNC: 7 MMOL/L — SIGNIFICANT CHANGE UP (ref 5–17)
AST SERPL-CCNC: 11 U/L — SIGNIFICANT CHANGE UP (ref 10–40)
BILIRUB SERPL-MCNC: 0.3 MG/DL — SIGNIFICANT CHANGE UP (ref 0.2–1.2)
BUN SERPL-MCNC: 52 MG/DL — HIGH (ref 7–18)
CALCIUM SERPL-MCNC: 8.1 MG/DL — LOW (ref 8.4–10.5)
CHLORIDE SERPL-SCNC: 114 MMOL/L — HIGH (ref 96–108)
CO2 SERPL-SCNC: 17 MMOL/L — LOW (ref 22–31)
CREAT SERPL-MCNC: 3.38 MG/DL — HIGH (ref 0.5–1.3)
GLUCOSE BLDC GLUCOMTR-MCNC: 118 MG/DL — HIGH (ref 70–99)
GLUCOSE BLDC GLUCOMTR-MCNC: 126 MG/DL — HIGH (ref 70–99)
GLUCOSE BLDC GLUCOMTR-MCNC: 156 MG/DL — HIGH (ref 70–99)
GLUCOSE BLDC GLUCOMTR-MCNC: 166 MG/DL — HIGH (ref 70–99)
GLUCOSE SERPL-MCNC: 108 MG/DL — HIGH (ref 70–99)
HCT VFR BLD CALC: 24.5 % — LOW (ref 39–50)
HGB BLD-MCNC: 7.7 G/DL — LOW (ref 13–17)
MCHC RBC-ENTMCNC: 27.3 PG — SIGNIFICANT CHANGE UP (ref 27–34)
MCHC RBC-ENTMCNC: 31.4 GM/DL — LOW (ref 32–36)
MCV RBC AUTO: 86.9 FL — SIGNIFICANT CHANGE UP (ref 80–100)
NRBC # BLD: 0 /100 WBCS — SIGNIFICANT CHANGE UP (ref 0–0)
PLATELET # BLD AUTO: 239 K/UL — SIGNIFICANT CHANGE UP (ref 150–400)
POTASSIUM SERPL-MCNC: 5.3 MMOL/L — SIGNIFICANT CHANGE UP (ref 3.5–5.3)
POTASSIUM SERPL-SCNC: 5.3 MMOL/L — SIGNIFICANT CHANGE UP (ref 3.5–5.3)
PROT SERPL-MCNC: 5.8 G/DL — LOW (ref 6–8.3)
RBC # BLD: 2.82 M/UL — LOW (ref 4.2–5.8)
RBC # FLD: 17.2 % — HIGH (ref 10.3–14.5)
SODIUM SERPL-SCNC: 138 MMOL/L — SIGNIFICANT CHANGE UP (ref 135–145)
WBC # BLD: 6.59 K/UL — SIGNIFICANT CHANGE UP (ref 3.8–10.5)
WBC # FLD AUTO: 6.59 K/UL — SIGNIFICANT CHANGE UP (ref 3.8–10.5)

## 2021-05-02 RX ORDER — SODIUM CHLORIDE 9 MG/ML
1000 INJECTION INTRAMUSCULAR; INTRAVENOUS; SUBCUTANEOUS
Refills: 0 | Status: DISCONTINUED | OUTPATIENT
Start: 2021-05-02 | End: 2021-05-04

## 2021-05-02 RX ORDER — SENNA PLUS 8.6 MG/1
2 TABLET ORAL AT BEDTIME
Refills: 0 | Status: DISCONTINUED | OUTPATIENT
Start: 2021-05-03 | End: 2021-05-04

## 2021-05-02 RX ORDER — SENNA PLUS 8.6 MG/1
2 TABLET ORAL ONCE
Refills: 0 | Status: COMPLETED | OUTPATIENT
Start: 2021-05-02 | End: 2021-05-02

## 2021-05-02 RX ADMIN — Medication 2000 UNIT(S): at 11:47

## 2021-05-02 RX ADMIN — CARVEDILOL PHOSPHATE 12.5 MILLIGRAM(S): 80 CAPSULE, EXTENDED RELEASE ORAL at 17:15

## 2021-05-02 RX ADMIN — AMLODIPINE BESYLATE 10 MILLIGRAM(S): 2.5 TABLET ORAL at 06:56

## 2021-05-02 RX ADMIN — Medication 100 MILLIGRAM(S): at 22:04

## 2021-05-02 RX ADMIN — CARVEDILOL PHOSPHATE 12.5 MILLIGRAM(S): 80 CAPSULE, EXTENDED RELEASE ORAL at 06:56

## 2021-05-02 RX ADMIN — SENNA PLUS 2 TABLET(S): 8.6 TABLET ORAL at 13:22

## 2021-05-02 RX ADMIN — Medication 100 MILLIGRAM(S): at 11:47

## 2021-05-02 RX ADMIN — Medication 50 MICROGRAM(S): at 06:56

## 2021-05-02 RX ADMIN — PANTOPRAZOLE SODIUM 40 MILLIGRAM(S): 20 TABLET, DELAYED RELEASE ORAL at 06:56

## 2021-05-02 RX ADMIN — Medication 2: at 11:47

## 2021-05-02 RX ADMIN — PANTOPRAZOLE SODIUM 40 MILLIGRAM(S): 20 TABLET, DELAYED RELEASE ORAL at 17:15

## 2021-05-02 RX ADMIN — SODIUM CHLORIDE 75 MILLILITER(S): 9 INJECTION INTRAMUSCULAR; INTRAVENOUS; SUBCUTANEOUS at 22:09

## 2021-05-02 RX ADMIN — Medication 100 MILLIGRAM(S): at 13:21

## 2021-05-02 RX ADMIN — Medication 2: at 22:04

## 2021-05-02 RX ADMIN — Medication 100 MILLIGRAM(S): at 06:56

## 2021-05-02 RX ADMIN — SODIUM CHLORIDE 75 MILLILITER(S): 9 INJECTION INTRAMUSCULAR; INTRAVENOUS; SUBCUTANEOUS at 06:57

## 2021-05-02 RX ADMIN — ATORVASTATIN CALCIUM 80 MILLIGRAM(S): 80 TABLET, FILM COATED ORAL at 22:04

## 2021-05-02 NOTE — PROGRESS NOTE ADULT - ASSESSMENT
_________________________________________________________________________________________  ========>>  M E D I C A L   A T T E N D I N G    F O L L O W  U P  N O T E  <<=========  -----------------------------------------------------------------------------------------------------    - Patient evaluated by me, chart reviewed.   - In summary,  MOLLY SILVEIRA is a 66y year old man admitted with symptomatic anemia, COVID  - Patient today overall doing ok, comfortable, eating OK.  overall doing well, no c./o, no SOB, + occasional  cough     ==================>> REVIEW OF SYSTEM <<=================    GEN: no fever, no chills, no pain  RESP: no SOB, no cough, no sputum  CVS: no chest pain, no palpitations  GI: no abdominal pain, no nausea, nop bleeding reported   : no dysuria, no frequency, no hematuria  Neuro: no headache, no dizziness  Derm : no itching, no rash    ==================>> PHYSICAL EXAM <<=================    GEN: A&O X 2 , NAD , comfortable, pleasant, calm, in chair    HEENT: NCAT, PERRL, MMM, hearing intact  Neck: supple , no JVD appreciated  CVS: S1S2 , regular , No M/R/G appreciated  PULM: CTA B/L,  no W/R/R appreciated  ABD.: soft. non tender, non distended,  bowel sounds present  Extrem: intact pulses , no edema   PSYCH : normal mood,  not anxious                             ( Note written / Date of service 05-02-21 )    ==================>> MEDICATIONS <<====================    allopurinol 100 milliGRAM(s) Oral daily  amLODIPine   Tablet 10 milliGRAM(s) Oral daily  atorvastatin 80 milliGRAM(s) Oral at bedtime  carvedilol 12.5 milliGRAM(s) Oral every 12 hours  cholecalciferol 2000 Unit(s) Oral daily  hydrALAZINE 100 milliGRAM(s) Oral three times a day  insulin lispro (ADMELOG) corrective regimen sliding scale   SubCutaneous Before meals and at bedtime  levothyroxine 50 MICROGram(s) Oral daily  pantoprazole    Tablet 40 milliGRAM(s) Oral two times a day  sodium chloride 0.9%. 1000 milliLiter(s) IV Continuous <Continuous>  sodium chloride 0.9%. 1000 milliLiter(s) IV Continuous <Continuous>    MEDICATIONS  (PRN):  acetaminophen   Tablet .. 650 milliGRAM(s) Oral every 6 hours PRN Temp greater or equal to 38C (100.4F), Moderate Pain (4 - 6)  ondansetron Injectable 4 milliGRAM(s) IV Push every 6 hours PRN Nausea and/or Vomiting    ___________  Active diet:  Diet, DASH/TLC:   Sodium & Cholesterol Restricted  Consistent Carbohydrate Evening Snacks  ___________________    ==================>> VITAL SIGNS <<==================    Vital Signs Last 24 HrsT(C): 37.1 (05-02-21 @ 13:40)  T(F): 98.7 (05-02-21 @ 13:40), Max: 98.7 (05-02-21 @ 13:40)  HR: 66 (05-02-21 @ 17:14) (62 - 72)  BP: 142/57 (05-02-21 @ 17:14)  RR: 17 (05-02-21 @ 13:40) (17 - 18)  SpO2: 97% (05-02-21 @ 13:40) (97% - 99%)      POCT Blood Glucose.: 126 mg/dL (02 May 2021 16:36)  POCT Blood Glucose.: 156 mg/dL (02 May 2021 11:32)  POCT Blood Glucose.: 118 mg/dL (02 May 2021 07:57)  POCT Blood Glucose.: 164 mg/dL (01 May 2021 20:49)     ==================>> LAB AND IMAGING <<==================                        7.7    6.59  )-----------( 239      ( 02 May 2021 08:36 )             24.5        05-02    138  |  114<H>  |  52<H>  ----------------------------<  108<H>  5.3   |  17<L>  |  3.38<H>    Ca    8.1<L>      02 May 2021 08:36    TPro  5.8<L>  /  Alb  2.6<L>  /  TBili  0.3  /  DBili  x   /  AST  11  /  ALT  27  /  AlkPhos  179<H>  05-02    WBC count:   6.59 <<== ,  7.47 <<== ,  7.97 <<== ,  8.17 <<== ,  8.54 <<==   Hemoglobin:   7.7 <<==,  8.2 <<==,  8.1 <<==,  7.6 <<==,  8.4 <<==  platelets:  239 <==, 275 <==, 290 <==, 278 <==, 279 <==, 275 <==, 227 <==    Creatinine:  3.38  <<==, 3.25  <<==, 2.88  <<==, 2.61  <<==, 2.45  <<==, 2.79  <<==  Sodium:   138  <==, 137  <==, 139  <==, 139  <==, 139  <==, 139  <==       AST:          11 <== , 13 <== , 8 <== , 10 <==      ALT:        27  <== , 32  <== , 39  <== , 41  <==      AP:        179  <=, 184  <=, 186  <=, 191  <=     Bili:        0.3  <=, 0.3  <=, 0.4  <=, 0.4  <=    ___________________________________________________________________________________  ===============>>  A S S E S S M E N T   A N D   P L A N <<===============  ------------------------------------------------------------------------------------------    · Assessment	  Patient is a 66 year old male from home AAO x3, with PMH of DM, anemia, CKD, HTN and hypothyroidism, who presented to the ED due to generalized weakness.     Problem/Plan - 1:  ·  Problem: GI bleed.  Plan: patient presented due to generalized weakness  noted to have Hgb of 3.9  doing better post PRBC  FOBT positive  monitor CBC closely   continue PPI drip   GI following>> will need repeat colonoscopy and Small bowel workup as OP ( had a long d/w with GI attending and team: no need for repeat scoping at this time as inpatient )    also recommending Echo ( not being done due to + coviD!)   hem following as well   pt tolerating regular diet     Problem/Plan - 2:  ·  Problem: Anemia.  Plan: Hgb of 3.9  Hgb around 8 on prior admission   plan as above  monitor CBC.   hematology ob board   echo pending  IV iron / supplements  ? procrit ?     Problem/Plan - 3:  ·  Problem: COVID-19.  Plan: noted to be COVID positive   saturating well on room air   monitor O2 sats  isolation precautions   monitor  supportive care    Problem/Plan - 4:  ·  Problem: CLOTILDE on CKD IV     creatinine was better now worsened again   IVH / PO hydration   held home med of lisinopril  monitor BMP daily >> if not better by AM: renal eval : Dr Wheeler / [place]     Problem/Plan - 5:  ·  Problem: HTN (hypertension).  Plan: patient on lisinopril, coreg, norvasc, hydralazine and nifedipine  will hold lisinopril in setting of CLOTILDE   continue coreg, norvasc and hydralazine  monitor BP and adjust meds as needed.     Problem/Plan - 6:  Problem: DM (diabetes mellitus). Plan: start SSI while in hospital  A1c:  6  monitor FS   monitor BS and adjust insulin as needed.    Problem/Plan - 7:  ·  Problem: Hypothyroidism.  Plan: continue home med of synthroid  TSH. 3.6    Problem/Plan - 8:  ·  Problem: Need for prophylactic measure.  Plan: no chemical prophylaxis in setting of GI bleed  SCDs for now.     --------------------------------------------  Case discussed with pt, RN, NP  Education given on findings and plan of care  ___________________________  GABI Manuel D.O.  Pager: [phone]

## 2021-05-03 DIAGNOSIS — E87.2 ACIDOSIS: ICD-10-CM

## 2021-05-03 LAB
ANION GAP SERPL CALC-SCNC: 10 MMOL/L — SIGNIFICANT CHANGE UP (ref 5–17)
APPEARANCE UR: CLEAR — SIGNIFICANT CHANGE UP
BILIRUB UR-MCNC: NEGATIVE — SIGNIFICANT CHANGE UP
BLD GP AB SCN SERPL QL: SIGNIFICANT CHANGE UP
BUN SERPL-MCNC: 55 MG/DL — HIGH (ref 7–18)
CALCIUM SERPL-MCNC: 8.3 MG/DL — LOW (ref 8.4–10.5)
CHLORIDE SERPL-SCNC: 114 MMOL/L — HIGH (ref 96–108)
CHLORIDE UR-SCNC: 42 MMOL/L — SIGNIFICANT CHANGE UP
CO2 SERPL-SCNC: 16 MMOL/L — LOW (ref 22–31)
COLOR SPEC: YELLOW — SIGNIFICANT CHANGE UP
CREAT ?TM UR-MCNC: 107 MG/DL — SIGNIFICANT CHANGE UP
CREAT SERPL-MCNC: 3.31 MG/DL — HIGH (ref 0.5–1.3)
DIFF PNL FLD: NEGATIVE — SIGNIFICANT CHANGE UP
GLUCOSE BLDC GLUCOMTR-MCNC: 113 MG/DL — HIGH (ref 70–99)
GLUCOSE BLDC GLUCOMTR-MCNC: 138 MG/DL — HIGH (ref 70–99)
GLUCOSE BLDC GLUCOMTR-MCNC: 161 MG/DL — HIGH (ref 70–99)
GLUCOSE BLDC GLUCOMTR-MCNC: 191 MG/DL — HIGH (ref 70–99)
GLUCOSE SERPL-MCNC: 100 MG/DL — HIGH (ref 70–99)
GLUCOSE UR QL: NEGATIVE — SIGNIFICANT CHANGE UP
HCT VFR BLD CALC: 24.6 % — LOW (ref 39–50)
HGB BLD-MCNC: 7.6 G/DL — LOW (ref 13–17)
KETONES UR-MCNC: NEGATIVE — SIGNIFICANT CHANGE UP
LEUKOCYTE ESTERASE UR-ACNC: NEGATIVE — SIGNIFICANT CHANGE UP
MCHC RBC-ENTMCNC: 27.5 PG — SIGNIFICANT CHANGE UP (ref 27–34)
MCHC RBC-ENTMCNC: 30.9 GM/DL — LOW (ref 32–36)
MCV RBC AUTO: 89.1 FL — SIGNIFICANT CHANGE UP (ref 80–100)
NITRITE UR-MCNC: NEGATIVE — SIGNIFICANT CHANGE UP
NRBC # BLD: 0 /100 WBCS — SIGNIFICANT CHANGE UP (ref 0–0)
PH UR: 5 — SIGNIFICANT CHANGE UP (ref 5–8)
PLATELET # BLD AUTO: 228 K/UL — SIGNIFICANT CHANGE UP (ref 150–400)
POTASSIUM SERPL-MCNC: 5 MMOL/L — SIGNIFICANT CHANGE UP (ref 3.5–5.3)
POTASSIUM SERPL-SCNC: 5 MMOL/L — SIGNIFICANT CHANGE UP (ref 3.5–5.3)
PROT UR-MCNC: 100
RBC # BLD: 2.76 M/UL — LOW (ref 4.2–5.8)
RBC # FLD: 17.5 % — HIGH (ref 10.3–14.5)
SODIUM SERPL-SCNC: 140 MMOL/L — SIGNIFICANT CHANGE UP (ref 135–145)
SODIUM UR-SCNC: 38 MMOL/L — SIGNIFICANT CHANGE UP
SP GR SPEC: 1.01 — SIGNIFICANT CHANGE UP (ref 1.01–1.02)
UROBILINOGEN FLD QL: NEGATIVE — SIGNIFICANT CHANGE UP
WBC # BLD: 5.89 K/UL — SIGNIFICANT CHANGE UP (ref 3.8–10.5)
WBC # FLD AUTO: 5.89 K/UL — SIGNIFICANT CHANGE UP (ref 3.8–10.5)

## 2021-05-03 RX ORDER — ERYTHROPOIETIN 10000 [IU]/ML
10000 INJECTION, SOLUTION INTRAVENOUS; SUBCUTANEOUS ONCE
Refills: 0 | Status: COMPLETED | OUTPATIENT
Start: 2021-05-04 | End: 2021-05-04

## 2021-05-03 RX ORDER — SODIUM BICARBONATE 1 MEQ/ML
1300 SYRINGE (ML) INTRAVENOUS
Refills: 0 | Status: DISCONTINUED | OUTPATIENT
Start: 2021-05-03 | End: 2021-05-04

## 2021-05-03 RX ADMIN — SODIUM CHLORIDE 75 MILLILITER(S): 9 INJECTION INTRAMUSCULAR; INTRAVENOUS; SUBCUTANEOUS at 07:46

## 2021-05-03 RX ADMIN — CARVEDILOL PHOSPHATE 12.5 MILLIGRAM(S): 80 CAPSULE, EXTENDED RELEASE ORAL at 05:48

## 2021-05-03 RX ADMIN — Medication 100 MILLIGRAM(S): at 11:31

## 2021-05-03 RX ADMIN — Medication 100 MILLIGRAM(S): at 05:47

## 2021-05-03 RX ADMIN — Medication 2000 UNIT(S): at 11:31

## 2021-05-03 RX ADMIN — ATORVASTATIN CALCIUM 80 MILLIGRAM(S): 80 TABLET, FILM COATED ORAL at 21:26

## 2021-05-03 RX ADMIN — Medication 2: at 11:31

## 2021-05-03 RX ADMIN — Medication 2: at 21:26

## 2021-05-03 RX ADMIN — SENNA PLUS 2 TABLET(S): 8.6 TABLET ORAL at 21:26

## 2021-05-03 RX ADMIN — AMLODIPINE BESYLATE 10 MILLIGRAM(S): 2.5 TABLET ORAL at 05:47

## 2021-05-03 RX ADMIN — Medication 100 MILLIGRAM(S): at 21:26

## 2021-05-03 RX ADMIN — CARVEDILOL PHOSPHATE 12.5 MILLIGRAM(S): 80 CAPSULE, EXTENDED RELEASE ORAL at 17:13

## 2021-05-03 RX ADMIN — Medication 100 MILLIGRAM(S): at 13:20

## 2021-05-03 RX ADMIN — Medication 50 MICROGRAM(S): at 05:48

## 2021-05-03 RX ADMIN — PANTOPRAZOLE SODIUM 40 MILLIGRAM(S): 20 TABLET, DELAYED RELEASE ORAL at 05:48

## 2021-05-03 RX ADMIN — PANTOPRAZOLE SODIUM 40 MILLIGRAM(S): 20 TABLET, DELAYED RELEASE ORAL at 17:13

## 2021-05-03 RX ADMIN — Medication 1300 MILLIGRAM(S): at 18:28

## 2021-05-03 NOTE — PROGRESS NOTE ADULT - PROBLEM SELECTOR PLAN 3
A1c 6.0  consistent carb diet  FS monitoring  HISS
-CLOTILDE on CKD stage 4  -likely due to prerenal on dehydration  -baseline creatinine 2.6  -avoid nephrotoxins  -Nephrology following
Asymptomatic  Continue with isolation precaution  Continue with PRN O2 support  Continue with supportive care  Follow up lab results
-asymptomatic   -CXR clear   -positive antibodies   -supportive measures   -airborne and contact isolation
-asymptomatic   -CXR clear   -positive antibodies   -supportive measures   -airborne and contact isolation

## 2021-05-03 NOTE — CONSULT NOTE ADULT - SUBJECTIVE AND OBJECTIVE BOX
[facility] NEPHROLOGY- CONSULTATION NOTE    67 yo M with CKD, DM, HTN, anemia  p/w weakness with intermittent melanotic stools. Found to have hgb 3.9. Pt a/w Anemia 2/2 GI bleed and COVID-19 positive. Nephrology consulted for Elevated serum creatinine.  Pt states he is aware of kidney disease for <1 year now. He follows up with a Nephrologist Dr. Lara but is unaware of the etiology of his kidney disease.   Pt denies any SOB, chest pain, n/v/d, abd pain, or urinary complaints.   Patient denies any NSAID use, recent CT with contrast, or hepatitis. Pt needed blood transfusions in the past, s/p EGD / colonoscopy in Jan 2021. Pt seen by GI no need to rescope this admission. Pt s/p 4 units prbc and for the 5th unit prbc today.       PAST MEDICAL & SURGICAL HISTORY:  DM (diabetes mellitus)    HTN (hypertension)    Hypothyroidism    Cholecystitis    Unilateral inguinal hernia without obstruction or gangrene, recurrence not specified    Anemia    H/O right inguinal hernia repair      No Known Allergies    Home Medications Reviewed  Hospital Medications:   MEDICATIONS  (STANDING):  allopurinol 100 milliGRAM(s) Oral daily  amLODIPine   Tablet 10 milliGRAM(s) Oral daily  atorvastatin 80 milliGRAM(s) Oral at bedtime  carvedilol 12.5 milliGRAM(s) Oral every 12 hours  cholecalciferol 2000 Unit(s) Oral daily  hydrALAZINE 100 milliGRAM(s) Oral three times a day  insulin lispro (ADMELOG) corrective regimen sliding scale   SubCutaneous Before meals and at bedtime  levothyroxine 50 MICROGram(s) Oral daily  pantoprazole    Tablet 40 milliGRAM(s) Oral two times a day  senna 2 Tablet(s) Oral at bedtime  sodium chloride 0.9%. 1000 milliLiter(s) (75 mL/Hr) IV Continuous <Continuous>    FAMILY HISTORY:      REVIEW OF SYSTEMS:  Gen: no changes in weight  HEENT: no rhinorrhea  Neck: no sore throat  Cards: no chest pain  Resp: no dyspnea  GI: no nausea or vomiting or diarrhea  : no dysuria or hematuria  Vascular: no LE edema  Derm: no rashes  Neuro: no numbness/tingling  All other review of systems is negative unless indicated above.    VITALS:  T(F): 97.9 (05-03-21 @ 16:19), Max: 98.8 (05-03-21 @ 13:56)  HR: 65 (05-03-21 @ 16:19)  BP: 150/60 (05-03-21 @ 16:19)  RR: 18 (05-03-21 @ 16:19)  SpO2: 99% (05-03-21 @ 16:19)  Wt(kg): --      PHYSICAL EXAM:  Gen: NAD, calm  HEENT: MMM  Neck: no JVD  Cards: RRR, +S1/S2, no M/G/R  Resp: decreased BS at bases  GI: soft, NT/ND, NABS  : no CVA tenderness  Extremities: +2 LE edema B/L  Derm: no rashes  Neuro: non-focal    LABS:  05-03    140  |  114<H>  |  55<H>  ----------------------------<  100<H>  5.0   |  16<L>  |  3.31<H>    Ca    8.3<L>      03 May 2021 07:29    TPro  5.8<L>  /  Alb  2.6<L>  /  TBili  0.3  /  DBili      /  AST  11  /  ALT  27  /  AlkPhos  179<H>  05-02    Creatinine Trend: 3.31 <--, 3.38 <--, 3.25 <--, 2.88 <--, 2.61 <--, 2.45 <--, 2.79 <--                        7.6    5.89  )-----------( 228      ( 03 May 2021 07:29 )             24.6     Urine Studies:      RADIOLOGY & ADDITIONAL STUDIES:      < from: Xray Chest 1 View-PORTABLE IMMEDIATE (04.26.21 @ 11:33) >    EXAM:  XR CHEST PORTABLE IMMED 1V                            PROCEDURE DATE:  04/26/2021          INTERPRETATION:  AP erect chest on April 26, 2021 at 11:32 AM. Patient has sepsis.    COMPARISON: None available.    Heart magnified by technique.    The lung fields and pleural surfaces are unremarkable.    IMPRESSION: No acute finding.            MARCELLO SULLIVAN M.D., ATTENDING RADIOLOGIST  This document has been electronically signed. Apr 26 2021  1:33PM    < end of copied text >

## 2021-05-03 NOTE — PROGRESS NOTE ADULT - PROBLEM SELECTOR PLAN 2
- saturating at 97% on RA  - Isolation precautions, supportive care
saturating at 97% on RA  Isolation precautions
-plan as above
improving  monitor BMP
-plan as above
- saturating at 97% on RA  - Isolation precautions, supportive care
Plan as above  Follow up CBC in AM

## 2021-05-03 NOTE — PROGRESS NOTE ADULT - PROBLEM SELECTOR PLAN 1
- possible GIB with CKD combined  -PRBC transfuse 1 unit, repeat CBC in AM  -f/u reticulocyte count in AM  - occult -positive  - Card Dr Pacheco consulted recommending TTE, however due to COVID status not emergent can f/u outpatient  - completed Venofer IV infusion x3days till 5/1  - outpatient GI f/u for colonoscopy  - nephro recommending - Epogen 10k units SC x1 on 5/4.   -Heme f/u to eval for Epogen injections  -GI Dr. Caballero  -HemOnc Dr. Nascimento  -Nephrology

## 2021-05-03 NOTE — PROGRESS NOTE ADULT - ASSESSMENT
Patient is a 66 year old male from home AAO x3, with PMH of DM, anemia, CKD, HTN and hypothyroidism, who presented to the ED due to generalized weakness and symptomatic Anemia s/p 4u PRBC. Patient admitted to medicine for anemia secondary to GIB/CKD

## 2021-05-03 NOTE — PROGRESS NOTE ADULT - PROBLEM SELECTOR PLAN 5
isolation precautions   saturating % on RA
-cont  coreg, norvasc and hydralazine with parameters   -mon BP
-cont  coreg, norvasc and hydralazine with parameters   -mon BP
-DVT PPX: SCD  -GI PPX: PPI
Controlled  Pt takes Lisinopril, Coreg, Norvasc, Hydralazine and Nifedipine at home  ACEi held in the setting of CLOTILDE   CCB held secondary to borderline BP  Resume CCB when blood pressure permits  Continue to monitor BP

## 2021-05-03 NOTE — PROGRESS NOTE ADULT - PROBLEM SELECTOR PLAN 6
-cont HSSC
Uncontrolled  Increase to moderate SSI  Continue with glucose monitoring  Continue with low carb diet
-PRBC 1 unit  -monitor CBC in AM
-cont HSSC

## 2021-05-03 NOTE — PROGRESS NOTE ADULT - PROBLEM SELECTOR PLAN 4
-Non gap MA in the setting of renal failure  -started on Sodium bicarbonate 1.3g PO bid  -f/u VBG in am
-CLOTILDE on baseline stage 4 CKD, now improving  -renal function at baseline   -avoid nephrotoxins   -mon BMP
Monitor BP  adjust meds as needed.
SCr 2.79  Baseline 2.62  Follow up BMP in AM
-CLOTILDE on baseline stage 4 CKD, now improving  -renal function at baseline   -avoid nephrotoxins   -mon BMP

## 2021-05-03 NOTE — CONSULT NOTE ADULT - ASSESSMENT
[facility] NEPHROLOGY  Sony Wheeler M.D.  Primitivo Oneill D.O.  Kateryna Mukherjee M.D.  Althea Carter, MSN, ANP-C  [phone]    [address] [place] [address]   65 yo M with CKD, DM, HTN, anemia  p/w weakness with intermittent melanotic stools. Found to have hgb 3.9. Pt a/w Anemia 2/2 GI bleed and COVID-19 positive. Nephrology consulted for Elevated serum creatinine.    1. CLOTILDE- likely hemodynamically mediated in the setting of severe anemia. Recc to check UA and urine lytes. Pt with significant LE edema- recc to d/c IVF. Check TTE to assess EF & Check BNP in am.   Will defer Renal US due to COVID -19 status. Recc to check post void bladder scan. Strict I/Os. Avoid nephrotoxins/ NSAIDs/ RCA. Monitor BMP.    2. CKD-4- previous SCr 2.4-2.6. CKD in the setting of DM/ HTN. Will defer secondary w/u as an outpt. Monitor lytes.     3. Anemia due to blood loss/ renal disease- s/p 4 units prbc. Hgb remains low- pt for 1 unit prbc today. Tsat 6% with Ferritin 34  s/p Venofer 200mg IV qd x 3 doses. LDH and haptoglobin wnl- no signs of TMA. Will give Epogen 10k units SC x1 on 5/4. Monitor hgb.     4. HTN 2/2 CKD- BP controlled. Continue with current anti-hypertensive medications. Monitor BP.    5. Metabolic Acidosis- Non gap MA in the setting of renal failure. Lactate 0.8 on 4/26. Check VBG ph. Will give Sodium bicarbonate 1.3g PO bid. Monitor ph/Co2.       [facility] NEPHROLOGY  Sony Wheeler M.D.  Primitivo Oneill D.O.  Kateryna Mukherjee M.D.  Althea Carter, MSN, ANP-C  [phone]    [address] [place] [address]   65 yo M with CKD, DM, HTN, anemia  p/w weakness with intermittent melanotic stools. Found to have hgb 3.9. Pt a/w Anemia 2/2 GI bleed and COVID-19 positive. Nephrology consulted for Elevated serum creatinine.    1. CLOTILDE- likely hemodynamically mediated in the setting of severe anemia. Recc to check UA and urine lytes. Pt with significant LE edema- recc to d/c IVF. Check TTE to assess EF & Check BNP in am.   Pt on PPI- check CBC with diff to monitor for peripheral eos r/o AIN   Will defer Renal US due to COVID -19 status. Recc to check post void bladder scan. Strict I/Os. Avoid nephrotoxins/ NSAIDs/ RCA. Monitor BMP.    2. CKD-4- previous SCr 2.4-2.6. CKD in the setting of DM/ HTN. Will defer secondary w/u as an outpt. Monitor lytes.     3. Anemia due to blood loss/ renal disease- s/p 4 units prbc. Hgb remains low- pt for 1 unit prbc today. Tsat 6% with Ferritin 34  s/p Venofer 200mg IV qd x 3 doses. LDH and haptoglobin wnl- no signs of TMA. Will give Epogen 10k units SC x1 on 5/4. Monitor hgb.     4. HTN 2/2 CKD- BP controlled. Continue with current anti-hypertensive medications. Monitor BP.    5. Metabolic Acidosis- Non gap MA in the setting of renal failure. Lactate 0.8 on 4/26. Check VBG ph. Will give Sodium bicarbonate 1.3g PO bid. Monitor ph/Co2.       [facility] NEPHROLOGY  Sony Wheeler M.D.  Primitivo Oneill D.O.  Kateryna Mukherjee M.D.  Althea Carter, MSN, ANP-C  [phone]    [address] [place] [address]

## 2021-05-03 NOTE — PROGRESS NOTE ADULT - ASSESSMENT
· Assessment	  66 year old male with black stool on and off for 6 years.  he had transfusion in Jan.  GI w/u showed ?AVM and 3cm polyp.  he was admitted for weakness and severe anemia with Hb 3.9.  he is also COVID+.    1. severe anemia  due to GI bleeding  will give venofer  GI on the case  haptoglobin and LDH  are normal, unlikely to be hemolysis  direct Otto neg  very few schistocyte, a few kimberly cells  BM failure on top of GIB and renal insuff always remains a possibility    2 CKD with Cr. 2.5  he probably has baseline anemia with Hb around 10  after giving venofer, he may need epo.  kimberly cell can be from renal insuff    3. COVID+  he probably had infection between 2 shots  probably asymptomatic

## 2021-05-03 NOTE — PROGRESS NOTE ADULT - ASSESSMENT
_________________________________________________________________________________________  ========>>  M E D I C A L   A T T E N D I N G    F O L L O W  U P  N O T E  <<=========  -----------------------------------------------------------------------------------------------------    - Patient evaluated by me, chart reviewed.   - In summary,  MOLLY SILVEIRA is a 66y year old man admitted with symptomatic anemia, COVID  - Patient today overall doing ok, comfortable, eating OK.  overall doing well, no c./o, no SOB,     ==================>> REVIEW OF SYSTEM <<=================    GEN: no fever, no chills, no pain  RESP: no SOB, no cough, no sputum  CVS: no chest pain, no palpitations  GI: no abdominal pain, no nausea, nop bleeding reported   : no dysuria, no frequency, no hematuria  Neuro: no headache, no dizziness  Derm : no itching, no rash    ==================>> PHYSICAL EXAM <<=================    GEN: A&O X 2 , NAD , comfortable, pleasant, calm, in chair    HEENT: NCAT, PERRL, MMM, hearing intact  Neck: supple , no JVD appreciated  CVS: S1S2 , regular , No M/R/G appreciated  PULM: CTA B/L,  no W/R/R appreciated  ABD.: soft. non tender, non distended,  bowel sounds present  Extrem: intact pulses , no edema   PSYCH : normal mood,  not anxious                              ( Note written / Date of service 05-03-21 )    ==================>> MEDICATIONS <<====================    allopurinol 100 milliGRAM(s) Oral daily  amLODIPine   Tablet 10 milliGRAM(s) Oral daily  atorvastatin 80 milliGRAM(s) Oral at bedtime  carvedilol 12.5 milliGRAM(s) Oral every 12 hours  cholecalciferol 2000 Unit(s) Oral daily  hydrALAZINE 100 milliGRAM(s) Oral three times a day  insulin lispro (ADMELOG) corrective regimen sliding scale   SubCutaneous Before meals and at bedtime  levothyroxine 50 MICROGram(s) Oral daily  pantoprazole    Tablet 40 milliGRAM(s) Oral two times a day  senna 2 Tablet(s) Oral at bedtime  sodium bicarbonate 1300 milliGRAM(s) Oral two times a day  sodium chloride 0.9%. 1000 milliLiter(s) IV Continuous <Continuous>    MEDICATIONS  (PRN):  acetaminophen   Tablet .. 650 milliGRAM(s) Oral every 6 hours PRN Temp greater or equal to 38C (100.4F), Moderate Pain (4 - 6)  ondansetron Injectable 4 milliGRAM(s) IV Push every 6 hours PRN Nausea and/or Vomiting    ___________  Active diet:  Diet, DASH/TLC:   Sodium & Cholesterol Restricted  Consistent Carbohydrate Evening Snacks  ___________________    ==================>> VITAL SIGNS <<==================    Vital Signs Last 24 HrsT(C): 37.1 (05-03-21 @ 20:50)  T(F): 98.8 (05-03-21 @ 20:50), Max: 98.8 (05-03-21 @ 13:56)  HR: 62 (05-03-21 @ 20:50) (62 - 65)  BP: 126/43 (05-03-21 @ 20:50)  RR: 18 (05-03-21 @ 20:50) (18 - 18)  SpO2: 99% (05-03-21 @ 20:50) (99% - 99%)      POCT Blood Glucose.: 161 mg/dL (03 May 2021 21:18)  POCT Blood Glucose.: 138 mg/dL (03 May 2021 17:03)  POCT Blood Glucose.: 191 mg/dL (03 May 2021 11:24)  POCT Blood Glucose.: 113 mg/dL (03 May 2021 08:05)  POCT Blood Glucose.: 166 mg/dL (02 May 2021 21:28)     ==================>> LAB AND IMAGING <<==================                        7.6    5.89  )-----------( 228      ( 03 May 2021 07:29 )             24.6        05-03    140  |  114<H>  |  55<H>  ----------------------------<  100<H>  5.0   |  16<L>  |  3.31<H>    Ca    8.3<L>      03 May 2021 07:29    TPro  5.8<L>  /  Alb  2.6<L>  /  TBili  0.3  /  DBili  x   /  AST  11  /  ALT  27  /  AlkPhos  179<H>  05-02    WBC count:   5.89 <<== ,  6.59 <<== ,  7.47 <<== ,  7.97 <<== ,  8.17 <<==   Hemoglobin:   7.6 <<==,  7.7 <<==,  8.2 <<==,  8.1 <<==,  7.6 <<==  platelets:  228 <==, 239 <==, 275 <==, 290 <==, 278 <==, 279 <==    Creatinine:  3.31  <<==, 3.38  <<==, 3.25  <<==, 2.88  <<==, 2.61  <<==, 2.45  <<==  Sodium:   140  <==, 138  <==, 137  <==, 139  <==, 139  <==, 139  <==       AST:          11 <== , 13 <== , 8 <== , 10 <==      ALT:        27  <== , 32  <== , 39  <== , 41  <==      AP:        179  <=, 184  <=, 186  <=, 191  <=     Bili:        0.3  <=, 0.3  <=, 0.4  <=, 0.4  <=    ___________________________________________________________________________________  ===============>>  A S S E S S M E N T   A N D   P L A N <<===============  ------------------------------------------------------------------------------------------    · Assessment	  Patient is a 66 year old male from home AAO x3, with PMH of DM, anemia, CKD, HTN and hypothyroidism, who presented to the ED due to generalized weakness.     Problem/Plan - 1:  ·  Problem: GI bleed.  Plan: patient presented due to generalized weakness  post PRBC >> more RBC ordered today for optimization   monitor CBC closely   continue PPI    GI following>> will need repeat colonoscopy and Small bowel workup as OP ( had a long d/w with GI attending and team: no need for repeat scoping at this time as inpatient )    also recommending Echo ( not being done due to + coviD!)   hem following as well : not likely hemolysis  pt tolerating regular diet   procrit for AOCD per renal     Problem/Plan - 2:  ·  Problem: Anemia.  Plan: Hgb of 3.9  Hgb around 8 on prior admission   plan as above  monitor CBC.   hematology ob board   echo pending  IV iron / supplements  as above     Problem/Plan - 3:  ·  Problem: COVID-19.  Plan: noted to be COVID positive   saturating well on room air  and otherwise not symptomatic   monitor O2   isolation precautions   monitor  supportive care    Problem/Plan - 4:  ·  Problem: CLOTILDE on CKD IV     creatinine was better now worsened again   IVH / PO hydration   held home med of lisinopril  monitor BMP   renal consulted, discussed and appreciated    Problem/Plan - 5:  ·  Problem: HTN (hypertension).  Plan: patient on lisinopril, coreg, norvasc, hydralazine and nifedipine  will hold lisinopril in setting of CLOTILDE   continue coreg, norvasc and hydralazine  monitor BP and adjust meds as needed.     Problem/Plan - 6:  Problem: DM (diabetes mellitus). Plan: start SSI while in hospital  A1c:  6  monitor FS   monitor BS and adjust insulin as needed.    Problem/Plan - 7:  ·  Problem: Hypothyroidism.  Plan: continue home med of synthroid  TSH. 3.6    Problem/Plan - 8:  ·  Problem: Need for prophylactic measure.  Plan: no chemical prophylaxis in setting of GI bleed  SCDs ordered     DC planing with GI follow up  if stable   --------------------------------------------  Case discussed with pt, NP, Renal..   Education given on findings and plan of care  ___________________________  H. PRASAD Manuel.  Pager: [phone]

## 2021-05-03 NOTE — PROGRESS NOTE ADULT - PROBLEM SELECTOR PROBLEM 4
HTN (hypertension)
Acute kidney injury superimposed on chronic kidney disease
Metabolic acidosis
CLOTILDE (acute kidney injury)
Acute kidney injury superimposed on chronic kidney disease

## 2021-05-04 ENCOUNTER — TRANSCRIPTION ENCOUNTER (OUTPATIENT)
Age: 66
End: 2021-05-04

## 2021-05-04 VITALS
TEMPERATURE: 98 F | DIASTOLIC BLOOD PRESSURE: 50 MMHG | OXYGEN SATURATION: 99 % | HEART RATE: 60 BPM | SYSTOLIC BLOOD PRESSURE: 141 MMHG | RESPIRATION RATE: 18 BRPM

## 2021-05-04 LAB
ANION GAP SERPL CALC-SCNC: 9 MMOL/L — SIGNIFICANT CHANGE UP (ref 5–17)
BASE EXCESS BLDV CALC-SCNC: -8.2 MMOL/L — SIGNIFICANT CHANGE UP
BASOPHILS # BLD AUTO: 0.06 K/UL — SIGNIFICANT CHANGE UP (ref 0–0.2)
BASOPHILS NFR BLD AUTO: 1 % — SIGNIFICANT CHANGE UP (ref 0–2)
BUN SERPL-MCNC: 56 MG/DL — HIGH (ref 7–18)
CALCIUM SERPL-MCNC: 8.1 MG/DL — LOW (ref 8.4–10.5)
CHLORIDE SERPL-SCNC: 115 MMOL/L — HIGH (ref 96–108)
CO2 SERPL-SCNC: 17 MMOL/L — LOW (ref 22–31)
CREAT SERPL-MCNC: 3.16 MG/DL — HIGH (ref 0.5–1.3)
EOSINOPHIL # BLD AUTO: 0.54 K/UL — HIGH (ref 0–0.5)
EOSINOPHIL NFR BLD AUTO: 9.3 % — HIGH (ref 0–6)
GLUCOSE BLDC GLUCOMTR-MCNC: 113 MG/DL — HIGH (ref 70–99)
GLUCOSE BLDC GLUCOMTR-MCNC: 181 MG/DL — HIGH (ref 70–99)
GLUCOSE SERPL-MCNC: 103 MG/DL — HIGH (ref 70–99)
HCO3 BLDV-SCNC: 16 MMOL/L — LOW (ref 22–29)
HCT VFR BLD CALC: 27.3 % — LOW (ref 39–50)
HGB BLD-MCNC: 8.5 G/DL — LOW (ref 13–17)
IMM GRANULOCYTES NFR BLD AUTO: 0.2 % — SIGNIFICANT CHANGE UP (ref 0–1.5)
LYMPHOCYTES # BLD AUTO: 1.16 K/UL — SIGNIFICANT CHANGE UP (ref 1–3.3)
LYMPHOCYTES # BLD AUTO: 20 % — SIGNIFICANT CHANGE UP (ref 13–44)
MCHC RBC-ENTMCNC: 27.6 PG — SIGNIFICANT CHANGE UP (ref 27–34)
MCHC RBC-ENTMCNC: 31.1 GM/DL — LOW (ref 32–36)
MCV RBC AUTO: 88.6 FL — SIGNIFICANT CHANGE UP (ref 80–100)
MONOCYTES # BLD AUTO: 0.46 K/UL — SIGNIFICANT CHANGE UP (ref 0–0.9)
MONOCYTES NFR BLD AUTO: 7.9 % — SIGNIFICANT CHANGE UP (ref 2–14)
NEUTROPHILS # BLD AUTO: 3.57 K/UL — SIGNIFICANT CHANGE UP (ref 1.8–7.4)
NEUTROPHILS NFR BLD AUTO: 61.6 % — SIGNIFICANT CHANGE UP (ref 43–77)
NRBC # BLD: 0 /100 WBCS — SIGNIFICANT CHANGE UP (ref 0–0)
NT-PROBNP SERPL-SCNC: 7709 PG/ML — HIGH (ref 0–125)
PCO2 BLDV: 29 MMHG — LOW (ref 42–55)
PH BLDV: 7.35 — SIGNIFICANT CHANGE UP (ref 7.32–7.43)
PLATELET # BLD AUTO: 221 K/UL — SIGNIFICANT CHANGE UP (ref 150–400)
PO2 BLDV: 74 MMHG — SIGNIFICANT CHANGE UP
POTASSIUM SERPL-MCNC: 4.8 MMOL/L — SIGNIFICANT CHANGE UP (ref 3.5–5.3)
POTASSIUM SERPL-SCNC: 4.8 MMOL/L — SIGNIFICANT CHANGE UP (ref 3.5–5.3)
RBC # BLD: 3.01 M/UL — LOW (ref 4.2–5.8)
RBC # BLD: 3.08 M/UL — LOW (ref 4.2–5.8)
RBC # FLD: 17.4 % — HIGH (ref 10.3–14.5)
RETICS #: 70.1 K/UL — SIGNIFICANT CHANGE UP (ref 25–125)
RETICS/RBC NFR: 2.3 % — SIGNIFICANT CHANGE UP (ref 0.5–2.5)
SAO2 % BLDV: 97.3 % — SIGNIFICANT CHANGE UP
SODIUM SERPL-SCNC: 141 MMOL/L — SIGNIFICANT CHANGE UP (ref 135–145)
UUN UR-MCNC: 522 MG/DL — SIGNIFICANT CHANGE UP
WBC # BLD: 5.8 K/UL — SIGNIFICANT CHANGE UP (ref 3.8–10.5)
WBC # FLD AUTO: 5.8 K/UL — SIGNIFICANT CHANGE UP (ref 3.8–10.5)

## 2021-05-04 PROCEDURE — 82803 BLOOD GASES ANY COMBINATION: CPT

## 2021-05-04 PROCEDURE — 83010 ASSAY OF HAPTOGLOBIN QUANT: CPT

## 2021-05-04 PROCEDURE — 86850 RBC ANTIBODY SCREEN: CPT

## 2021-05-04 PROCEDURE — 86769 SARS-COV-2 COVID-19 ANTIBODY: CPT

## 2021-05-04 PROCEDURE — 85730 THROMBOPLASTIN TIME PARTIAL: CPT

## 2021-05-04 PROCEDURE — 86900 BLOOD TYPING SEROLOGIC ABO: CPT

## 2021-05-04 PROCEDURE — 83550 IRON BINDING TEST: CPT

## 2021-05-04 PROCEDURE — 82272 OCCULT BLD FECES 1-3 TESTS: CPT

## 2021-05-04 PROCEDURE — 80053 COMPREHEN METABOLIC PANEL: CPT

## 2021-05-04 PROCEDURE — 83036 HEMOGLOBIN GLYCOSYLATED A1C: CPT

## 2021-05-04 PROCEDURE — 82570 ASSAY OF URINE CREATININE: CPT

## 2021-05-04 PROCEDURE — 82436 ASSAY OF URINE CHLORIDE: CPT

## 2021-05-04 PROCEDURE — 83540 ASSAY OF IRON: CPT

## 2021-05-04 PROCEDURE — 87086 URINE CULTURE/COLONY COUNT: CPT

## 2021-05-04 PROCEDURE — 86901 BLOOD TYPING SEROLOGIC RH(D): CPT

## 2021-05-04 PROCEDURE — 99285 EMERGENCY DEPT VISIT HI MDM: CPT | Mod: 25

## 2021-05-04 PROCEDURE — 82728 ASSAY OF FERRITIN: CPT

## 2021-05-04 PROCEDURE — 86880 COOMBS TEST DIRECT: CPT

## 2021-05-04 PROCEDURE — 84300 ASSAY OF URINE SODIUM: CPT

## 2021-05-04 PROCEDURE — 86140 C-REACTIVE PROTEIN: CPT

## 2021-05-04 PROCEDURE — 84443 ASSAY THYROID STIM HORMONE: CPT

## 2021-05-04 PROCEDURE — 82607 VITAMIN B-12: CPT

## 2021-05-04 PROCEDURE — 96374 THER/PROPH/DIAG INJ IV PUSH: CPT

## 2021-05-04 PROCEDURE — 83605 ASSAY OF LACTIC ACID: CPT

## 2021-05-04 PROCEDURE — 85045 AUTOMATED RETICULOCYTE COUNT: CPT

## 2021-05-04 PROCEDURE — 87040 BLOOD CULTURE FOR BACTERIA: CPT

## 2021-05-04 PROCEDURE — 82746 ASSAY OF FOLIC ACID SERUM: CPT

## 2021-05-04 PROCEDURE — 36430 TRANSFUSION BLD/BLD COMPNT: CPT

## 2021-05-04 PROCEDURE — 80048 BASIC METABOLIC PNL TOTAL CA: CPT

## 2021-05-04 PROCEDURE — 83735 ASSAY OF MAGNESIUM: CPT

## 2021-05-04 PROCEDURE — 85610 PROTHROMBIN TIME: CPT

## 2021-05-04 PROCEDURE — 83880 ASSAY OF NATRIURETIC PEPTIDE: CPT

## 2021-05-04 PROCEDURE — 84145 PROCALCITONIN (PCT): CPT

## 2021-05-04 PROCEDURE — 93005 ELECTROCARDIOGRAM TRACING: CPT

## 2021-05-04 PROCEDURE — 82306 VITAMIN D 25 HYDROXY: CPT

## 2021-05-04 PROCEDURE — 84540 ASSAY OF URINE/UREA-N: CPT

## 2021-05-04 PROCEDURE — 84484 ASSAY OF TROPONIN QUANT: CPT

## 2021-05-04 PROCEDURE — 86923 COMPATIBILITY TEST ELECTRIC: CPT

## 2021-05-04 PROCEDURE — 80061 LIPID PANEL: CPT

## 2021-05-04 PROCEDURE — 81001 URINALYSIS AUTO W/SCOPE: CPT

## 2021-05-04 PROCEDURE — 82962 GLUCOSE BLOOD TEST: CPT

## 2021-05-04 PROCEDURE — 87635 SARS-COV-2 COVID-19 AMP PRB: CPT

## 2021-05-04 PROCEDURE — 85652 RBC SED RATE AUTOMATED: CPT

## 2021-05-04 PROCEDURE — 85025 COMPLETE CBC W/AUTO DIFF WBC: CPT

## 2021-05-04 PROCEDURE — 83615 LACTATE (LD) (LDH) ENZYME: CPT

## 2021-05-04 PROCEDURE — 82550 ASSAY OF CK (CPK): CPT

## 2021-05-04 PROCEDURE — 97161 PT EVAL LOW COMPLEX 20 MIN: CPT

## 2021-05-04 PROCEDURE — 36415 COLL VENOUS BLD VENIPUNCTURE: CPT

## 2021-05-04 PROCEDURE — P9040: CPT

## 2021-05-04 PROCEDURE — 71045 X-RAY EXAM CHEST 1 VIEW: CPT

## 2021-05-04 PROCEDURE — 85027 COMPLETE CBC AUTOMATED: CPT

## 2021-05-04 PROCEDURE — 84100 ASSAY OF PHOSPHORUS: CPT

## 2021-05-04 RX ORDER — SODIUM BICARBONATE 1 MEQ/ML
2 SYRINGE (ML) INTRAVENOUS
Qty: 8 | Refills: 0
Start: 2021-05-04 | End: 2021-05-05

## 2021-05-04 RX ORDER — CHOLECALCIFEROL (VITAMIN D3) 125 MCG
2000 CAPSULE ORAL
Qty: 30 | Refills: 0
Start: 2021-05-04 | End: 2021-06-02

## 2021-05-04 RX ORDER — SENNA PLUS 8.6 MG/1
2 TABLET ORAL
Qty: 60 | Refills: 0
Start: 2021-05-04 | End: 2021-06-02

## 2021-05-04 RX ORDER — LISINOPRIL 2.5 MG/1
1 TABLET ORAL
Qty: 0 | Refills: 0 | DISCHARGE

## 2021-05-04 RX ADMIN — Medication 2000 UNIT(S): at 12:53

## 2021-05-04 RX ADMIN — Medication 50 MICROGRAM(S): at 05:46

## 2021-05-04 RX ADMIN — SODIUM CHLORIDE 75 MILLILITER(S): 9 INJECTION INTRAMUSCULAR; INTRAVENOUS; SUBCUTANEOUS at 01:49

## 2021-05-04 RX ADMIN — AMLODIPINE BESYLATE 10 MILLIGRAM(S): 2.5 TABLET ORAL at 05:46

## 2021-05-04 RX ADMIN — Medication 100 MILLIGRAM(S): at 05:46

## 2021-05-04 RX ADMIN — Medication 100 MILLIGRAM(S): at 15:09

## 2021-05-04 RX ADMIN — ERYTHROPOIETIN 10000 UNIT(S): 10000 INJECTION, SOLUTION INTRAVENOUS; SUBCUTANEOUS at 15:09

## 2021-05-04 RX ADMIN — PANTOPRAZOLE SODIUM 40 MILLIGRAM(S): 20 TABLET, DELAYED RELEASE ORAL at 05:46

## 2021-05-04 RX ADMIN — Medication 1300 MILLIGRAM(S): at 05:46

## 2021-05-04 RX ADMIN — Medication 0: at 08:15

## 2021-05-04 RX ADMIN — CARVEDILOL PHOSPHATE 12.5 MILLIGRAM(S): 80 CAPSULE, EXTENDED RELEASE ORAL at 05:46

## 2021-05-04 RX ADMIN — Medication 100 MILLIGRAM(S): at 12:52

## 2021-05-04 NOTE — PROGRESS NOTE ADULT - ASSESSMENT
67 yo M with CKD, DM, HTN, anemia  p/w weakness with intermittent melanotic stools. Found to have hgb 3.9. Pt a/w Anemia 2/2 GI bleed and COVID-19 positive. Nephrology consulted for Elevated serum creatinine.    1. CLOTILDE- likely hemodynamically mediated in the setting of severe anemia. UA with protein, no blood; FeNa 0.8%.  Pt with significant LE edema- recc to d/c IVF. Check TTE to assess EF.   Pt on PPI- with peripheral eos- possible AIN; however no sterile pyuria. If worsening renal function; consider switching PPI to H2 blocker   Will defer Renal US due to COVID -19 status. Recc to check post void bladder scan. Strict I/Os. Avoid nephrotoxins/ NSAIDs/ RCA. Monitor BMP.    2. CKD-4- previous SCr 2.4-2.6. CKD in the setting of DM/ HTN. Will defer secondary w/u as an outpt. Monitor lytes.     3. Anemia due to blood loss/ renal disease- s/p total 5 units prbc. Hgb improving s/p  1 unit prbc 5/3.  Tsat 6% with Ferritin 34  s/p Venofer 200mg IV qd x 3 doses. LDH and haptoglobin wnl- no signs of TMA. Will give Epogen 10k units SC x1 today.  Monitor hgb.     4. HTN 2/2 CKD- BP acceptable; will improve off IVF. Continue with current anti-hypertensive medications. Monitor BP.    5. Metabolic Acidosis- Non gap MA in the setting of renal failure. Lactate 0.8 on 4/26 and ph 7.35. c/w Sodium bicarbonate 1.3g PO bid. Monitor ph/Co2.     Pt follows with Nephrologist Dr. Rod. Discussed with Dr. Daly, since pt likely leaving today; will follow on this admission. Pt to f/u with Dr. Rod post discharge.       [facility] NEPHROLOGY  Sony Wheeler M.D.  Primitivo Oneill D.O.  Kateryna Mukherjee M.D.  Althea Carter, MSN, ANP-C  [phone]    [id number] [place] [address]

## 2021-05-04 NOTE — PROGRESS NOTE ADULT - ASSESSMENT
M E D I C A L   A T T E N D I N G    F O L L O W    U P   N O T E  (21 )                                     ------------------------------------------------------------------------------------------------    patient evaluated by me, case discussed with team, chart, medications, and physical exam reviewed, labs / tests  and vitals reviewed by me, as bellow.   Patient is stable for discharge today.  Patient to follow up with  GI, nephrology, PMD ..  See discharge document for full note.                             ( note written / Date of service  21 )    ==================>> MEDICATIONS <<====================    allopurinol 100 milliGRAM(s) Oral daily  amLODIPine   Tablet 10 milliGRAM(s) Oral daily  atorvastatin 80 milliGRAM(s) Oral at bedtime  carvedilol 12.5 milliGRAM(s) Oral every 12 hours  cholecalciferol 2000 Unit(s) Oral daily  hydrALAZINE 100 milliGRAM(s) Oral three times a day  insulin lispro (ADMELOG) corrective regimen sliding scale   SubCutaneous Before meals and at bedtime  levothyroxine 50 MICROGram(s) Oral daily  pantoprazole    Tablet 40 milliGRAM(s) Oral two times a day  senna 2 Tablet(s) Oral at bedtime  sodium bicarbonate 1300 milliGRAM(s) Oral two times a day    MEDICATIONS  (PRN):  acetaminophen   Tablet .. 650 milliGRAM(s) Oral every 6 hours PRN Temp greater or equal to 38C (100.4F), Moderate Pain (4 - 6)  ondansetron Injectable 4 milliGRAM(s) IV Push every 6 hours PRN Nausea and/or Vomiting    ___________  Active diet:  Diet, DASH/TLC:   Sodium & Cholesterol Restricted  Consistent Carbohydrate Evening Snacks  ___________________    ==================>> VITAL SIGNS <<==================    T(C): 36.9 (21 @ 12:45), Max: 37.1 (21 @ 20:50)  HR: 60 (21 @ 12:45) (59 - 65)  BP: 141/50 (21 @ 12:45) (126/43 - 159/61)  RR: 18 (21 @ 12:45) (16 - 18)  SpO2: 99% (21 @ 12:45) (95% - 99%)       POCT Blood Glucose.: 181 mg/dL (04 May 2021 11:16)  POCT Blood Glucose.: 113 mg/dL (04 May 2021 07:38)  POCT Blood Glucose.: 161 mg/dL (03 May 2021 21:18)  POCT Blood Glucose.: 138 mg/dL (03 May 2021 17:03)     ==================>> LAB AND IMAGING <<==================                        8.5    5.80  )-----------( 221      ( 04 May 2021 07:33 )             27.3        05-    141  |  115<H>  |  56<H>  ----------------------------<  103<H>  4.8   |  17<L>  |  3.16<H>    Ca    8.1<L>      04 May 2021 07:33       Urinalysis Basic - ( 03 May 2021 17:12 )  Color: Yellow / Appearance: Clear / S.015 / pH: x  Gluc: x / Ketone: Negative  / Bili: Negative / Urobili: Negative   Blood: x / Protein: 100 / Nitrite: Negative   Leuk Esterase: Negative / RBC: x / WBC 0-2 /HPF   Sq Epi: x / Non Sq Epi: Few /HPF / Bacteria: Trace /HPF    TSH:      3.66   (21)           Lipid profile:  (21)     Total: 102     LDL  : (p)     HDL  :28     TG   :117     HgA1C:   (21)          (21)      6.0,   (21)          (21)      5.7    WBC count:   5.80 <<== ,  5.89 <<== ,  6.59 <<== ,  7.47 <<== ,  7.97 <<==   Hemoglobin:   8.5 <<==,  7.6 <<==,  7.7 <<==,  8.2 <<==,  8.1 <<==  platelets:  221 <==, 228 <==, 239 <==, 275 <==, 290 <==, 278 <==    Creatinine:  3.16  <<==, 3.31  <<==, 3.38  <<==, 3.25  <<==, 2.88  <<==, 2.61  <<==  Sodium:   141  <==, 140  <==, 138  <==, 137  <==, 139  <==, 139  <==       AST:          11 <== , 13 <== , 8 <==      ALT:        27  <== , 32  <== , 39  <==      AP:        179  <=, 184  <=, 186  <=     Bili:        0.3  <=, 0.3  <=, 0.4  <=

## 2021-05-04 NOTE — PROGRESS NOTE ADULT - PROVIDER SPECIALTY LIST ADULT
Internal Medicine
Heme/Onc
Heme/Onc
Internal Medicine
Heme/Onc
Nephrology
Gastroenterology
Internal Medicine
Internal Medicine
Intervent Cardiology
Gastroenterology
Internal Medicine
Internal Medicine

## 2021-05-04 NOTE — PROGRESS NOTE ADULT - REASON FOR ADMISSION
weakness

## 2021-05-04 NOTE — PROGRESS NOTE ADULT - SUBJECTIVE AND OBJECTIVE BOX
feel fine  no pain, sob  no fever  he has intermittent black stool but he takes iron supplement     MEDICATIONS  (STANDING):  allopurinol 100 milliGRAM(s) Oral daily  amLODIPine   Tablet 10 milliGRAM(s) Oral daily  atorvastatin 80 milliGRAM(s) Oral at bedtime  carvedilol 12.5 milliGRAM(s) Oral every 12 hours  hydrALAZINE 100 milliGRAM(s) Oral three times a day  insulin lispro (ADMELOG) corrective regimen sliding scale   SubCutaneous Before meals and at bedtime  iron sucrose IVPB 200 milliGRAM(s) IV Intermittent every 24 hours  levothyroxine 50 MICROGram(s) Oral daily  pantoprazole    Tablet 40 milliGRAM(s) Oral two times a day  sodium chloride 0.9%. 1000 milliLiter(s) (80 mL/Hr) IV Continuous <Continuous>    MEDICATIONS  (PRN):  acetaminophen   Tablet .. 650 milliGRAM(s) Oral every 6 hours PRN Temp greater or equal to 38C (100.4F), Moderate Pain (4 - 6)  ondansetron Injectable 4 milliGRAM(s) IV Push every 6 hours PRN Nausea and/or Vomiting      Allergies    No Known Allergies    Intolerances        Vital Signs Last 24 Hrs  T(C): 36.9 (29 Apr 2021 05:09), Max: 36.9 (28 Apr 2021 17:02)  T(F): 98.4 (29 Apr 2021 05:09), Max: 98.4 (28 Apr 2021 17:02)  HR: 64 (29 Apr 2021 05:09) (60 - 64)  BP: 150/60 (29 Apr 2021 05:09) (125/58 - 152/63)  BP(mean): --  RR: 18 (29 Apr 2021 05:09) (18 - 18)  SpO2: 97% (29 Apr 2021 05:09) (97% - 98%)    PHYSICAL EXAM  General: adult in NAD  HEENT: clear oropharynx, anicteric sclera, pink conjunctiva  Neck: supple  CV: normal S1/S2 with no murmur rubs or gallops  Lungs: positive air movement b/l ant lungs,clear to auscultation, no wheezes, no rales  Abdomen: soft non-tender non-distended, no hepatosplenomegaly  Ext: no clubbing cyanosis or edema  Skin: no rashes and no petechiae  Neuro: alert and oriented X 4, no focal deficits  LABS:                          7.6    8.17  )-----------( 278      ( 29 Apr 2021 06:19 )             24.2         Mean Cell Volume : 86.1 fl  Mean Cell Hemoglobin : 27.0 pg  Mean Cell Hemoglobin Concentration : 31.4 gm/dL  Auto Neutrophil # : x  Auto Lymphocyte # : x  Auto Monocyte # : x  Auto Eosinophil # : x  Auto Basophil # : x  Auto Neutrophil % : x  Auto Lymphocyte % : x  Auto Monocyte % : x  Auto Eosinophil % : x  Auto Basophil % : x    Serial CBC  Hematocrit 24.2  Hemoglobin 7.6  Plat 278  RBC 2.81  WBC 8.17  Serial CBC  Hematocrit 25.8  Hemoglobin 8.4  Plat 279  RBC 3.01  WBC 8.54  Serial CBC  Hematocrit 25.4  Hemoglobin 8.3  Plat 275  RBC 2.98  WBC 8.52  Serial CBC  Hematocrit 20.6  Hemoglobin 6.7  Plat 227  RBC 2.39  WBC 6.57  Serial CBC  Hematocrit 17.6  Hemoglobin 5.7  Plat 232  RBC 2.06  WBC 6.75  Serial CBC  Hematocrit 12.8  Hemoglobin 3.9  Plat 235  RBC 1.51  WBC 7.97    04-29    139  |  112<H>  |  47<H>  ----------------------------<  126<H>  4.9   |  19<L>  |  2.61<H>    Ca    8.2<L>      29 Apr 2021 06:19  Phos  4.1     04-28  Mg     2.6     04-28    TPro  5.6<L>  /  Alb  2.4<L>  /  TBili  0.4  /  DBili  x   /  AST  10  /  ALT  41  /  AlkPhos  191<H>  04-29          Ferritin, Serum: 34 ng/mL (04-27 @ 12:02)  Folate, Serum: 8.2 ng/mL (04-27 @ 12:02)  Vitamin B12, Serum: 787 pg/mL (04-27 @ 12:02)  Iron - Total Binding Capacity.: 283 ug/dL (04-27 @ 08:15)            BLOOD SMEAR INTERPRETATION:       RADIOLOGY & ADDITIONAL STUDIES:    
  feel ok  no black stool  no sob or fever    MEDICATIONS  (STANDING):  allopurinol 100 milliGRAM(s) Oral daily  amLODIPine   Tablet 10 milliGRAM(s) Oral daily  atorvastatin 80 milliGRAM(s) Oral at bedtime  carvedilol 12.5 milliGRAM(s) Oral every 12 hours  cholecalciferol 2000 Unit(s) Oral daily  hydrALAZINE 100 milliGRAM(s) Oral three times a day  insulin lispro (ADMELOG) corrective regimen sliding scale   SubCutaneous Before meals and at bedtime  levothyroxine 50 MICROGram(s) Oral daily  pantoprazole    Tablet 40 milliGRAM(s) Oral two times a day  senna 2 Tablet(s) Oral at bedtime  sodium bicarbonate 1300 milliGRAM(s) Oral two times a day  sodium chloride 0.9%. 1000 milliLiter(s) (75 mL/Hr) IV Continuous <Continuous>    MEDICATIONS  (PRN):  acetaminophen   Tablet .. 650 milliGRAM(s) Oral every 6 hours PRN Temp greater or equal to 38C (100.4F), Moderate Pain (4 - 6)  ondansetron Injectable 4 milliGRAM(s) IV Push every 6 hours PRN Nausea and/or Vomiting      Allergies    No Known Allergies    Intolerances        Vital Signs Last 24 Hrs  T(C): 37.1 (03 May 2021 20:50), Max: 37.1 (03 May 2021 13:56)  T(F): 98.8 (03 May 2021 20:50), Max: 98.8 (03 May 2021 13:56)  HR: 59 (03 May 2021 21:20) (59 - 65)  BP: 141/52 (03 May 2021 21:20) (126/43 - 150/60)  BP(mean): --  RR: 18 (03 May 2021 20:50) (18 - 18)  SpO2: 99% (03 May 2021 20:50) (99% - 99%)    PHYSICAL EXAM  General: adult in NAD  HEENT: clear oropharynx, anicteric sclera, pink conjunctiva  Neck: supple  CV: normal S1/S2 with no murmur rubs or gallops  Lungs: positive air movement b/l ant lungs,clear to auscultation, no wheezes, no rales  Abdomen: soft non-tender non-distended, no hepatosplenomegaly  Ext: no clubbing cyanosis or edema  Skin: no rashes and no petechiae  Neuro: alert and oriented X 4, no focal deficits  LABS:                          7.6    5.89  )-----------( 228      ( 03 May 2021 07:29 )             24.6         Mean Cell Volume : 89.1 fl  Mean Cell Hemoglobin : 27.5 pg  Mean Cell Hemoglobin Concentration : 30.9 gm/dL  Auto Neutrophil # : x  Auto Lymphocyte # : x  Auto Monocyte # : x  Auto Eosinophil # : x  Auto Basophil # : x  Auto Neutrophil % : x  Auto Lymphocyte % : x  Auto Monocyte % : x  Auto Eosinophil % : x  Auto Basophil % : x    Serial CBC  Hematocrit 24.6  Hemoglobin 7.6  Plat 228  RBC 2.76  WBC 5.89  Serial CBC  Hematocrit 24.5  Hemoglobin 7.7  Plat 239  RBC 2.82  WBC 6.59  Serial CBC  Hematocrit 26.1  Hemoglobin 8.2  Plat 275  RBC 3.00  WBC 7.47  Serial CBC  Hematocrit 25.5  Hemoglobin 8.1  Plat 290  RBC 2.95  WBC 7.97    05-03    140  |  114<H>  |  55<H>  ----------------------------<  100<H>  5.0   |  16<L>  |  3.31<H>    Ca    8.3<L>      03 May 2021 07:29    TPro  5.8<L>  /  Alb  2.6<L>  /  TBili  0.3  /  DBili  x   /  AST  11  /  ALT  27  /  AlkPhos  179<H>  05-02          Ferritin, Serum: 34 ng/mL (04-27 @ 12:02)  Folate, Serum: 8.2 ng/mL (04-27 @ 12:02)  Vitamin B12, Serum: 787 pg/mL (04-27 @ 12:02)  Iron - Total Binding Capacity.: 283 ug/dL (04-27 @ 08:15)            BLOOD SMEAR INTERPRETATION:       RADIOLOGY & ADDITIONAL STUDIES:    
Patient is a 66y old  Male who presents with a chief complaint of weakness (29 Apr 2021 09:25)  seen and examined using  Ling ID # [id number]     OVERNIGHT EVENTS: no acute events overnight, sitting up in bed, tolerating advanced diet       REVIEW OF SYSTEMS:  CONSTITUTIONAL: No fever, chills  ENMT:  No difficulty hearing, no change in vision  NECK: No pain or stiffness  RESPIRATORY: No cough, SOB  CARDIOVASCULAR: No chest pain, palpitations  GASTROINTESTINAL: No abdominal pain. No nausea, vomiting, or diarrhea  GENITOURINARY: No dysuria  NEUROLOGICAL: No HA  SKIN: No itching, burning, rashes, or lesions   MUSCULOSKELETAL: No joint pain or swelling; No muscle, back, or extremity pain  PSYCHIATRIC: No depression, anxiety      T(C): 36.9 (04-29-21 @ 05:09), Max: 36.9 (04-28-21 @ 17:02)  HR: 64 (04-29-21 @ 05:09) (60 - 64)  BP: 150/60 (04-29-21 @ 05:09) (125/58 - 152/63)  RR: 18 (04-29-21 @ 05:09) (18 - 18)  SpO2: 97% (04-29-21 @ 05:09) (97% - 98%)  Wt(kg): --Vital Signs Last 24 Hrs  T(C): 36.9 (29 Apr 2021 05:09), Max: 36.9 (28 Apr 2021 17:02)  T(F): 98.4 (29 Apr 2021 05:09), Max: 98.4 (28 Apr 2021 17:02)  HR: 64 (29 Apr 2021 05:09) (60 - 64)  BP: 150/60 (29 Apr 2021 05:09) (125/58 - 152/63)  BP(mean): --  RR: 18 (29 Apr 2021 05:09) (18 - 18)  SpO2: 97% (29 Apr 2021 05:09) (97% - 98%)    MEDICATIONS  (STANDING):  allopurinol 100 milliGRAM(s) Oral daily  amLODIPine   Tablet 10 milliGRAM(s) Oral daily  atorvastatin 80 milliGRAM(s) Oral at bedtime  carvedilol 12.5 milliGRAM(s) Oral every 12 hours  hydrALAZINE 100 milliGRAM(s) Oral three times a day  insulin lispro (ADMELOG) corrective regimen sliding scale   SubCutaneous Before meals and at bedtime  iron sucrose IVPB 200 milliGRAM(s) IV Intermittent every 24 hours  levothyroxine 50 MICROGram(s) Oral daily  pantoprazole    Tablet 40 milliGRAM(s) Oral two times a day  sodium chloride 0.9%. 1000 milliLiter(s) (80 mL/Hr) IV Continuous <Continuous>    MEDICATIONS  (PRN):  acetaminophen   Tablet .. 650 milliGRAM(s) Oral every 6 hours PRN Temp greater or equal to 38C (100.4F), Moderate Pain (4 - 6)  ondansetron Injectable 4 milliGRAM(s) IV Push every 6 hours PRN Nausea and/or Vomiting      PHYSICAL EXAM:  GENERAL: NAD  EYES: clear conjunctiva  ENMT: Moist mucous membranes  NECK: Supple, No JVD  CHEST/LUNG: Clear to auscultation bilaterally; No rales, rhonchi, wheezing, or rubs  HEART: S1, S2, Regular rate and rhythm  ABDOMEN: Soft, Nontender, Nondistended; Bowel sounds present  NEURO: Alert & Oriented X3  EXTREMITIES: No LE edema, no calf tenderness  SKIN: No rashes or lesions    Consultant(s) Notes Reviewed:  [x ] YES  [ ] NO  Care Discussed with Consultants/Other Providers [ x] YES  [ ] NO    LABS:                        7.6    8.17  )-----------( 278      ( 29 Apr 2021 06:19 )             24.2     04-29    139  |  112<H>  |  47<H>  ----------------------------<  126<H>  4.9   |  19<L>  |  2.61<H>    Ca    8.2<L>      29 Apr 2021 06:19  Phos  4.1     04-28  Mg     2.6     04-28    TPro  5.6<L>  /  Alb  2.4<L>  /  TBili  0.4  /  DBili  x   /  AST  10  /  ALT  41  /  AlkPhos  191<H>  04-29    CAPILLARY BLOOD GLUCOSE      POCT Blood Glucose.: 160 mg/dL (29 Apr 2021 11:49)  POCT Blood Glucose.: 129 mg/dL (29 Apr 2021 07:54)  POCT Blood Glucose.: 180 mg/dL (28 Apr 2021 20:54)  POCT Blood Glucose.: 196 mg/dL (28 Apr 2021 16:44)    RADIOLOGY & ADDITIONAL TESTS:    < from: Xray Chest 1 View-PORTABLE IMMEDIATE (04.26.21 @ 11:33) >    EXAM:  XR CHEST PORTABLE IMMED 1V                            PROCEDURE DATE:  04/26/2021          INTERPRETATION:  AP erect chest on April 26, 2021 at 11:32 AM. Patient has sepsis.    COMPARISON: None available.    Heart magnified by technique.    The lung fields and pleural surfaces are unremarkable.    IMPRESSION: No acute finding.          < end of copied text >      Imaging Personally Reviewed:  [ ] YES  [ ] NO  
[facility] NEPHROLOGY- PROGRESS NOTE    65 yo M with CKD, DM, HTN, anemia  p/w weakness with intermittent melanotic stools. Found to have hgb 3.9. Pt a/w Anemia 2/2 GI bleed and COVID-19 positive. Nephrology consulted for Elevated serum creatinine.    Hospital Medications: Medications reviewed.  REVIEW OF SYSTEMS:  CONSTITUTIONAL: No fevers or chills  RESPIRATORY: No shortness of breath  CARDIOVASCULAR: No chest pain.  GASTROINTESTINAL: No nausea, vomiting, diarrhea or abdominal pain.   VASCULAR: No bilateral lower extremity edema.     VITALS:  T(F): 98.1 (21 @ 05:37), Max: 98.8 (21 @ 13:56)  HR: 63 (21 @ 05:37)  BP: 159/61 (21 @ 05:37)  RR: 16 (21 @ 05:37)  SpO2: 98% (21 @ 05:37)  Wt(kg): --  Height (cm): 170.2 ( @ 09:29)  Weight (kg): 86.3 ( @ 09:29)  BMI (kg/m2): 29.8 ( @ 09:29)  BSA (m2): 1.98 ( @ 09:29)    PHYSICAL EXAM:  Constitutional: NAD  Neurological: Awake and Alert  HEENT: anicteric sclera,   Respiratory: decreased BS at bases  Cardiovascular: S1, S2, RRR  Gastrointestinal: BS+, soft, NT/ND  Extremities: +2 LE edema b/l     LABS:      141  |  115<H>  |  56<H>  ----------------------------<  103<H>  4.8   |  17<L>  |  3.16<H>    Ca    8.1<L>      04 May 2021 07:33      Creatinine Trend: 3.16 <--, 3.31 <--, 3.38 <--, 3.25 <--, 2.88 <--, 2.61 <--, 2.45 <--                        8.5    5.80  )-----------( 221      ( 04 May 2021 07:33 )             27.3     Urine Studies:  Urinalysis Basic - ( 03 May 2021 17:12 )    Color: Yellow / Appearance: Clear / S.015 / pH:   Gluc:  / Ketone: Negative  / Bili: Negative / Urobili: Negative   Blood:  / Protein: 100 / Nitrite: Negative   Leuk Esterase: Negative / RBC:  / WBC 0-2 /HPF   Sq Epi:  / Non Sq Epi: Few /HPF / Bacteria: Trace /HPF      Sodium, Random Urine: 38 mmol/L ( @ 17:12)  Creatinine, Random Urine: 107 mg/dL ( @ 17:12)  Chloride, Random Urine: 42 mmol/L ( @ 17:12)    RADIOLOGY & ADDITIONAL STUDIES:  
HPI:  Patient is a 66 year old male from home AAO x3, with PMH of DM, anemia, CKD, HTN and hypothyroidism, who presented to the ED due to generalized weakness.   Patient states that he recently completed his second dose of the Moderna vaccine. States that since he received the first dose, he has been feeling weak. Patient completed second dose last Monday. States that this morning he was feeling so weak that he wasn't able to get up from bed. Patient also states that he has been having on and off black stools for the past 6 years and last had an episode of black stool about 3-4 days ago. Denies any hematochezia, hematemesis or coughing up blood. Patient denies any history of COVID or exposure to COVID. Denies any respiratory issues. Of note, patient was in ED in January 2021 for anemia with Hgb of 5 and received 2 PRBCs and had EGD/colonoscopy done by Dr. Caballero which showed 2 nodules with possible AVM on EGD and 3cm multilobulated sessile polyp in right colon noted on colonoscopy.   in ED pt with Hemoglobin: 3.9, and + COVID-19    (26 Apr 2021 15:25)      Patient is a 66y old  Male who presents with a chief complaint of weakness (01 May 2021 13:12)      INTERVAL HPI/OVERNIGHT EVENTS:  T(C): 36.8 (05-01-21 @ 12:59), Max: 36.8 (05-01-21 @ 12:59)  HR: 67 (05-01-21 @ 16:56) (62 - 67)  BP: 145/57 (05-01-21 @ 16:56) (133/69 - 148/62)  RR: 18 (05-01-21 @ 12:59) (18 - 19)  SpO2: 97% (05-01-21 @ 12:59) (97% - 98%)  Wt(kg): --  I&O's Summary      REVIEW OF SYSTEMS: denies fever, chills, SOB, palpitations, chest pain, abdominal pain, nausea, vomitting, diarrhea, constipation, dizziness    MEDICATIONS  (STANDING):  allopurinol 100 milliGRAM(s) Oral daily  amLODIPine   Tablet 10 milliGRAM(s) Oral daily  atorvastatin 80 milliGRAM(s) Oral at bedtime  carvedilol 12.5 milliGRAM(s) Oral every 12 hours  cholecalciferol 2000 Unit(s) Oral daily  hydrALAZINE 100 milliGRAM(s) Oral three times a day  insulin lispro (ADMELOG) corrective regimen sliding scale   SubCutaneous Before meals and at bedtime  levothyroxine 50 MICROGram(s) Oral daily  pantoprazole    Tablet 40 milliGRAM(s) Oral two times a day  sodium chloride 0.9%. 1000 milliLiter(s) (80 mL/Hr) IV Continuous <Continuous>  sodium chloride 0.9%. 1000 milliLiter(s) (75 mL/Hr) IV Continuous <Continuous>    MEDICATIONS  (PRN):  acetaminophen   Tablet .. 650 milliGRAM(s) Oral every 6 hours PRN Temp greater or equal to 38C (100.4F), Moderate Pain (4 - 6)  ondansetron Injectable 4 milliGRAM(s) IV Push every 6 hours PRN Nausea and/or Vomiting      PHYSICAL EXAM:  GENERAL: NAD, well-groomed, well-developed  HEAD:  Atraumatic, Normocephalic  EYES: EOMI, PERRLA, conjunctiva and sclera clear  ENMT: No tonsillar erythema, exudates, or enlargement; Moist mucous membranes, Good dentition, No lesions  NECK: Supple, No JVD, Normal thyroid  NERVOUS SYSTEM:  Alert & Oriented X3, Good concentration; Motor Strength 5/5 B/L upper and lower extremities; DTRs 2+ intact and symmetric  CHEST/LUNG: Clear to percussion bilaterally; No rales, rhonchi, wheezing, or rubs  HEART: Regular rate and rhythm; No murmurs, rubs, or gallops  ABDOMEN: Soft, Nontender, Nondistended; Bowel sounds present  EXTREMITIES:  2+ Peripheral Pulses, No clubbing, cyanosis, or edema  LYMPH: No lymphadenopathy noted  SKIN: No rashes or lesions  LABS:                        8.2    7.47  )-----------( 275      ( 01 May 2021 07:38 )             26.1     05-01    137  |  113<H>  |  46<H>  ----------------------------<  113<H>  5.3   |  17<L>  |  3.25<H>    Ca    8.2<L>      01 May 2021 07:38    TPro  6.1  /  Alb  2.6<L>  /  TBili  0.3  /  DBili  x   /  AST  13  /  ALT  32  /  AlkPhos  184<H>  05-01        CAPILLARY BLOOD GLUCOSE      POCT Blood Glucose.: 137 mg/dL (01 May 2021 16:23)  POCT Blood Glucose.: 165 mg/dL (01 May 2021 12:02)  POCT Blood Glucose.: 124 mg/dL (01 May 2021 08:06)  POCT Blood Glucose.: 154 mg/dL (30 Apr 2021 20:57)        
    GI PROGRESS NOTE    Patient is a 66y old  Male who presents with a chief complaint of weakness (29 Apr 2021 09:25)      HPI:  Patient is a 66 year old male from home AAO x3, with PMH of DM, anemia, CKD, HTN and hypothyroidism, who presented to the ED due to generalized weakness.   Patient states that he recently completed his second dose of the Moderna vaccine. States that since he received the first dose, he has been feeling weak. Patient completed second dose last Monday. States that this morning he was feeling so weak that he wasn't able to get up from bed. Patient also states that he has been having on and off black stools for the past 6 years and last had an episode of black stool about 3-4 days ago. Denies any hematochezia, hematemesis or coughing up blood. Patient denies any history of COVID or exposure to COVID. Denies any respiratory issues. Of note, patient was in ED in January 2021 for anemia with Hgb of 5 and received 2 PRBCs and had EGD/colonoscopy done by Dr. Caballero which showed 2 nodules with possible AVM on EGD and 3cm multilobulated sessile polyp in right colon noted on colonoscopy.   in ED pt with Hemoglobin: 3.9, and + COVID-19    (26 Apr 2021 15:25)          ______________________________________________________________________  PMH/PSH:  PAST MEDICAL & SURGICAL HISTORY:  DM (diabetes mellitus)    HTN (hypertension)    Hypothyroidism    Cholecystitis    Unilateral inguinal hernia without obstruction or gangrene, recurrence not specified    Anemia    H/O right inguinal hernia repair      ______________________________________________________________________  MEDS:  MEDICATIONS  (STANDING):  allopurinol 100 milliGRAM(s) Oral daily  amLODIPine   Tablet 10 milliGRAM(s) Oral daily  atorvastatin 80 milliGRAM(s) Oral at bedtime  carvedilol 12.5 milliGRAM(s) Oral every 12 hours  hydrALAZINE 100 milliGRAM(s) Oral three times a day  insulin lispro (ADMELOG) corrective regimen sliding scale   SubCutaneous Before meals and at bedtime  iron sucrose IVPB 200 milliGRAM(s) IV Intermittent every 24 hours  levothyroxine 50 MICROGram(s) Oral daily  pantoprazole    Tablet 40 milliGRAM(s) Oral two times a day  sodium chloride 0.9%. 1000 milliLiter(s) (80 mL/Hr) IV Continuous <Continuous>    MEDICATIONS  (PRN):  acetaminophen   Tablet .. 650 milliGRAM(s) Oral every 6 hours PRN Temp greater or equal to 38C (100.4F), Moderate Pain (4 - 6)  ondansetron Injectable 4 milliGRAM(s) IV Push every 6 hours PRN Nausea and/or Vomiting    ______________________________________________________________________  ALL:   Allergies    No Known Allergies    Intolerances    GI HPI  Patient seen and examined sitting OOB-chair in NAD. Denies chest pain, dizziness, SOB or palpitations. Reports feeling weak this am. No N/V/D or abdominal pain      ______________________________________________________________________  SH: Social History:  Patient lives at home with his wife. Former smoker. Occasional alcohol use. Denies any illicit drug use. (26 Apr 2021 15:25)    ______________________________________________________________________  FH:  FAMILY HISTORY:    ______________________________________________________________________  ROS:    CONSTITUTIONAL:  No weight loss, fever, chills, weakness or fatigue.    HEENT:  Eyes:  No visual loss, blurred vision, double vision or yellow sclerae. Ears, Nose, Throat:  No hearing loss, sneezing, congestion, runny nose or sore throat.    SKIN:  No rash or itching.    CARDIOVASCULAR:  No chest pain, chest pressure or chest discomfort. No palpitations or edema.    RESPIRATORY:  No shortness of breath, cough or sputum.    GASTROINTESTINAL:  SEE HPI    GENITOURINARY:  No dysuria, hematuria, urinary frequency    NEUROLOGICAL:  No headache, dizziness, syncope, paralysis, ataxia, numbness or tingling in the extremities. No change in bowel or bladder control.    MUSCULOSKELETAL:  No muscle, back pain, joint pain or stiffness.    HEMATOLOGIC:  No anemia, bleeding or bruising.    LYMPHATICS:  No enlarged nodes. No history of splenectomy.    PSYCHIATRIC:  No history of depression or anxiety.    ENDOCRINOLOGIC:  No reports of sweating, cold or heat intolerance. No polyuria or polydipsia.    ALLERGIES:  No history of asthma, hives, eczema or rhinitis.  ______________________________________________________________________  PHYSICAL EXAM:  T(C): 36.9 (04-29-21 @ 05:09), Max: 36.9 (04-28-21 @ 17:02)  HR: 64 (04-29-21 @ 05:09)  BP: 150/60 (04-29-21 @ 05:09)  RR: 18 (04-29-21 @ 05:09)  SpO2: 97% (04-29-21 @ 05:09)  Wt(kg): --      GEN: NAD   CVS- S1 S2  ABD: soft nontender, non distended, bowel sounds+, no rebound, no guarding  LUNGS: clear to auscultation  NEURO: non focal neuro exam;   Extremities: no cyanosis, no calf tenderness, no edema, no clubbing      ______________________________________________________________________  LABS:                        7.6    8.17  )-----------( 278      ( 29 Apr 2021 06:19 )             24.2     04-29    139  |  112<H>  |  47<H>  ----------------------------<  126<H>  4.9   |  19<L>  |  2.61<H>    Ca    8.2<L>      29 Apr 2021 06:19  Phos  4.1     04-28  Mg     2.6     04-28    TPro  5.6<L>  /  Alb  2.4<L>  /  TBili  0.4  /  DBili  x   /  AST  10  /  ALT  41  /  AlkPhos  191<H>  04-29    LIVER FUNCTIONS - ( 29 Apr 2021 06:19 )  Alb: 2.4 g/dL / Pro: 5.6 g/dL / ALK PHOS: 191 U/L / ALT: 41 U/L DA / AST: 10 U/L / GGT: x           _______________________________________________________________
HPI:  66 YOM with PMH significant for anemia admitted for GIB.  Pt transfused PRBCs, PPI gtt started and GI consulted.      OVERNIGHT EVENTS:  No new overnight events     REVIEW OF SYSTEMS:      CONSTITUTIONAL: No fever, weakness improving  EYES: no acute visual disturbances  NECK: No pain or stiffness  RESPIRATORY: No cough; No shortness of breath  CARDIOVASCULAR: No chest pain, no palpitations  GASTROINTESTINAL: No pain. No nausea, vomiting or diarrhea   NEUROLOGICAL: No headache or numbness, no tremors  MUSCULOSKELETAL: No joint pain, no muscle pain  GENITOURINARY: no dysuria, no frequency, no hesitancy  PSYCHIATRY: no depression, no anxiety  ALL OTHER  ROS negative        Vital Signs Last 24 Hrs  T(C): 36.7 (2021 13:15), Max: 36.7 (2021 16:10)  T(F): 98 (2021 13:15), Max: 98.1 (2021 16:10)  HR: 59 (2021 13:15) (59 - 67)  BP: 135/54 (2021 13:15) (130/45 - 157/63)  BP(mean): 76 (2021 13:15) (68 - 76)  RR: 17 (2021 13:15) (17 - 19)  SpO2: 100% (2021 13:15) (96% - 100%)    ________________________________________________  PHYSICAL EXAM:    GENERAL: NAD  HEENT: Normocephalic; conjunctivae and sclerae clear;  NECK : supple, no JVD  CHEST/LUNG: Clear to auscultation  HEART: S1 S2  regular  ABDOMEN: Soft, Nontender, Nondistended; Bowel sounds present  EXTREMITIES: no cyanosis; no LE edema; no calf tenderness  SKIN: warm and dry  NERVOUS SYSTEM:  Alert; no new deficits    _________________________________________________  CURRENT MEDICATIONS:    MEDICATIONS  (STANDING):  allopurinol 100 milliGRAM(s) Oral daily  amLODIPine   Tablet 10 milliGRAM(s) Oral daily  atorvastatin 80 milliGRAM(s) Oral at bedtime  carvedilol 12.5 milliGRAM(s) Oral every 12 hours  hydrALAZINE 100 milliGRAM(s) Oral three times a day  insulin lispro (ADMELOG) corrective regimen sliding scale   SubCutaneous Before meals and at bedtime  levothyroxine 50 MICROGram(s) Oral daily  pantoprazole Infusion 8 mG/Hr (10 mL/Hr) IV Continuous <Continuous>  sodium chloride 0.9%. 1000 milliLiter(s) (80 mL/Hr) IV Continuous <Continuous>    MEDICATIONS  (PRN):  acetaminophen   Tablet .. 650 milliGRAM(s) Oral every 6 hours PRN Temp greater or equal to 38C (100.4F), Moderate Pain (4 - 6)  ondansetron Injectable 4 milliGRAM(s) IV Push every 6 hours PRN Nausea and/or Vomiting      __________________________________________________  LABS:                          6.7    6.57  )-----------( 227      ( 2021 08:15 )             20.6         139  |  111<H>  |  65<H>  ----------------------------<  128<H>  4.6   |  17<L>  |  2.79<H>    Ca    7.7<L>      2021 08:15  Phos  4.6       Mg     2.5         TPro  6.2  /  Alb  2.6<L>  /  TBili  0.3  /  DBili  x   /  AST  25  /  ALT  49  /  AlkPhos  202<H>      PT/INR - ( 2021 08:15 )   PT: 14.0 sec;   INR: 1.19 ratio         PTT - ( 2021 08:15 )  PTT:37.0 sec  Urinalysis Basic - ( 2021 16:37 )    Color: Yellow / Appearance: Clear / S.015 / pH: x  Gluc: x / Ketone: Negative  / Bili: Negative / Urobili: Negative   Blood: x / Protein: 100 / Nitrite: Negative   Leuk Esterase: Negative / RBC: 0-2 /HPF / WBC 0-2 /HPF   Sq Epi: x / Non Sq Epi: x / Bacteria: Few /HPF      CAPILLARY BLOOD GLUCOSE  167 (2021 16:10)      POCT Blood Glucose.: 234 mg/dL (2021 11:21)  POCT Blood Glucose.: 142 mg/dL (2021 07:54)      __________________________________________________  RADIOLOGY & ADDITIONAL TESTS:    Imaging Personally Reviewed:  YES    < from: Xray Chest 1 View-PORTABLE IMMEDIATE (21 @ 11:33) >  COMPARISON: None available.    Heart magnified by technique.    The lung fields and pleural surfaces are unremarkable.    IMPRESSION: No acute finding.    < end of copied text >    Consultant(s) Notes Reviewed:   YES     Plan of care was discussed with patient and /or primary care giver; all questions and concerns were addressed and care was aligned with patient's wishes.    Plan discussed with attending and consulting physicians.  
Patient is a 66y old  Male who presents with a chief complaint of weakness (30 Apr 2021 13:07)      SUBJECTIVE / OVERNIGHT EVENTS:    MEDICATIONS  (STANDING):  allopurinol 100 milliGRAM(s) Oral daily  amLODIPine   Tablet 10 milliGRAM(s) Oral daily  atorvastatin 80 milliGRAM(s) Oral at bedtime  carvedilol 12.5 milliGRAM(s) Oral every 12 hours  cholecalciferol 2000 Unit(s) Oral daily  hydrALAZINE 100 milliGRAM(s) Oral three times a day  insulin lispro (ADMELOG) corrective regimen sliding scale   SubCutaneous Before meals and at bedtime  levothyroxine 50 MICROGram(s) Oral daily  pantoprazole    Tablet 40 milliGRAM(s) Oral two times a day  sodium chloride 0.9%. 1000 milliLiter(s) (80 mL/Hr) IV Continuous <Continuous>    MEDICATIONS  (PRN):  acetaminophen   Tablet .. 650 milliGRAM(s) Oral every 6 hours PRN Temp greater or equal to 38C (100.4F), Moderate Pain (4 - 6)  ondansetron Injectable 4 milliGRAM(s) IV Push every 6 hours PRN Nausea and/or Vomiting        CAPILLARY BLOOD GLUCOSE      POCT Blood Glucose.: 118 mg/dL (30 Apr 2021 16:52)  POCT Blood Glucose.: 168 mg/dL (30 Apr 2021 12:03)  POCT Blood Glucose.: 124 mg/dL (30 Apr 2021 07:41)  POCT Blood Glucose.: 167 mg/dL (29 Apr 2021 21:10)    I&O's Summary    29 Apr 2021 07:01  -  30 Apr 2021 07:00  --------------------------------------------------------  IN: 0 mL / OUT: 2 mL / NET: -2 mL        PHYSICAL EXAM:  GENERAL: NAD, well-developed  HEAD:  Atraumatic, Normocephalic  EYES: EOMI, PERRLA, conjunctiva and sclera clear  NECK: Supple, No JVD  CHEST/LUNG: Clear to auscultation bilaterally; No wheeze  HEART: Regular rate and rhythm; No murmurs, rubs, or gallops, S1, S2 present  ABDOMEN: Soft, Nontender, Nondistended; Bowel sounds present  EXTREMITIES:  2+ Peripheral Pulses, No clubbing, cyanosis, or edema  PSYCH: AAOx3  NEUROLOGY: non-focal  SKIN: No rashes or lesions    LABS:                        8.1    7.97  )-----------( 290      ( 30 Apr 2021 07:00 )             25.5     04-30    139  |  113<H>  |  46<H>  ----------------------------<  109<H>  5.1   |  19<L>  |  2.88<H>    Ca    8.4      30 Apr 2021 07:00    TPro  5.9<L>  /  Alb  2.6<L>  /  TBili  0.4  /  DBili  x   /  AST  8<L>  /  ALT  39  /  AlkPhos  186<H>  04-30        RADIOLOGY & ADDITIONAL TESTS:    Imaging Personally Reviewed:    Consultant(s) Notes Reviewed:      Care Discussed with Consultants/Other Providers:  
feel ok  no fever or sob  no gross bleeding    MEDICATIONS  (STANDING):  allopurinol 100 milliGRAM(s) Oral daily  amLODIPine   Tablet 10 milliGRAM(s) Oral daily  atorvastatin 80 milliGRAM(s) Oral at bedtime  carvedilol 12.5 milliGRAM(s) Oral every 12 hours  cholecalciferol 2000 Unit(s) Oral daily  hydrALAZINE 100 milliGRAM(s) Oral three times a day  insulin lispro (ADMELOG) corrective regimen sliding scale   SubCutaneous Before meals and at bedtime  levothyroxine 50 MICROGram(s) Oral daily  pantoprazole    Tablet 40 milliGRAM(s) Oral two times a day  sodium chloride 0.9%. 1000 milliLiter(s) (80 mL/Hr) IV Continuous <Continuous>    MEDICATIONS  (PRN):  acetaminophen   Tablet .. 650 milliGRAM(s) Oral every 6 hours PRN Temp greater or equal to 38C (100.4F), Moderate Pain (4 - 6)  ondansetron Injectable 4 milliGRAM(s) IV Push every 6 hours PRN Nausea and/or Vomiting      Allergies    No Known Allergies    Intolerances        Vital Signs Last 24 Hrs  T(C): 36.6 (30 Apr 2021 20:19), Max: 36.7 (30 Apr 2021 13:43)  T(F): 97.8 (30 Apr 2021 20:19), Max: 98 (30 Apr 2021 13:43)  HR: 62 (30 Apr 2021 20:19) (62 - 64)  BP: 140/53 (30 Apr 2021 20:19) (140/53 - 154/65)  BP(mean): --  RR: 18 (30 Apr 2021 20:19) (18 - 20)  SpO2: 97% (30 Apr 2021 20:19) (95% - 97%)    PHYSICAL EXAM  General: adult in NAD  HEENT: clear oropharynx, anicteric sclera, pink conjunctiva  Neck: supple  CV: normal S1/S2 with no murmur rubs or gallops  Lungs: positive air movement b/l ant lungs,clear to auscultation, no wheezes, no rales  Abdomen: soft non-tender non-distended, no hepatosplenomegaly  Ext: no clubbing cyanosis or edema  Skin: no rashes and no petechiae  Neuro: alert and oriented X 4, no focal deficits  LABS:                          8.1    7.97  )-----------( 290      ( 30 Apr 2021 07:00 )             25.5         Mean Cell Volume : 86.4 fl  Mean Cell Hemoglobin : 27.5 pg  Mean Cell Hemoglobin Concentration : 31.8 gm/dL  Auto Neutrophil # : x  Auto Lymphocyte # : x  Auto Monocyte # : x  Auto Eosinophil # : x  Auto Basophil # : x  Auto Neutrophil % : x  Auto Lymphocyte % : x  Auto Monocyte % : x  Auto Eosinophil % : x  Auto Basophil % : x    Serial CBC  Hematocrit 25.5  Hemoglobin 8.1  Plat 290  RBC 2.95  WBC 7.97  Serial CBC  Hematocrit 24.2  Hemoglobin 7.6  Plat 278  RBC 2.81  WBC 8.17  Serial CBC  Hematocrit 25.8  Hemoglobin 8.4  Plat 279  RBC 3.01  WBC 8.54  Serial CBC  Hematocrit 25.4  Hemoglobin 8.3  Plat 275  RBC 2.98  WBC 8.52  Serial CBC  Hematocrit 20.6  Hemoglobin 6.7  Plat 227  RBC 2.39  WBC 6.57  Serial CBC  Hematocrit 17.6  Hemoglobin 5.7  Plat 232  RBC 2.06  WBC 6.75    04-30    139  |  113<H>  |  46<H>  ----------------------------<  109<H>  5.1   |  19<L>  |  2.88<H>    Ca    8.4      30 Apr 2021 07:00    TPro  5.9<L>  /  Alb  2.6<L>  /  TBili  0.4  /  DBili  x   /  AST  8<L>  /  ALT  39  /  AlkPhos  186<H>  04-30          Ferritin, Serum: 34 ng/mL (04-27 @ 12:02)  Folate, Serum: 8.2 ng/mL (04-27 @ 12:02)  Vitamin B12, Serum: 787 pg/mL (04-27 @ 12:02)  Iron - Total Binding Capacity.: 283 ug/dL (04-27 @ 08:15)            BLOOD SMEAR INTERPRETATION:       RADIOLOGY & ADDITIONAL STUDIES:    
NP Note discussed with  Primary Attending    Patient is a 66y old  Male who presents with a chief complaint of weakness (03 May 2021 16:16)      INTERVAL HPI/OVERNIGHT EVENTS: Patient seen and examined at bedside. Patient Arabic speaking ID #[id number]. understands english fluently.     MEDICATIONS  (STANDING):  allopurinol 100 milliGRAM(s) Oral daily  amLODIPine   Tablet 10 milliGRAM(s) Oral daily  atorvastatin 80 milliGRAM(s) Oral at bedtime  carvedilol 12.5 milliGRAM(s) Oral every 12 hours  cholecalciferol 2000 Unit(s) Oral daily  hydrALAZINE 100 milliGRAM(s) Oral three times a day  insulin lispro (ADMELOG) corrective regimen sliding scale   SubCutaneous Before meals and at bedtime  levothyroxine 50 MICROGram(s) Oral daily  pantoprazole    Tablet 40 milliGRAM(s) Oral two times a day  senna 2 Tablet(s) Oral at bedtime  sodium bicarbonate 1300 milliGRAM(s) Oral two times a day  sodium chloride 0.9%. 1000 milliLiter(s) (75 mL/Hr) IV Continuous <Continuous>    MEDICATIONS  (PRN):  acetaminophen   Tablet .. 650 milliGRAM(s) Oral every 6 hours PRN Temp greater or equal to 38C (100.4F), Moderate Pain (4 - 6)  ondansetron Injectable 4 milliGRAM(s) IV Push every 6 hours PRN Nausea and/or Vomiting      __________________________________________________  REVIEW OF SYSTEMS:    CONSTITUTIONAL: No fever,   EYES: no acute visual disturbances  NECK: No pain or stiffness  RESPIRATORY: No cough; No shortness of breath  CARDIOVASCULAR: No chest pain, no palpitations  GASTROINTESTINAL: No pain. No nausea or vomiting; No diarrhea   NEUROLOGICAL: No headache or numbness, no tremors  MUSCULOSKELETAL: No joint pain, no muscle pain  GENITOURINARY: no dysuria, no frequency, no hesitancy  PSYCHIATRY: no depression , no anxiety  ALL OTHER  ROS negative        Vital Signs Last 24 Hrs  T(C): 36.8 (03 May 2021 16:45), Max: 37.1 (03 May 2021 13:56)  T(F): 98.3 (03 May 2021 16:45), Max: 98.8 (03 May 2021 13:56)  HR: 62 (03 May 2021 16:45) (62 - 65)  BP: 148/46 (03 May 2021 16:45) (130/49 - 150/60)  BP(mean): --  RR: 18 (03 May 2021 16:45) (18 - 18)  SpO2: 99% (03 May 2021 16:19) (99% - 99%)    ________________________________________________  PHYSICAL EXAM:  GENERAL: NAD  HEENT: Normocephalic;  conjunctivae and sclerae clear; moist mucous membranes;   NECK : supple  CHEST/LUNG: Clear to auscultation bilaterally with good air entry   HEART: S1 S2  regular; no murmurs, gallops or rubs  ABDOMEN: Soft, Nontender, Nondistended; Bowel sounds present  EXTREMITIES: no cyanosis; no edema; no calf tenderness  SKIN: warm and dry; no rash  NERVOUS SYSTEM:  Awake and alert; Oriented  to place, person and time ; no new deficits    _________________________________________________  LABS:                        7.6    5.89  )-----------( 228      ( 03 May 2021 07:29 )             24.6     05-03    140  |  114<H>  |  55<H>  ----------------------------<  100<H>  5.0   |  16<L>  |  3.31<H>    Ca    8.3<L>      03 May 2021 07:29    TPro  5.8<L>  /  Alb  2.6<L>  /  TBili  0.3  /  DBili  x   /  AST  11  /  ALT  27  /  AlkPhos  179<H>  05-02        CAPILLARY BLOOD GLUCOSE      POCT Blood Glucose.: 138 mg/dL (03 May 2021 17:03)  POCT Blood Glucose.: 191 mg/dL (03 May 2021 11:24)  POCT Blood Glucose.: 113 mg/dL (03 May 2021 08:05)  POCT Blood Glucose.: 166 mg/dL (02 May 2021 21:28)        RADIOLOGY & ADDITIONAL TESTS:  < from: Xray Chest 1 View-PORTABLE IMMEDIATE (04.26.21 @ 11:33) >  EXAM:  XR CHEST PORTABLE IMMED 1V                            PROCEDURE DATE:  04/26/2021          INTERPRETATION:  AP erect chest on April 26, 2021 at 11:32 AM. Patient has sepsis.    COMPARISON: None available.    Heart magnified by technique.    The lung fields and pleural surfaces are unremarkable.    IMPRESSION: No acute finding.    < end of copied text >    Imaging  Reviewed:  YES    Consultant(s) Notes Reviewed:   YES      Plan of care was discussed with patient and /or primary care giver; all questions and concerns were addressed 
Patient is a 66y old  Male who presents with a chief complaint of weakness (2021 10:40)    OVERNIGHT EVENTS: no acute events overnight, sitting up in bed, tolerating clear liquid diet       REVIEW OF SYSTEMS:  CONSTITUTIONAL: No fever, chills  ENMT:  No difficulty hearing, no change in vision  NECK: No pain or stiffness  RESPIRATORY: No cough, SOB  CARDIOVASCULAR: No chest pain, palpitations  GASTROINTESTINAL: No abdominal pain. No nausea, vomiting, or diarrhea  GENITOURINARY: No dysuria  NEUROLOGICAL: No HA  SKIN: No itching, burning, rashes, or lesions   MUSCULOSKELETAL: No joint pain or swelling; No muscle, back, or extremity pain  PSYCHIATRIC: No depression, anxiety    T(C): 36.8 (21 @ 13:50), Max: 36.8 (21 @ 13:50)  HR: 62 (21 @ 13:50) (58 - 64)  BP: 125/58 (21 @ 13:50) (125/58 - 151/66)  RR: 18 (21 @ 13:50) (18 - 18)  SpO2: 97% (21 @ 13:50) (97% - 100%)  Wt(kg): --Vital Signs Last 24 Hrs  T(C): 36.8 (2021 13:50), Max: 36.8 (2021 13:50)  T(F): 98.3 (2021 13:50), Max: 98.3 (2021 13:50)  HR: 62 (2021 13:50) (58 - 64)  BP: 125/58 (2021 13:50) (125/58 - 151/66)  BP(mean): --  RR: 18 (2021 13:50) (18 - 18)  SpO2: 97% (2021 13:50) (97% - 100%)    MEDICATIONS  (STANDING):  allopurinol 100 milliGRAM(s) Oral daily  amLODIPine   Tablet 10 milliGRAM(s) Oral daily  atorvastatin 80 milliGRAM(s) Oral at bedtime  carvedilol 12.5 milliGRAM(s) Oral every 12 hours  hydrALAZINE 100 milliGRAM(s) Oral three times a day  insulin lispro (ADMELOG) corrective regimen sliding scale   SubCutaneous Before meals and at bedtime  iron sucrose IVPB 200 milliGRAM(s) IV Intermittent every 24 hours  levothyroxine 50 MICROGram(s) Oral daily  pantoprazole Infusion 8 mG/Hr (10 mL/Hr) IV Continuous <Continuous>  sodium chloride 0.9%. 1000 milliLiter(s) (80 mL/Hr) IV Continuous <Continuous>    MEDICATIONS  (PRN):  acetaminophen   Tablet .. 650 milliGRAM(s) Oral every 6 hours PRN Temp greater or equal to 38C (100.4F), Moderate Pain (4 - 6)  ondansetron Injectable 4 milliGRAM(s) IV Push every 6 hours PRN Nausea and/or Vomiting      PHYSICAL EXAM:  GENERAL: NAD  EYES: clear conjunctiva  ENMT: Moist mucous membranes  NECK: Supple, No JVD  CHEST/LUNG: Clear to auscultation bilaterally; No rales, rhonchi, wheezing, or rubs  HEART: S1, S2, Regular rate and rhythm  ABDOMEN: Soft, Nontender, Nondistended; Bowel sounds present  NEURO: Alert & Oriented X3  EXTREMITIES: No LE edema, no calf tenderness  SKIN: No rashes or lesions    Consultant(s) Notes Reviewed:  [x ] YES  [ ] NO  Care Discussed with Consultants/Other Providers [ x] YES  [ ] NO    LABS:                        8.4    8.54  )-----------( 279      ( 2021 07:45 )             25.8     04-28    139  |  111<H>  |  52<H>  ----------------------------<  103<H>  4.6   |  18<L>  |  2.45<H>    Ca    8.4      2021 07:45  Phos  4.1     -28  Mg     2.6     -28      PT/INR - ( 2021 08:15 )   PT: 14.0 sec;   INR: 1.19 ratio         PTT - ( 2021 08:15 )  PTT:37.0 sec  CAPILLARY BLOOD GLUCOSE      POCT Blood Glucose.: 152 mg/dL (2021 11:49)  POCT Blood Glucose.: 121 mg/dL (2021 07:53)  POCT Blood Glucose.: 189 mg/dL (2021 21:01)  POCT Blood Glucose.: 189 mg/dL (2021 16:53)    Urinalysis Basic - ( 2021 16:37 )    Color: Yellow / Appearance: Clear / S.015 / pH: x  Gluc: x / Ketone: Negative  / Bili: Negative / Urobili: Negative   Blood: x / Protein: 100 / Nitrite: Negative   Leuk Esterase: Negative / RBC: 0-2 /HPF / WBC 0-2 /HPF   Sq Epi: x / Non Sq Epi: x / Bacteria: Few /HPF    RADIOLOGY & ADDITIONAL TESTS:    < from: Xray Chest 1 View-PORTABLE IMMEDIATE (21 @ 11:33) >    EXAM:  XR CHEST PORTABLE IMMED 1V                            PROCEDURE DATE:  2021          INTERPRETATION:  AP erect chest on 2021 at 11:32 AM. Patient has sepsis.    COMPARISON: None available.    Heart magnified by technique.    The lung fields and pleural surfaces are unremarkable.    IMPRESSION: No acute finding.    < end of copied text >      Imaging Personally Reviewed:  [ ] YES  [ ] NO  
    GI PROGRESS NOTE    Patient is a 66y old  Male who presents with a chief complaint of weakness (27 Apr 2021 19:50)      HPI:  Patient is a 66 year old male from home AAO x3, with PMH of DM, anemia, CKD, HTN and hypothyroidism, who presented to the ED due to generalized weakness.   Patient states that he recently completed his second dose of the Moderna vaccine. States that since he received the first dose, he has been feeling weak. Patient completed second dose last Monday. States that this morning he was feeling so weak that he wasn't able to get up from bed. Patient also states that he has been having on and off black stools for the past 6 years and last had an episode of black stool about 3-4 days ago. Denies any hematochezia, hematemesis or coughing up blood. Patient denies any history of COVID or exposure to COVID. Denies any respiratory issues. Of note, patient was in ED in January 2021 for anemia with Hgb of 5 and received 2 PRBCs and had EGD/colonoscopy done by Dr. Caballero which showed 2 nodules with possible AVM on EGD and 3cm multilobulated sessile polyp in right colon noted on colonoscopy.   in ED pt with Hemoglobin: 3.9, and + COVID-19    (26 Apr 2021 15:25)          ______________________________________________________________________  PMH/PSH:  PAST MEDICAL & SURGICAL HISTORY:  DM (diabetes mellitus)    HTN (hypertension)    Hypothyroidism    Cholecystitis    Unilateral inguinal hernia without obstruction or gangrene, recurrence not specified    Anemia    H/O right inguinal hernia repair      ______________________________________________________________________  MEDS:  MEDICATIONS  (STANDING):  allopurinol 100 milliGRAM(s) Oral daily  amLODIPine   Tablet 10 milliGRAM(s) Oral daily  atorvastatin 80 milliGRAM(s) Oral at bedtime  carvedilol 12.5 milliGRAM(s) Oral every 12 hours  ferrous    sulfate 325 milliGRAM(s) Oral daily  hydrALAZINE 100 milliGRAM(s) Oral three times a day  insulin lispro (ADMELOG) corrective regimen sliding scale   SubCutaneous Before meals and at bedtime  levothyroxine 50 MICROGram(s) Oral daily  pantoprazole Infusion 8 mG/Hr (10 mL/Hr) IV Continuous <Continuous>  sodium chloride 0.9%. 1000 milliLiter(s) (80 mL/Hr) IV Continuous <Continuous>    MEDICATIONS  (PRN):  acetaminophen   Tablet .. 650 milliGRAM(s) Oral every 6 hours PRN Temp greater or equal to 38C (100.4F), Moderate Pain (4 - 6)  ondansetron Injectable 4 milliGRAM(s) IV Push every 6 hours PRN Nausea and/or Vomiting    ______________________________________________________________________  ALL:   Allergies    No Known Allergies    Intolerances    GI HPI  Patient seen and examined lying in bed in NAD. Denies chest pain, dizziness, SOB or palpitations. No abdominal pain, N/V/D. Hgb 8.4 this am s/p 4 units PRBC's this admission. Will eventually need repeat colonoscopy and small bowel workup for anemia once recovered from acute infection.      ______________________________________________________________________  SH: Social History:  Patient lives at home with his wife. Former smoker. Occasional alcohol use. Denies any illicit drug use. (26 Apr 2021 15:25)    ______________________________________________________________________  FH:  FAMILY HISTORY:    ______________________________________________________________________  ROS:    CONSTITUTIONAL:  No weight loss, fever, chills, weakness or fatigue.    HEENT:  Eyes:  No visual loss, blurred vision, double vision or yellow sclerae. Ears, Nose, Throat:  No hearing loss, sneezing, congestion, runny nose or sore throat.    SKIN:  No rash or itching.    CARDIOVASCULAR:  No chest pain, chest pressure or chest discomfort. No palpitations or edema.    RESPIRATORY:  No shortness of breath, cough or sputum.    GASTROINTESTINAL:  SEE HPI    GENITOURINARY:  No dysuria, hematuria, urinary frequency    NEUROLOGICAL:  No headache, dizziness, syncope, paralysis, ataxia, numbness or tingling in the extremities. No change in bowel or bladder control.    MUSCULOSKELETAL:  No muscle, back pain, joint pain or stiffness.    HEMATOLOGIC:  No anemia, bleeding or bruising.    LYMPHATICS:  No enlarged nodes. No history of splenectomy.    PSYCHIATRIC:  No history of depression or anxiety.    ENDOCRINOLOGIC:  No reports of sweating, cold or heat intolerance. No polyuria or polydipsia.    ALLERGIES:  No history of asthma, hives, eczema or rhinitis.  ______________________________________________________________________  PHYSICAL EXAM:  T(C): 36.2 (04-28-21 @ 05:05), Max: 36.7 (04-27-21 @ 13:15)  HR: 64 (04-28-21 @ 05:05)  BP: 151/66 (04-28-21 @ 05:05)  RR: 18 (04-28-21 @ 05:05)  SpO2: 97% (04-28-21 @ 05:05)  Wt(kg): --      GEN: NAD   CVS- S1 S2  ABD: soft nontender, non distended, bowel sounds+, no rebound, no guarding  LUNGS: clear to auscultation  NEURO: non focal neuro exam;   Extremities: no cyanosis, no calf tenderness, no edema, no clubbing      ______________________________________________________________________  LABS:                        8.4    8.54  )-----------( 279      ( 28 Apr 2021 07:45 )             25.8     04-28    139  |  111<H>  |  52<H>  ----------------------------<  103<H>  4.6   |  18<L>  |  2.45<H>    Ca    8.4      28 Apr 2021 07:45  Phos  4.1     04-28  Mg     2.6     04-28    TPro  6.2  /  Alb  2.6<L>  /  TBili  0.3  /  DBili  x   /  AST  25  /  ALT  49  /  AlkPhos  202<H>  04-26    LIVER FUNCTIONS - ( 26 Apr 2021 11:13 )  Alb: 2.6 g/dL / Pro: 6.2 g/dL / ALK PHOS: 202 U/L / ALT: 49 U/L DA / AST: 25 U/L / GGT: x

## 2021-05-04 NOTE — DISCHARGE NOTE NURSING/CASE MANAGEMENT/SOCIAL WORK - PATIENT PORTAL LINK FT
You can access the FollowMyHealth Patient Portal offered by Health system by registering at the following website: [email or web address]. By joining [facility]’s FollowMyHealth portal, you will also be able to view your health information using other applications (apps) compatible with our system.

## 2021-05-04 NOTE — PHARMACOTHERAPY INTERVENTION NOTE - COMMENTS
Spoke to the patient, using  ID [id number]: Javier, concerning at home medications. Provided printed education note, in Amharic, for the patient. No questions or concerns about his medications at this time
Patient's home pharmacy ([facility] on [id number] [facility] [phone]) was contacted and the Outside Medication Record was updated based on the prescription history.    Per pharmacy, a prescription for furosemide 40mg 1 tab daily was entered in January 2021 with 5 refills, but not yet picked up by the patient.

## 2021-05-04 NOTE — PROGRESS NOTE ADULT - NSICDXPILOT_GEN_ALL_CORE
[place]

## 2021-05-05 ENCOUNTER — TRANSCRIPTION ENCOUNTER (OUTPATIENT)
Age: 66
End: 2021-05-05

## 2021-05-06 ENCOUNTER — TRANSCRIPTION ENCOUNTER (OUTPATIENT)
Age: 66
End: 2021-05-06

## 2021-05-06 LAB
GLUCOSE BLDC GLUCOMTR-MCNC: 138 MG/DL — HIGH (ref 70–99)
GLUCOSE BLDC GLUCOMTR-MCNC: 167 MG/DL — HIGH (ref 70–99)

## 2021-06-16 VITALS
OXYGEN SATURATION: 97 % | RESPIRATION RATE: 18 BRPM | DIASTOLIC BLOOD PRESSURE: 71 MMHG | SYSTOLIC BLOOD PRESSURE: 141 MMHG | WEIGHT: 166.89 LBS | HEART RATE: 80 BPM | HEIGHT: 67 IN | TEMPERATURE: 99 F

## 2021-06-16 LAB
ALBUMIN SERPL ELPH-MCNC: 3.7 G/DL — SIGNIFICANT CHANGE UP (ref 3.3–5)
ALP SERPL-CCNC: 101 U/L — SIGNIFICANT CHANGE UP (ref 40–120)
ALT FLD-CCNC: 6 U/L — LOW (ref 10–45)
ANION GAP SERPL CALC-SCNC: 12 MMOL/L — SIGNIFICANT CHANGE UP (ref 5–17)
ANISOCYTOSIS BLD QL: SIGNIFICANT CHANGE UP
APTT BLD: 35.7 SEC — HIGH (ref 27.5–35.5)
AST SERPL-CCNC: 14 U/L — SIGNIFICANT CHANGE UP (ref 10–40)
BASOPHILS # BLD AUTO: 0.04 K/UL — SIGNIFICANT CHANGE UP (ref 0–0.2)
BASOPHILS NFR BLD AUTO: 0.9 % — SIGNIFICANT CHANGE UP (ref 0–2)
BILIRUB SERPL-MCNC: 0.3 MG/DL — SIGNIFICANT CHANGE UP (ref 0.2–1.2)
BLD GP AB SCN SERPL QL: NEGATIVE — SIGNIFICANT CHANGE UP
BUN SERPL-MCNC: 48 MG/DL — HIGH (ref 7–23)
CALCIUM SERPL-MCNC: 8.5 MG/DL — SIGNIFICANT CHANGE UP (ref 8.4–10.5)
CHLORIDE SERPL-SCNC: 102 MMOL/L — SIGNIFICANT CHANGE UP (ref 96–108)
CO2 SERPL-SCNC: 25 MMOL/L — SIGNIFICANT CHANGE UP (ref 22–31)
CREAT SERPL-MCNC: 3.1 MG/DL — HIGH (ref 0.5–1.3)
DACRYOCYTES BLD QL SMEAR: SLIGHT — SIGNIFICANT CHANGE UP
ELLIPTOCYTES BLD QL SMEAR: SLIGHT — SIGNIFICANT CHANGE UP
EOSINOPHIL # BLD AUTO: 0.48 K/UL — SIGNIFICANT CHANGE UP (ref 0–0.5)
EOSINOPHIL NFR BLD AUTO: 10.4 % — HIGH (ref 0–6)
GIANT PLATELETS BLD QL SMEAR: PRESENT — SIGNIFICANT CHANGE UP
GLUCOSE SERPL-MCNC: 169 MG/DL — HIGH (ref 70–99)
HCT VFR BLD CALC: 31.2 % — LOW (ref 39–50)
HGB BLD-MCNC: 9.3 G/DL — LOW (ref 13–17)
INR BLD: 1.05 — SIGNIFICANT CHANGE UP (ref 0.88–1.16)
LYMPHOCYTES # BLD AUTO: 0.48 K/UL — LOW (ref 1–3.3)
LYMPHOCYTES # BLD AUTO: 10.4 % — LOW (ref 13–44)
MACROCYTES BLD QL: SIGNIFICANT CHANGE UP
MANUAL SMEAR VERIFICATION: SIGNIFICANT CHANGE UP
MCHC RBC-ENTMCNC: 27.9 PG — SIGNIFICANT CHANGE UP (ref 27–34)
MCHC RBC-ENTMCNC: 29.8 GM/DL — LOW (ref 32–36)
MCV RBC AUTO: 93.7 FL — SIGNIFICANT CHANGE UP (ref 80–100)
METAMYELOCYTES # FLD: 0.9 % — HIGH (ref 0–0)
MONOCYTES # BLD AUTO: 0.26 K/UL — SIGNIFICANT CHANGE UP (ref 0–0.9)
MONOCYTES NFR BLD AUTO: 5.7 % — SIGNIFICANT CHANGE UP (ref 2–14)
MYELOCYTES NFR BLD: 1.9 % — HIGH (ref 0–0)
NEUTROPHILS # BLD AUTO: 3.08 K/UL — SIGNIFICANT CHANGE UP (ref 1.8–7.4)
NEUTROPHILS NFR BLD AUTO: 67 % — SIGNIFICANT CHANGE UP (ref 43–77)
NRBC # BLD: 1 /100 — HIGH (ref 0–0)
NRBC # BLD: SIGNIFICANT CHANGE UP /100 WBCS (ref 0–0)
OVALOCYTES BLD QL SMEAR: SLIGHT — SIGNIFICANT CHANGE UP
PLAT MORPH BLD: NORMAL — SIGNIFICANT CHANGE UP
PLATELET # BLD AUTO: 238 K/UL — SIGNIFICANT CHANGE UP (ref 150–400)
POIKILOCYTOSIS BLD QL AUTO: SLIGHT — SIGNIFICANT CHANGE UP
POLYCHROMASIA BLD QL SMEAR: SLIGHT — SIGNIFICANT CHANGE UP
POTASSIUM SERPL-MCNC: 4.7 MMOL/L — SIGNIFICANT CHANGE UP (ref 3.5–5.3)
POTASSIUM SERPL-SCNC: 4.7 MMOL/L — SIGNIFICANT CHANGE UP (ref 3.5–5.3)
PROT SERPL-MCNC: 6.6 G/DL — SIGNIFICANT CHANGE UP (ref 6–8.3)
PROTHROM AB SERPL-ACNC: 12.6 SEC — SIGNIFICANT CHANGE UP (ref 10.6–13.6)
RBC # BLD: 3.33 M/UL — LOW (ref 4.2–5.8)
RBC # FLD: 21.4 % — HIGH (ref 10.3–14.5)
RBC BLD AUTO: SIGNIFICANT CHANGE UP
RH IG SCN BLD-IMP: POSITIVE — SIGNIFICANT CHANGE UP
SARS-COV-2 RNA SPEC QL NAA+PROBE: NEGATIVE — SIGNIFICANT CHANGE UP
SMUDGE CELLS # BLD: PRESENT — SIGNIFICANT CHANGE UP
SODIUM SERPL-SCNC: 139 MMOL/L — SIGNIFICANT CHANGE UP (ref 135–145)
SPHEROCYTES BLD QL SMEAR: SIGNIFICANT CHANGE UP
VARIANT LYMPHS # BLD: 2.8 % — SIGNIFICANT CHANGE UP (ref 0–6)
WBC # BLD: 4.6 K/UL — SIGNIFICANT CHANGE UP (ref 3.8–10.5)
WBC # FLD AUTO: 4.6 K/UL — SIGNIFICANT CHANGE UP (ref 3.8–10.5)

## 2021-06-16 PROCEDURE — 99285 EMERGENCY DEPT VISIT HI MDM: CPT

## 2021-06-16 RX ORDER — AMLODIPINE BESYLATE 2.5 MG/1
5 TABLET ORAL ONCE
Refills: 0 | Status: COMPLETED | OUTPATIENT
Start: 2021-06-16 | End: 2021-06-16

## 2021-06-16 RX ADMIN — AMLODIPINE BESYLATE 5 MILLIGRAM(S): 2.5 TABLET ORAL at 22:41

## 2021-06-16 NOTE — ED PROVIDER NOTE - PMH
Anemia    Cholecystitis    DM (diabetes mellitus)    HTN (hypertension)    Hypothyroidism    Unilateral inguinal hernia without obstruction or gangrene, recurrence not specified

## 2021-06-16 NOTE — ED PROVIDER NOTE - PROGRESS NOTE DETAILS
Left message for [facility] Dr Ingram Another [facility] (answering service). Left message for [facility] Dr Ingram (voicemail). spoke with [facility] on call.  SB capsule report with brisk active small bowel bleeding.  Dr Grady (colorectal surg) to be consulted; GI consult for Dr Marlow. spoke with [facility] on call Dr Perez.  SB capsule report with brisk active small bowel bleeding.  Dr Grady (colorectal surg) to be consulted; GI consult for Dr Marlow.

## 2021-06-16 NOTE — ED ADULT TRIAGE NOTE - CHIEF COMPLAINT QUOTE
pt c/o dark stool x 1 month. " had a colonoscopy yesterday and was told he rectal bleeding" pt denies cp, sob, dizziness.

## 2021-06-16 NOTE — ED PROVIDER NOTE - CLINICAL SUMMARY MEDICAL DECISION MAKING FREE TEXT BOX
patieint with melena x 1 month.  hx of severe anemia Hb 3.9 requiring prbc transfusions in the past, but no bleeding source was found.  now found to have brisk small bowel bleed on capsule endoscopy yesterday.  HD stable with no anemic symptoms, and only moderately anemic.

## 2021-06-16 NOTE — ED PROVIDER NOTE - OBJECTIVE STATEMENT
67 y/o M pt with PMHx of DM, HTN, hypothyroidism, and GI bleed/anemia requiring PRBC transfusion 1.5 months ago presents to ED c/o black stools every day x 1 month. He had capsule endoscopy performed yesterday, and his GI, Dr. Matthew Ingram, called him today and advised he come to ED for intestinal bleeding. He does not know the site of bleeding. Denies weakness, lightheadedness, dizziness, chest pain, shortness of breath, exertional chest pain or shortness of breath, fever, chills, hematuria, bruising, or any other abnormal bleeding.

## 2021-06-17 ENCOUNTER — INPATIENT (INPATIENT)
Facility: HOSPITAL | Age: 66
LOS: 2 days | Discharge: HOME | DRG: 378 | End: 2021-06-20
Attending: SURGERY | Admitting: SURGERY
Payer: MEDICARE

## 2021-06-17 DIAGNOSIS — Z98.890 OTHER SPECIFIED POSTPROCEDURAL STATES: Chronic | ICD-10-CM

## 2021-06-17 PROBLEM — D64.9 ANEMIA, UNSPECIFIED: Chronic | Status: ACTIVE | Noted: 2021-04-26

## 2021-06-17 LAB
ANION GAP SERPL CALC-SCNC: 13 MMOL/L — SIGNIFICANT CHANGE UP (ref 5–17)
BLD GP AB SCN SERPL QL: NEGATIVE — SIGNIFICANT CHANGE UP
BUN SERPL-MCNC: 46 MG/DL — HIGH (ref 7–23)
CALCIUM SERPL-MCNC: 8.3 MG/DL — LOW (ref 8.4–10.5)
CHLORIDE SERPL-SCNC: 103 MMOL/L — SIGNIFICANT CHANGE UP (ref 96–108)
CO2 SERPL-SCNC: 23 MMOL/L — SIGNIFICANT CHANGE UP (ref 22–31)
COVID-19 SPIKE DOMAIN AB INTERP: POSITIVE
COVID-19 SPIKE DOMAIN ANTIBODY RESULT: >250 U/ML — HIGH
CREAT SERPL-MCNC: 2.89 MG/DL — HIGH (ref 0.5–1.3)
GLUCOSE SERPL-MCNC: 117 MG/DL — HIGH (ref 70–99)
HCT VFR BLD CALC: 32.7 % — LOW (ref 39–50)
HGB BLD-MCNC: 9.7 G/DL — LOW (ref 13–17)
MAGNESIUM SERPL-MCNC: 2.8 MG/DL — HIGH (ref 1.6–2.6)
MCHC RBC-ENTMCNC: 27.4 PG — SIGNIFICANT CHANGE UP (ref 27–34)
MCHC RBC-ENTMCNC: 29.7 GM/DL — LOW (ref 32–36)
MCV RBC AUTO: 92.4 FL — SIGNIFICANT CHANGE UP (ref 80–100)
NRBC # BLD: 0 /100 WBCS — SIGNIFICANT CHANGE UP (ref 0–0)
PHOSPHATE SERPL-MCNC: 5.5 MG/DL — HIGH (ref 2.5–4.5)
PLATELET # BLD AUTO: 226 K/UL — SIGNIFICANT CHANGE UP (ref 150–400)
POTASSIUM SERPL-MCNC: 4.3 MMOL/L — SIGNIFICANT CHANGE UP (ref 3.5–5.3)
POTASSIUM SERPL-SCNC: 4.3 MMOL/L — SIGNIFICANT CHANGE UP (ref 3.5–5.3)
RBC # BLD: 3.54 M/UL — LOW (ref 4.2–5.8)
RBC # FLD: 21.1 % — HIGH (ref 10.3–14.5)
RH IG SCN BLD-IMP: POSITIVE — SIGNIFICANT CHANGE UP
SARS-COV-2 IGG+IGM SERPL QL IA: >250 U/ML — HIGH
SARS-COV-2 IGG+IGM SERPL QL IA: POSITIVE
SODIUM SERPL-SCNC: 139 MMOL/L — SIGNIFICANT CHANGE UP (ref 135–145)
WBC # BLD: 5.54 K/UL — SIGNIFICANT CHANGE UP (ref 3.8–10.5)
WBC # FLD AUTO: 5.54 K/UL — SIGNIFICANT CHANGE UP (ref 3.8–10.5)

## 2021-06-17 PROCEDURE — 93306 TTE W/DOPPLER COMPLETE: CPT | Mod: 26

## 2021-06-17 PROCEDURE — 99223 1ST HOSP IP/OBS HIGH 75: CPT

## 2021-06-17 RX ORDER — SODIUM CHLORIDE 9 MG/ML
1000 INJECTION, SOLUTION INTRAVENOUS
Refills: 0 | Status: DISCONTINUED | OUTPATIENT
Start: 2021-06-17 | End: 2021-06-17

## 2021-06-17 RX ORDER — SODIUM CHLORIDE 9 MG/ML
1000 INJECTION, SOLUTION INTRAVENOUS
Refills: 0 | Status: DISCONTINUED | OUTPATIENT
Start: 2021-06-17 | End: 2021-06-20

## 2021-06-17 RX ORDER — INSULIN LISPRO 100/ML
VIAL (ML) SUBCUTANEOUS
Refills: 0 | Status: DISCONTINUED | OUTPATIENT
Start: 2021-06-17 | End: 2021-06-20

## 2021-06-17 RX ORDER — CARVEDILOL PHOSPHATE 80 MG/1
12.5 CAPSULE, EXTENDED RELEASE ORAL EVERY 12 HOURS
Refills: 0 | Status: DISCONTINUED | OUTPATIENT
Start: 2021-06-17 | End: 2021-06-20

## 2021-06-17 RX ORDER — GLUCAGON INJECTION, SOLUTION 0.5 MG/.1ML
1 INJECTION, SOLUTION SUBCUTANEOUS ONCE
Refills: 0 | Status: DISCONTINUED | OUTPATIENT
Start: 2021-06-17 | End: 2021-06-20

## 2021-06-17 RX ORDER — SODIUM CHLORIDE 9 MG/ML
1000 INJECTION INTRAMUSCULAR; INTRAVENOUS; SUBCUTANEOUS
Refills: 0 | Status: DISCONTINUED | OUTPATIENT
Start: 2021-06-17 | End: 2021-06-19

## 2021-06-17 RX ORDER — HYDRALAZINE HCL 50 MG
10 TABLET ORAL ONCE
Refills: 0 | Status: COMPLETED | OUTPATIENT
Start: 2021-06-17 | End: 2021-06-17

## 2021-06-17 RX ORDER — ATORVASTATIN CALCIUM 80 MG/1
80 TABLET, FILM COATED ORAL AT BEDTIME
Refills: 0 | Status: DISCONTINUED | OUTPATIENT
Start: 2021-06-17 | End: 2021-06-20

## 2021-06-17 RX ORDER — ALLOPURINOL 300 MG
100 TABLET ORAL DAILY
Refills: 0 | Status: DISCONTINUED | OUTPATIENT
Start: 2021-06-17 | End: 2021-06-20

## 2021-06-17 RX ORDER — DEXTROSE 50 % IN WATER 50 %
25 SYRINGE (ML) INTRAVENOUS ONCE
Refills: 0 | Status: DISCONTINUED | OUTPATIENT
Start: 2021-06-17 | End: 2021-06-20

## 2021-06-17 RX ORDER — AMLODIPINE BESYLATE 2.5 MG/1
10 TABLET ORAL DAILY
Refills: 0 | Status: DISCONTINUED | OUTPATIENT
Start: 2021-06-17 | End: 2021-06-20

## 2021-06-17 RX ORDER — HYDRALAZINE HCL 50 MG
5 TABLET ORAL EVERY 6 HOURS
Refills: 0 | Status: DISCONTINUED | OUTPATIENT
Start: 2021-06-17 | End: 2021-06-18

## 2021-06-17 RX ORDER — PANTOPRAZOLE SODIUM 20 MG/1
40 TABLET, DELAYED RELEASE ORAL ONCE
Refills: 0 | Status: COMPLETED | OUTPATIENT
Start: 2021-06-17 | End: 2021-06-17

## 2021-06-17 RX ORDER — ONDANSETRON 8 MG/1
4 TABLET, FILM COATED ORAL EVERY 6 HOURS
Refills: 0 | Status: DISCONTINUED | OUTPATIENT
Start: 2021-06-17 | End: 2021-06-20

## 2021-06-17 RX ORDER — LEVOTHYROXINE SODIUM 125 MCG
50 TABLET ORAL DAILY
Refills: 0 | Status: DISCONTINUED | OUTPATIENT
Start: 2021-06-17 | End: 2021-06-20

## 2021-06-17 RX ADMIN — AMLODIPINE BESYLATE 10 MILLIGRAM(S): 2.5 TABLET ORAL at 22:15

## 2021-06-17 RX ADMIN — Medication 10 MILLIGRAM(S): at 20:49

## 2021-06-17 RX ADMIN — Medication 10 MILLIGRAM(S): at 10:55

## 2021-06-17 RX ADMIN — CARVEDILOL PHOSPHATE 12.5 MILLIGRAM(S): 80 CAPSULE, EXTENDED RELEASE ORAL at 04:45

## 2021-06-17 RX ADMIN — Medication 5 MILLIGRAM(S): at 17:03

## 2021-06-17 RX ADMIN — ATORVASTATIN CALCIUM 80 MILLIGRAM(S): 80 TABLET, FILM COATED ORAL at 22:15

## 2021-06-17 RX ADMIN — Medication 50 MICROGRAM(S): at 04:45

## 2021-06-17 RX ADMIN — CARVEDILOL PHOSPHATE 12.5 MILLIGRAM(S): 80 CAPSULE, EXTENDED RELEASE ORAL at 17:03

## 2021-06-17 RX ADMIN — Medication 5 MILLIGRAM(S): at 04:45

## 2021-06-17 RX ADMIN — Medication 100 MILLIGRAM(S): at 12:20

## 2021-06-17 RX ADMIN — PANTOPRAZOLE SODIUM 40 MILLIGRAM(S): 20 TABLET, DELAYED RELEASE ORAL at 01:43

## 2021-06-17 RX ADMIN — SODIUM CHLORIDE 50 MILLILITER(S): 9 INJECTION INTRAMUSCULAR; INTRAVENOUS; SUBCUTANEOUS at 04:45

## 2021-06-17 RX ADMIN — Medication 5 MILLIGRAM(S): at 12:20

## 2021-06-17 NOTE — CONSULT NOTE ADULT - SUBJECTIVE AND OBJECTIVE BOX
GASTROENTEROLOGY CONSULT NOTE  HPI:  Patient is a 66 year with PMH of DM, anemia, CKD, HTN and hypothyroidism, PSH of right inguinal hernia repair, presents to ED today after he has been called by his GI doctor who did capsule endoscopy  yesterday was told he has brisk bleeding in small bowel. According to the patient he has been having those intermittent episodes of melena for a few months and over the last months he has been having black stools every single day.  The patient denies abdominal pain, nausea, vomiting fever or chills, denies family history of IBD or colon Ca.  Of note, patient was in ED in January 2021 for anemia with Hgb of 5 and received 2 PRBCs and had EGD/colonoscopy done by Dr. Caballero which showed 2 nodules with possible AVM on EGD and 3cm multilobulated sessile polyp in right colon noted on colonoscopy, was recently aditted to [facility] in April with weakness and anemia.      Surgery team was consulted for comanagement with GI for findings of capsule endoscopy    On arrival to ED he was afebrile, HR 75, /80, Hgb 9.3, cr 3.1 (same as last admission in  in April)              PMH: DM, anemia, CKD, HTN and hypothyroidism,  PSH: right inguinal hernia repair  SH: used to drink a bottle of wine every week, quit a year ago, denies tobacco or recreational drusg  FH: Denies  Allerg: NKDA     (17 Jun 2021 01:52)    Allergies    No Known Allergies    Intolerances      Home Medications:  acetaminophen 325 mg oral tablet: 2 tab(s) orally every 6 hours, As needed, Temp greater or equal to 38C (100.4F), Moderate Pain (4 - 6) (17 Jun 2021 01:38)  allopurinol 100 mg oral tablet: 1 tab(s) orally once a day (17 Jun 2021 01:38)  amLODIPine 10 mg oral tablet: 1 tab(s) orally once a day (17 Jun 2021 01:38)  aspirin 81 mg oral tablet, chewable: 1 tab(s) orally once a day (17 Jun 2021 01:38)  atorvastatin 80 mg oral tablet: 1 tab(s) orally once a day (17 Jun 2021 01:38)  betamethasone dipropionate, augmented 0.05% topical ointment: Apply topically to affected area 2 times a day (17 Jun 2021 01:38)  carboxymethylcellulose-glycerin 0.5-0.9 sol: 1 dose(s) to each affected eye 4 times a day (17 Jun 2021 01:38)  carvedilol 12.5 mg oral tablet: 1 tab(s) orally 2 times a day (17 Jun 2021 01:38)  ferric citrate 210 mg oral tablet:  (17 Jun 2021 01:38)  hydrALAZINE 100 mg oral tablet: 1 tab(s) orally 3 times a day (17 Jun 2021 01:38)  levothyroxine 50 mcg (0.05 mg) oral tablet: 1 tab(s) orally once a day (17 Jun 2021 01:38)  metroNIDAZOLE 0.75% topical gel: Apply topically to affected area 2 times a day to the face (17 Jun 2021 01:38)  NIFEdipine 60 mg oral tablet, extended release: 1 tab(s) orally once a day (17 Jun 2021 01:38)  pantoprazole 40 mg oral delayed release tablet: 1 tab(s) orally once a day (17 Jun 2021 01:38)  Triple Antibiotic topical ointment: Apply topically to affected area 4 times a day (17 Jun 2021 01:38)    MEDICATIONS:  MEDICATIONS  (STANDING):  allopurinol 100 milliGRAM(s) Oral daily  amLODIPine   Tablet 10 milliGRAM(s) Oral daily  atorvastatin 80 milliGRAM(s) Oral at bedtime  carvedilol 12.5 milliGRAM(s) Oral every 12 hours  dextrose 5%. 1000 milliLiter(s) (100 mL/Hr) IV Continuous <Continuous>  dextrose 50% Injectable 25 Gram(s) IV Push once  glucagon  Injectable 1 milliGRAM(s) IntraMuscular once  hydrALAZINE 5 milliGRAM(s) Oral every 6 hours  insulin lispro (ADMELOG) corrective regimen sliding scale   SubCutaneous three times a day before meals  levothyroxine 50 MICROGram(s) Oral daily  sodium chloride 0.9%. 1000 milliLiter(s) (50 mL/Hr) IV Continuous <Continuous>    MEDICATIONS  (PRN):  ondansetron Injectable 4 milliGRAM(s) IV Push every 6 hours PRN Nausea and/or Vomiting    PAST MEDICAL & SURGICAL HISTORY:  DM (diabetes mellitus)    HTN (hypertension)    Hypothyroidism    Cholecystitis    Unilateral inguinal hernia without obstruction or gangrene, recurrence not specified    Anemia    H/O right inguinal hernia repair      FAMILY HISTORY:    SOCIAL HISTORY:  [place]: [ ] Current, [ ] Former, [ ] Never; Pack Years:  Alcohol:  Illicit Drugs:    REVIEW OF SYSTEMS:  CONSTITUTIONAL: No fevers or chills  HEENT: No visual changes; No vertigo or throat pain   NECK: No pain or stiffness  RESPIRATORY: No cough, wheezing, hemoptysis; No shortness of breath  CARDIOVASCULAR: No chest pain or palpitations  GASTROINTESTINAL: as above  GENITOURINARY: No dysuria, frequency or hematuria  NEUROLOGICAL: No numbness or weakness  SKIN: No itching, burning, rashes, or lesions   All other 10 review of systems is negative unless indicated above.    Vital Signs Last 24 Hrs  T(C): 36.7 (17 Jun 2021 06:22), Max: 37.2 (16 Jun 2021 22:17)  T(F): 98 (17 Jun 2021 06:22), Max: 98.9 (16 Jun 2021 22:17)  HR: 56 (17 Jun 2021 13:58) (55 - 80)  BP: 180/91 (17 Jun 2021 13:58) (141/71 - 204/82)  BP(mean): 113 (17 Jun 2021 13:58) (98 - 115)  RR: 18 (17 Jun 2021 13:58) (17 - 18)  SpO2: 100% (17 Jun 2021 13:58) (97% - 100%)    06-16 @ 07:01  -  06-17 @ 07:00  --------------------------------------------------------  IN: 150 mL / OUT: 0 mL / NET: 150 mL        PHYSICAL EXAM:    General: Well developed; in no acute distress  Eyes: Anicteric sclerae, moist conjunctivae  HENT: Moist mucous membranes  Neck: Trachea midline, supple  Lungs: Normal respiratory effort  Cardiovascular: RRR  Abdomen: Soft, non-tender non-distended; No rebound or guarding  Extremities: Normal range of motion, No clubbing, cyanosis or edema  Neurological: Alert and oriented x3  Skin: Warm and dry. No obvious rash    LABS:                        9.7    5.54  )-----------( 226      ( 17 Jun 2021 06:17 )             32.7     06-17    139  |  103  |  46<H>  ----------------------------<  117<H>  4.3   |  23  |  2.89<H>    Ca    8.3<L>      17 Jun 2021 06:17  Phos  5.5     06-17  Mg     2.8     06-17    TPro  6.6  /  Alb  3.7  /  TBili  0.3  /  DBili  x   /  AST  14  /  ALT  6<L>  /  AlkPhos  101  06-16        PT/INR - ( 16 Jun 2021 21:17 )   PT: 12.6 sec;   INR: 1.05          PTT - ( 16 Jun 2021 21:17 )  PTT:35.7 sec    RADIOLOGY & ADDITIONAL STUDIES:     Reviewed

## 2021-06-17 NOTE — ED ADULT NURSE NOTE - CHIEF COMPLAINT
The patient is a 66y Male complaining of rectal bleeding. Patient with unknown pmhx, presents with nausea after smoking K2. denies cp, sob, abd pain, or diarrhea. denies vomiting. denies other complaints. notes this has happened in the past after smoking.

## 2021-06-17 NOTE — H&P ADULT - NSHPLABSRESULTS_GEN_ALL_CORE
LABS:                        9.3    4.60  )-----------( 238      ( 16 Jun 2021 21:17 )             31.2     06-16    139  |  102  |  48<H>  ----------------------------<  169<H>  4.7   |  25  |  3.10<H>    Ca    8.5      16 Jun 2021 21:17    TPro  6.6  /  Alb  3.7  /  TBili  0.3  /  DBili  x   /  AST  14  /  ALT  6<L>  /  AlkPhos  101  06-16    PT/INR - ( 16 Jun 2021 21:17 )   PT: 12.6 sec;   INR: 1.05          PTT - ( 16 Jun 2021 21:17 )  PTT:35.7 sec    CAPILLARY BLOOD GLUCOSE        LIVER FUNCTIONS - ( 16 Jun 2021 21:17 )  Alb: 3.7 g/dL / Pro: 6.6 g/dL / ALK PHOS: 101 U/L / ALT: 6 U/L / AST: 14 U/L / GGT: x             Cultures:      RADIOLOGY & ADDITIONAL STUDIES:

## 2021-06-17 NOTE — H&P ADULT - ASSESSMENT
Patient is a 66 year with PMH of DM, anemia, CKD, HTN and hypothyroidism, PSH of right inguinal hernia repair, presents to ED today after he has been called by his GI doctor who did capsule endoscopy  yesterday was told he has brisk bleeding in small bowel. According to the patient he has been having those intermittent episodes of melena for a few months and over the last months he has been having black stools every single day.    On arrival to ED he was afebrile, HR 75, /80, Hgb 9.3, cr 3.1 (same as last admission in  in April)    Discussed with Dr. Grady and chief resident:  Plan:  - Admit to tele  - GI consult for possible enteroscopy in am  - NPO except for meds  - Monitor for signs of bleeding

## 2021-06-17 NOTE — H&P ADULT - NSHPPHYSICALEXAM_GEN_ALL_CORE
Vital Signs Last 24 Hrs  T(C): 36.7 (17 Jun 2021 01:28), Max: 37.2 (16 Jun 2021 22:17)  T(F): 98 (17 Jun 2021 01:28), Max: 98.9 (16 Jun 2021 22:17)  HR: 70 (17 Jun 2021 01:28) (61 - 80)  BP: 191/78 (17 Jun 2021 01:28) (141/71 - 204/82)  BP(mean): --  RR: 17 (17 Jun 2021 01:28) (17 - 18)  SpO2: 100% (17 Jun 2021 01:28) (97% - 100%)          Physical Exam:  General: NAD, resting comfortably  HEENT: NC/AT, EOMI, normal hearing, no oral lesions, no LAD, neck supple  Pulmonary: normal resp effort, CTA-B  Cardiovascular: NSR, no murmurs  Abdominal: soft, ND/NT, no organomegaly  Extremities: WWP, normal strength, no clubbing/cyanosis/edema  Neuro: A/O x 3, CNs II-XII grossly intact, normal sensation, no focal deficits  Pulses: palpable distal pulses

## 2021-06-17 NOTE — CONSULT NOTE ADULT - ASSESSMENT
66 year with PMH of DM, anemia, CKD, HTN and hypothyroidism, PSH of right inguinal hernia repair, presents to ED today after he has been called by his GI doctor (Dr Glenn Ingram) who did capsule endoscopy yesterday was told he has brisk bleeding in small bowel. According to the patient he has been having those intermittent episodes of melena for a few months and over the last months. Of note, patient was in ED in January 2021 for anemia with Hgb of 5 and received 2 PRBCs and had EGD/colonoscopy done by Dr. Caballero which showed ?2 nodules with possible AVM on EGD and 3cm multilobulated sessile polyp in right colon noted on colonoscopy, was recently admitted to [facility] in April with weakness and anemia.    # SB bleeding  Normocytic anemia  Hgb stable  - last dark bm this am  - obtain records of the VCE and EGD/colonoscopy  - Clear liquid diet. NPO past midnight for SBE tomorrow    Recommendations discussed with primary team  Plan discussed with GI service attending    Harsh Zamora MD  PGY-4 GI fellow  Pager: [phone]

## 2021-06-17 NOTE — H&P ADULT - HISTORY OF PRESENT ILLNESS
Patient is a 66 year with PMH of DM, anemia, CKD, HTN and hypothyroidism, PSH of right inguinal hernia repair, presents to ED today after he has been called by his GI doctor who did capsule endoscopy  yesterday was told he has brisk bleeding in small bowel. According to the patient he has been having those intermittent episodes of melena for a few months and over the last months he has been having black stools every single day.  The patient denies abdominal pain, nausea, vomiting fever or chills, denies family history of IBD or colon Ca.  Of note, patient was in ED in January 2021 for anemia with Hgb of 5 and received 2 PRBCs and had EGD/colonoscopy done by Dr. Caballero which showed 2 nodules with possible AVM on EGD and 3cm multilobulated sessile polyp in right colon noted on colonoscopy, was recently aditted to [facility] in April with weakness and anemia.      Surgery team was consulted for comanagement with GI for findings of capsule endoscopy    On arrival to ED he was afebrile, HR 75, /80, Hgb 9.3, cr 3.1 (same as last admission in  in April)              PMH: DM, anemia, CKD, HTN and hypothyroidism,  PSH: right inguinal hernia repair  SH: used to drink a bottle of wine every week, quit a year ago, denies tobacco or recreational drusg  : Denies  Allerg: NKDA

## 2021-06-17 NOTE — ED ADULT NURSE NOTE - OBJECTIVE STATEMENT
66Y Male A&OX3 C/O black tarry stool 66Y Male A&OX3 C/O black tarry stool for one month. Pt had a endoscopy yesterday and sent in by MD Ingram to be evaluated for GI bleed. NPO since 17:00 today. Pt gets RBC transfusions once a month for Anemia. Last reported Yesterday. Denies dizziness, sob, fever, chills, cough, n.v, nor abd pain.

## 2021-06-18 ENCOUNTER — TRANSCRIPTION ENCOUNTER (OUTPATIENT)
Age: 66
End: 2021-06-18

## 2021-06-18 LAB
ANION GAP SERPL CALC-SCNC: 13 MMOL/L — SIGNIFICANT CHANGE UP (ref 5–17)
APPEARANCE UR: CLEAR — SIGNIFICANT CHANGE UP
APTT BLD: 38.1 SEC — HIGH (ref 27.5–35.5)
BACTERIA # UR AUTO: PRESENT /HPF
BILIRUB UR-MCNC: NEGATIVE — SIGNIFICANT CHANGE UP
BLD GP AB SCN SERPL QL: NEGATIVE — SIGNIFICANT CHANGE UP
BUN SERPL-MCNC: 42 MG/DL — HIGH (ref 7–23)
CALCIUM SERPL-MCNC: 8.4 MG/DL — SIGNIFICANT CHANGE UP (ref 8.4–10.5)
CHLORIDE SERPL-SCNC: 105 MMOL/L — SIGNIFICANT CHANGE UP (ref 96–108)
CO2 SERPL-SCNC: 21 MMOL/L — LOW (ref 22–31)
COLOR SPEC: YELLOW — SIGNIFICANT CHANGE UP
COMMENT - URINE: SIGNIFICANT CHANGE UP
CREAT ?TM UR-MCNC: 102 MG/DL — SIGNIFICANT CHANGE UP
CREAT SERPL-MCNC: 2.64 MG/DL — HIGH (ref 0.5–1.3)
DIFF PNL FLD: NEGATIVE — SIGNIFICANT CHANGE UP
EPI CELLS # UR: SIGNIFICANT CHANGE UP /HPF (ref 0–5)
GLUCOSE SERPL-MCNC: 92 MG/DL — SIGNIFICANT CHANGE UP (ref 70–99)
GLUCOSE UR QL: NEGATIVE — SIGNIFICANT CHANGE UP
HCT VFR BLD CALC: 33.5 % — LOW (ref 39–50)
HGB BLD-MCNC: 9.8 G/DL — LOW (ref 13–17)
HYALINE CASTS # UR AUTO: SIGNIFICANT CHANGE UP /LPF (ref 0–2)
INR BLD: 1.1 — SIGNIFICANT CHANGE UP (ref 0.88–1.16)
KETONES UR-MCNC: NEGATIVE — SIGNIFICANT CHANGE UP
LEUKOCYTE ESTERASE UR-ACNC: NEGATIVE — SIGNIFICANT CHANGE UP
MAGNESIUM SERPL-MCNC: 2.6 MG/DL — SIGNIFICANT CHANGE UP (ref 1.6–2.6)
MCHC RBC-ENTMCNC: 27.2 PG — SIGNIFICANT CHANGE UP (ref 27–34)
MCHC RBC-ENTMCNC: 29.3 GM/DL — LOW (ref 32–36)
MCV RBC AUTO: 93.1 FL — SIGNIFICANT CHANGE UP (ref 80–100)
NITRITE UR-MCNC: NEGATIVE — SIGNIFICANT CHANGE UP
NRBC # BLD: 0 /100 WBCS — SIGNIFICANT CHANGE UP (ref 0–0)
OSMOLALITY UR: 384 MOSM/KG — SIGNIFICANT CHANGE UP (ref 300–900)
PH UR: 6 — SIGNIFICANT CHANGE UP (ref 5–8)
PHOSPHATE SERPL-MCNC: 4.4 MG/DL — SIGNIFICANT CHANGE UP (ref 2.5–4.5)
PLATELET # BLD AUTO: 226 K/UL — SIGNIFICANT CHANGE UP (ref 150–400)
POTASSIUM SERPL-MCNC: 4.2 MMOL/L — SIGNIFICANT CHANGE UP (ref 3.5–5.3)
POTASSIUM SERPL-SCNC: 4.2 MMOL/L — SIGNIFICANT CHANGE UP (ref 3.5–5.3)
PROT UR-MCNC: 100 MG/DL
PROTHROM AB SERPL-ACNC: 13.1 SEC — SIGNIFICANT CHANGE UP (ref 10.6–13.6)
RBC # BLD: 3.6 M/UL — LOW (ref 4.2–5.8)
RBC # FLD: 21.1 % — HIGH (ref 10.3–14.5)
RBC CASTS # UR COMP ASSIST: < 5 /HPF — SIGNIFICANT CHANGE UP
RH IG SCN BLD-IMP: POSITIVE — SIGNIFICANT CHANGE UP
SODIUM SERPL-SCNC: 139 MMOL/L — SIGNIFICANT CHANGE UP (ref 135–145)
SODIUM UR-SCNC: 61 MMOL/L — SIGNIFICANT CHANGE UP
SP GR SPEC: 1.02 — SIGNIFICANT CHANGE UP (ref 1–1.03)
UROBILINOGEN FLD QL: 0.2 E.U./DL — SIGNIFICANT CHANGE UP
UUN UR-MCNC: 484 MG/DL — SIGNIFICANT CHANGE UP
WBC # BLD: 4.22 K/UL — SIGNIFICANT CHANGE UP (ref 3.8–10.5)
WBC # FLD AUTO: 4.22 K/UL — SIGNIFICANT CHANGE UP (ref 3.8–10.5)
WBC UR QL: < 5 /HPF — SIGNIFICANT CHANGE UP

## 2021-06-18 PROCEDURE — 44360 SMALL BOWEL ENDOSCOPY: CPT

## 2021-06-18 PROCEDURE — 99221 1ST HOSP IP/OBS SF/LOW 40: CPT

## 2021-06-18 RX ORDER — HYDRALAZINE HCL 50 MG
100 TABLET ORAL THREE TIMES A DAY
Refills: 0 | Status: DISCONTINUED | OUTPATIENT
Start: 2021-06-18 | End: 2021-06-20

## 2021-06-18 RX ADMIN — Medication 100 MILLIGRAM(S): at 21:14

## 2021-06-18 RX ADMIN — ATORVASTATIN CALCIUM 80 MILLIGRAM(S): 80 TABLET, FILM COATED ORAL at 21:14

## 2021-06-18 RX ADMIN — CARVEDILOL PHOSPHATE 12.5 MILLIGRAM(S): 80 CAPSULE, EXTENDED RELEASE ORAL at 21:15

## 2021-06-18 RX ADMIN — AMLODIPINE BESYLATE 10 MILLIGRAM(S): 2.5 TABLET ORAL at 10:13

## 2021-06-18 RX ADMIN — Medication 5 MILLIGRAM(S): at 11:01

## 2021-06-18 RX ADMIN — Medication 5 MILLIGRAM(S): at 02:37

## 2021-06-18 RX ADMIN — Medication 100 MILLIGRAM(S): at 11:01

## 2021-06-18 RX ADMIN — Medication 5 MILLIGRAM(S): at 07:11

## 2021-06-18 RX ADMIN — CARVEDILOL PHOSPHATE 12.5 MILLIGRAM(S): 80 CAPSULE, EXTENDED RELEASE ORAL at 06:21

## 2021-06-18 RX ADMIN — Medication 50 MICROGRAM(S): at 06:21

## 2021-06-18 NOTE — PROGRESS NOTE ADULT - SUBJECTIVE AND OBJECTIVE BOX
INTERVAL HPI/OVERNIGHT EVENTS: EGD tomorrow, NPO at midnight, /76, HR 60, 10 hydralazine given     SUBJECTIVE: Pt seen and examined at bedside this am by surgery team. No acute complaints. Denies f/n/v/cp/sob.    MEDICATIONS  (STANDING):  allopurinol 100 milliGRAM(s) Oral daily  amLODIPine   Tablet 10 milliGRAM(s) Oral daily  atorvastatin 80 milliGRAM(s) Oral at bedtime  carvedilol 12.5 milliGRAM(s) Oral every 12 hours  dextrose 5%. 1000 milliLiter(s) (100 mL/Hr) IV Continuous <Continuous>  dextrose 50% Injectable 25 Gram(s) IV Push once  glucagon  Injectable 1 milliGRAM(s) IntraMuscular once  hydrALAZINE 5 milliGRAM(s) Oral every 6 hours  insulin lispro (ADMELOG) corrective regimen sliding scale   SubCutaneous every 6 hours  levothyroxine 50 MICROGram(s) Oral daily  sodium chloride 0.9%. 1000 milliLiter(s) (50 mL/Hr) IV Continuous <Continuous>    MEDICATIONS  (PRN):  ondansetron Injectable 4 milliGRAM(s) IV Push every 6 hours PRN Nausea and/or Vomiting      Vital Signs Last 24 Hrs  T(C): 37 (18 Jun 2021 09:15), Max: 37 (18 Jun 2021 09:15)  T(F): 98.6 (18 Jun 2021 09:15), Max: 98.6 (18 Jun 2021 09:15)  HR: 62 (18 Jun 2021 08:36) (55 - 66)  BP: 146/68 (18 Jun 2021 08:36) (146/68 - 180/91)  BP(mean): 98 (18 Jun 2021 08:36) (96 - 115)  RR: 18 (18 Jun 2021 08:36) (17 - 18)  SpO2: 97% (18 Jun 2021 08:36) (95% - 100%)    PHYSICAL EXAM:    Constitutional: A&Ox3, NAD    Respiratory: non labored breathing, no respiratory distress    Cardiovascular: NSR, RRR    Gastrointestinal: abdomen soft, nd, nt     Extremities: wwp, no calf tenderness or edema. SCDs in place       I&O's Detail    17 Jun 2021 07:01  -  18 Jun 2021 07:00  --------------------------------------------------------  IN:    sodium chloride 0.9%: 850 mL  Total IN: 850 mL    OUT:    Voided (mL): 1100 mL  Total OUT: 1100 mL    Total NET: -250 mL          LABS:                        9.8    4.22  )-----------( 226      ( 18 Jun 2021 06:56 )             33.5     06-18    139  |  105  |  42<H>  ----------------------------<  92  4.2   |  21<L>  |  2.64<H>    Ca    8.4      18 Jun 2021 06:56  Phos  4.4     06-18  Mg     2.6     06-18    TPro  6.6  /  Alb  3.7  /  TBili  0.3  /  DBili  x   /  AST  14  /  ALT  6<L>  /  AlkPhos  101  06-16    PT/INR - ( 18 Jun 2021 06:56 )   PT: 13.1 sec;   INR: 1.10          PTT - ( 18 Jun 2021 06:56 )  PTT:38.1 sec      RADIOLOGY & ADDITIONAL STUDIES:

## 2021-06-18 NOTE — CHART NOTE - NSCHARTNOTEFT_GEN_A_CORE
Single Balloon enteroscopy and push enteroscopy:    The scope could only reach proximal jejunum after which no further gain could be achieved with single balloon enteroscopy. No stigmata of active or recent bleeding was observed in the visualized extent of small bowel in the jejunum.       Plan: Resume Previous Diet  In case of further bleeding may need surgical exploration of small bowel for control of bleeding

## 2021-06-18 NOTE — CONSULT NOTE ADULT - SUBJECTIVE AND OBJECTIVE BOX
66M PMH HTN, DM, CKD, anemia, found with bleeding on VCE pending colonoscopy 6/18/21 with possible plan for OR for exploratory lap next Wednesday. Cardiology consulted for heart.          HPI:  Patient is a 66 year with PMH of DM, anemia, CKD, HTN and hypothyroidism, PSH of right inguinal hernia repair, presents to ED today after he has been called by his GI doctor who did capsule endoscopy  yesterday was told he has brisk bleeding in small bowel. According to the patient he has been having those intermittent episodes of melena for a few months and over the last months he has been having black stools every single day.  The patient denies abdominal pain, nausea, vomiting fever or chills, denies family history of IBD or colon Ca.  Of note, patient was in ED in January 2021 for anemia with Hgb of 5 and received 2 PRBCs and had EGD/colonoscopy done by Dr. Caballero which showed 2 nodules with possible AVM on EGD and 3cm multilobulated sessile polyp in right colon noted on colonoscopy, was recently aditted to [facility] in April with weakness and anemia.      Surgery team was consulted for comanagement with GI for findings of capsule endoscopy    On arrival to ED he was afebrile, HR 75, /80, Hgb 9.3, cr 3.1 (same as last admission in  in April)              PMH: DM, anemia, CKD, HTN and hypothyroidism,  PSH: right inguinal hernia repair  SH: used to drink a bottle of wine every week, quit a year ago, denies tobacco or recreational drusg  FH: Denies  Allerg: NKDA     (17 Jun 2021 01:52)    PAST MEDICAL & SURGICAL HISTORY:  DM (diabetes mellitus)    HTN (hypertension)    Hypothyroidism    Cholecystitis    Unilateral inguinal hernia without obstruction or gangrene, recurrence not specified    Anemia    H/O right inguinal hernia repair      [ ] Diabetes   [ ] Hypertension  [ ] Hyperlipidemia  [ ] CAD  [ ] PCI  [ ] CABG    PREVIOUS DIAGNOSTIC TESTING:    [ ] Echocardiogram:  [ ] Stress Test:  [ ] Catheterization: 	    FAMILY HISTORY:    SOCIAL HISTORY:    [ ] Non-smoker  [ ] Current Smoker  [ ] Former Smoker  [ ] Alcohol Use  [ ] Drug Use    ALLERGIES/INTOLERANCES:  No Known Allergies    HOME MEDICATIONS:    INPATIENT MEDICATIONS:  amLODIPine   Tablet 10 milliGRAM(s) Oral daily  carvedilol 12.5 milliGRAM(s) Oral every 12 hours  hydrALAZINE 5 milliGRAM(s) Oral every 6 hours      allopurinol 100 milliGRAM(s) Oral daily  atorvastatin 80 milliGRAM(s) Oral at bedtime  dextrose 5%. 1000 milliLiter(s) IV Continuous <Continuous>  dextrose 50% Injectable 25 Gram(s) IV Push once  glucagon  Injectable 1 milliGRAM(s) IntraMuscular once  insulin lispro (ADMELOG) corrective regimen sliding scale   SubCutaneous every 6 hours  levothyroxine 50 MICROGram(s) Oral daily  ondansetron Injectable 4 milliGRAM(s) IV Push every 6 hours PRN  sodium chloride 0.9%. 1000 milliLiter(s) IV Continuous <Continuous>      REVIEW OF SYSTEMS:    CONSTITUTIONAL: No weakness, F/C, wt loss/gain  EYES: No visual changes/disturbances  ENMT: No dry mouth, no vertigo  NECK: No pain or stiffness  RESPIRATORY: No cough, wheezing, hemoptysis; No shortness of breath  CARDIOVASCULAR: No chest pain, palpitations, lightheadedness/dizziness, LOC, or leg swelling  GASTROINTESTINAL: No abdominal or epigastric pain. No N/V/D/C. No melena, hematochezia, or hematemesis.  GENITOURINARY: No dysuria, increased frequency, hematuria, or incontinence  NEUROLOGICAL: No lightheadedness/dizziness, LOC, headaches, numbness or weakness  MUSCULOSKELETAL: No joint pain or swelling; No muscle, back, or extremity pain  SKIN: No itching, burning, rashes, or lesions   ENDOCRINE: No heat or cold intolerance; No hair loss  HEME/LYMPH: No easy bruising, or bleeding gums  PSYCHIATRIC: No depression, anxiety, mood swings, or difficulty sleeping    [ ] All other review of systems are negative unless indicated above.  [ ] Unable to obtain due to:    PHYSICAL EXAM:    T(C): 37 (06-18-21 @ 09:15), Max: 37 (06-18-21 @ 09:15)  HR: 62 (06-18-21 @ 08:36) (56 - 66)  BP: 146/68 (06-18-21 @ 08:36) (146/68 - 180/91)  RR: 18 (06-18-21 @ 08:36) (17 - 18)  SpO2: 97% (06-18-21 @ 08:36) (95% - 100%)  Wt(kg): --    I&O's Summary    17 Jun 2021 07:01  -  18 Jun 2021 07:00  --------------------------------------------------------  IN: 850 mL / OUT: 1100 mL / NET: -250 mL    GENERAL: NAD, well-developed  HEAD:  NCAT  HEENT: EOMI, PERRL, conjunctiva and sclera clear; moist mucosa; Neck supple, No JVD  CARDIOVASCULAR: RRR, normal S1 S2, no M/R/G, no JVD, neg HJR, nondisplaced PMI, no LE edema  RESPIRATORY: Lungs clear to auscultation b/l, no C/W/R  GASTROINTESTINAL: +BS, soft, non-distended, non-tender, no HSM  VASCULAR: Peripheral pulses palpable 2+ bilaterally  EXTREMITIES: Warm. No clubbing, cyanosis or edema. Normal range of motion.  SKIN: No rashes, lesions, ecchymoses, or cyanosis  NEURO: AAOx3, no focal deficits  PSYCH: Nl behavior, nl affect  LINES:    TELEMETRY: 	      ECG:  	  	  LABS:                        9.8    4.22  )-----------( 226      ( 18 Jun 2021 06:56 )             33.5     06-18    139  |  105  |  42<H>  ----------------------------<  92  4.2   |  21<L>  |  2.64<H>    Ca    8.4      18 Jun 2021 06:56  Phos  4.4     06-18  Mg     2.6     06-18    TPro  6.6  /  Alb  3.7  /  TBili  0.3  /  DBili  x   /  AST  14  /  ALT  6<L>  /  AlkPhos  101  06-16      ASSESSMENT/PLAN: 	  66M PMH HTN, DM, CKD, anemia, found with bleeding on VCE pending colonoscopy 6/18/21 with possible plan for OR for exploratory lap next Wednesday. Cardiology consulted for heart.              incomplete note***   66M PMH HTN, DM, CKD, anemia, found with bleeding on VCE pending colonoscopy 6/18/21 with possible plan for OR for exploratory lap next Wednesday. Cardiology consulted for pre-op clearance        HPI:  Patient is a 66 year with PMH of DM, anemia, CKD, HTN and hypothyroidism, PSH of right inguinal hernia repair, presents to ED today after he has been called by his GI doctor who did capsule endoscopy  yesterday was told he has brisk bleeding in small bowel. According to the patient he has been having those intermittent episodes of melena for a few months and over the last months he has been having black stools every single day.  The patient denies abdominal pain, nausea, vomiting fever or chills, denies family history of IBD or colon Ca.  Of note, patient was in ED in January 2021 for anemia with Hgb of 5 and received 2 PRBCs and had EGD/colonoscopy done by Dr. Caballero which showed 2 nodules with possible AVM on EGD and 3cm multilobulated sessile polyp in right colon noted on colonoscopy, was recently aditted to [facility] in April with weakness and anemia.      Surgery team was consulted for comanagement with GI for findings of capsule endoscopy    On arrival to ED he was afebrile, HR 75, /80, Hgb 9.3, cr 3.1 (same as last admission in  in April)              PMH: DM, anemia, CKD, HTN and hypothyroidism,  PSH: right inguinal hernia repair  SH: used to drink a bottle of wine every week, quit a year ago, denies tobacco or recreational drusg  FH: Denies  Allerg: NKDA     (17 Jun 2021 01:52)    PAST MEDICAL & SURGICAL HISTORY:  DM (diabetes mellitus)    HTN (hypertension)    Hypothyroidism    Cholecystitis    Unilateral inguinal hernia without obstruction or gangrene, recurrence not specified    Anemia    H/O right inguinal hernia repair      [ ] Diabetes   [ ] Hypertension  [ ] Hyperlipidemia  [ ] CAD  [ ] PCI  [ ] CABG    PREVIOUS DIAGNOSTIC TESTING:    [ ] Echocardiogram:  [ ] Stress Test:  [ ] Catheterization: 	    FAMILY HISTORY:    SOCIAL HISTORY:    [ ] Non-smoker  [ ] Current Smoker  [ ] Former Smoker  [ ] Alcohol Use  [ ] Drug Use    ALLERGIES/INTOLERANCES:  No Known Allergies    HOME MEDICATIONS:    INPATIENT MEDICATIONS:  amLODIPine   Tablet 10 milliGRAM(s) Oral daily  carvedilol 12.5 milliGRAM(s) Oral every 12 hours  hydrALAZINE 5 milliGRAM(s) Oral every 6 hours      allopurinol 100 milliGRAM(s) Oral daily  atorvastatin 80 milliGRAM(s) Oral at bedtime  dextrose 5%. 1000 milliLiter(s) IV Continuous <Continuous>  dextrose 50% Injectable 25 Gram(s) IV Push once  glucagon  Injectable 1 milliGRAM(s) IntraMuscular once  insulin lispro (ADMELOG) corrective regimen sliding scale   SubCutaneous every 6 hours  levothyroxine 50 MICROGram(s) Oral daily  ondansetron Injectable 4 milliGRAM(s) IV Push every 6 hours PRN  sodium chloride 0.9%. 1000 milliLiter(s) IV Continuous <Continuous>      REVIEW OF SYSTEMS:    CONSTITUTIONAL: No weakness, F/C, wt loss/gain  EYES: No visual changes/disturbances  ENMT: No dry mouth, no vertigo  NECK: No pain or stiffness  RESPIRATORY: No cough, wheezing, hemoptysis; No shortness of breath  CARDIOVASCULAR: No chest pain, palpitations, lightheadedness/dizziness, LOC, or leg swelling  GASTROINTESTINAL: No abdominal or epigastric pain. No N/V/D/C. No melena, hematochezia, or hematemesis.  GENITOURINARY: No dysuria, increased frequency, hematuria, or incontinence  NEUROLOGICAL: No lightheadedness/dizziness, LOC, headaches, numbness or weakness  MUSCULOSKELETAL: No joint pain or swelling; No muscle, back, or extremity pain  SKIN: No itching, burning, rashes, or lesions   ENDOCRINE: No heat or cold intolerance; No hair loss  HEME/LYMPH: No easy bruising, or bleeding gums  PSYCHIATRIC: No depression, anxiety, mood swings, or difficulty sleeping    [ ] All other review of systems are negative unless indicated above.  [ ] Unable to obtain due to:    PHYSICAL EXAM:    T(C): 37 (06-18-21 @ 09:15), Max: 37 (06-18-21 @ 09:15)  HR: 62 (06-18-21 @ 08:36) (56 - 66)  BP: 146/68 (06-18-21 @ 08:36) (146/68 - 180/91)  RR: 18 (06-18-21 @ 08:36) (17 - 18)  SpO2: 97% (06-18-21 @ 08:36) (95% - 100%)  Wt(kg): --    I&O's Summary    17 Jun 2021 07:01  -  18 Jun 2021 07:00  --------------------------------------------------------  IN: 850 mL / OUT: 1100 mL / NET: -250 mL    GENERAL: NAD, well-developed  HEAD:  NCAT  HEENT: EOMI, PERRL, conjunctiva and sclera clear; moist mucosa; Neck supple, No JVD  CARDIOVASCULAR: RRR, normal S1 S2, no M/R/G, no JVD, neg HJR, nondisplaced PMI, no LE edema  RESPIRATORY: Lungs clear to auscultation b/l, no C/W/R  GASTROINTESTINAL: +BS, soft, non-distended, non-tender, no HSM  VASCULAR: Peripheral pulses palpable 2+ bilaterally  EXTREMITIES: Warm. No clubbing, cyanosis or edema. Normal range of motion.  SKIN: No rashes, lesions, ecchymoses, or cyanosis  NEURO: AAOx3, no focal deficits  PSYCH: Nl behavior, nl affect  LINES:    TELEMETRY: 	      ECG:  	  	  LABS:                        9.8    4.22  )-----------( 226      ( 18 Jun 2021 06:56 )             33.5     06-18    139  |  105  |  42<H>  ----------------------------<  92  4.2   |  21<L>  |  2.64<H>    Ca    8.4      18 Jun 2021 06:56  Phos  4.4     06-18  Mg     2.6     06-18    TPro  6.6  /  Alb  3.7  /  TBili  0.3  /  DBili  x   /  AST  14  /  ALT  6<L>  /  AlkPhos  101  06-16      ASSESSMENT/PLAN: 	  66M PMH HTN, DM, CKD, anemia, found with bleeding on VCE pending colonoscopy 6/18/21 with possible plan for OR for exploratory lap next Wednesday. Cardiology consulted for heart.              incomplete note***   66M PMH HTN, DM, CKD, anemia, found with bleeding on VCE pending colonoscopy 6/18/21 with possible plan for OR for exploratory lap next Wednesday. Cardiology consulted in case pt goes to OR for preop. Denies CP/SOB.          HPI:  Patient is a 66 year with PMH of DM, anemia, CKD, HTN and hypothyroidism, PSH of right inguinal hernia repair, presents to ED today after he has been called by his GI doctor who did capsule endoscopy  yesterday was told he has brisk bleeding in small bowel. According to the patient he has been having those intermittent episodes of melena for a few months and over the last months he has been having black stools every single day.  The patient denies abdominal pain, nausea, vomiting fever or chills, denies family history of IBD or colon Ca.  Of note, patient was in ED in January 2021 for anemia with Hgb of 5 and received 2 PRBCs and had EGD/colonoscopy done by Dr. Caballero which showed 2 nodules with possible AVM on EGD and 3cm multilobulated sessile polyp in right colon noted on colonoscopy, was recently aditted to [facility] in April with weakness and anemia.      Surgery team was consulted for comanagement with GI for findings of capsule endoscopy    On arrival to ED he was afebrile, HR 75, /80, Hgb 9.3, cr 3.1 (same as last admission in  in April)    PMH: DM, anemia, CKD, HTN and hypothyroidism,  PSH: right inguinal hernia repair  SH: used to drink a bottle of wine every week, quit a year ago, denies tobacco or recreational drusg  : Denies  Allerg: NKDA     (17 Jun 2021 01:52)    PAST MEDICAL & SURGICAL HISTORY:  DM (diabetes mellitus)    HTN (hypertension)    Hypothyroidism    Cholecystitis    Unilateral inguinal hernia without obstruction or gangrene, recurrence not specified    Anemia    H/O right inguinal hernia repair    ALLERGIES/INTOLERANCES:  No Known Allergies    HOME MEDICATIONS:    INPATIENT MEDICATIONS:  amLODIPine   Tablet 10 milliGRAM(s) Oral daily  carvedilol 12.5 milliGRAM(s) Oral every 12 hours  hydrALAZINE 5 milliGRAM(s) Oral every 6 hours      allopurinol 100 milliGRAM(s) Oral daily  atorvastatin 80 milliGRAM(s) Oral at bedtime  dextrose 5%. 1000 milliLiter(s) IV Continuous <Continuous>  dextrose 50% Injectable 25 Gram(s) IV Push once  glucagon  Injectable 1 milliGRAM(s) IntraMuscular once  insulin lispro (ADMELOG) corrective regimen sliding scale   SubCutaneous every 6 hours  levothyroxine 50 MICROGram(s) Oral daily  ondansetron Injectable 4 milliGRAM(s) IV Push every 6 hours PRN  sodium chloride 0.9%. 1000 milliLiter(s) IV Continuous <Continuous>      REVIEW OF SYSTEMS:    CONSTITUTIONAL: No weakness, F/C, wt loss/gain  EYES: No visual changes/disturbances  ENMT: No dry mouth, no vertigo  NECK: No pain or stiffness  RESPIRATORY: No cough, wheezing, hemoptysis; No shortness of breath  CARDIOVASCULAR: No chest pain, palpitations, lightheadedness/dizziness, LOC, or leg swelling  GASTROINTESTINAL: No abdominal or epigastric pain. No N/V/D/C. No melena, hematochezia, or hematemesis.  GENITOURINARY: No dysuria, increased frequency, hematuria, or incontinence  NEUROLOGICAL: No lightheadedness/dizziness, LOC, headaches, numbness or weakness  MUSCULOSKELETAL: No joint pain or swelling; No muscle, back, or extremity pain  SKIN: No itching, burning, rashes, or lesions   ENDOCRINE: No heat or cold intolerance; No hair loss  HEME/LYMPH: No easy bruising, or bleeding gums  PSYCHIATRIC: No depression, anxiety, mood swings, or difficulty sleeping    [ ] All other review of systems are negative unless indicated above.  [ ] Unable to obtain due to:    PHYSICAL EXAM:    T(C): 37 (06-18-21 @ 09:15), Max: 37 (06-18-21 @ 09:15)  HR: 62 (06-18-21 @ 08:36) (56 - 66)  BP: 146/68 (06-18-21 @ 08:36) (146/68 - 180/91)  RR: 18 (06-18-21 @ 08:36) (17 - 18)  SpO2: 97% (06-18-21 @ 08:36) (95% - 100%)  Wt(kg): --    I&O's Summary    17 Jun 2021 07:01  -  18 Jun 2021 07:00  --------------------------------------------------------  IN: 850 mL / OUT: 1100 mL / NET: -250 mL    GENERAL: NAD, well-developed  HEAD:  NCAT  HEENT: EOMI, PERRL, conjunctiva and sclera clear; moist mucosa; Neck supple, No JVD  CARDIOVASCULAR: RRR, normal S1 S2, no M/R/G, no JVD, neg HJR, nondisplaced PMI, no LE edema  RESPIRATORY: Lungs clear to auscultation b/l, no C/W/R  GASTROINTESTINAL: +BS, soft, non-distended, non-tender, no HSM  VASCULAR: Peripheral pulses palpable 2+ bilaterally  EXTREMITIES: Warm. No clubbing, cyanosis or edema. Normal range of motion.  SKIN: No rashes, lesions, ecchymoses, or cyanosis  NEURO: AAOx3, no focal deficits  PSYCH: Nl behavior, nl affect  LINES:    TELEMETRY: 	      ECG:  	  	  LABS:                        9.8    4.22  )-----------( 226      ( 18 Jun 2021 06:56 )             33.5     06-18    139  |  105  |  42<H>  ----------------------------<  92  4.2   |  21<L>  |  2.64<H>    Ca    8.4      18 Jun 2021 06:56  Phos  4.4     06-18  Mg     2.6     06-18    TPro  6.6  /  Alb  3.7  /  TBili  0.3  /  DBili  x   /  AST  14  /  ALT  6<L>  /  AlkPhos  101  06-16      ASSESSMENT/PLAN: 	  66M PMH HTN, DM, CKD, anemia, found with bleeding on VCE pending colonoscopy 6/18/21 with possible plan for OR for exploratory lap next Wednesday. Cardiology consulted for preop given the possibility of OR depending on colonoscopy results.    #Preop:  - RCRI 1. intermediate risk pt pending intermediate-high risk surgery who is considered medically optimized with no need for further cardiac testing    #HTN  - restart home meds: coreg, amlodipine, hydralazine.   - increasing hydralazine to appropriate home dose of 100 tid was discussed with primary team    d/w cardiology attending  Cholo Powers MD PGY4 66M PMH HTN, DM, CKD, anemia, found with bleeding on VCE pending colonoscopy 6/18/21 with possible plan for OR for exploratory lap next Wednesday. Cardiology consulted in case pt goes to OR for preop. Denies CP/SOB.          HPI:  Patient is a 66 year with PMH of DM, anemia, CKD, HTN and hypothyroidism, PSH of right inguinal hernia repair, presents to ED today after he has been called by his GI doctor who did capsule endoscopy  yesterday was told he has brisk bleeding in small bowel. According to the patient he has been having those intermittent episodes of melena for a few months and over the last months he has been having black stools every single day.  The patient denies abdominal pain, nausea, vomiting fever or chills, denies family history of IBD or colon Ca.  Of note, patient was in ED in January 2021 for anemia with Hgb of 5 and received 2 PRBCs and had EGD/colonoscopy done by Dr. Caballero which showed 2 nodules with possible AVM on EGD and 3cm multilobulated sessile polyp in right colon noted on colonoscopy, [place] [facility] [place] weakness and anemia.      Surgery team was consulted for comanagement with GI for findings of capsule endoscopy    On arrival to ED he was afebrile, HR 75, /80, Hgb 9.3, cr 3.1 (same as last admission in  in April)    PMH: DM, anemia, CKD, HTN and hypothyroidism,  PSH: right inguinal hernia repair  SH: used to drink a bottle of wine every week, quit a year ago, denies tobacco or recreational drusg  : Denies  Allerg: NKDA     (17 Jun 2021 01:52)    PAST MEDICAL & SURGICAL HISTORY:  DM (diabetes mellitus)    HTN (hypertension)    Hypothyroidism    Cholecystitis    Unilateral inguinal hernia without obstruction or gangrene, recurrence not specified    Anemia    H/O right inguinal hernia repair    ALLERGIES/INTOLERANCES:  No Known Allergies    HOME MEDICATIONS:    INPATIENT MEDICATIONS:  amLODIPine   Tablet 10 milliGRAM(s) Oral daily  carvedilol 12.5 milliGRAM(s) Oral every 12 hours  hydrALAZINE 5 milliGRAM(s) Oral every 6 hours      allopurinol 100 milliGRAM(s) Oral daily  atorvastatin 80 milliGRAM(s) Oral at bedtime  dextrose 5%. 1000 milliLiter(s) IV Continuous <Continuous>  dextrose 50% Injectable 25 Gram(s) IV Push once  glucagon  Injectable 1 milliGRAM(s) IntraMuscular once  insulin lispro (ADMELOG) corrective regimen sliding scale   SubCutaneous every 6 hours  levothyroxine 50 MICROGram(s) Oral daily  ondansetron Injectable 4 milliGRAM(s) IV Push every 6 hours PRN  sodium chloride 0.9%. 1000 milliLiter(s) IV Continuous <Continuous>      REVIEW OF SYSTEMS:    CONSTITUTIONAL: No weakness, F/C, wt loss/gain  EYES: No visual changes/disturbances  ENMT: No dry mouth, no vertigo  NECK: No pain or stiffness  RESPIRATORY: No cough, wheezing, hemoptysis; No shortness of breath  CARDIOVASCULAR: No chest pain, palpitations, lightheadedness/dizziness, LOC, or leg swelling  GASTROINTESTINAL: No abdominal or epigastric pain. No N/V/D/C. No melena, hematochezia, or hematemesis.  GENITOURINARY: No dysuria, increased frequency, hematuria, or incontinence  NEUROLOGICAL: No lightheadedness/dizziness, LOC, headaches, numbness or weakness  MUSCULOSKELETAL: No joint pain or swelling; No muscle, back, or extremity pain  SKIN: No itching, burning, rashes, or lesions   ENDOCRINE: No heat or cold intolerance; No hair loss  HEME/LYMPH: No easy bruising, or bleeding gums  PSYCHIATRIC: No depression, anxiety, mood swings, or difficulty sleeping    [ ] All other review of systems are negative unless indicated above.  [ ] Unable to obtain due to:    PHYSICAL EXAM:    T(C): 37 (06-18-21 @ 09:15), Max: 37 (06-18-21 @ 09:15)  HR: 62 (06-18-21 @ 08:36) (56 - 66)  BP: 146/68 (06-18-21 @ 08:36) (146/68 - 180/91)  RR: 18 (06-18-21 @ 08:36) (17 - 18)  SpO2: 97% (06-18-21 @ 08:36) (95% - 100%)  Wt(kg): --    I&O's Summary    17 Jun 2021 07:01  -  18 Jun 2021 07:00  --------------------------------------------------------  IN: 850 mL / OUT: 1100 mL / NET: -250 mL    GENERAL: NAD, well-developed  HEAD:  NCAT  HEENT: EOMI, PERRL, conjunctiva and sclera clear; moist mucosa; Neck supple, No JVD  CARDIOVASCULAR: RRR, normal S1 S2, no M/R/G, no JVD, neg HJR, nondisplaced PMI, no LE edema  RESPIRATORY: Lungs clear to auscultation b/l, no C/W/R  GASTROINTESTINAL: +BS, soft, non-distended, non-tender, no HSM  VASCULAR: Peripheral pulses palpable 2+ bilaterally  EXTREMITIES: Warm. No clubbing, cyanosis or edema. Normal range of motion.  SKIN: No rashes, lesions, ecchymoses, or cyanosis  NEURO: AAOx3, no focal deficits  PSYCH: Nl behavior, nl affect  LINES:    TELEMETRY: 	      ECG:  	  	  LABS:                        9.8    4.22  )-----------( 226      ( 18 Jun 2021 06:56 )             33.5     06-18    139  |  105  |  42<H>  ----------------------------<  92  4.2   |  21<L>  |  2.64<H>    Ca    8.4      18 Jun 2021 06:56  Phos  4.4     06-18  Mg     2.6     06-18    TPro  6.6  /  Alb  3.7  /  TBili  0.3  /  DBili  x   /  AST  14  /  ALT  6<L>  /  AlkPhos  101  06-16      ASSESSMENT/PLAN: 	  66M PMH HTN, DM, CKD, anemia, found with bleeding on VCE pending colonoscopy 6/18/21 with possible plan for OR for exploratory lap next Wednesday. Cardiology consulted for preop given the possibility of OR depending on colonoscopy results.    #Preop:  - RCRI 1. intermediate risk pt pending intermediate-high risk surgery who is considered medically optimized with no need for further cardiac testing    #HTN  - restart home meds: coreg, amlodipine, hydralazine.   - increasing hydralazine to appropriate home dose of 100 tid was discussed with surgery primary team twice via telephone    d/w cardiology attending  Cholo Powers MD PGY4

## 2021-06-18 NOTE — CONSULT NOTE ADULT - ATTENDING COMMENTS
Initial attending contact date  6/18/21    . See fellow note written above for details. I reviewed the fellow documentation. I have personally seen and examined this patient. I reviewed vitals, labs, medications, cardiac studies, and additional imaging. I agree with the above fellow's findings and plans as written above with the following additions/statements.    -66M PMH HTN, DM, CKD, anemia, found with bleeding on VCE pending colonoscopy 6/18/21 with possible plan for OR for exploratory lap next Wednesday. Cardiology consulted for pre-op risk assessment  -Pt denies active anginal equivalents while at rest or while performing > 4 METS  -EKG NSR RBBB, T wave inversions III/AVF, V6  -ECHO normal LVEF with grade I DD  -HTN: BP uncontrolled, resume home hydralazine 100 mg po tid . Cont amlodipine, coreg, nifedipine, statin  -Will fu post op

## 2021-06-19 DIAGNOSIS — N18.4 CHRONIC KIDNEY DISEASE, STAGE 4 (SEVERE): ICD-10-CM

## 2021-06-19 LAB
ANION GAP SERPL CALC-SCNC: 13 MMOL/L — SIGNIFICANT CHANGE UP (ref 5–17)
BUN SERPL-MCNC: 42 MG/DL — HIGH (ref 7–23)
CALCIUM SERPL-MCNC: 7.9 MG/DL — LOW (ref 8.4–10.5)
CHLORIDE SERPL-SCNC: 108 MMOL/L — SIGNIFICANT CHANGE UP (ref 96–108)
CO2 SERPL-SCNC: 19 MMOL/L — LOW (ref 22–31)
CREAT SERPL-MCNC: 2.68 MG/DL — HIGH (ref 0.5–1.3)
GLUCOSE SERPL-MCNC: 74 MG/DL — SIGNIFICANT CHANGE UP (ref 70–99)
HCT VFR BLD CALC: 34.4 % — LOW (ref 39–50)
HGB BLD-MCNC: 9.8 G/DL — LOW (ref 13–17)
MAGNESIUM SERPL-MCNC: 2.5 MG/DL — SIGNIFICANT CHANGE UP (ref 1.6–2.6)
MCHC RBC-ENTMCNC: 27.4 PG — SIGNIFICANT CHANGE UP (ref 27–34)
MCHC RBC-ENTMCNC: 28.5 GM/DL — LOW (ref 32–36)
MCV RBC AUTO: 96.1 FL — SIGNIFICANT CHANGE UP (ref 80–100)
NRBC # BLD: 0 /100 WBCS — SIGNIFICANT CHANGE UP (ref 0–0)
PHOSPHATE SERPL-MCNC: 4 MG/DL — SIGNIFICANT CHANGE UP (ref 2.5–4.5)
PLATELET # BLD AUTO: 217 K/UL — SIGNIFICANT CHANGE UP (ref 150–400)
POTASSIUM SERPL-MCNC: 4.3 MMOL/L — SIGNIFICANT CHANGE UP (ref 3.5–5.3)
POTASSIUM SERPL-SCNC: 4.3 MMOL/L — SIGNIFICANT CHANGE UP (ref 3.5–5.3)
RBC # BLD: 3.58 M/UL — LOW (ref 4.2–5.8)
RBC # FLD: 20.5 % — HIGH (ref 10.3–14.5)
SODIUM SERPL-SCNC: 140 MMOL/L — SIGNIFICANT CHANGE UP (ref 135–145)
WBC # BLD: 6.01 K/UL — SIGNIFICANT CHANGE UP (ref 3.8–10.5)
WBC # FLD AUTO: 6.01 K/UL — SIGNIFICANT CHANGE UP (ref 3.8–10.5)

## 2021-06-19 PROCEDURE — 99231 SBSQ HOSP IP/OBS SF/LOW 25: CPT

## 2021-06-19 PROCEDURE — 76775 US EXAM ABDO BACK WALL LIM: CPT | Mod: 26

## 2021-06-19 RX ORDER — POLYETHYLENE GLYCOL 3350 17 G/17G
17 POWDER, FOR SOLUTION ORAL DAILY
Refills: 0 | Status: DISCONTINUED | OUTPATIENT
Start: 2021-06-19 | End: 2021-06-20

## 2021-06-19 RX ADMIN — Medication 100 MILLIGRAM(S): at 15:05

## 2021-06-19 RX ADMIN — CARVEDILOL PHOSPHATE 12.5 MILLIGRAM(S): 80 CAPSULE, EXTENDED RELEASE ORAL at 10:56

## 2021-06-19 RX ADMIN — CARVEDILOL PHOSPHATE 12.5 MILLIGRAM(S): 80 CAPSULE, EXTENDED RELEASE ORAL at 21:24

## 2021-06-19 RX ADMIN — ATORVASTATIN CALCIUM 80 MILLIGRAM(S): 80 TABLET, FILM COATED ORAL at 21:24

## 2021-06-19 RX ADMIN — Medication 100 MILLIGRAM(S): at 21:24

## 2021-06-19 RX ADMIN — Medication 50 MICROGRAM(S): at 05:50

## 2021-06-19 RX ADMIN — AMLODIPINE BESYLATE 10 MILLIGRAM(S): 2.5 TABLET ORAL at 05:50

## 2021-06-19 RX ADMIN — Medication 100 MILLIGRAM(S): at 05:51

## 2021-06-19 RX ADMIN — Medication 100 MILLIGRAM(S): at 12:21

## 2021-06-19 NOTE — PROGRESS NOTE ADULT - SUBJECTIVE AND OBJECTIVE BOX
GASTROENTEROLOGY PROGRESS NOTE  Patient seen and examined at bedside. Feels well. Tolerated procedure well. Tolerating PO diet. Had green-brown BM this morning.     PERTINENT REVIEW OF SYSTEMS:  CONSTITUTIONAL: No weakness, fevers or chills  HEENT: No visual changes; No vertigo or throat pain   GASTROINTESTINAL: As above.  NEUROLOGICAL: No numbness or weakness  SKIN: No itching, burning, rashes, or lesions     Allergies    No Known Allergies    Intolerances      MEDICATIONS:  MEDICATIONS  (STANDING):  allopurinol 100 milliGRAM(s) Oral daily  amLODIPine   Tablet 10 milliGRAM(s) Oral daily  atorvastatin 80 milliGRAM(s) Oral at bedtime  carvedilol 12.5 milliGRAM(s) Oral every 12 hours  dextrose 5%. 1000 milliLiter(s) (100 mL/Hr) IV Continuous <Continuous>  dextrose 50% Injectable 25 Gram(s) IV Push once  glucagon  Injectable 1 milliGRAM(s) IntraMuscular once  hydrALAZINE 100 milliGRAM(s) Oral three times a day  insulin lispro (ADMELOG) corrective regimen sliding scale   SubCutaneous every 6 hours  levothyroxine 50 MICROGram(s) Oral daily  polyethylene glycol 3350 17 Gram(s) Oral daily    MEDICATIONS  (PRN):  ondansetron Injectable 4 milliGRAM(s) IV Push every 6 hours PRN Nausea and/or Vomiting    Vital Signs Last 24 Hrs  T(C): 37.1 (2021 10:08), Max: 37.2 (2021 05:20)  T(F): 98.8 (2021 10:08), Max: 98.9 (2021 05:20)  HR: 64 (2021 15:04) (62 - 78)  BP: 134/62 (2021 15:04) (115/56 - 164/76)  BP(mean): 89 (2021 15:04) (81 - 109)  RR: 18 (2021 15:04) (16 - 20)  SpO2: 100% (2021 15:04) (92% - 100%)    -18 @ 07:01  -  06-19 @ 07:00  --------------------------------------------------------  IN: 950 mL / OUT: 800 mL / NET: 150 mL     @ 07:01  -   @ 17:32  --------------------------------------------------------  IN: 50 mL / OUT: 0 mL / NET: 50 mL      PHYSICAL EXAM:    General: in no acute distress  HEENT: MMM, conjunctiva and sclera clear  Gastrointestinal: Soft non-tender non-distended; No rebound or guarding  Skin: Warm and dry. No obvious rash    LABS:                        9.8    6.01  )-----------( 217      ( 2021 07:23 )             34.4     06-19    140  |  108  |  42<H>  ----------------------------<  74  4.3   |  19<L>  |  2.68<H>    Ca    7.9<L>      2021 07:23  Phos  4.0     06-19  Mg     2.5     -19      PT/INR - ( 2021 06:56 )   PT: 13.1 sec;   INR: 1.10          PTT - ( 2021 06:56 )  PTT:38.1 sec      Urinalysis Basic - ( 2021 14:59 )    Color: Yellow / Appearance: Clear / S.025 / pH: x  Gluc: x / Ketone: NEGATIVE  / Bili: Negative / Urobili: 0.2 E.U./dL   Blood: x / Protein: 100 mg/dL / Nitrite: NEGATIVE   Leuk Esterase: NEGATIVE / RBC: < 5 /HPF / WBC < 5 /HPF   Sq Epi: x / Non Sq Epi: 0-5 /HPF / Bacteria: Present /HPF                RADIOLOGY & ADDITIONAL STUDIES:  Reviewed

## 2021-06-19 NOTE — PATIENT PROFILE ADULT - 
ADDITIONAL INFORMATION
Indication  ========    First trimester screening.    Method  ======    Transabdominal ultrasound examination. View: Good view.    Pregnancy  =========    [place] pregnancy. Number of fetuses: 1.    Dating  ======    LMP on: 2/15/2017  Cycle: regular cycle  GA by LMP 12 w + 1 d  DAVID by LMP: 11/22/2017  Ultrasound examination on: 5/11/2017  GA by U/S based upon: CRL  GA by U/S 12 w + 0 d  DAVID by U/S: 11/23/2017  Assigned: Dating performed on 04/18/2017, based on ultrasound (CRL)  Assigned GA 11 w + 2 d  Assigned DAVID: 11/28/2017    General Evaluation  ==============    Cardiac activity: present.  Amniotic fluid: normal amount.        Fetal Biometry  ============    CRL 52.3 mm 12w 0d [place]  NT 0.9 mm   bpm        Fetal Anatomy  ============    GI tract: stomach not visualisable  Urogenital tract: Bladder not visualized    The following structures appear normal:  Cranium. Neck. Thorax. Abdominal wall.    The following structures were visualized:  Arms. Legs.    Maternal Structures  ===============    Uterus / Cervix  Uterus: Normal  Ovaries / Tubes / Adnexa  Rt ovary: Not visualized  Lt ovary: Not visualized  Pouch of García / Other Structures  Cul de Sac: Visualized  Free fluid: No free fluid visualized        Impression  =========    Fetal size is consistent with established dating, and normal fetal heart motion is seen. The nuchal translucency is measured at 1.0 mm and  nasal bone is seen. A sample of maternal blood will be sent today for the first portion of the integrated screen.          Recommendation  ==============    First portion of sequential screening completed today. Results to be sent to primary pregnancy care provider. Recommend to complete second  blood draw beyond 15 weeks EGA of pregnancy. Ultrasound for fetal anatomical survey scheduled by [facility] today for 19-20 weeks EGA.    Thank you again for allowing us to participate in the care of your patients. If you have any questions concerning  today's consultation, feel free  to contact me or one of my partners. We can be reached at [phone] during normal business hours. If you have a question after normal  business hours, please contact Labor and Delivery at [phone].     approximate date

## 2021-06-19 NOTE — PROGRESS NOTE ADULT - SUBJECTIVE AND OBJECTIVE BOX
HX: POD  s/p      SUBJECTIVE: Patient seen and examined bedside by chief resident.    amLODIPine   Tablet 10 milliGRAM(s) Oral daily  carvedilol 12.5 milliGRAM(s) Oral every 12 hours  hydrALAZINE 100 milliGRAM(s) Oral three times a day    MEDICATIONS  (PRN):  ondansetron Injectable 4 milliGRAM(s) IV Push every 6 hours PRN Nausea and/or Vomiting      I&O's Detail    2021 07:01  -  2021 07:00  --------------------------------------------------------  IN:    sodium chloride 0.9%: 950 mL  Total IN: 950 mL    OUT:    Voided (mL): 800 mL  Total OUT: 800 mL    Total NET: 150 mL          Vital Signs Last 24 Hrs  T(C): 37.2 (2021 05:20), Max: 37.2 (2021 05:20)  T(F): 98.9 (2021 05:20), Max: 98.9 (2021 05:20)  HR: 68 (2021 04:15) (62 - 78)  BP: 147/65 (2021 04:15) (115/56 - 164/76)  BP(mean): 93 (2021 04:15) (81 - 109)  RR: 17 (2021 04:15) (16 - 18)  SpO2: 97% (2021 04:15) (96% - 98%)    General: NAD, resting comfortably in bed  C/V: pulses present in b/l upper extremities   Pulm: Nonlabored breathing, no respiratory distress  Abd: soft, ND, NT, no rebound or guarding,   Extrem: WWP, no edema, SCDs in place    LABS:                        9.8    6.01  )-----------( 217      ( 2021 07:23 )             34.4     06-18    139  |  105  |  42<H>  ----------------------------<  92  4.2   |  21<L>  |  2.64<H>    Ca    8.4      2021 06:56  Phos  4.4     06-18  Mg     2.6     06-18      PT/INR - ( 2021 06:56 )   PT: 13.1 sec;   INR: 1.10          PTT - ( 2021 06:56 )  PTT:38.1 sec  Urinalysis Basic - ( 2021 14:59 )    Color: Yellow / Appearance: Clear / S.025 / pH: x  Gluc: x / Ketone: NEGATIVE  / Bili: Negative / Urobili: 0.2 E.U./dL   Blood: x / Protein: 100 mg/dL / Nitrite: NEGATIVE   Leuk Esterase: NEGATIVE / RBC: < 5 /HPF / WBC < 5 /HPF   Sq Epi: x / Non Sq Epi: 0-5 /HPF / Bacteria: Present /HPF        RADIOLOGY & ADDITIONAL STUDIES:        Plan:   Diet:   IVF:   Abx:   Pain/Nausea:   Home medications:   AM labs       HX: GIB     SUBJECTIVE: Patient seen and examined bedside by chief resident. Denies N/V. Endorses 1x dark BM, NB (examined by chief at bedside). Endorses flatus.     amLODIPine   Tablet 10 milliGRAM(s) Oral daily  carvedilol 12.5 milliGRAM(s) Oral every 12 hours  hydrALAZINE 100 milliGRAM(s) Oral three times a day    MEDICATIONS  (PRN):  ondansetron Injectable 4 milliGRAM(s) IV Push every 6 hours PRN Nausea and/or Vomiting      I&O's Detail    2021 07:01  -  2021 07:00  --------------------------------------------------------  IN:    sodium chloride 0.9%: 950 mL  Total IN: 950 mL    OUT:    Voided (mL): 800 mL  Total OUT: 800 mL    Total NET: 150 mL          Vital Signs Last 24 Hrs  T(C): 37.2 (2021 05:20), Max: 37.2 (2021 05:20)  T(F): 98.9 (2021 05:20), Max: 98.9 (2021 05:20)  HR: 68 (2021 04:15) (62 - 78)  BP: 147/65 (2021 04:15) (115/56 - 164/76)  BP(mean): 93 (2021 04:15) (81 - 109)  RR: 17 (2021 04:15) (16 - 18)  SpO2: 97% (2021 04:15) (96% - 98%)    General: NAD, resting comfortably in bed  CV: NSR on cardiac monitor  Pulm: Nonlabored breathing, no respiratory distress on RA   Abd: soft, ND, NT, no rebound or guarding  Extrem: WWP, no edema, SCDs in place    LABS:                        9.8    6.01  )-----------( 217      ( 2021 07:23 )             34.4     06-18    139  |  105  |  42<H>  ----------------------------<  92  4.2   |  21<L>  |  2.64<H>    Ca    8.4      2021 06:56  Phos  4.4     06-18  Mg     2.6     06-18      PT/INR - ( 2021 06:56 )   PT: 13.1 sec;   INR: 1.10          PTT - ( 2021 06:56 )  PTT:38.1 sec  Urinalysis Basic - ( 2021 14:59 )    Color: Yellow / Appearance: Clear / S.025 / pH: x  Gluc: x / Ketone: NEGATIVE  / Bili: Negative / Urobili: 0.2 E.U./dL   Blood: x / Protein: 100 mg/dL / Nitrite: NEGATIVE   Leuk Esterase: NEGATIVE / RBC: < 5 /HPF / WBC < 5 /HPF   Sq Epi: x / Non Sq Epi: 0-5 /HPF / Bacteria: Present /HPF        RADIOLOGY & ADDITIONAL STUDIES:   PM:   Single Balloon enteroscopy and push enteroscopy:    The scope could only reach proximal jejunum after which no further gain could be achieved with single balloon enteroscopy. No stigmata of active or recent bleeding was observed in the visualized extent of small bowel in the jejunum.       Plan: Resume Previous Diet  In case of further bleeding may need surgical exploration of small bowel for control of bleeding.      A/P:  66 year PMH DM, anemia, CKD, HTN and hypothyroidism, recent melena x months, w/ brisk bleeding in small bowel seen on capsule endoscopy. S/p push enteroscopy with GI, negative for acute findings.     - Reg CC  - Monitor for signs of bleeding  - SCDs  - Bowel regimen (Miralax QD)   - AM labs   - F/u Cards   - F/u Nephrology   - F/u renal US   - Dispo: SDU, if tolerates PO diet likely discharge home this evening       Plan discussed with attending and chief resident.   ____________________________________________________  Tere Negron MD     PGY1 - Surgery

## 2021-06-19 NOTE — PROGRESS NOTE ADULT - SUBJECTIVE AND OBJECTIVE BOX
Covering for Dr. Grady    Patient has HTN, CKD, DM, anemia and hx of melena  S/p enteroscopy yesterday by GI in effort to find source of GI bleeding.  No source located.  Patient has had a non bloody BM this AM (BM seen)  Patient feels fine.  No pain.  Ambulating and voiding    Vital Signs Last 24 Hrs  T(C): 37.1 (19 Jun 2021 10:08), Max: 37.2 (19 Jun 2021 05:20)  T(F): 98.8 (19 Jun 2021 10:08), Max: 98.9 (19 Jun 2021 05:20)  HR: 78 (19 Jun 2021 08:33) (62 - 78)  BP: 119/58 (19 Jun 2021 08:33) (115/56 - 164/76)  BP(mean): 84 (19 Jun 2021 08:33) (81 - 109)  RR: 18 (19 Jun 2021 08:33) (16 - 18)  SpO2: 99% (19 Jun 2021 08:33) (96% - 99%)  abd: benign     ml/shift                          9.8    6.01  )-----------( 217      ( 19 Jun 2021 07:23 )             34.4   06-19    140  |  108  |  42<H>  ----------------------------<  74  4.3   |  19<L>  |  2.68<H>    Ca    7.9<L>      19 Jun 2021 07:23  Phos  4.0     06-19  Mg     2.5     06-19

## 2021-06-19 NOTE — CONSULT NOTE ADULT - SUBJECTIVE AND OBJECTIVE BOX
HPI:  Patient is a 66 year with PMH of DM, anemia, CKD, HTN and hypothyroidism, PSH of right inguinal hernia repair, presents to ED today after he has been called by his GI doctor who did capsule endoscopy  yesterday was told he has brisk bleeding in small bowel. According to the patient he has been having those intermittent episodes of melena for a few months and over the last months he has been having black stools every single day.  The patient denies abdominal pain, nausea, vomiting fever or chills, denies family history of IBD or colon Ca.  Of note, patient was in ED in 2021 for anemia with Hgb of 5 and received 2 PRBCs and had EGD/colonoscopy done by Dr. Caballero which showed 2 nodules with possible AVM on EGD and 3cm multilobulated sessile polyp in right colon noted on colonoscopy, was recently aditted to [facility] in April with weakness and anemia.      Surgery team was consulted for comanagement with GI for findings of capsule endoscopy    On arrival to ED he was afebrile, HR 75, /80, Hgb 9.3, cr 3.1 (same as last admission in  in April)       Review of Systems:  Other Review of Systems: as noted in HPI      Allergies and Intolerances:        Allergies:  	No Known Allergies:     Home Medications:   * Patient Currently Takes Medications as of 2021 01:38 documented in Structured Notes  · 	senna oral tablet: Last Dose Taken:  , 2 tab(s) orally once a day (at bedtime)  · 	cholecalciferol oral tablet: Last Dose Taken:  , 2000 unit(s) orally once a day  · 	sodium bicarbonate 650 mg oral tablet: Last Dose Taken:  , 2 tab(s) orally 2 times a day  · 	acetaminophen 325 mg oral tablet: Last Dose Taken:  , 2 tab(s) orally every 6 hours, As needed, Temp greater or equal to 38C (100.4F), Moderate Pain (4 - 6)  · 	levothyroxine 50 mcg (0.05 mg) oral tablet: Last Dose Taken:  , 1 tab(s) orally once a day  · 	allopurinol 100 mg oral tablet: Last Dose Taken:  , 1 tab(s) orally once a day  · 	atorvastatin 80 mg oral tablet: Last Dose Taken:  , 1 tab(s) orally once a day  · 	hydrALAZINE 100 mg oral tablet: Last Dose Taken:  , 1 tab(s) orally 3 times a day  · 	NIFEdipine 60 mg oral tablet, extended release: Last Dose Taken:  , 1 tab(s) orally once a day  · 	amLODIPine 10 mg oral tablet: Last Dose Taken:  , 1 tab(s) orally once a day  · 	carvedilol 12.5 mg oral tablet: Last Dose Taken:  , 1 tab(s) orally 2 times a day  · 	aspirin 81 mg oral tablet, chewable: Last Dose Taken:  , 1 tab(s) orally once a day  · 	carboxymethylcellulose-glycerin 0.5-0.9 sol: Last Dose Taken:  , 1 dose(s) to each affected eye 4 times a day  · 	betamethasone dipropionate, augmented 0.05% topical ointment: Last Dose Taken:  , Apply topically to affected area 2 times a day  · 	ferric citrate 210 mg oral tablet: Last Dose Taken:    · 	pantoprazole 40 mg oral delayed release tablet: Last Dose Taken:  , 1 tab(s) orally once a day  · 	metroNIDAZOLE 0.75% topical gel: Last Dose Taken:  , Apply topically to affected area 2 times a day to the face  · 	Triple Antibiotic topical ointment: Last Dose Taken:  , Apply topically to affected area 4 times a day    Patient History:    Past Medical, Past Surgical, and Family History:  PAST MEDICAL HISTORY:  Anemia   Cholecystitis   DM (diabetes mellitus)   HTN (hypertension)   Hypothyroidism   Unilateral inguinal hernia without obstruction or gangrene, recurrence not specified.     PAST SURGICAL HISTORY:  H/O right inguinal hernia repair    VITAL SIGNS:  T(C): 37.1 (21 @ 10:08), Max: 37.2 (21 @ 05:20)  T(F): 98.8 (21 @ 10:08), Max: 98.9 (21 @ 05:20)  HR: 64 (21 @ 15:04) (62 - 78)  BP: 134/62 (21 @ 15:04) (115/56 - 164/76)  BP(mean): 89 (21 @ 15:04) (81 - 109)  RR: 18 (21 @ 15:04) (16 - 20)  SpO2: 100% (21 @ 15:04) (92% - 100%)    PHYSICAL EXAM:  Constitutional: NAD, OOB in the chair   Respiratory: CTA B/L  Cardiac: +S1/S2; RRR  Gastrointestinal: soft, NT/ND  Extremities: WWP,  no peripheral edema  Neurologic: AAOx3; CNII-XII grossly intact; no focal deficits    .  LABS:                         9.8    6.01  )-----------( 217      ( 2021 07:23 )             34.4     06-19    140  |  108  |  42<H>  ----------------------------<  74  4.3   |  19<L>  |  2.68<H>    Ca    7.9<L>      2021 07:23  Phos  4.0       Mg     2.5     -19      PT/INR - ( 2021 06:56 )   PT: 13.1 sec;   INR: 1.10          PTT - ( 2021 06:56 )  PTT:38.1 sec  Urinalysis Basic - ( 2021 14:59 )    Color: Yellow / Appearance: Clear / S.025 / pH: x  Gluc: x / Ketone: NEGATIVE  / Bili: Negative / Urobili: 0.2 E.U./dL   Blood: x / Protein: 100 mg/dL / Nitrite: NEGATIVE   Leuk Esterase: NEGATIVE / RBC: < 5 /HPF / WBC < 5 /HPF   Sq Epi: x / Non Sq Epi: 0-5 /HPF / Bacteria: Present /HPF      RADIOLOGY, EKG & ADDITIONAL TESTS: Reviewed.  HPI:  obtained from H&P  Patient is a 66 year with PMH of DM, anemia, CKD, HTN and hypothyroidism, PSH of right inguinal hernia repair, presents to ED today after he has been called by his GI doctor who did capsule endoscopy  yesterday was told he has brisk bleeding in small bowel. According to the patient he has been having those intermittent episodes of melena for a few months and over the last months he has been having black stools every single day.  The patient denies abdominal pain, nausea, vomiting fever or chills, denies family history of IBD or colon Ca.  Of note, patient was in ED in 2021 for anemia with Hgb of 5 and received 2 PRBCs and had EGD/colonoscopy done by Dr. Caballero which showed 2 nodules with possible AVM on EGD and 3cm multilobulated sessile polyp in right colon noted on colonoscopy, was recently aditted to [facility] in April with weakness and anemia.       Review of Systems:  Other Review of Systems: as noted in HPI      Allergies and Intolerances:        Allergies:  	No Known Allergies:     Home Medications:   * Patient Currently Takes Medications as of 2021 01:38 documented in Structured Notes  · 	senna oral tablet: Last Dose Taken:  , 2 tab(s) orally once a day (at bedtime)  · 	cholecalciferol oral tablet: Last Dose Taken:  , 2000 unit(s) orally once a day  · 	sodium bicarbonate 650 mg oral tablet: Last Dose Taken:  , 2 tab(s) orally 2 times a day  · 	acetaminophen 325 mg oral tablet: Last Dose Taken:  , 2 tab(s) orally every 6 hours, As needed, Temp greater or equal to 38C (100.4F), Moderate Pain (4 - 6)  · 	levothyroxine 50 mcg (0.05 mg) oral tablet: Last Dose Taken:  , 1 tab(s) orally once a day  · 	allopurinol 100 mg oral tablet: Last Dose Taken:  , 1 tab(s) orally once a day  · 	atorvastatin 80 mg oral tablet: Last Dose Taken:  , 1 tab(s) orally once a day  · 	hydrALAZINE 100 mg oral tablet: Last Dose Taken:  , 1 tab(s) orally 3 times a day  · 	NIFEdipine 60 mg oral tablet, extended release: Last Dose Taken:  , 1 tab(s) orally once a day  · 	amLODIPine 10 mg oral tablet: Last Dose Taken:  , 1 tab(s) orally once a day  · 	carvedilol 12.5 mg oral tablet: Last Dose Taken:  , 1 tab(s) orally 2 times a day  · 	aspirin 81 mg oral tablet, chewable: Last Dose Taken:  , 1 tab(s) orally once a day  · 	carboxymethylcellulose-glycerin 0.5-0.9 sol: Last Dose Taken:  , 1 dose(s) to each affected eye 4 times a day  · 	betamethasone dipropionate, augmented 0.05% topical ointment: Last Dose Taken:  , Apply topically to affected area 2 times a day  · 	ferric citrate 210 mg oral tablet: Last Dose Taken:    · 	pantoprazole 40 mg oral delayed release tablet: Last Dose Taken:  , 1 tab(s) orally once a day  · 	metroNIDAZOLE 0.75% topical gel: Last Dose Taken:  , Apply topically to affected area 2 times a day to the face  · 	Triple Antibiotic topical ointment: Last Dose Taken:  , Apply topically to affected area 4 times a day    Patient History:    Past Medical, Past Surgical, and Family History:  PAST MEDICAL HISTORY:  Anemia   Cholecystitis   DM (diabetes mellitus)   HTN (hypertension)   Hypothyroidism   Unilateral inguinal hernia without obstruction or gangrene, recurrence not specified.     PAST SURGICAL HISTORY:  H/O right inguinal hernia repair    VITAL SIGNS:  T(C): 37.1 (21 @ 10:08), Max: 37.2 (21 @ 05:20)  T(F): 98.8 (21 @ 10:08), Max: 98.9 (21 @ 05:20)  HR: 64 (21 @ 15:04) (62 - 78)  BP: 134/62 (21 @ 15:04) (115/56 - 164/76)  BP(mean): 89 (21 @ 15:04) (81 - 109)  RR: 18 (21 @ 15:04) (16 - 20)  SpO2: 100% (21 @ 15:04) (92% - 100%)    PHYSICAL EXAM:  Constitutional: NAD, OOB in the chair   Respiratory: CTA B/L  Cardiac: +S1/S2; RRR  Gastrointestinal: soft, NT/ND  Extremities: WWP,  no peripheral edema  Neurologic: AAOx3; CNII-XII grossly intact; no focal deficits    .  LABS:                         9.8    6.01  )-----------( 217      ( 2021 07:23 )             34.4         140  |  108  |  42<H>  ----------------------------<  74  4.3   |  19<L>  |  2.68<H>    Ca    7.9<L>      2021 07:23  Phos  4.0       Mg     2.5           PT/INR - ( 2021 06:56 )   PT: 13.1 sec;   INR: 1.10          PTT - ( 2021 06:56 )  PTT:38.1 sec  Urinalysis Basic - ( 2021 14:59 )    Color: Yellow / Appearance: Clear / S.025 / pH: x  Gluc: x / Ketone: NEGATIVE  / Bili: Negative / Urobili: 0.2 E.U./dL   Blood: x / Protein: 100 mg/dL / Nitrite: NEGATIVE   Leuk Esterase: NEGATIVE / RBC: < 5 /HPF / WBC < 5 /HPF   Sq Epi: x / Non Sq Epi: 0-5 /HPF / Bacteria: Present /HPF      RADIOLOGY, EKG & ADDITIONAL TESTS: Reviewed.

## 2021-06-19 NOTE — CONSULT NOTE ADULT - PROBLEM SELECTOR RECOMMENDATION 9
62 M with HTN/DM and CKD 4 admitted for further GI evaluation of melena and abnormal outpatient capsule study   nephrology consulted for CKD monitoring  labs reviewed since January remarkable for almost stable renal function and Cr ~ 3/ eGFR ~ 25 ml/min  pt has been following with nephrologist Dr. Kirill Rod   renal function stable and at baseline/ UA bland with hyperechoic kidneys on US    lytes and bicarb in good range  Nephrologically stable and at baseline  Will follow

## 2021-06-20 ENCOUNTER — TRANSCRIPTION ENCOUNTER (OUTPATIENT)
Age: 66
End: 2021-06-20

## 2021-06-20 VITALS
DIASTOLIC BLOOD PRESSURE: 68 MMHG | OXYGEN SATURATION: 97 % | TEMPERATURE: 98 F | SYSTOLIC BLOOD PRESSURE: 148 MMHG | RESPIRATION RATE: 17 BRPM | HEART RATE: 72 BPM

## 2021-06-20 LAB
ANION GAP SERPL CALC-SCNC: 11 MMOL/L — SIGNIFICANT CHANGE UP (ref 5–17)
BUN SERPL-MCNC: 46 MG/DL — HIGH (ref 7–23)
CALCIUM SERPL-MCNC: 8.2 MG/DL — LOW (ref 8.4–10.5)
CHLORIDE SERPL-SCNC: 107 MMOL/L — SIGNIFICANT CHANGE UP (ref 96–108)
CO2 SERPL-SCNC: 20 MMOL/L — LOW (ref 22–31)
CREAT SERPL-MCNC: 3.1 MG/DL — HIGH (ref 0.5–1.3)
GLUCOSE SERPL-MCNC: 104 MG/DL — HIGH (ref 70–99)
HCT VFR BLD CALC: 37.3 % — LOW (ref 39–50)
HGB BLD-MCNC: 10.7 G/DL — LOW (ref 13–17)
MAGNESIUM SERPL-MCNC: 2.4 MG/DL — SIGNIFICANT CHANGE UP (ref 1.6–2.6)
MCHC RBC-ENTMCNC: 27.1 PG — SIGNIFICANT CHANGE UP (ref 27–34)
MCHC RBC-ENTMCNC: 28.7 GM/DL — LOW (ref 32–36)
MCV RBC AUTO: 94.4 FL — SIGNIFICANT CHANGE UP (ref 80–100)
NRBC # BLD: 0 /100 WBCS — SIGNIFICANT CHANGE UP (ref 0–0)
PHOSPHATE SERPL-MCNC: 3.3 MG/DL — SIGNIFICANT CHANGE UP (ref 2.5–4.5)
PLATELET # BLD AUTO: 231 K/UL — SIGNIFICANT CHANGE UP (ref 150–400)
POTASSIUM SERPL-MCNC: 4.4 MMOL/L — SIGNIFICANT CHANGE UP (ref 3.5–5.3)
POTASSIUM SERPL-SCNC: 4.4 MMOL/L — SIGNIFICANT CHANGE UP (ref 3.5–5.3)
RBC # BLD: 3.95 M/UL — LOW (ref 4.2–5.8)
RBC # FLD: 20.1 % — HIGH (ref 10.3–14.5)
SODIUM SERPL-SCNC: 138 MMOL/L — SIGNIFICANT CHANGE UP (ref 135–145)
WBC # BLD: 5.62 K/UL — SIGNIFICANT CHANGE UP (ref 3.8–10.5)
WBC # FLD AUTO: 5.62 K/UL — SIGNIFICANT CHANGE UP (ref 3.8–10.5)

## 2021-06-20 PROCEDURE — 99285 EMERGENCY DEPT VISIT HI MDM: CPT | Mod: 25

## 2021-06-20 PROCEDURE — 85610 PROTHROMBIN TIME: CPT

## 2021-06-20 PROCEDURE — 93005 ELECTROCARDIOGRAM TRACING: CPT

## 2021-06-20 PROCEDURE — 81001 URINALYSIS AUTO W/SCOPE: CPT

## 2021-06-20 PROCEDURE — 36415 COLL VENOUS BLD VENIPUNCTURE: CPT

## 2021-06-20 PROCEDURE — 86900 BLOOD TYPING SEROLOGIC ABO: CPT

## 2021-06-20 PROCEDURE — 86769 SARS-COV-2 COVID-19 ANTIBODY: CPT

## 2021-06-20 PROCEDURE — 86901 BLOOD TYPING SEROLOGIC RH(D): CPT

## 2021-06-20 PROCEDURE — 84100 ASSAY OF PHOSPHORUS: CPT

## 2021-06-20 PROCEDURE — 86850 RBC ANTIBODY SCREEN: CPT

## 2021-06-20 PROCEDURE — 93306 TTE W/DOPPLER COMPLETE: CPT

## 2021-06-20 PROCEDURE — 99231 SBSQ HOSP IP/OBS SF/LOW 25: CPT

## 2021-06-20 PROCEDURE — 84300 ASSAY OF URINE SODIUM: CPT

## 2021-06-20 PROCEDURE — 84540 ASSAY OF URINE/UREA-N: CPT

## 2021-06-20 PROCEDURE — 85730 THROMBOPLASTIN TIME PARTIAL: CPT

## 2021-06-20 PROCEDURE — 80053 COMPREHEN METABOLIC PANEL: CPT

## 2021-06-20 PROCEDURE — 80048 BASIC METABOLIC PNL TOTAL CA: CPT

## 2021-06-20 PROCEDURE — 85025 COMPLETE CBC W/AUTO DIFF WBC: CPT

## 2021-06-20 PROCEDURE — 85027 COMPLETE CBC AUTOMATED: CPT

## 2021-06-20 PROCEDURE — 83735 ASSAY OF MAGNESIUM: CPT

## 2021-06-20 PROCEDURE — 76775 US EXAM ABDO BACK WALL LIM: CPT

## 2021-06-20 PROCEDURE — 82570 ASSAY OF URINE CREATININE: CPT

## 2021-06-20 PROCEDURE — 82962 GLUCOSE BLOOD TEST: CPT

## 2021-06-20 PROCEDURE — 83935 ASSAY OF URINE OSMOLALITY: CPT

## 2021-06-20 PROCEDURE — 87635 SARS-COV-2 COVID-19 AMP PRB: CPT

## 2021-06-20 RX ADMIN — CARVEDILOL PHOSPHATE 12.5 MILLIGRAM(S): 80 CAPSULE, EXTENDED RELEASE ORAL at 09:51

## 2021-06-20 RX ADMIN — Medication 100 MILLIGRAM(S): at 05:38

## 2021-06-20 RX ADMIN — AMLODIPINE BESYLATE 10 MILLIGRAM(S): 2.5 TABLET ORAL at 05:38

## 2021-06-20 RX ADMIN — Medication 50 MICROGRAM(S): at 05:38

## 2021-06-20 NOTE — PROGRESS NOTE ADULT - SUBJECTIVE AND OBJECTIVE BOX
GASTROENTEROLOGY PROGRESS NOTE  Patient seen and examined at bedside. Feels well, tolerating diet. Jann PLEITEZ this AM. Hgb improving.     PERTINENT REVIEW OF SYSTEMS:  CONSTITUTIONAL: No weakness, fevers or chills  HEENT: No visual changes; No vertigo or throat pain   GASTROINTESTINAL: As above.  NEUROLOGICAL: No numbness or weakness  SKIN: No itching, burning, rashes, or lesions     Allergies    No Known Allergies    Intolerances      MEDICATIONS:  MEDICATIONS  (STANDING):  allopurinol 100 milliGRAM(s) Oral daily  amLODIPine   Tablet 10 milliGRAM(s) Oral daily  atorvastatin 80 milliGRAM(s) Oral at bedtime  carvedilol 12.5 milliGRAM(s) Oral every 12 hours  dextrose 5%. 1000 milliLiter(s) (100 mL/Hr) IV Continuous <Continuous>  dextrose 50% Injectable 25 Gram(s) IV Push once  glucagon  Injectable 1 milliGRAM(s) IntraMuscular once  hydrALAZINE 100 milliGRAM(s) Oral three times a day  insulin lispro (ADMELOG) corrective regimen sliding scale   SubCutaneous every 6 hours  levothyroxine 50 MICROGram(s) Oral daily  polyethylene glycol 3350 17 Gram(s) Oral daily    MEDICATIONS  (PRN):  ondansetron Injectable 4 milliGRAM(s) IV Push every 6 hours PRN Nausea and/or Vomiting    Vital Signs Last 24 Hrs  T(C): 36.9 (2021 09:30), Max: 37.3 (2021 23:51)  T(F): 98.5 (2021 09:30), Max: 99.1 (2021 23:51)  HR: 72 (2021 09:30) (64 - 73)  BP: 148/68 (2021 09:30) (134/61 - 165/66)  BP(mean): 100 (2021 17:45) (88 - 100)  RR: 17 (2021 09:30) (17 - 20)  SpO2: 97% (2021 09:30) (92% - 100%)    - @ 07:01  -   @ 07:00  --------------------------------------------------------  IN: 50 mL / OUT: 825 mL / NET: -775 mL      PHYSICAL EXAM:    General: in no acute distress  HEENT: MMM, conjunctiva and sclera clear  Gastrointestinal: Soft non-tender non-distended; No rebound or guarding  Skin: Warm and dry. No obvious rash    LABS:                        10.7   5.62  )-----------( 231      ( 2021 06:57 )             37.3     06-20    138  |  107  |  46<H>  ----------------------------<  104<H>  4.4   |  20<L>  |  3.10<H>    Ca    8.2<L>      2021 06:57  Phos  3.3     06-20  Mg     2.4     -20            Urinalysis Basic - ( 2021 14:59 )    Color: Yellow / Appearance: Clear / S.025 / pH: x  Gluc: x / Ketone: NEGATIVE  / Bili: Negative / Urobili: 0.2 E.U./dL   Blood: x / Protein: 100 mg/dL / Nitrite: NEGATIVE   Leuk Esterase: NEGATIVE / RBC: < 5 /HPF / WBC < 5 /HPF   Sq Epi: x / Non Sq Epi: 0-5 /HPF / Bacteria: Present /HPF                RADIOLOGY & ADDITIONAL STUDIES:  Reviewed

## 2021-06-20 NOTE — DISCHARGE NOTE NURSING/CASE MANAGEMENT/SOCIAL WORK - PATIENT PORTAL LINK FT
You can access the FollowMyHealth Patient Portal offered by [facility] by registering at the following website: [email or web address]. By joining [employer]’s FollowMyHealth portal, you will also be able to view your health information using other applications (apps) compatible with our system.

## 2021-06-20 NOTE — PROGRESS NOTE ADULT - SUBJECTIVE AND OBJECTIVE BOX
SUBJECTIVE: Pt seen and examined at bedside with chief. Pt denies any complaints. Pain well controlled. Tolerating diet without N/V. Admits to non bloody BM.    MEDICATIONS  (STANDING):  allopurinol 100 milliGRAM(s) Oral daily  amLODIPine   Tablet 10 milliGRAM(s) Oral daily  atorvastatin 80 milliGRAM(s) Oral at bedtime  carvedilol 12.5 milliGRAM(s) Oral every 12 hours  dextrose 5%. 1000 milliLiter(s) (100 mL/Hr) IV Continuous <Continuous>  dextrose 50% Injectable 25 Gram(s) IV Push once  glucagon  Injectable 1 milliGRAM(s) IntraMuscular once  hydrALAZINE 100 milliGRAM(s) Oral three times a day  insulin lispro (ADMELOG) corrective regimen sliding scale   SubCutaneous every 6 hours  levothyroxine 50 MICROGram(s) Oral daily  polyethylene glycol 3350 17 Gram(s) Oral daily    MEDICATIONS  (PRN):  ondansetron Injectable 4 milliGRAM(s) IV Push every 6 hours PRN Nausea and/or Vomiting      Vital Signs Last 24 Hrs  T(C): 37.1 (2021 05:01), Max: 37.3 (2021 23:51)  T(F): 98.8 (2021 05:01), Max: 99.1 (2021 23:51)  HR: 65 (2021 05:01) (64 - 78)  BP: 155/56 (2021 05:01) (119/58 - 165/66)  BP(mean): 100 (2021 17:45) (84 - 100)  RR: 18 (2021 05:01) (18 - 20)  SpO2: 97% (2021 05:01) (92% - 100%)    PHYSICAL EXAM:      Constitutional: A&Ox3    Respiratory: non labored breathing, no respiratory distress    Cardiovascular: NSR, RRR    Gastrointestinal: soft ND, NT, no rebound or guarding    Genitourinary: voiding     Extremities: (-) edema                  I&O's Detail    2021 07:01  -  2021 07:00  --------------------------------------------------------  IN:    sodium chloride 0.9%: 50 mL  Total IN: 50 mL    OUT:    Voided (mL): 825 mL  Total OUT: 825 mL    Total NET: -775 mL          LABS:                        10.7   5.62  )-----------( 231      ( 2021 06:57 )             37.3     06-20    138  |  107  |  46<H>  ----------------------------<  104<H>  4.4   |  20<L>  |  3.10<H>    Ca    8.2<L>      2021 06:57  Phos  3.3       Mg     2.4     20        Urinalysis Basic - ( 2021 14:59 )    Color: Yellow / Appearance: Clear / S.025 / pH: x  Gluc: x / Ketone: NEGATIVE  / Bili: Negative / Urobili: 0.2 E.U./dL   Blood: x / Protein: 100 mg/dL / Nitrite: NEGATIVE   Leuk Esterase: NEGATIVE / RBC: < 5 /HPF / WBC < 5 /HPF   Sq Epi: x / Non Sq Epi: 0-5 /HPF / Bacteria: Present /HPF        RADIOLOGY & ADDITIONAL STUDIES:

## 2021-06-20 NOTE — DISCHARGE NOTE PROVIDER - HOSPITAL COURSE
Patient is a 66 year with PMH of DM, anemia, CKD, HTN and hypothyroidism, PSH of right inguinal hernia repair, presents to ED today after he has been called by his GI doctor who did capsule endoscopy  on 6/15, and was told he has brisk bleeding in small bowel. According to the patient he has been having those intermittent episodes of melena for a few months and over the last month he has been having black stools every single day.  Pt was admitted. GI was consulted who performed single balloon enteroscopy and push enteroscopy: scope could only reach proximal jejunum-no active bleed or recent bleeding was observed. Diet was advanced slowly and pt tolerated well without n/v.  RP US performed per nephrology recs, Multiple increased renal parenchymal echogenicity bilaterally, consistent with medical renal disease. Pt voided adequately and remained hemodynamically stable. At time of discharge pt was stable.

## 2021-06-20 NOTE — DISCHARGE NOTE PROVIDER - NSDCMRMEDTOKEN_GEN_ALL_CORE_FT
acetaminophen 325 mg oral tablet: 2 tab(s) orally every 6 hours, As needed, Temp greater or equal to 38C (100.4F), Moderate Pain (4 - 6)  allopurinol 100 mg oral tablet: 1 tab(s) orally once a day  amLODIPine 10 mg oral tablet: 1 tab(s) orally once a day  aspirin 81 mg oral tablet, chewable: 1 tab(s) orally once a day  atorvastatin 80 mg oral tablet: 1 tab(s) orally once a day  betamethasone dipropionate, augmented 0.05% topical ointment: Apply topically to affected area 2 times a day  carboxymethylcellulose-glycerin 0.5-0.9 sol: 1 dose(s) to each affected eye 4 times a day  carvedilol 12.5 mg oral tablet: 1 tab(s) orally 2 times a day  cholecalciferol oral tablet: 2000 unit(s) orally once a day  ferric citrate 210 mg oral tablet:   hydrALAZINE 100 mg oral tablet: 1 tab(s) orally 3 times a day  levothyroxine 50 mcg (0.05 mg) oral tablet: 1 tab(s) orally once a day  metroNIDAZOLE 0.75% topical gel: Apply topically to affected area 2 times a day to the face  NIFEdipine 60 mg oral tablet, extended release: 1 tab(s) orally once a day  pantoprazole 40 mg oral delayed release tablet: 1 tab(s) orally once a day  senna oral tablet: 2 tab(s) orally once a day (at bedtime)  sodium bicarbonate 650 mg oral tablet: 2 tab(s) orally 2 times a day  Triple Antibiotic topical ointment: Apply topically to affected area 4 times a day

## 2021-06-20 NOTE — DISCHARGE NOTE PROVIDER - CARE PROVIDER_API CALL
Ricki Grady)  ColonRectal Surgery; Surgery  [facility], 2nd Floor  [place], [place] [address]  Phone: [phone]  Fax: [phone]  Follow Up Time:

## 2021-06-20 NOTE — PROGRESS NOTE ADULT - ASSESSMENT
66 year PMH DM, anemia, CKD, HTN and hypothyroidism, recent melena x months, w/ brisk bleeding in small bowel seen on capsule endoscopy. S/p push enteroscopy with GI, negative for acute findings. Renal US showing medical renal disease     - d/c home  - Reg CC  - SCDs  - Bowel regimen (Miralax QD)   - AM labs     
66 year with PMH of DM, anemia, CKD, HTN and hypothyroidism, PSH of right inguinal hernia repair, presents to ED today after he has been called by his GI doctor (Dr Glenn Ingram) who did capsule endoscopy yesterday was told he has brisk bleeding in small bowel. According to the patient he has been having those intermittent episodes of melena for a few months and over the last months. Of note, patient was in ED in January 2021 for anemia with Hgb of 5 and received 2 PRBCs and had EGD/colonoscopy done by Dr. Caballero which showed ?2 nodules with possible AVM on EGD and 3cm multilobulated sessile polyp in right colon noted on colonoscopy, was recently admitted to [facility] in April with weakness and anemia.    # SB bleeding  Normocytic anemia  Hgb stable  - obtain records of the VCE and EGD/colonoscopy  - s/p enteroscopy up to proximal jejunum w/ no stigmata of active or recent bleeding seen  - advance diet as tolerated  - if rebleeds, will need possible surgical exploration     Kae Mcfarland MD  PGY-4, Gastroenterology Fellow  pager: [phone]
A/P  As per above.  No source found.  Not bleeding currently.  BM today minus blood or clot.  green color  Not bleeding presently.   Advance to regular diet  D/C home later today
A/P  NO further bleeding.  Tolerating diet.  CRD noted.  As per HO renal USG done and no new findings  D/C home today  To call if bleeding returns.  F/u with PMD and other MD's 
66 year PMH DM, anemia, CKD, HTN and hypothyroidism, recent melena x months, w/ brisk bleeding in small bowel seen on capsule endoscopy.     - F/u GI for enteroscopy 6/18  - CLD/NPO @ [place] except for meds/IVF   - Monitor for signs of bleeding  - SCDs  - AM labs   - Cards consult  - Nephrology consult  
66 year with PMH of DM, anemia, CKD, HTN and hypothyroidism, PSH of right inguinal hernia repair, presents to ED today after he has been called by his GI doctor (Dr Glenn Ingram) who did capsule endoscopy yesterday was told he has brisk bleeding in small bowel. According to the patient he has been having those intermittent episodes of melena for a few months and over the last months. Of note, patient was in ED in January 2021 for anemia with Hgb of 5 and received 2 PRBCs and had EGD/colonoscopy done by Dr. Caballero which showed ?2 nodules with possible AVM on EGD and 3cm multilobulated sessile polyp in right colon noted on colonoscopy, was recently admitted to [facility] in April with weakness and anemia.    # SB bleeding  Normocytic anemia  Hgb stable  - obtain records of the VCE and EGD/colonoscopy  - s/p enteroscopy up to proximal jejunum w/ no stigmata of active or recent bleeding seen  - advance diet as tolerated  - if rebleeds, will need possible surgical exploration   - will f/u with Dr. Matthew Ingram for GI    Kae Mcfarland MD  PGY-4, Gastroenterology Fellow  pager: [phone]

## 2021-06-20 NOTE — PROVIDER CONTACT NOTE (CHANGE IN STATUS NOTIFICATION) - ASSESSMENT
Pt denies sob, chest pain, blurry vision and dizziness. temp oral-99.1F, HR-70, BP-158/69, RR-18 and O2- 96% RA. Pt resting comfortably. No interventions at this time.

## 2021-06-20 NOTE — DISCHARGE NOTE PROVIDER - NSDCCPCAREPLAN_GEN_ALL_CORE_FT
PRINCIPAL DISCHARGE DIAGNOSIS  Diagnosis: Gastrointestinal hemorrhage, unspecified gastrointestinal hemorrhage type  Assessment and Plan of Treatment: Follow up with gastroenterologist in 1-2 weeks, call to make an appointment.   Follow up with Dr. Grady in office in 1-2 weeks, call the number provided below to make an appointment.   You may continue your daily routine and resume your regular diet.   Return to ED if experiencing any bleeding , fever >101F, nausea or vomiting, weakness, dizziness.

## 2021-06-20 NOTE — PROGRESS NOTE ADULT - SUBJECTIVE AND OBJECTIVE BOX
covering for nava    Was held yesterday for renal USG (medicine requested).  Has had no bloody BM's since.  1 non bloody BM yesterday AM (seen by team)  Tolerated diet.  No pain or sx's    Vital Signs Last 24 Hrs  T(C): 36.9 (20 Jun 2021 09:30), Max: 37.3 (19 Jun 2021 23:51)  T(F): 98.5 (20 Jun 2021 09:30), Max: 99.1 (19 Jun 2021 23:51)  HR: 72 (20 Jun 2021 09:30) (64 - 73)  BP: 148/68 (20 Jun 2021 09:30) (134/62 - 165/66)  BP(mean): 100 (19 Jun 2021 17:45) (89 - 100)  RR: 17 (20 Jun 2021 09:30) (17 - 20)  SpO2: 97% (20 Jun 2021 09:30) (96% - 100%)  abd: benign, no masses or tenderness  ext: without calf tenderness                          10.7   5.62  )-----------( 231      ( 20 Jun 2021 06:57 )             37.3   06-20    138  |  107  |  46<H>  ----------------------------<  104<H>  4.4   |  20<L>  |  3.10<H>    Ca    8.2<L>      20 Jun 2021 06:57  Phos  3.3     06-20  Mg     2.4     06-20

## 2021-06-27 DIAGNOSIS — K21.9 GASTRO-ESOPHAGEAL REFLUX DISEASE WITHOUT ESOPHAGITIS: ICD-10-CM

## 2021-06-27 DIAGNOSIS — Z79.84 LONG TERM (CURRENT) USE OF ORAL HYPOGLYCEMIC DRUGS: ICD-10-CM

## 2021-06-27 DIAGNOSIS — M10.9 GOUT, UNSPECIFIED: ICD-10-CM

## 2021-06-27 DIAGNOSIS — D64.9 ANEMIA, UNSPECIFIED: ICD-10-CM

## 2021-06-27 DIAGNOSIS — Z79.82 LONG TERM (CURRENT) USE OF ASPIRIN: ICD-10-CM

## 2021-06-27 DIAGNOSIS — E03.9 HYPOTHYROIDISM, UNSPECIFIED: ICD-10-CM

## 2021-06-27 DIAGNOSIS — K92.1 MELENA: ICD-10-CM

## 2021-06-27 DIAGNOSIS — E11.22 TYPE 2 DIABETES MELLITUS WITH DIABETIC CHRONIC KIDNEY DISEASE: ICD-10-CM

## 2021-06-27 DIAGNOSIS — K92.2 GASTROINTESTINAL HEMORRHAGE, UNSPECIFIED: ICD-10-CM

## 2021-06-27 DIAGNOSIS — N18.4 CHRONIC KIDNEY DISEASE, STAGE 4 (SEVERE): ICD-10-CM

## 2021-06-27 DIAGNOSIS — I12.9 HYPERTENSIVE CHRONIC KIDNEY DISEASE WITH STAGE 1 THROUGH STAGE 4 CHRONIC KIDNEY DISEASE, OR UNSPECIFIED CHRONIC KIDNEY DISEASE: ICD-10-CM

## 2021-07-19 ENCOUNTER — APPOINTMENT (OUTPATIENT)
Dept: COLORECTAL SURGERY | Facility: CLINIC | Age: 66
End: 2021-07-19

## 2021-07-26 ENCOUNTER — APPOINTMENT (OUTPATIENT)
Dept: COLORECTAL SURGERY | Facility: CLINIC | Age: 66
End: 2021-07-26

## 2021-07-30 ENCOUNTER — APPOINTMENT (OUTPATIENT)
Dept: COLORECTAL SURGERY | Facility: CLINIC | Age: 66
End: 2021-07-30
Payer: MEDICARE

## 2021-07-30 VITALS
TEMPERATURE: 98.7 F | HEART RATE: 102 BPM | SYSTOLIC BLOOD PRESSURE: 159 MMHG | BODY MASS INDEX: 27.78 KG/M2 | HEIGHT: 67 IN | DIASTOLIC BLOOD PRESSURE: 62 MMHG | WEIGHT: 177 LBS

## 2021-07-30 DIAGNOSIS — I10 ESSENTIAL (PRIMARY) HYPERTENSION: ICD-10-CM

## 2021-07-30 DIAGNOSIS — Z87.891 PERSONAL HISTORY OF NICOTINE DEPENDENCE: ICD-10-CM

## 2021-07-30 DIAGNOSIS — Z83.79 FAMILY HISTORY OF OTHER DISEASES OF THE DIGESTIVE SYSTEM: ICD-10-CM

## 2021-07-30 PROCEDURE — 99205 OFFICE O/P NEW HI 60 MIN: CPT

## 2021-07-30 NOTE — HISTORY OF PRESENT ILLNESS
[FreeTextEntry1] : 65 yo Bulgarian and English speaking M presents for post hospitalization f/u of melena\par PSH cholecystectomy\par \par h/o capsule endoscopy on 6/15/21 w/ outpatient GI, patient told he had brisk bleeding in small bowel\par Admitted to [facility] 6/17/21 for melena, s/p single balloon enteroscopy and push enteroscopy attempted (Dr. Marlow), could only reach proximal jejunum, no active bleeding or stigmata noted\par Previously admitted 4/26-5/4/21 for weakness, hgb 3.9 s/p 4 units PRBCs\par Also admitted 1/20-1/22/21 for anemia, hgb 5, s/p 2 units PRBCs. EGD/Colonoscopy completed, 1 ascending polyp removed, pathology c/w TA\par \par Pt reports he is doing well. Denies SOB, palpitations, abd pain, n/v\par Pt reports one episode of melena since hospital discharge. Last occurred 2 weeks ago\par He is unsure of the name of outpatient GI\par \par BH: every 2-3 days, no complaints\par Reports adequate dietary fiber and drinks 3-4 cups water daily\par Denies use of stool softeners or fiber supplements\par \par Denies FMH colorectal CA or stomach CA\par Denies ASA/NSAIDs in last 7 days\par

## 2021-07-30 NOTE — PHYSICAL EXAM
[Abdomen Masses] : No abdominal masses [Abdomen Tenderness] : ~T No ~M abdominal tenderness [No HSM] : no hepatosplenomegaly [JVD] : no jugular venous distention  [Normal Breath Sounds] : Normal breath sounds [Normal Heart Sounds] : normal heart sounds

## 2021-07-30 NOTE — ASSESSMENT
[FreeTextEntry1] : I reviewed with the patient with a  the need for surgical exploration. The role of surgery to identify and excise this lesion was outlined. The risks, benefits and alternatives to surgery have been detail including but not limited to bleeding, infection, recurrent bleeding, need for future and recurrent procedures, anastomotic leak, need for ileostomy creation, heart or lung complications and death. All questions were answered.\par \par CBC drawn today.\par \par The patient consents to planned procedure. We will schedule him for a small bowel resection with on table push enteroscopy.\par \par

## 2021-08-05 ENCOUNTER — NON-APPOINTMENT (OUTPATIENT)
Age: 66
End: 2021-08-05

## 2021-08-05 LAB
BASOPHILS # BLD AUTO: 0.03 K/UL
BASOPHILS NFR BLD AUTO: 0.5 %
EOSINOPHIL # BLD AUTO: 0.4 K/UL
EOSINOPHIL NFR BLD AUTO: 6.4 %
HCT VFR BLD CALC: 31.3 %
HGB BLD-MCNC: 9.8 G/DL
IMM GRANULOCYTES NFR BLD AUTO: 0.3 %
LYMPHOCYTES # BLD AUTO: 1.02 K/UL
LYMPHOCYTES NFR BLD AUTO: 16.3 %
MAN DIFF?: NORMAL
MCHC RBC-ENTMCNC: 28.4 PG
MCHC RBC-ENTMCNC: 31.3 GM/DL
MCV RBC AUTO: 90.7 FL
MONOCYTES # BLD AUTO: 0.47 K/UL
MONOCYTES NFR BLD AUTO: 7.5 %
NEUTROPHILS # BLD AUTO: 4.3 K/UL
NEUTROPHILS NFR BLD AUTO: 69 %
PLATELET # BLD AUTO: 167 K/UL
RBC # BLD: 3.45 M/UL
RBC # FLD: 17.6 %
WBC # FLD AUTO: 6.24 K/UL

## 2021-09-10 DIAGNOSIS — Z01.818 ENCOUNTER FOR OTHER PREPROCEDURAL EXAMINATION: ICD-10-CM

## 2021-09-11 ENCOUNTER — APPOINTMENT (OUTPATIENT)
Dept: DISASTER EMERGENCY | Facility: CLINIC | Age: 66
End: 2021-09-11

## 2021-09-13 ENCOUNTER — TRANSCRIPTION ENCOUNTER (OUTPATIENT)
Age: 66
End: 2021-09-13

## 2021-09-13 ENCOUNTER — INPATIENT (INPATIENT)
Facility: HOSPITAL | Age: 66
LOS: 3 days | Discharge: HOME | DRG: 331 | End: 2021-09-17
Attending: SURGERY | Admitting: SURGERY
Payer: MEDICARE

## 2021-09-13 VITALS
RESPIRATION RATE: 18 BRPM | TEMPERATURE: 99 F | WEIGHT: 184.09 LBS | SYSTOLIC BLOOD PRESSURE: 156 MMHG | HEART RATE: 64 BPM | HEIGHT: 67 IN | DIASTOLIC BLOOD PRESSURE: 64 MMHG

## 2021-09-13 DIAGNOSIS — Z98.890 OTHER SPECIFIED POSTPROCEDURAL STATES: Chronic | ICD-10-CM

## 2021-09-13 LAB
ALBUMIN SERPL ELPH-MCNC: 4.3 G/DL — SIGNIFICANT CHANGE UP (ref 3.3–5)
ALP SERPL-CCNC: 127 U/L — HIGH (ref 40–120)
ALT FLD-CCNC: 14 U/L — SIGNIFICANT CHANGE UP (ref 10–45)
ANION GAP SERPL CALC-SCNC: 11 MMOL/L — SIGNIFICANT CHANGE UP (ref 5–17)
APTT BLD: 40 SEC — HIGH (ref 27.5–35.5)
AST SERPL-CCNC: 13 U/L — SIGNIFICANT CHANGE UP (ref 10–40)
BASOPHILS # BLD AUTO: 0.02 K/UL — SIGNIFICANT CHANGE UP (ref 0–0.2)
BASOPHILS NFR BLD AUTO: 0.4 % — SIGNIFICANT CHANGE UP (ref 0–2)
BILIRUB SERPL-MCNC: 0.4 MG/DL — SIGNIFICANT CHANGE UP (ref 0.2–1.2)
BLD GP AB SCN SERPL QL: NEGATIVE — SIGNIFICANT CHANGE UP
BUN SERPL-MCNC: 37 MG/DL — HIGH (ref 7–23)
CALCIUM SERPL-MCNC: 9 MG/DL — SIGNIFICANT CHANGE UP (ref 8.4–10.5)
CHLORIDE SERPL-SCNC: 104 MMOL/L — SIGNIFICANT CHANGE UP (ref 96–108)
CO2 SERPL-SCNC: 23 MMOL/L — SIGNIFICANT CHANGE UP (ref 22–31)
CREAT SERPL-MCNC: 3.02 MG/DL — HIGH (ref 0.5–1.3)
EOSINOPHIL # BLD AUTO: 0.5 K/UL — SIGNIFICANT CHANGE UP (ref 0–0.5)
EOSINOPHIL NFR BLD AUTO: 10.5 % — HIGH (ref 0–6)
GLUCOSE SERPL-MCNC: 106 MG/DL — HIGH (ref 70–99)
HCT VFR BLD CALC: 26.8 % — LOW (ref 39–50)
HGB BLD-MCNC: 8.3 G/DL — LOW (ref 13–17)
IMM GRANULOCYTES NFR BLD AUTO: 0.2 % — SIGNIFICANT CHANGE UP (ref 0–1.5)
INR BLD: 1.15 — SIGNIFICANT CHANGE UP (ref 0.88–1.16)
LYMPHOCYTES # BLD AUTO: 1.03 K/UL — SIGNIFICANT CHANGE UP (ref 1–3.3)
LYMPHOCYTES # BLD AUTO: 21.6 % — SIGNIFICANT CHANGE UP (ref 13–44)
MAGNESIUM SERPL-MCNC: 2.5 MG/DL — SIGNIFICANT CHANGE UP (ref 1.6–2.6)
MCHC RBC-ENTMCNC: 27.7 PG — SIGNIFICANT CHANGE UP (ref 27–34)
MCHC RBC-ENTMCNC: 31 GM/DL — LOW (ref 32–36)
MCV RBC AUTO: 89.3 FL — SIGNIFICANT CHANGE UP (ref 80–100)
MONOCYTES # BLD AUTO: 0.52 K/UL — SIGNIFICANT CHANGE UP (ref 0–0.9)
MONOCYTES NFR BLD AUTO: 10.9 % — SIGNIFICANT CHANGE UP (ref 2–14)
NEUTROPHILS # BLD AUTO: 2.68 K/UL — SIGNIFICANT CHANGE UP (ref 1.8–7.4)
NEUTROPHILS NFR BLD AUTO: 56.4 % — SIGNIFICANT CHANGE UP (ref 43–77)
NRBC # BLD: 0 /100 WBCS — SIGNIFICANT CHANGE UP (ref 0–0)
PLATELET # BLD AUTO: 165 K/UL — SIGNIFICANT CHANGE UP (ref 150–400)
POTASSIUM SERPL-MCNC: 4.6 MMOL/L — SIGNIFICANT CHANGE UP (ref 3.5–5.3)
POTASSIUM SERPL-SCNC: 4.6 MMOL/L — SIGNIFICANT CHANGE UP (ref 3.5–5.3)
PROT SERPL-MCNC: 7.1 G/DL — SIGNIFICANT CHANGE UP (ref 6–8.3)
PROTHROM AB SERPL-ACNC: 13.7 SEC — HIGH (ref 10.6–13.6)
RBC # BLD: 3 M/UL — LOW (ref 4.2–5.8)
RBC # FLD: 16.5 % — HIGH (ref 10.3–14.5)
RH IG SCN BLD-IMP: POSITIVE — SIGNIFICANT CHANGE UP
SARS-COV-2 N GENE NPH QL NAA+PROBE: NOT DETECTED
SARS-COV-2 RNA SPEC QL NAA+PROBE: SIGNIFICANT CHANGE UP
SODIUM SERPL-SCNC: 138 MMOL/L — SIGNIFICANT CHANGE UP (ref 135–145)
WBC # BLD: 4.76 K/UL — SIGNIFICANT CHANGE UP (ref 3.8–10.5)
WBC # FLD AUTO: 4.76 K/UL — SIGNIFICANT CHANGE UP (ref 3.8–10.5)

## 2021-09-13 PROCEDURE — 99285 EMERGENCY DEPT VISIT HI MDM: CPT

## 2021-09-13 PROCEDURE — 71045 X-RAY EXAM CHEST 1 VIEW: CPT | Mod: 26

## 2021-09-13 PROCEDURE — 93010 ELECTROCARDIOGRAM REPORT: CPT

## 2021-09-13 RX ORDER — GABAPENTIN 400 MG/1
600 CAPSULE ORAL ONCE
Refills: 0 | Status: COMPLETED | OUTPATIENT
Start: 2021-09-14 | End: 2021-09-14

## 2021-09-13 RX ORDER — AMLODIPINE BESYLATE 2.5 MG/1
1 TABLET ORAL
Qty: 0 | Refills: 0 | DISCHARGE

## 2021-09-13 RX ORDER — LEVOTHYROXINE SODIUM 125 MCG
1 TABLET ORAL
Qty: 0 | Refills: 0 | DISCHARGE

## 2021-09-13 RX ORDER — FERRIC CITRATE 210 MG/1
0 TABLET, COATED ORAL
Qty: 0 | Refills: 0 | DISCHARGE

## 2021-09-13 RX ORDER — SODIUM CHLORIDE 9 MG/ML
1000 INJECTION, SOLUTION INTRAVENOUS
Refills: 0 | Status: DISCONTINUED | OUTPATIENT
Start: 2021-09-13 | End: 2021-09-14

## 2021-09-13 RX ORDER — ALVIMOPAN 12 MG/1
12 CAPSULE ORAL ONCE
Refills: 0 | Status: DISCONTINUED | OUTPATIENT
Start: 2021-09-14 | End: 2021-09-14

## 2021-09-13 RX ORDER — ONDANSETRON 8 MG/1
4 TABLET, FILM COATED ORAL EVERY 6 HOURS
Refills: 0 | Status: DISCONTINUED | OUTPATIENT
Start: 2021-09-13 | End: 2021-09-14

## 2021-09-13 RX ORDER — NIFEDIPINE 30 MG
1 TABLET, EXTENDED RELEASE 24 HR ORAL
Qty: 0 | Refills: 0 | DISCHARGE

## 2021-09-13 RX ORDER — ACETAMINOPHEN 500 MG
1000 TABLET ORAL ONCE
Refills: 0 | Status: COMPLETED | OUTPATIENT
Start: 2021-09-14 | End: 2021-09-14

## 2021-09-13 RX ORDER — METRONIDAZOLE 7.5 MG/G
1 GEL VAGINAL
Qty: 0 | Refills: 0 | DISCHARGE

## 2021-09-13 RX ORDER — PANTOPRAZOLE SODIUM 20 MG/1
1 TABLET, DELAYED RELEASE ORAL
Qty: 0 | Refills: 0 | DISCHARGE

## 2021-09-13 RX ORDER — BACITRACIN ZINC NEOMYCIN SULFATE POLYMYXIN B SULFATE 400; 3.5; 5 [IU]/G; MG/G; [IU]/G
1 OINTMENT TOPICAL
Qty: 0 | Refills: 0 | DISCHARGE

## 2021-09-13 RX ORDER — SOD SULF/SODIUM/NAHCO3/KCL/PEG
4000 SOLUTION, RECONSTITUTED, ORAL ORAL ONCE
Refills: 0 | Status: COMPLETED | OUTPATIENT
Start: 2021-09-13 | End: 2021-09-13

## 2021-09-13 RX ORDER — HEPARIN SODIUM 5000 [USP'U]/ML
5000 INJECTION INTRAVENOUS; SUBCUTANEOUS ONCE
Refills: 0 | Status: COMPLETED | OUTPATIENT
Start: 2021-09-14 | End: 2021-09-14

## 2021-09-13 RX ORDER — CARVEDILOL PHOSPHATE 80 MG/1
1 CAPSULE, EXTENDED RELEASE ORAL
Qty: 0 | Refills: 0 | DISCHARGE

## 2021-09-13 RX ORDER — ASPIRIN/CALCIUM CARB/MAGNESIUM 324 MG
1 TABLET ORAL
Qty: 0 | Refills: 0 | DISCHARGE

## 2021-09-13 RX ADMIN — SODIUM CHLORIDE 120 MILLILITER(S): 9 INJECTION, SOLUTION INTRAVENOUS at 20:45

## 2021-09-13 RX ADMIN — Medication 5 MILLIGRAM(S): at 21:20

## 2021-09-13 RX ADMIN — Medication 4000 MILLILITER(S): at 21:20

## 2021-09-13 NOTE — ED ADULT NURSE NOTE - NSIMPLEMENTINTERV_GEN_ALL_ED
Implemented All Universal Safety Interventions:  [place] to call system. Call bell, personal items and telephone within reach. Instruct patient to call for assistance. Room bathroom lighting operational. Non-slip footwear when patient is off stretcher. Physically safe environment: no spills, clutter or unnecessary equipment. Stretcher in lowest position, wheels locked, appropriate side rails in place.

## 2021-09-13 NOTE — ED PROVIDER NOTE - CLINICAL SUMMARY MEDICAL DECISION MAKING FREE TEXT BOX
avss. nontoxic. NAD. no systemic sx. no acute surgical abd. no indication for ct imaging at this time. preop labs/ekg/cxr done. surgery consulted. will admit per reccs.

## 2021-09-13 NOTE — H&P ADULT - NSHPLABSRESULTS_GEN_ALL_CORE
8.3    4.76  )-----------( 165      ( 13 Sep 2021 18:50 )             26.8     09-13    138  |  104  |  37<H>  ----------------------------<  106<H>  4.6   |  23  |  3.02<H>    Ca    9.0      13 Sep 2021 18:50  Mg     2.5     09-13    TPro  7.1  /  Alb  4.3  /  TBili  0.4  /  DBili  x   /  AST  13  /  ALT  14  /  AlkPhos  127<H>  09-13    LIVER FUNCTIONS - ( 13 Sep 2021 18:50 )  Alb: 4.3 g/dL / Pro: 7.1 g/dL / ALK PHOS: 127 U/L / ALT: 14 U/L / AST: 13 U/L / GGT: x           PT/INR - ( 13 Sep 2021 18:50 )   PT: 13.7 sec;   INR: 1.15          PTT - ( 13 Sep 2021 18:50 )  PTT:40.0 sec  CAPILLARY BLOOD GLUCOSE

## 2021-09-13 NOTE — ED ADULT NURSE NOTE - OBJECTIVE STATEMENT
Patient presents due to having a scheduled rectal surgery tomorrow.  Patient denies abdominal pain, n/v/d, chest pain, SOB, fevers, chills, AMS.  Patient reports he is covid 19 vaccinated. He is in no acute distress.

## 2021-09-13 NOTE — H&P ADULT - NSHPADDITIONALINFOADULT_GEN_ALL_CORE
SENIOR RESIDENT ADDENDUM  66M PMHx small bowel bleeding, admitted 6/2021 s/p attempted push enteroscopy (Ele), only able to reach proximal jejunum, no active bleeding seen, last colonoscopy 1/2021, planned to have small bowel resection with operative push enteroscopy with Ysabel tomorrow, but presented today for preop bowel preparation. Vitals normal. Abdomen soft. Hgb 8. Preop for scheduled SBR tomorrow. Golyghtly/dulcolax. Discussed with attending surgeon.

## 2021-09-13 NOTE — H&P ADULT - HISTORY OF PRESENT ILLNESS
67yo M with PMH HTN, DM (diet controlled) and PSH R inguinal hernia repair who presents for pre-operative bowel preparation for tomorrow. Capsule endoscopy on 6/15/21 w/ outpatient GI, brisk bleeding in small bowel. Admitted to [facility] 6/17/21 for melena, s/p single balloon enteroscopy and push enteroscopy attempted (Dr. Marlow), could only reach proximal jejunum, no active bleeding or stigmata noted. Previously admitted 4/26-5/4/21 for weakness, hgb 3.9 s/p 4 units PRBCs. Admitted 1/20-1/22/21 for anemia, hgb 5, s/p 2 units PRBCs. EGD/Colonoscopy completed, 1 ascending polyp removed, pathology c/w TA. Patient endorses feeling well. Denies N/V, abdomnial pain, CP, SOB. Last BM 2d ago with melana, no BRBPR. Plan for prep and pre-op for OR tomorrow. In ED, H/H 8.3 without active BRBPR.     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 Capsule endoscopy on 6/15/21 w/ outpatient GI, brisk bleeding in small bowel. Admitted to [facility] 6/17/21 for melena, s/p single balloon enteroscopy and push enteroscopy attempted (Dr. Marlow), could only reach proximal jejunum, no active bleeding or stigmata noted. Previously admitted 4/26-5/4/21 for weakness, hgb 3.9 s/p 4 units PRBCs. Admitted 1/20-1/22/21 for anemia, hgb 5, s/p 2 units PRBCs. EGD/Colonoscopy completed, 1 ascending polyp removed, pathology c/w TA. Patient endorses feeling well. Denies N/V, abdomnial pain, CP, SOB. Last BM 2d ago with melana, no BRBPR. Plan for prep and pre-op for OR tomorrow. In ED, H/H 8.3 without active BRBPR.     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

## 2021-09-13 NOTE — ED PROVIDER NOTE - OBJECTIVE STATEMENT
66M htn, iddm, ckd, normocytic anemia, hypothyroidism, s/p R inguinal hernia repair, completed covid19 vaccination (3/2021), recently hosptialized for melena found to have unremarkable enteroscopy up to proximal jejunum by GI (6/17/21-6/20/21), now referred in by surgery for proper bowel prep in anticipation of small bowel resection w/ table push enterosocpy tomorrow for persistent melena. last ate yesterday before midnight. last drank cranberry juice at 10am today. no iron supplementation xyears. no fever/chills, no ha/dizziness, no uri/cough, no cp/sob, no abd pain/n/v, no diarrhea, no hematochezia, no change in appetite/po intake, no dysuria, no prior covid, no trauma, no etoh-dpt/ivdu.     surgery: jerry vick 66M htn, iddm, ckd, normocytic anemia, hypothyroidism, s/p R inguinal hernia repair, completed covid19 vaccination (3/2021), recently hospitalized for melena found to have unremarkable enteroscopy up to proximal jejunum by GI (6/17/21-6/20/21), now referred in by surgery for proper bowel prep in anticipation of small bowel resection w/ table push enterosocpy tomorrow for persistent melena. last ate yesterday before midnight. last drank cranberry juice at 10am today. no iron supplementation xyears. no fever/chills, no ha/dizziness, no uri/cough, no cp/sob, no abd pain/n/v, no diarrhea, no hematochezia, no change in appetite/po intake, no dysuria, no prior covid, no trauma, no etoh-dpt/ivdu.     surgery: jerry vick

## 2021-09-13 NOTE — H&P ADULT - ASSESSMENT
65yo M with PMH HTN, DM (diet controlled) and PSH cholecystectomy, R inguinal hernia repair who presents for pre-operative bowel preparation for tomorrow, for definitive management of small bowel bleed. Plan for bowel prep and pre-op for OR tomorrow, Hgb 8.3.     Admit to team1, general surgery, [facility], Dr. Grady  Pain/nausea control PRN   Bowel prep Golytely, Dulcolax  Home meds as appropriate (Hydralazine 100QD, )  OOBA/IS/SCDs/holding SQ  AM Labs  Pre-op for OR tomorrow    Plan discussed with attending and chief resident.   ____________________________________________________   [facility]ident   Surgery  65yo M with PMH HTN, DM (diet controlled) and PSH cholecystectomy, R inguinal hernia repair who presents for pre-operative bowel preparation for tomorrow, for definitive management of small bowel bleed. Plan for bowel prep and pre-op for OR tomorrow, Hgb 8.3.     Admit to team1, general surgery, [facility], Dr. Grady  Pain/nausea control PRN   Bowel prep Golytely, Dulcolax  Home meds as appropriate (Holding for OR)   OOBA/IS/SCDs/holding SQH  AM Labs  Pre-op for OR tomorrow    Plan discussed with attending and chief resident.   ____________________________________________________   [place] - Resident   Surgery

## 2021-09-13 NOTE — H&P ADULT - NSHPPHYSICALEXAM_GEN_ALL_CORE
GEN: NAD, resting comfortably in bed   CV: NSR  Pulm: no respiratory distress on RA   Abd: soft, ND, NTTP, no rebound or guarding   Ext: WWP, no edema   Neuro: motor and sensory intact

## 2021-09-13 NOTE — ED PROVIDER NOTE - WR ORDER ID 1
Initial Clinical Review    Admission: Date/Time/Statement: 7/26/18 @ 1931     Orders Placed This Encounter   Procedures    Inpatient Admission (expected length of stay for this patient is greater than two midnights)     Standing Status:   Standing     Number of Occurrences:   1     Order Specific Question:   Admitting Physician     Answer:   Kristin Reason     Order Specific Question:   Level of Care     Answer:   Med Surg [16]     Order Specific Question:   Estimated length of stay     Answer:   More than 2 Midnights     Order Specific Question:   Certification     Answer:   I certify that inpatient services are medically necessary for this patient for a duration of greater than two midnights  See H&P and MD Progress Notes for additional information about the patient's course of treatment  ED: Date/Time/Mode of Arrival:   ED Arrival Information     Expected Arrival Acuity Means of Arrival Escorted By Service Admission Type    - 7/26/2018 15:29 Urgent Wheelchair Family Member General Medicine Urgent    Arrival Complaint    weakness      Chief Complaint:   Chief Complaint   Patient presents with    Weakness - Generalized     Patient has had generalized weakness that has been increaseing the past few days  Patient was seen at her PCP today and sent to the ED for evlauation of placement in a rehab facility  History of Illness:   78 yr female comes to ED today pov with her son due to "can't take of herself anymore and she's home alone and I have a problem with that"  PCP front office staff did call ahead to notify us that patient would be coming here "for placement" although provider's office note from this week doesn't mention it  Pt had prior hospitalization early June 2018 for pneumonia and hepatic encephalopathy following which she was in rehab 6/6-6/23 on [facility] [place]      Pt is oriented to self and place but not really sure why she is here  She tells me she doesn't remember a lot   She is pleasant and cooperative  She denies pain dizziness vomiting chest pain shortness of breath or cough  She tells me she wears her oxygen  She tells me she has her meds in a daily sun-sat pill container that her son prepares for her  Most history is from patient's son Gina Briceno- she's confused she lives alone she doesn't remember anything she has diarrhea constantly and I don't think she can be alone anymore  Son says pcp recommended she come to ED for evaluation for placement      ED Vital Signs:   ED Triage Vitals   Temperature Pulse Respirations Blood Pressure SpO2   07/26/18 1539 07/26/18 1539 07/26/18 1539 07/26/18 1539 07/26/18 1539   97 6 °F (36 4 °C) (!) 52 20 115/51 98 %      Temp Source Heart Rate Source Patient Position - Orthostatic VS BP Location FiO2 (%)   07/26/18 1539 07/26/18 1539 07/26/18 1539 07/26/18 1539 --   Temporal Monitor Lying Left arm       Pain Score       07/26/18 1700       No Pain        Wt Readings from Last 1 Encounters:   07/26/18 69 5 kg (153 lb 3 5 oz)   Vital Signs (abnormal):   HR 57, 53, 45, 48, 48, 47  Abnormal Labs/Diagnostic Test Results:   PT 15 3 INR 1 27 PTT 39 CO2 35 ALK PHOS 157 ALB 2 0 TBILI 1 40 GFR 54 TSH 4 488 AMMONIA 59  CT HEAD=No acute intracranial abnormality  CXR=Recurrent pulmonary edema with bibasilar pleural effusions  ED Treatment:   Medication Administration from 07/26/2018 1528 to 07/27/2018 1424       Date/Time Order Dose Route Action Action by Comments     07/26/2018 1916 ondansetron (ZOFRAN) injection 4 mg 4 mg Intravenous Given Reed Campbell RN      07/26/2018 1919 ondansetron (ZOFRAN-ODT) dispersible tablet 4 mg   Oral Canceled Entry Reed Campbell RN      07/27/2018 1911 aspirin chewable tablet 81 mg 81 mg Oral Given Cherri Rico RN      07/27/2018 1896 cholecalciferol (VITAMIN D3) tablet 1,000 Units 1,000 Units Oral Given Cherri Rico RN      07/27/2018 0955 co-enzyme Q-10 capsule 30 mg 30 mg Oral Given Cherri Rico RN      07/27/2018 5427 pantoprazole (PROTONIX) EC tablet 40 mg 40 mg Oral Given Brady Glasgow RN      07/27/2018 0959 furosemide (LASIX) tablet 40 mg 0 mg Oral Hold Patricia Snell RN      07/27/2018 1000 lactulose 20 g/30 mL oral solution 30 g 30 g Oral Given Patricia Snell RN      07/26/2018 2259 lactulose 20 g/30 mL oral solution 30 g 30 g Oral Given Brady Glasgow RN      07/27/2018 0958 losartan (COZAAR) tablet 50 mg 0 mg Oral Hold Patricia Snell [facility]      07/27/2018 0950 magnesium oxide (MAG-OX) tablet 400 mg 400 mg Oral Given Patricia Snell RN      07/27/2018 1321 menthol-zinc oxide (CALMOSEPTINE) 0 44-20 6 % ointment   Topical Not Given Vannesa Alegria RN Order does not specify location of administration  Contacted hospitilist verbal order to hold medication until assessed by physcian      07/27/2018 0949 potassium chloride 10 % oral solution 20 mEq 20 mEq Oral Given Patricia Snell RN      07/27/2018 4832 sodium chloride 0 9 % infusion 100 mL/hr Intravenous [address] , [facility]      07/26/2018 2257 sodium chloride 0 9 % infusion 100 mL/hr Intravenous [address], [facility]      07/27/2018 0936 enoxaparin (LOVENOX) subcutaneous injection 40 mg 40 mg Subcutaneous Given Patricia Snell RN       Past Medical/Surgical History:    Active Ambulatory Problems     Diagnosis Date Noted    Shortness of breath 03/10/2017    Pleural effusion 03/10/2017    Hypertension 03/10/2017    GERD without esophagitis 03/10/2017    Gastritis 03/10/2017    Valvular heart disease 03/11/2017    Acute on chronic diastolic congestive heart failure ([facility] ) 03/11/2017    Acute encephalopathy 03/17/2017    Tricuspid regurgitation 03/18/2017    Pulmonary hypertension ([facility] ) 03/18/2017    Mitral valve stenosis 03/18/2017    Aortic stenosis 03/18/2017    Hypoalbuminemia 03/18/2017    Hepatic coma/encephalopathy ([facility] ) 03/18/2017    Hep[facility] cirrhosis [facility] 03/18/2017    Hyperbilirubinemia 03/18/2017    Leukopenia 03/18/2017    Neutropenia ([facility] ) 03/18/2017    Transaminitis 03/18/2017    Healthcare-associated pneumonia 03/18/2017    Mucosal abnormality of stomach 03/19/2017    Portal hypertension ([place] [address] ) 03/20/2017    Left adrenal mass ([place] [address] ) 03/21/2017    Aneurysm of anterior cerebral artery 04/29/2014    Pressure ulcer of left buttock, stage 3 ([place] [address] ) 71/00/8230    Diastolic CHF ([place] [address] ) 15/82/2640    Edema 07/26/2012    Hyperlipidemia 07/25/2012    Pancreatic cyst 08/01/2017    Post-menopausal osteoporosis 07/25/2012    Pneumonia 06/04/2018    Altered mental status 06/04/2018    Increased ammonia level 06/04/2018     Resolved Ambulatory Problems     Diagnosis Date Noted    Hyponatremia 11/04/2017    Shortness of breath 11/04/2017     Past Medical History:   Diagnosis Date    Breast cancer ([place] [address] )     Cancer ([place] [address] )     CHF (congestive heart failure) ([place] [address] )     Cirrhosis of liver ([place] [address] )     GERD (gastroesophageal reflux disease)     Hepatic encephalopathy (HCC)     Hypertension     Polyp, sigmoid colon    Admitting Diagnosis: Weakness [R53 1]  Age/Sex: 78 y o  female  Assessment/Plan:   Generalized weakness   Assessment & Plan     -Brought in by her so with complaints of weakness  -Son is concern that she is not able to care for herself and needs placement  -She admits to increased weakness over past several days  -Denies fever, chills, no leukocytosis  -Head CT shows-No acute intracranial abnormality   -Admit to Med/surg  -IV normal saline  -Check B12  -TSH elevated, check Free T3,T4  -AM labs  -Case management consult for placement  -Supportive care       Diastolic CHF ([place] [address] )   Assessment & Plan     -No complaints of SOB, no lower extremity edema  -Chest X-ray shows- Recurrent pulmonary edema with bibasilar pleural effusions   -Last echo on 5/29/2018 reviewed EF at 60%  -Check BNP  -Continue home medications  -Supportive care       Increased ammonia level   Assessment & Plan     -Ammonia level at 59  -This appears to be chronic as iot has been elevated over past 8 months  -Probably secondary to chronic liver disease  -Continue lactulose       Hepatic cirrhosis (HCC)   Assessment & Plan     -Chronic condition  -Continue Lactulose  -Avoid hepatotoxic agents       GERD without esophagitis   Assessment & Plan     -Continue protonix       Hypertension   Assessment & Plan     -Blood pressure is stable  -Continue home medication       Hyperlipidemia   Assessment & Plan     -Hold statin therapy   Admission Orders:  TELEMETRY  PT/OT/ST EVAL &TX  VENODYNES  Scheduled Meds:   Current Facility-Administered Medications:  aspirin 81 mg Oral Daily RONAL Dawson-ALESSIA    cholecalciferol 1,000 Units Oral Daily Phu Slava, PA-C    co-enzyme Q-10 30 mg Oral Daily Phu Slava, PA-C    enoxaparin 40 mg Subcutaneous Daily Phu Slava, PA-C    furosemide 40 mg Oral Daily Phu Slava, PA-C    lactulose 30 g Oral TID Phu Pedersen, PA-C    losartan 50 mg Oral Daily Phugaldino Pedersen, PA-C    magnesium oxide 400 mg Oral Daily Phugaldino Pedersen, PA-C    menthol-zinc oxide  Topical BID RONAL Dawson-ALESSIA    ondansetron 4 mg Intravenous Q6H PRN Phu Pedersen, PA-C    pantoprazole 40 mg Oral Early Morning Phu Slava, PA-C    potassium chloride 20 mEq Oral Daily Phu Pedersen PA-C      Continuous Infusions:   sodium chloride 100 mL/hr Last Rate: 100 mL/hr (07/27/18 0937)     PRN Meds: ondansetron [id number]

## 2021-09-13 NOTE — ED ADULT TRIAGE NOTE - CHIEF COMPLAINT QUOTE
Patient c/o of black stools X 3 days, no abdominal pain, denies dizziness or weakness.  States scheduled for surgery tomorrow due to internal bleeding, sent for bowel preparation prior to colon and rectal surgery.

## 2021-09-14 ENCOUNTER — APPOINTMENT (OUTPATIENT)
Dept: COLORECTAL SURGERY | Facility: HOSPITAL | Age: 66
End: 2021-09-14

## 2021-09-14 ENCOUNTER — TRANSCRIPTION ENCOUNTER (OUTPATIENT)
Age: 66
End: 2021-09-14

## 2021-09-14 ENCOUNTER — RESULT REVIEW (OUTPATIENT)
Age: 66
End: 2021-09-14

## 2021-09-14 LAB
ANION GAP SERPL CALC-SCNC: 12 MMOL/L — SIGNIFICANT CHANGE UP (ref 5–17)
APTT BLD: 41.6 SEC — HIGH (ref 27.5–35.5)
BASE EXCESS BLDA CALC-SCNC: -2.9 MMOL/L — LOW (ref -2–3)
BASE EXCESS BLDA CALC-SCNC: -4.1 MMOL/L — LOW (ref -2–3)
BLD GP AB SCN SERPL QL: NEGATIVE — SIGNIFICANT CHANGE UP
BUN SERPL-MCNC: 37 MG/DL — HIGH (ref 7–23)
CA-I BLDA-SCNC: 1.14 MMOL/L — LOW (ref 1.15–1.33)
CA-I BLDA-SCNC: 1.15 MMOL/L — SIGNIFICANT CHANGE UP (ref 1.15–1.33)
CALCIUM SERPL-MCNC: 8.6 MG/DL — SIGNIFICANT CHANGE UP (ref 8.4–10.5)
CHLORIDE SERPL-SCNC: 107 MMOL/L — SIGNIFICANT CHANGE UP (ref 96–108)
CO2 BLDA-SCNC: 22 MMOL/L — SIGNIFICANT CHANGE UP (ref 19–24)
CO2 BLDA-SCNC: 23 MMOL/L — SIGNIFICANT CHANGE UP (ref 19–24)
CO2 SERPL-SCNC: 22 MMOL/L — SIGNIFICANT CHANGE UP (ref 22–31)
COHGB MFR BLDA: 0.8 % — SIGNIFICANT CHANGE UP
COHGB MFR BLDA: 1.2 % — SIGNIFICANT CHANGE UP
COVID-19 SPIKE DOMAIN AB INTERP: POSITIVE
COVID-19 SPIKE DOMAIN ANTIBODY RESULT: >250 U/ML — HIGH
CREAT SERPL-MCNC: 2.69 MG/DL — HIGH (ref 0.5–1.3)
GLUCOSE BLDC GLUCOMTR-MCNC: 102 MG/DL — HIGH (ref 70–99)
GLUCOSE BLDC GLUCOMTR-MCNC: 168 MG/DL — HIGH (ref 70–99)
GLUCOSE BLDC GLUCOMTR-MCNC: 196 MG/DL — HIGH (ref 70–99)
GLUCOSE BLDC GLUCOMTR-MCNC: 86 MG/DL — SIGNIFICANT CHANGE UP (ref 70–99)
GLUCOSE BLDC GLUCOMTR-MCNC: 89 MG/DL — SIGNIFICANT CHANGE UP (ref 70–99)
GLUCOSE SERPL-MCNC: 87 MG/DL — SIGNIFICANT CHANGE UP (ref 70–99)
HCO3 BLDA-SCNC: 21 MMOL/L — SIGNIFICANT CHANGE UP (ref 21–28)
HCO3 BLDA-SCNC: 22 MMOL/L — SIGNIFICANT CHANGE UP (ref 21–28)
HCT VFR BLD CALC: 26.5 % — LOW (ref 39–50)
HGB BLD-MCNC: 8 G/DL — LOW (ref 13–17)
HGB BLDA-MCNC: 7.4 G/DL — LOW (ref 12.6–17.4)
HGB BLDA-MCNC: 8.3 G/DL — LOW (ref 12.6–17.4)
INR BLD: 1.19 — HIGH (ref 0.88–1.16)
MAGNESIUM SERPL-MCNC: 2.3 MG/DL — SIGNIFICANT CHANGE UP (ref 1.6–2.6)
MCHC RBC-ENTMCNC: 27.6 PG — SIGNIFICANT CHANGE UP (ref 27–34)
MCHC RBC-ENTMCNC: 30.2 GM/DL — LOW (ref 32–36)
MCV RBC AUTO: 91.4 FL — SIGNIFICANT CHANGE UP (ref 80–100)
METHGB MFR BLDA: 0.5 % — SIGNIFICANT CHANGE UP
METHGB MFR BLDA: 0.8 % — SIGNIFICANT CHANGE UP
NRBC # BLD: 0 /100 WBCS — SIGNIFICANT CHANGE UP (ref 0–0)
OXYHGB MFR BLDA: 97.7 % — HIGH (ref 90–95)
OXYHGB MFR BLDA: 97.7 % — HIGH (ref 90–95)
PCO2 BLDA: 36 MMHG — SIGNIFICANT CHANGE UP (ref 35–48)
PCO2 BLDA: 37 MMHG — SIGNIFICANT CHANGE UP (ref 35–48)
PH BLDA: 7.37 — SIGNIFICANT CHANGE UP (ref 7.35–7.45)
PH BLDA: 7.38 — SIGNIFICANT CHANGE UP (ref 7.35–7.45)
PHOSPHATE SERPL-MCNC: 4.2 MG/DL — SIGNIFICANT CHANGE UP (ref 2.5–4.5)
PLATELET # BLD AUTO: 155 K/UL — SIGNIFICANT CHANGE UP (ref 150–400)
PO2 BLDA: 277 MMHG — HIGH (ref 83–108)
PO2 BLDA: 286 MMHG — HIGH (ref 83–108)
POTASSIUM BLDA-SCNC: 4.1 MMOL/L — SIGNIFICANT CHANGE UP (ref 3.5–5.1)
POTASSIUM BLDA-SCNC: 4.5 MMOL/L — SIGNIFICANT CHANGE UP (ref 3.5–5.1)
POTASSIUM SERPL-MCNC: 4.5 MMOL/L — SIGNIFICANT CHANGE UP (ref 3.5–5.3)
POTASSIUM SERPL-SCNC: 4.5 MMOL/L — SIGNIFICANT CHANGE UP (ref 3.5–5.3)
PROTHROM AB SERPL-ACNC: 14.2 SEC — HIGH (ref 10.6–13.6)
RBC # BLD: 2.9 M/UL — LOW (ref 4.2–5.8)
RBC # FLD: 16.6 % — HIGH (ref 10.3–14.5)
RH IG SCN BLD-IMP: POSITIVE — SIGNIFICANT CHANGE UP
SAO2 % BLDA: 99.2 % — HIGH (ref 94–98)
SAO2 % BLDA: 99.3 % — HIGH (ref 94–98)
SARS-COV-2 IGG+IGM SERPL QL IA: >250 U/ML — HIGH
SARS-COV-2 IGG+IGM SERPL QL IA: POSITIVE
SODIUM BLDA-SCNC: 138 MMOL/L — SIGNIFICANT CHANGE UP (ref 136–145)
SODIUM BLDA-SCNC: 139 MMOL/L — SIGNIFICANT CHANGE UP (ref 136–145)
SODIUM SERPL-SCNC: 141 MMOL/L — SIGNIFICANT CHANGE UP (ref 135–145)
WBC # BLD: 4.4 K/UL — SIGNIFICANT CHANGE UP (ref 3.8–10.5)
WBC # FLD AUTO: 4.4 K/UL — SIGNIFICANT CHANGE UP (ref 3.8–10.5)

## 2021-09-14 PROCEDURE — 88304 TISSUE EXAM BY PATHOLOGIST: CPT | Mod: 26

## 2021-09-14 PROCEDURE — 44120 REMOVAL OF SMALL INTESTINE: CPT | Mod: GC

## 2021-09-14 PROCEDURE — 88342 IMHCHEM/IMCYTCHM 1ST ANTB: CPT | Mod: 26

## 2021-09-14 PROCEDURE — 44378 SMALL BOWEL ENDOSCOPY: CPT

## 2021-09-14 RX ORDER — HYDRALAZINE HCL 50 MG
10 TABLET ORAL ONCE
Refills: 0 | Status: COMPLETED | OUTPATIENT
Start: 2021-09-14 | End: 2021-09-14

## 2021-09-14 RX ORDER — DEXTROSE 50 % IN WATER 50 %
12.5 SYRINGE (ML) INTRAVENOUS ONCE
Refills: 0 | Status: DISCONTINUED | OUTPATIENT
Start: 2021-09-14 | End: 2021-09-17

## 2021-09-14 RX ORDER — DEXTROSE 50 % IN WATER 50 %
15 SYRINGE (ML) INTRAVENOUS ONCE
Refills: 0 | Status: DISCONTINUED | OUTPATIENT
Start: 2021-09-14 | End: 2021-09-17

## 2021-09-14 RX ORDER — ALLOPURINOL 300 MG
100 TABLET ORAL DAILY
Refills: 0 | Status: DISCONTINUED | OUTPATIENT
Start: 2021-09-15 | End: 2021-09-17

## 2021-09-14 RX ORDER — KETOROLAC TROMETHAMINE 30 MG/ML
15 SYRINGE (ML) INJECTION EVERY 6 HOURS
Refills: 0 | Status: DISCONTINUED | OUTPATIENT
Start: 2021-09-14 | End: 2021-09-17

## 2021-09-14 RX ORDER — DEXTROSE 50 % IN WATER 50 %
25 SYRINGE (ML) INTRAVENOUS ONCE
Refills: 0 | Status: DISCONTINUED | OUTPATIENT
Start: 2021-09-14 | End: 2021-09-17

## 2021-09-14 RX ORDER — ONDANSETRON 8 MG/1
4 TABLET, FILM COATED ORAL EVERY 6 HOURS
Refills: 0 | Status: DISCONTINUED | OUTPATIENT
Start: 2021-09-14 | End: 2021-09-17

## 2021-09-14 RX ORDER — HYDROMORPHONE HYDROCHLORIDE 2 MG/ML
0.5 INJECTION INTRAMUSCULAR; INTRAVENOUS; SUBCUTANEOUS EVERY 4 HOURS
Refills: 0 | Status: DISCONTINUED | OUTPATIENT
Start: 2021-09-14 | End: 2021-09-17

## 2021-09-14 RX ORDER — INSULIN LISPRO 100/ML
VIAL (ML) SUBCUTANEOUS
Refills: 0 | Status: DISCONTINUED | OUTPATIENT
Start: 2021-09-14 | End: 2021-09-17

## 2021-09-14 RX ORDER — HEPARIN SODIUM 5000 [USP'U]/ML
5000 INJECTION INTRAVENOUS; SUBCUTANEOUS EVERY 8 HOURS
Refills: 0 | Status: DISCONTINUED | OUTPATIENT
Start: 2021-09-14 | End: 2021-09-17

## 2021-09-14 RX ORDER — BUPIVACAINE 13.3 MG/ML
20 INJECTION, SUSPENSION, LIPOSOMAL INFILTRATION ONCE
Refills: 0 | Status: DISCONTINUED | OUTPATIENT
Start: 2021-09-14 | End: 2021-09-17

## 2021-09-14 RX ORDER — SODIUM CHLORIDE 9 MG/ML
1000 INJECTION, SOLUTION INTRAVENOUS
Refills: 0 | Status: DISCONTINUED | OUTPATIENT
Start: 2021-09-14 | End: 2021-09-17

## 2021-09-14 RX ORDER — GLUCAGON INJECTION, SOLUTION 0.5 MG/.1ML
1 INJECTION, SOLUTION SUBCUTANEOUS ONCE
Refills: 0 | Status: DISCONTINUED | OUTPATIENT
Start: 2021-09-14 | End: 2021-09-17

## 2021-09-14 RX ORDER — ATORVASTATIN CALCIUM 80 MG/1
80 TABLET, FILM COATED ORAL AT BEDTIME
Refills: 0 | Status: DISCONTINUED | OUTPATIENT
Start: 2021-09-14 | End: 2021-09-17

## 2021-09-14 RX ORDER — SODIUM CHLORIDE 9 MG/ML
1000 INJECTION, SOLUTION INTRAVENOUS
Refills: 0 | Status: DISCONTINUED | OUTPATIENT
Start: 2021-09-14 | End: 2021-09-15

## 2021-09-14 RX ORDER — ALVIMOPAN 12 MG/1
12 CAPSULE ORAL
Refills: 0 | Status: DISCONTINUED | OUTPATIENT
Start: 2021-09-14 | End: 2021-09-17

## 2021-09-14 RX ORDER — HYDRALAZINE HCL 50 MG
5 TABLET ORAL ONCE
Refills: 0 | Status: COMPLETED | OUTPATIENT
Start: 2021-09-14 | End: 2021-09-14

## 2021-09-14 RX ORDER — HYDRALAZINE HCL 50 MG
100 TABLET ORAL DAILY
Refills: 0 | Status: DISCONTINUED | OUTPATIENT
Start: 2021-09-15 | End: 2021-09-15

## 2021-09-14 RX ORDER — ACETAMINOPHEN 500 MG
1000 TABLET ORAL EVERY 6 HOURS
Refills: 0 | Status: DISCONTINUED | OUTPATIENT
Start: 2021-09-14 | End: 2021-09-17

## 2021-09-14 RX ADMIN — HEPARIN SODIUM 5000 UNIT(S): 5000 INJECTION INTRAVENOUS; SUBCUTANEOUS at 06:29

## 2021-09-14 RX ADMIN — HYDROMORPHONE HYDROCHLORIDE 0.5 MILLIGRAM(S): 2 INJECTION INTRAMUSCULAR; INTRAVENOUS; SUBCUTANEOUS at 22:13

## 2021-09-14 RX ADMIN — Medication 15 MILLIGRAM(S): at 23:22

## 2021-09-14 RX ADMIN — Medication 15 MILLIGRAM(S): at 13:06

## 2021-09-14 RX ADMIN — Medication 1000 MILLIGRAM(S): at 23:21

## 2021-09-14 RX ADMIN — Medication 1000 MILLIGRAM(S): at 17:38

## 2021-09-14 RX ADMIN — Medication 1000 MILLIGRAM(S): at 06:29

## 2021-09-14 RX ADMIN — Medication 15 MILLIGRAM(S): at 17:14

## 2021-09-14 RX ADMIN — Medication 10 MILLIGRAM(S): at 01:43

## 2021-09-14 RX ADMIN — HEPARIN SODIUM 5000 UNIT(S): 5000 INJECTION INTRAVENOUS; SUBCUTANEOUS at 17:15

## 2021-09-14 RX ADMIN — Medication 5 MILLIGRAM(S): at 14:31

## 2021-09-14 RX ADMIN — Medication 2: at 22:12

## 2021-09-14 RX ADMIN — ALVIMOPAN 12 MILLIGRAM(S): 12 CAPSULE ORAL at 18:44

## 2021-09-14 RX ADMIN — Medication 10 MILLIGRAM(S): at 06:54

## 2021-09-14 RX ADMIN — ATORVASTATIN CALCIUM 80 MILLIGRAM(S): 80 TABLET, FILM COATED ORAL at 22:12

## 2021-09-14 RX ADMIN — Medication 1000 MILLIGRAM(S): at 17:15

## 2021-09-14 RX ADMIN — Medication 2: at 17:30

## 2021-09-14 RX ADMIN — Medication 15 MILLIGRAM(S): at 17:38

## 2021-09-14 RX ADMIN — GABAPENTIN 600 MILLIGRAM(S): 400 CAPSULE ORAL at 06:30

## 2021-09-14 RX ADMIN — Medication 1000 MILLIGRAM(S): at 13:46

## 2021-09-14 RX ADMIN — HYDROMORPHONE HYDROCHLORIDE 0.5 MILLIGRAM(S): 2 INJECTION INTRAMUSCULAR; INTRAVENOUS; SUBCUTANEOUS at 23:08

## 2021-09-14 RX ADMIN — Medication 15 MILLIGRAM(S): at 14:23

## 2021-09-14 NOTE — BRIEF OPERATIVE NOTE - OPERATION/FINDINGS
Midline laparotomy incision. Small bowel run from Ligament of Treitz to terminal ileum. There was no overt signs of bleeding. Operative push enteroscopy was performed with Dr. Marlow. We were able to get to mid-ileum. During withdrawal, a noted AVM was marked with stitch. No other evidence of bleeding. Small bowel resection performed with FIDE 75 blue load. 3-0 Vicryl used for mesenteric defect. Closure performed with 1 PDS x 2. Skin closed with 3-0 Vicryl and staples.

## 2021-09-14 NOTE — PROGRESS NOTE ADULT - SUBJECTIVE AND OBJECTIVE BOX
SUBJECTIVE:  Doing well this AM. VALERIANO. Pt finished his golytely w/o difficulty. Denies n/v. Endorses f/ clear liquid bm. Denies cp/sob. Ambulating as tolerated.    MEDICATIONS  (STANDING):  lactated ringers. 1000 milliLiter(s) (120 mL/Hr) IV Continuous <Continuous>    MEDICATIONS  (PRN):  ondansetron Injectable 4 milliGRAM(s) IV Push every 6 hours PRN Nausea      Vital Signs Last 24 Hrs  T(C): 37 (14 Sep 2021 06:20), Max: 37.1 (13 Sep 2021 17:08)  T(F): 98.6 (14 Sep 2021 06:20), Max: 98.8 (13 Sep 2021 17:08)  HR: 68 (14 Sep 2021 06:35) (59 - 71)  BP: 183/68 (14 Sep 2021 06:35) (156/64 - 196/78)  BP(mean): 104 (14 Sep 2021 05:00) (104 - 104)  RR: 18 (14 Sep 2021 06:35) (18 - 18)  SpO2: 97% (14 Sep 2021 06:35) (96% - 98%)    Physical Exam:  General: NAD, resting comfortably in bed  Pulmonary: Nonlabored breathing, no respiratory distress  Cardiovascular: NSR  Abdominal: soft, NT/ND  Extremities: WWP, normal strength  Neuro: A/O x 3, CNs II-XII grossly intact, no focal deficits    I&O's Summary    13 Sep 2021 07:01  -  14 Sep 2021 07:00  --------------------------------------------------------  IN: 600 mL / OUT: 100 mL / NET: 500 mL        LABS:                        8.0    4.40  )-----------( 155      ( 14 Sep 2021 06:47 )             26.5     09-14    141  |  107  |  37<H>  ----------------------------<  87  4.5   |  22  |  2.69<H>    Ca    8.6      14 Sep 2021 06:47  Phos  4.2     09-14  Mg     2.3     09-14    TPro  7.1  /  Alb  4.3  /  TBili  0.4  /  DBili  x   /  AST  13  /  ALT  14  /  AlkPhos  127<H>  09-13    PT/INR - ( 14 Sep 2021 06:47 )   PT: 14.2 sec;   INR: 1.19          PTT - ( 14 Sep 2021 06:47 )  PTT:41.6 sec    CAPILLARY BLOOD GLUCOSE      POCT Blood Glucose.: 89 mg/dL (14 Sep 2021 06:55)    LIVER FUNCTIONS - ( 13 Sep 2021 18:50 )  Alb: 4.3 g/dL / Pro: 7.1 g/dL / ALK PHOS: 127 U/L / ALT: 14 U/L / AST: 13 U/L / GGT: x             RADIOLOGY & ADDITIONAL STUDIES:

## 2021-09-14 NOTE — PROGRESS NOTE ADULT - ASSESSMENT
67yo M with PMH HTN, pre-diabetes and PSH R inguinal hernia repair who presents for pre-operative bowel preparation for OR 9/14. Admission Hgb 8.3.    Pain/nausea control PRN   Home meds as appropriate  OOBA/IS  SCDs  AM Labs  OR today for small bowel resection

## 2021-09-14 NOTE — PROGRESS NOTE ADULT - SUBJECTIVE AND OBJECTIVE BOX
Procedure: Small bowel resection  Surgeon: Dr. Grady    S: Pt has no complaints. Denies CP, SOB, COELHO, calf tenderness. Pain controlled with medication.    O:  T(C): --  T(F): --  HR: 63 (09-14-21 @ 14:42) (62 - 66)  BP: 158/70 (09-14-21 @ 14:42) (154/72 - 177/80)  RR: 12 (09-14-21 @ 14:42) (10 - 16)  SpO2: 100% (09-14-21 @ 14:42) (99% - 100%)  Wt(kg): --                        8.0    4.40  )-----------( 155      ( 14 Sep 2021 06:47 )             26.5     09-14    141  |  107  |  37<H>  ----------------------------<  87  4.5   |  22  |  2.69<H>    Ca    8.6      14 Sep 2021 06:47  Phos  4.2     09-14  Mg     2.3     09-14    TPro  7.1  /  Alb  4.3  /  TBili  0.4  /  DBili  x   /  AST  13  /  ALT  14  /  AlkPhos  127<H>  09-13      Gen: NAD, resting comfortably in bed  C/V: NSR  Pulm: Nonlabored breathing, no respiratory distress  Abd: soft, NT/ND Incision: dressing saturated in blood, intact  Extrem: WWP, no calf edema, SCDs in place      A/P: 66yMale s/p above procedure  Diet: npo  IVF  Pain/nausea control  DVT ppx: SHQ, SCDs

## 2021-09-14 NOTE — PRE-OP CHECKLIST - SELECT TESTS ORDERED
BMP/CBC/CMP/PT/PTT/INR/Type and Screen/COVID-19 BMP/CBC/CMP/PT/PTT/INR/Type and Screen/POCT Blood Glucose/COVID-19

## 2021-09-15 LAB
A1C WITH ESTIMATED AVERAGE GLUCOSE RESULT: 5.8 % — HIGH (ref 4–5.6)
ANION GAP SERPL CALC-SCNC: 12 MMOL/L — SIGNIFICANT CHANGE UP (ref 5–17)
BUN SERPL-MCNC: 39 MG/DL — HIGH (ref 7–23)
CALCIUM SERPL-MCNC: 8.7 MG/DL — SIGNIFICANT CHANGE UP (ref 8.4–10.5)
CHLORIDE SERPL-SCNC: 104 MMOL/L — SIGNIFICANT CHANGE UP (ref 96–108)
CO2 SERPL-SCNC: 21 MMOL/L — LOW (ref 22–31)
CREAT SERPL-MCNC: 3.02 MG/DL — HIGH (ref 0.5–1.3)
ESTIMATED AVERAGE GLUCOSE: 120 MG/DL — HIGH (ref 68–114)
GLUCOSE BLDC GLUCOMTR-MCNC: 149 MG/DL — HIGH (ref 70–99)
GLUCOSE BLDC GLUCOMTR-MCNC: 164 MG/DL — HIGH (ref 70–99)
GLUCOSE BLDC GLUCOMTR-MCNC: 169 MG/DL — HIGH (ref 70–99)
GLUCOSE BLDC GLUCOMTR-MCNC: 186 MG/DL — HIGH (ref 70–99)
GLUCOSE SERPL-MCNC: 164 MG/DL — HIGH (ref 70–99)
HCT VFR BLD CALC: 24.4 % — LOW (ref 39–50)
HCT VFR BLD CALC: 25.2 % — LOW (ref 39–50)
HGB BLD-MCNC: 7.7 G/DL — LOW (ref 13–17)
HGB BLD-MCNC: 7.8 G/DL — LOW (ref 13–17)
MAGNESIUM SERPL-MCNC: 2.4 MG/DL — SIGNIFICANT CHANGE UP (ref 1.6–2.6)
MCHC RBC-ENTMCNC: 27.5 PG — SIGNIFICANT CHANGE UP (ref 27–34)
MCHC RBC-ENTMCNC: 27.5 PG — SIGNIFICANT CHANGE UP (ref 27–34)
MCHC RBC-ENTMCNC: 31 GM/DL — LOW (ref 32–36)
MCHC RBC-ENTMCNC: 31.6 GM/DL — LOW (ref 32–36)
MCV RBC AUTO: 87.1 FL — SIGNIFICANT CHANGE UP (ref 80–100)
MCV RBC AUTO: 88.7 FL — SIGNIFICANT CHANGE UP (ref 80–100)
NRBC # BLD: 0 /100 WBCS — SIGNIFICANT CHANGE UP (ref 0–0)
NRBC # BLD: 0 /100 WBCS — SIGNIFICANT CHANGE UP (ref 0–0)
PHOSPHATE SERPL-MCNC: 5.6 MG/DL — HIGH (ref 2.5–4.5)
PLATELET # BLD AUTO: 162 K/UL — SIGNIFICANT CHANGE UP (ref 150–400)
PLATELET # BLD AUTO: 172 K/UL — SIGNIFICANT CHANGE UP (ref 150–400)
POTASSIUM SERPL-MCNC: 5.2 MMOL/L — SIGNIFICANT CHANGE UP (ref 3.5–5.3)
POTASSIUM SERPL-SCNC: 5.2 MMOL/L — SIGNIFICANT CHANGE UP (ref 3.5–5.3)
RBC # BLD: 2.8 M/UL — LOW (ref 4.2–5.8)
RBC # BLD: 2.84 M/UL — LOW (ref 4.2–5.8)
RBC # FLD: 16.4 % — HIGH (ref 10.3–14.5)
RBC # FLD: 16.6 % — HIGH (ref 10.3–14.5)
SODIUM SERPL-SCNC: 137 MMOL/L — SIGNIFICANT CHANGE UP (ref 135–145)
WBC # BLD: 11.86 K/UL — HIGH (ref 3.8–10.5)
WBC # BLD: 12.54 K/UL — HIGH (ref 3.8–10.5)
WBC # FLD AUTO: 11.86 K/UL — HIGH (ref 3.8–10.5)
WBC # FLD AUTO: 12.54 K/UL — HIGH (ref 3.8–10.5)

## 2021-09-15 PROCEDURE — 93010 ELECTROCARDIOGRAM REPORT: CPT

## 2021-09-15 RX ORDER — HYDRALAZINE HCL 50 MG
100 TABLET ORAL
Refills: 0 | Status: DISCONTINUED | OUTPATIENT
Start: 2021-09-15 | End: 2021-09-16

## 2021-09-15 RX ORDER — SODIUM CHLORIDE 9 MG/ML
1000 INJECTION INTRAMUSCULAR; INTRAVENOUS; SUBCUTANEOUS
Refills: 0 | Status: DISCONTINUED | OUTPATIENT
Start: 2021-09-15 | End: 2021-09-15

## 2021-09-15 RX ORDER — SODIUM CHLORIDE 9 MG/ML
1000 INJECTION, SOLUTION INTRAVENOUS
Refills: 0 | Status: DISCONTINUED | OUTPATIENT
Start: 2021-09-15 | End: 2021-09-17

## 2021-09-15 RX ADMIN — HEPARIN SODIUM 5000 UNIT(S): 5000 INJECTION INTRAVENOUS; SUBCUTANEOUS at 02:46

## 2021-09-15 RX ADMIN — Medication 1000 MILLIGRAM(S): at 05:41

## 2021-09-15 RX ADMIN — Medication 1000 MILLIGRAM(S): at 00:22

## 2021-09-15 RX ADMIN — Medication 100 MILLIGRAM(S): at 05:40

## 2021-09-15 RX ADMIN — Medication 15 MILLIGRAM(S): at 05:40

## 2021-09-15 RX ADMIN — Medication 15 MILLIGRAM(S): at 21:50

## 2021-09-15 RX ADMIN — SODIUM CHLORIDE 40 MILLILITER(S): 9 INJECTION, SOLUTION INTRAVENOUS at 18:48

## 2021-09-15 RX ADMIN — Medication 1000 MILLIGRAM(S): at 22:00

## 2021-09-15 RX ADMIN — Medication 15 MILLIGRAM(S): at 00:22

## 2021-09-15 RX ADMIN — Medication 1000 MILLIGRAM(S): at 17:23

## 2021-09-15 RX ADMIN — Medication 15 MILLIGRAM(S): at 12:07

## 2021-09-15 RX ADMIN — HEPARIN SODIUM 5000 UNIT(S): 5000 INJECTION INTRAVENOUS; SUBCUTANEOUS at 17:24

## 2021-09-15 RX ADMIN — Medication 1000 MILLIGRAM(S): at 12:37

## 2021-09-15 RX ADMIN — HEPARIN SODIUM 5000 UNIT(S): 5000 INJECTION INTRAVENOUS; SUBCUTANEOUS at 09:23

## 2021-09-15 RX ADMIN — Medication 100 MILLIGRAM(S): at 12:06

## 2021-09-15 RX ADMIN — Medication 100 MILLIGRAM(S): at 18:48

## 2021-09-15 RX ADMIN — Medication 2: at 12:13

## 2021-09-15 RX ADMIN — ALVIMOPAN 12 MILLIGRAM(S): 12 CAPSULE ORAL at 05:40

## 2021-09-15 RX ADMIN — Medication 2: at 07:44

## 2021-09-15 RX ADMIN — Medication 1000 MILLIGRAM(S): at 12:06

## 2021-09-15 RX ADMIN — Medication 15 MILLIGRAM(S): at 12:37

## 2021-09-15 RX ADMIN — ATORVASTATIN CALCIUM 80 MILLIGRAM(S): 80 TABLET, FILM COATED ORAL at 21:23

## 2021-09-15 RX ADMIN — Medication 15 MILLIGRAM(S): at 21:24

## 2021-09-15 RX ADMIN — Medication 1000 MILLIGRAM(S): at 21:23

## 2021-09-15 RX ADMIN — ALVIMOPAN 12 MILLIGRAM(S): 12 CAPSULE ORAL at 17:23

## 2021-09-15 RX ADMIN — Medication 15 MILLIGRAM(S): at 17:24

## 2021-09-15 NOTE — DIETITIAN INITIAL EVALUATION ADULT. - OTHER INFO
Pt with PMH of HTN, DM and PSH Rt inguinal hernia repair who presents for pre-operative bowel for 9/14. Admitted to [facility] 6/17/21 for melena, s/p single balloon enteroscopy and push enteroscopy attempted (Dr. Marlow), could only reach proximal jejunum, no active bleeding or stigmata noted. Previously admitted 4/26-5/4/21 for weakness, hgb 3.9 s/p 4 units PRBCs. Admitted 1/20-1/22/21 for anemia, hgb 5, s/p 2 units PRBCs. EGD/Colonoscopy completed, 1 ascending polyp removed, pathology c/w TA. S/P bowel prep and small bowel resection on 9/14, currently POD 1. On clear liquid diet and tolerating without N/V or abd pain.   GI: abd discomfort, soft. Denies BF   Skin: surgical incision noted    Pt denies any recent weight changes.

## 2021-09-15 NOTE — PROGRESS NOTE ADULT - SUBJECTIVE AND OBJECTIVE BOX
SUBJECTIVE: Pt seen and examined at bedside with chief. Pt denies any complaints. Pain well controlled. Tolerating diet without N/V. Denies any BF    MEDICATIONS  (STANDING):  acetaminophen   Tablet .. 1000 milliGRAM(s) Oral every 6 hours  allopurinol 100 milliGRAM(s) Oral daily  alvimopan 12 milliGRAM(s) Oral two times a day  atorvastatin 80 milliGRAM(s) Oral at bedtime  BUpivacaine liposome 1.3% Injectable (no eMAR) 20 milliLiter(s) Local Injection once  dextrose 40% Gel 15 Gram(s) Oral once  dextrose 5%. 1000 milliLiter(s) (50 mL/Hr) IV Continuous <Continuous>  dextrose 5%. 1000 milliLiter(s) (100 mL/Hr) IV Continuous <Continuous>  dextrose 50% Injectable 25 Gram(s) IV Push once  dextrose 50% Injectable 12.5 Gram(s) IV Push once  dextrose 50% Injectable 25 Gram(s) IV Push once  glucagon  Injectable 1 milliGRAM(s) IntraMuscular once  heparin   Injectable 5000 Unit(s) SubCutaneous every 8 hours  hydrALAZINE 100 milliGRAM(s) Oral daily  insulin lispro (ADMELOG) corrective regimen sliding scale   SubCutaneous Before meals and at bedtime  ketorolac   Injectable 15 milliGRAM(s) IV Push every 6 hours  lactated ringers. 1000 milliLiter(s) (40 mL/Hr) IV Continuous <Continuous>    MEDICATIONS  (PRN):  HYDROmorphone  Injectable 0.5 milliGRAM(s) IV Push every 4 hours PRN Severe Pain (7 - 10)  ondansetron Injectable 4 milliGRAM(s) IV Push every 6 hours PRN Nausea and/or Vomiting      Vital Signs Last 24 Hrs  T(C): 36.9 (15 Sep 2021 05:10), Max: 37 (15 Sep 2021 00:01)  T(F): 98.4 (15 Sep 2021 05:10), Max: 98.6 (15 Sep 2021 00:01)  HR: 63 (15 Sep 2021 05:40) (60 - 66)  BP: 166/74 (15 Sep 2021 05:40) (137/44 - 177/80)  BP(mean): 75 (15 Sep 2021 05:10) (75 - 115)  RR: 17 (15 Sep 2021 05:10) (10 - 17)  SpO2: 95% (15 Sep 2021 05:10) (95% - 100%)    PHYSICAL EXAM:      Constitutional: A&Ox3    Respiratory: non labored breathing, no respiratory distress    Cardiovascular: NSR, RRR    Gastrointestinal: Soft ND, NT                 Incision: CDI    Genitourinary: Dougherty to gravity     Extremities: (-) edema                  I&O's Detail    14 Sep 2021 07:01  -  15 Sep 2021 07:00  --------------------------------------------------------  IN:    Lactated Ringers: 200 mL    Oral Fluid: 600 mL  Total IN: 800 mL    OUT:    Indwelling Catheter - Urethral (mL): 1475 mL  Total OUT: 1475 mL    Total NET: -675 mL          LABS:                        7.7    11.86 )-----------( 162      ( 15 Sep 2021 06:06 )             24.4     09-15    137  |  104  |  39<H>  ----------------------------<  164<H>  5.2   |  21<L>  |  3.02<H>    Ca    8.7      15 Sep 2021 06:06  Phos  5.6     09-15  Mg     2.4     09-15    TPro  7.1  /  Alb  4.3  /  TBili  0.4  /  DBili  x   /  AST  13  /  ALT  14  /  AlkPhos  127<H>  09-13    PT/INR - ( 14 Sep 2021 06:47 )   PT: 14.2 sec;   INR: 1.19          PTT - ( 14 Sep 2021 06:47 )  PTT:41.6 sec      RADIOLOGY & ADDITIONAL STUDIES:

## 2021-09-15 NOTE — DIETITIAN INITIAL EVALUATION ADULT. - PERTINENT LABORATORY DATA
Anesthesia Type: 1% lidocaine with 1:200,000 epinephrine Size Of Lesion In Cm: 0.4 Additional Anesthesia Volume In Cc (Will Not Render If 0): 0 Render Post-Care Instructions In Note?: no Wound Care: Petrolatum Cryotherapy Text: The wound bed was treated with cryotherapy after the biopsy was performed. Lab Facility: 3 Biopsy Type: H and E Type Of Destruction Used: Curettage Billing Type: Third-Party Bill Electrodesiccation And Curettage Text: The wound bed was treated with electrodesiccation and curettage after the biopsy was performed. Electrodesiccation Text: The wound bed was treated with electrodesiccation after the biopsy was performed. Depth Of Biopsy: dermis Dressing: bandage Silver Nitrate Text: The wound bed was treated with silver nitrate after the biopsy was performed. Was A Bandage Applied: Yes Post-Care Instructions: I reviewed with the patient in detail post-care instructions. Patient is to keep the biopsy site dry overnight, and then apply bacitracin twice daily until healed. Patient may apply hydrogen peroxide soaks to remove any crusting. Anesthesia Volume In Cc (Will Not Render If 0): 0.5 Biopsy Method: Personna blade Lab: 6 Detail Level: Detailed Consent: Written consent was obtained and risks were reviewed including but not limited to scarring, infection, bleeding, scabbing, incomplete removal, nerve damage and allergy to anesthesia. Hemostasis: Aluminum Chloride Notification Instructions: Patient will be notified of biopsy results. However, patient instructed to call the office if not contacted within 2 weeks. Curettage Text: The wound bed was treated with curettage after the biopsy was performed. BUN 39 H, creatinine 3.02 H, glucose 164 H, GFR 20 L, phosphorus 5.6 L, A1c 5.8% H  POCT glucose x48 hrs:

## 2021-09-15 NOTE — PROGRESS NOTE ADULT - ASSESSMENT
67yo M with PMH HTN, pre-diabetes and PSH R inguinal hernia repair who presents for pre-operative bowel preparation, s/p small bowel resection with 2U pRBCs given in OR 9/14.    CLD  Pain/nausea control PRN   Home meds as appropriate  OOBA/IS  SCDs/SQH  AM Labs

## 2021-09-15 NOTE — DIETITIAN INITIAL EVALUATION ADULT. - ORAL INTAKE PTA/DIET HISTORY
Spoke with pt in room this morning. States appetite was intact prior to admission, eats 3 meals per day and wife cooks most meals at home. For breakfast always has toast with coffee or corn flakes and milk. No cultural, ethnic, Temple food preferences noted. Nutritionally stable prior to admit.

## 2021-09-16 LAB
ANION GAP SERPL CALC-SCNC: 12 MMOL/L — SIGNIFICANT CHANGE UP (ref 5–17)
BUN SERPL-MCNC: 42 MG/DL — HIGH (ref 7–23)
CALCIUM SERPL-MCNC: 8.5 MG/DL — SIGNIFICANT CHANGE UP (ref 8.4–10.5)
CHLORIDE SERPL-SCNC: 104 MMOL/L — SIGNIFICANT CHANGE UP (ref 96–108)
CO2 SERPL-SCNC: 21 MMOL/L — LOW (ref 22–31)
CREAT SERPL-MCNC: 3.07 MG/DL — HIGH (ref 0.5–1.3)
GLUCOSE BLDC GLUCOMTR-MCNC: 103 MG/DL — HIGH (ref 70–99)
GLUCOSE BLDC GLUCOMTR-MCNC: 125 MG/DL — HIGH (ref 70–99)
GLUCOSE BLDC GLUCOMTR-MCNC: 139 MG/DL — HIGH (ref 70–99)
GLUCOSE SERPL-MCNC: 98 MG/DL — SIGNIFICANT CHANGE UP (ref 70–99)
HCT VFR BLD CALC: 22.3 % — LOW (ref 39–50)
HCT VFR BLD CALC: 22.9 % — LOW (ref 39–50)
HGB BLD-MCNC: 7.1 G/DL — LOW (ref 13–17)
HGB BLD-MCNC: 7.2 G/DL — LOW (ref 13–17)
MAGNESIUM SERPL-MCNC: 2.2 MG/DL — SIGNIFICANT CHANGE UP (ref 1.6–2.6)
MCHC RBC-ENTMCNC: 28 PG — SIGNIFICANT CHANGE UP (ref 27–34)
MCHC RBC-ENTMCNC: 28.1 PG — SIGNIFICANT CHANGE UP (ref 27–34)
MCHC RBC-ENTMCNC: 31.4 GM/DL — LOW (ref 32–36)
MCHC RBC-ENTMCNC: 31.8 GM/DL — LOW (ref 32–36)
MCV RBC AUTO: 87.8 FL — SIGNIFICANT CHANGE UP (ref 80–100)
MCV RBC AUTO: 89.5 FL — SIGNIFICANT CHANGE UP (ref 80–100)
NRBC # BLD: 0 /100 WBCS — SIGNIFICANT CHANGE UP (ref 0–0)
NRBC # BLD: 0 /100 WBCS — SIGNIFICANT CHANGE UP (ref 0–0)
PHOSPHATE SERPL-MCNC: 4 MG/DL — SIGNIFICANT CHANGE UP (ref 2.5–4.5)
PLATELET # BLD AUTO: 155 K/UL — SIGNIFICANT CHANGE UP (ref 150–400)
PLATELET # BLD AUTO: 165 K/UL — SIGNIFICANT CHANGE UP (ref 150–400)
POTASSIUM SERPL-MCNC: 4.8 MMOL/L — SIGNIFICANT CHANGE UP (ref 3.5–5.3)
POTASSIUM SERPL-SCNC: 4.8 MMOL/L — SIGNIFICANT CHANGE UP (ref 3.5–5.3)
RBC # BLD: 2.54 M/UL — LOW (ref 4.2–5.8)
RBC # BLD: 2.56 M/UL — LOW (ref 4.2–5.8)
RBC # FLD: 16.5 % — HIGH (ref 10.3–14.5)
RBC # FLD: 16.6 % — HIGH (ref 10.3–14.5)
SODIUM SERPL-SCNC: 137 MMOL/L — SIGNIFICANT CHANGE UP (ref 135–145)
WBC # BLD: 10.5 K/UL — SIGNIFICANT CHANGE UP (ref 3.8–10.5)
WBC # BLD: 9.57 K/UL — SIGNIFICANT CHANGE UP (ref 3.8–10.5)
WBC # FLD AUTO: 10.5 K/UL — SIGNIFICANT CHANGE UP (ref 3.8–10.5)
WBC # FLD AUTO: 9.57 K/UL — SIGNIFICANT CHANGE UP (ref 3.8–10.5)

## 2021-09-16 RX ORDER — CARVEDILOL PHOSPHATE 80 MG/1
12.5 CAPSULE, EXTENDED RELEASE ORAL EVERY 12 HOURS
Refills: 0 | Status: DISCONTINUED | OUTPATIENT
Start: 2021-09-16 | End: 2021-09-17

## 2021-09-16 RX ORDER — HYDRALAZINE HCL 50 MG
100 TABLET ORAL EVERY 8 HOURS
Refills: 0 | Status: DISCONTINUED | OUTPATIENT
Start: 2021-09-17 | End: 2021-09-17

## 2021-09-16 RX ORDER — HYDRALAZINE HCL 50 MG
10 TABLET ORAL ONCE
Refills: 0 | Status: COMPLETED | OUTPATIENT
Start: 2021-09-16 | End: 2021-09-16

## 2021-09-16 RX ORDER — AMLODIPINE BESYLATE 2.5 MG/1
10 TABLET ORAL DAILY
Refills: 0 | Status: DISCONTINUED | OUTPATIENT
Start: 2021-09-16 | End: 2021-09-17

## 2021-09-16 RX ORDER — HYDRALAZINE HCL 50 MG
100 TABLET ORAL ONCE
Refills: 0 | Status: COMPLETED | OUTPATIENT
Start: 2021-09-16 | End: 2021-09-16

## 2021-09-16 RX ADMIN — Medication 10 MILLIGRAM(S): at 10:04

## 2021-09-16 RX ADMIN — Medication 15 MILLIGRAM(S): at 18:18

## 2021-09-16 RX ADMIN — Medication 1000 MILLIGRAM(S): at 11:43

## 2021-09-16 RX ADMIN — ATORVASTATIN CALCIUM 80 MILLIGRAM(S): 80 TABLET, FILM COATED ORAL at 21:51

## 2021-09-16 RX ADMIN — Medication 100 MILLIGRAM(S): at 11:29

## 2021-09-16 RX ADMIN — Medication 1000 MILLIGRAM(S): at 06:00

## 2021-09-16 RX ADMIN — CARVEDILOL PHOSPHATE 12.5 MILLIGRAM(S): 80 CAPSULE, EXTENDED RELEASE ORAL at 19:53

## 2021-09-16 RX ADMIN — Medication 15 MILLIGRAM(S): at 21:50

## 2021-09-16 RX ADMIN — Medication 1000 MILLIGRAM(S): at 18:31

## 2021-09-16 RX ADMIN — Medication 15 MILLIGRAM(S): at 18:31

## 2021-09-16 RX ADMIN — Medication 15 MILLIGRAM(S): at 11:30

## 2021-09-16 RX ADMIN — Medication 100 MILLIGRAM(S): at 05:28

## 2021-09-16 RX ADMIN — HEPARIN SODIUM 5000 UNIT(S): 5000 INJECTION INTRAVENOUS; SUBCUTANEOUS at 01:53

## 2021-09-16 RX ADMIN — ALVIMOPAN 12 MILLIGRAM(S): 12 CAPSULE ORAL at 05:28

## 2021-09-16 RX ADMIN — ALVIMOPAN 12 MILLIGRAM(S): 12 CAPSULE ORAL at 18:16

## 2021-09-16 RX ADMIN — Medication 1000 MILLIGRAM(S): at 11:29

## 2021-09-16 RX ADMIN — Medication 15 MILLIGRAM(S): at 11:43

## 2021-09-16 RX ADMIN — Medication 15 MILLIGRAM(S): at 05:45

## 2021-09-16 RX ADMIN — Medication 15 MILLIGRAM(S): at 05:29

## 2021-09-16 RX ADMIN — Medication 1000 MILLIGRAM(S): at 05:29

## 2021-09-16 RX ADMIN — Medication 1000 MILLIGRAM(S): at 18:16

## 2021-09-16 RX ADMIN — Medication 15 MILLIGRAM(S): at 22:05

## 2021-09-16 RX ADMIN — AMLODIPINE BESYLATE 10 MILLIGRAM(S): 2.5 TABLET ORAL at 18:17

## 2021-09-16 RX ADMIN — HEPARIN SODIUM 5000 UNIT(S): 5000 INJECTION INTRAVENOUS; SUBCUTANEOUS at 18:17

## 2021-09-16 RX ADMIN — Medication 100 MILLIGRAM(S): at 18:17

## 2021-09-16 RX ADMIN — HEPARIN SODIUM 5000 UNIT(S): 5000 INJECTION INTRAVENOUS; SUBCUTANEOUS at 10:04

## 2021-09-16 RX ADMIN — Medication 100 MILLIGRAM(S): at 11:30

## 2021-09-16 NOTE — PROGRESS NOTE ADULT - ASSESSMENT
67yo M with PMH HTN, pre-diabetes and PSH R inguinal hernia repair who presents for pre-operative bowel preparation, s/p small bowel resection with 2U pRBCs given in OR 9/14.    CLD/IVF   Pain/nausea control PRN   Home meds as appropriate  OOBA/IS  SCDs/SQH  Repeat CBC at noon today

## 2021-09-16 NOTE — CONSULT NOTE ADULT - SUBJECTIVE AND OBJECTIVE BOX
HPI:  65yo M with PMH HTN, DM (diet controlled) and PSH R inguinal hernia repair who presents for pre-operative bowel preparation for tomorrow. Capsule endoscopy on 6/15/21 w/ outpatient GI, brisk bleeding in small bowel. Admitted to [facility] 6/17/21 for melena, s/p single balloon enteroscopy and push enteroscopy attempted (Dr. Marlow), could only reach proximal jejunum, no active bleeding or stigmata noted. Previously admitted 4/26-5/4/21 for weakness, hgb 3.9 s/p 4 units PRBCs. Admitted 1/20-1/22/21 for anemia, hgb 5, s/p 2 units PRBCs. EGD/Colonoscopy completed, 1 ascending polyp removed, pathology c/w TA. Patient endorses feeling well. Denies N/V, abdomnial pain, CP, SOB. Last BM 2d ago with melana, no BRBPR. Plan for prep and pre-op for OR tomorrow. In ED, H/H 8.3 without active BRBPR.     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    PAST MEDICAL & SURGICAL HISTORY:  DM (diabetes mellitus)    HTN (hypertension)    Hypothyroidism    Cholecystitis    Unilateral inguinal hernia without obstruction or gangrene, recurrence not specified    Anemia    H/O right inguinal hernia repair      SOCIAL HISTORY:  FAMILY HISTORY:    ALLERGIES: 	  No Known Allergies          MEDICATIONS:  acetaminophen   Tablet .. 1000 milliGRAM(s) Oral every 6 hours  allopurinol 100 milliGRAM(s) Oral daily  alvimopan 12 milliGRAM(s) Oral two times a day  amLODIPine   Tablet 10 milliGRAM(s) Oral daily  atorvastatin 80 milliGRAM(s) Oral at bedtime  BUpivacaine liposome 1.3% Injectable (no eMAR) 20 milliLiter(s) Local Injection once  dextrose 40% Gel 15 Gram(s) Oral once  dextrose 5%. 1000 milliLiter(s) IV Continuous <Continuous>  dextrose 5%. 1000 milliLiter(s) IV Continuous <Continuous>  dextrose 50% Injectable 25 Gram(s) IV Push once  dextrose 50% Injectable 12.5 Gram(s) IV Push once  dextrose 50% Injectable 25 Gram(s) IV Push once  glucagon  Injectable 1 milliGRAM(s) IntraMuscular once  heparin   Injectable 5000 Unit(s) SubCutaneous every 8 hours  hydrALAZINE 100 milliGRAM(s) Oral two times a day  HYDROmorphone  Injectable 0.5 milliGRAM(s) IV Push every 4 hours PRN  insulin lispro (ADMELOG) corrective regimen sliding scale   SubCutaneous Before meals and at bedtime  ketorolac   Injectable 15 milliGRAM(s) IV Push every 6 hours  lactated ringers. 1000 milliLiter(s) IV Continuous <Continuous>  ondansetron Injectable 4 milliGRAM(s) IV Push every 6 hours PRN      PHYSICAL EXAM:  T(C): 36.8 (09-16-21 @ 15:58), Max: 37.7 (09-16-21 @ 09:28)  HR: 72 (09-16-21 @ 15:58) (60 - 72)  BP: 186/77 (09-16-21 @ 15:58) (163/72 - 186/77)  RR: 17 (09-16-21 @ 15:58) (17 - 18)  SpO2: 97% (09-16-21 @ 15:58) (95% - 98%)  Wt(kg): --    GEN: Awake, comfortable. NAD.  RESP: CTA b/l  CV: RRR, normal s1/s2. No m/r/g.  EXT: Warm. No edema  NEURO: AAOx3. No focal deficits.    I&O's Summary    15 Sep 2021 07:01  -  16 Sep 2021 07:00  --------------------------------------------------------  IN: 1040 mL / OUT: 1125 mL / NET: -85 mL    16 Sep 2021 07:01  -  16 Sep 2021 17:28  --------------------------------------------------------  IN: 0 mL / OUT: 300 mL / NET: -300 mL        LABS:	 	                        7.2    10.50 )-----------( 165      ( 16 Sep 2021 12:51 )             22.9     09-16    137  |  104  |  42<H>  ----------------------------<  98  4.8   |  21<L>  |  3.07<H>    Ca    8.5      16 Sep 2021 06:51  Phos  4.0     09-16  Mg     2.2     09-16

## 2021-09-16 NOTE — CONSULT NOTE ADULT - ASSESSMENT
66M PMH HTN, DM, CKD, anemia, patient is admitted for SBO, s/p small bowel resection post up day 2. Cardiology was consulted for hypertension management.     #HTN  Patient seen and evaluated, reports feeling good. Roque any complains. No CP, no SOB.  EKG: NSR, RBBB  ECHO:  1. Normal left and right ventricular size and systolic function.   2. Grade I left ventricular diastolic dysfunction.   3. No significant valvular disease.   4. No evidence of pulmonary hypertension.   5. No pericardial effusion.  - Restart patient's home medications: Amlodipine 10 mg daily, Carvedilol 12.5 mg bid;  - c/w Hydralazine 100 mg tid

## 2021-09-16 NOTE — PROGRESS NOTE ADULT - SUBJECTIVE AND OBJECTIVE BOX
CRS Attending for Dr Grady  Patient seen and examined on morning rounds  No complaints  No nausea/vomit  No flatus or BM    Vital Signs Last 24 Hrs  T(C): 37.1 (16 Sep 2021 05:30), Max: 37.1 (16 Sep 2021 05:30)  T(F): 98.7 (16 Sep 2021 05:30), Max: 98.7 (16 Sep 2021 05:30)  HR: 65 (16 Sep 2021 05:30) (60 - 65)  BP: 184/72 (16 Sep 2021 05:30) (150/66 - 188/69)  BP(mean): --  RR: 18 (16 Sep 2021 05:30) (17 - 19)  SpO2: 96% (16 Sep 2021 05:30) (95% - 98%)    I&O's Detail    15 Sep 2021 07:01  -  16 Sep 2021 07:00  --------------------------------------------------------  IN:    Lactated Ringers: 320 mL    Oral Fluid: 720 mL  Total IN: 1040 mL    OUT:    Indwelling Catheter - Urethral (mL): 150 mL    Voided (mL): 975 mL  Total OUT: 1125 mL    Total NET: -85 mL      Gen NAD, comfortable in bed  Abdomen soft, nondistended, nontender                          7.1    9.57  )-----------( 155      ( 16 Sep 2021 06:51 )             22.3   09-16    137  |  104  |  42<H>  ----------------------------<  98  4.8   |  21<L>  |  3.07<H>    Ca    8.5      16 Sep 2021 06:51  Phos  4.0     09-16  Mg     2.2     09-16

## 2021-09-16 NOTE — PROGRESS NOTE ADULT - SUBJECTIVE AND OBJECTIVE BOX
INTERVAL HPI/OVERNIGHT EVENTS: passed TOV, voiding appropriately. Repeat SBP after hydralazine 160s, VSS. TRU. Around 5:30am SBP 180s, getting hydralazine    STATUS POST:  9/14: Small bowel resection for AVM malformation    SUBJECTIVE:   Patient seen and evaluated. Patient says that he is doing well. He notes that his abdominal pain is well controlled. He denies any nausea or emesis. He denies passing flatus or producing bowel movements.     heparin   Injectable 5000 Unit(s) SubCutaneous every 8 hours  hydrALAZINE 100 milliGRAM(s) Oral two times a day      Vital Signs Last 24 Hrs  T(C): 37.1 (16 Sep 2021 05:30), Max: 37.1 (16 Sep 2021 05:30)  T(F): 98.7 (16 Sep 2021 05:30), Max: 98.7 (16 Sep 2021 05:30)  HR: 65 (16 Sep 2021 05:30) (60 - 65)  BP: 184/72 (16 Sep 2021 05:30) (150/66 - 188/69)  BP(mean): --  RR: 18 (16 Sep 2021 05:30) (17 - 19)  SpO2: 96% (16 Sep 2021 05:30) (95% - 98%)  I&O's Detail    15 Sep 2021 07:01  -  16 Sep 2021 07:00  --------------------------------------------------------  IN:    Lactated Ringers: 320 mL    Oral Fluid: 720 mL  Total IN: 1040 mL    OUT:    Indwelling Catheter - Urethral (mL): 150 mL    Voided (mL): 975 mL  Total OUT: 1125 mL    Total NET: -85 mL          General: NAD, resting comfortably in bed  C/V: Normal rate.   Pulm: Nonlabored breathing, no respiratory distress. Speaking in complete sentences.  Abd: soft, no abdominal distention. Surgical incision sites c/d/i; no erythema, purulence, or focal edema; appropriately TTP. Bandage replaced.   Extrem: WWP, no edema, SCDs in place      LABS:                        7.1    9.57  )-----------( 155      ( 16 Sep 2021 06:51 )             22.3     09-16    137  |  104  |  42<H>  ----------------------------<  98  4.8   |  21<L>  |  3.07<H>    Ca    8.5      16 Sep 2021 06:51  Phos  4.0     09-16  Mg     2.2     09-16

## 2021-09-17 ENCOUNTER — TRANSCRIPTION ENCOUNTER (OUTPATIENT)
Age: 66
End: 2021-09-17

## 2021-09-17 VITALS
RESPIRATION RATE: 17 BRPM | DIASTOLIC BLOOD PRESSURE: 76 MMHG | TEMPERATURE: 98 F | SYSTOLIC BLOOD PRESSURE: 168 MMHG | OXYGEN SATURATION: 97 % | HEART RATE: 65 BPM

## 2021-09-17 LAB
ANION GAP SERPL CALC-SCNC: 8 MMOL/L — SIGNIFICANT CHANGE UP (ref 5–17)
BUN SERPL-MCNC: 38 MG/DL — HIGH (ref 7–23)
CALCIUM SERPL-MCNC: 8.3 MG/DL — LOW (ref 8.4–10.5)
CHLORIDE SERPL-SCNC: 110 MMOL/L — HIGH (ref 96–108)
CO2 SERPL-SCNC: 22 MMOL/L — SIGNIFICANT CHANGE UP (ref 22–31)
CREAT SERPL-MCNC: 2.72 MG/DL — HIGH (ref 0.5–1.3)
GLUCOSE BLDC GLUCOMTR-MCNC: 125 MG/DL — HIGH (ref 70–99)
GLUCOSE BLDC GLUCOMTR-MCNC: 179 MG/DL — HIGH (ref 70–99)
GLUCOSE SERPL-MCNC: 99 MG/DL — SIGNIFICANT CHANGE UP (ref 70–99)
HCT VFR BLD CALC: 23.1 % — LOW (ref 39–50)
HGB BLD-MCNC: 7.2 G/DL — LOW (ref 13–17)
MAGNESIUM SERPL-MCNC: 2.1 MG/DL — SIGNIFICANT CHANGE UP (ref 1.6–2.6)
MCHC RBC-ENTMCNC: 27.7 PG — SIGNIFICANT CHANGE UP (ref 27–34)
MCHC RBC-ENTMCNC: 31.2 GM/DL — LOW (ref 32–36)
MCV RBC AUTO: 88.8 FL — SIGNIFICANT CHANGE UP (ref 80–100)
NRBC # BLD: 0 /100 WBCS — SIGNIFICANT CHANGE UP (ref 0–0)
PHOSPHATE SERPL-MCNC: 4 MG/DL — SIGNIFICANT CHANGE UP (ref 2.5–4.5)
PLATELET # BLD AUTO: 161 K/UL — SIGNIFICANT CHANGE UP (ref 150–400)
POTASSIUM SERPL-MCNC: 4.7 MMOL/L — SIGNIFICANT CHANGE UP (ref 3.5–5.3)
POTASSIUM SERPL-SCNC: 4.7 MMOL/L — SIGNIFICANT CHANGE UP (ref 3.5–5.3)
RBC # BLD: 2.6 M/UL — LOW (ref 4.2–5.8)
RBC # FLD: 16.3 % — HIGH (ref 10.3–14.5)
SODIUM SERPL-SCNC: 140 MMOL/L — SIGNIFICANT CHANGE UP (ref 135–145)
WBC # BLD: 7.68 K/UL — SIGNIFICANT CHANGE UP (ref 3.8–10.5)
WBC # FLD AUTO: 7.68 K/UL — SIGNIFICANT CHANGE UP (ref 3.8–10.5)

## 2021-09-17 RX ORDER — NIFEDIPINE 30 MG
60 TABLET, EXTENDED RELEASE 24 HR ORAL DAILY
Refills: 0 | Status: DISCONTINUED | OUTPATIENT
Start: 2021-09-17 | End: 2021-09-17

## 2021-09-17 RX ORDER — OXYCODONE HYDROCHLORIDE 5 MG/1
1 TABLET ORAL
Qty: 6 | Refills: 0
Start: 2021-09-17

## 2021-09-17 RX ORDER — DOCUSATE SODIUM 100 MG
1 CAPSULE ORAL
Qty: 10 | Refills: 0
Start: 2021-09-17

## 2021-09-17 RX ADMIN — Medication 15 MILLIGRAM(S): at 05:37

## 2021-09-17 RX ADMIN — Medication 100 MILLIGRAM(S): at 01:39

## 2021-09-17 RX ADMIN — Medication 15 MILLIGRAM(S): at 06:00

## 2021-09-17 RX ADMIN — CARVEDILOL PHOSPHATE 12.5 MILLIGRAM(S): 80 CAPSULE, EXTENDED RELEASE ORAL at 05:37

## 2021-09-17 RX ADMIN — Medication 1000 MILLIGRAM(S): at 06:07

## 2021-09-17 RX ADMIN — AMLODIPINE BESYLATE 10 MILLIGRAM(S): 2.5 TABLET ORAL at 05:37

## 2021-09-17 RX ADMIN — Medication 100 MILLIGRAM(S): at 10:46

## 2021-09-17 RX ADMIN — ALVIMOPAN 12 MILLIGRAM(S): 12 CAPSULE ORAL at 05:37

## 2021-09-17 RX ADMIN — HEPARIN SODIUM 5000 UNIT(S): 5000 INJECTION INTRAVENOUS; SUBCUTANEOUS at 01:38

## 2021-09-17 RX ADMIN — HEPARIN SODIUM 5000 UNIT(S): 5000 INJECTION INTRAVENOUS; SUBCUTANEOUS at 10:46

## 2021-09-17 RX ADMIN — Medication 1000 MILLIGRAM(S): at 05:37

## 2021-09-17 NOTE — PROGRESS NOTE ADULT - ASSESSMENT
66M PMH HTN, DM, CKD, anemia, patient is admitted for SBO, s/p small bowel resection post up day 2. Cardiology was consulted for hypertension management.     #HTN  Patient seen and evaluated, reports feeling good. Roque any complains. No CP, no SOB.  EKG: NSR, RBBB  ECHO:  1. Normal left and right ventricular size and systolic function.   2. Grade I left ventricular diastolic dysfunction.   3. No significant valvular disease.   4. No evidence of pulmonary hypertension.   5. No pericardial effusion.  - Restart patient's home medications: Amlodipine 10 mg daily, Carvedilol 12.5 mg bid; and Nifedipine ER 60 mg daily   - c/w Hydralazine 100 mg tid  66M PMH HTN, DM, CKD, anemia, patient is admitted for SBO, s/p small bowel resection post up day 2. Cardiology was consulted for hypertension management.     #HTN  Patient seen and evaluated, reports feeling good. Roque any complains. No CP, no SOB.  EKG: NSR, RBBB  ECHO:  1. Normal left and right ventricular size and systolic function.   2. Grade I left ventricular diastolic dysfunction.   3. No significant valvular disease.   4. No evidence of pulmonary hypertension.   5. No pericardial effusion.  - Restart patient's home medications: c/w Amlodipine 10 mg, Carvedilol 12.5 mg bid; stop Nifedipine ER 60 mg daily   - c/w Hydralazine 100 mg tid

## 2021-09-17 NOTE — PROGRESS NOTE ADULT - SUBJECTIVE AND OBJECTIVE BOX
SUBJECTIVE:  Doing well this AM. NAEON. Denies n/v. Denies f/bm. Denies cp/sob. Pain is well controlled. Ambulating as tolerated. Tolerating diet.    MEDICATIONS  (STANDING):  acetaminophen   Tablet .. 1000 milliGRAM(s) Oral every 6 hours  allopurinol 100 milliGRAM(s) Oral daily  alvimopan 12 milliGRAM(s) Oral two times a day  amLODIPine   Tablet 10 milliGRAM(s) Oral daily  atorvastatin 80 milliGRAM(s) Oral at bedtime  BUpivacaine liposome 1.3% Injectable (no eMAR) 20 milliLiter(s) Local Injection once  carvedilol 12.5 milliGRAM(s) Oral every 12 hours  dextrose 40% Gel 15 Gram(s) Oral once  dextrose 5%. 1000 milliLiter(s) (50 mL/Hr) IV Continuous <Continuous>  dextrose 5%. 1000 milliLiter(s) (100 mL/Hr) IV Continuous <Continuous>  dextrose 50% Injectable 25 Gram(s) IV Push once  dextrose 50% Injectable 12.5 Gram(s) IV Push once  dextrose 50% Injectable 25 Gram(s) IV Push once  glucagon  Injectable 1 milliGRAM(s) IntraMuscular once  heparin   Injectable 5000 Unit(s) SubCutaneous every 8 hours  hydrALAZINE 100 milliGRAM(s) Oral every 8 hours  insulin lispro (ADMELOG) corrective regimen sliding scale   SubCutaneous Before meals and at bedtime  lactated ringers. 1000 milliLiter(s) (40 mL/Hr) IV Continuous <Continuous>    MEDICATIONS  (PRN):  HYDROmorphone  Injectable 0.5 milliGRAM(s) IV Push every 4 hours PRN Severe Pain (7 - 10)  ondansetron Injectable 4 milliGRAM(s) IV Push every 6 hours PRN Nausea and/or Vomiting      Vital Signs Last 24 Hrs  T(C): 36.7 (17 Sep 2021 04:52), Max: 36.9 (16 Sep 2021 13:00)  T(F): 98.1 (17 Sep 2021 04:52), Max: 98.4 (16 Sep 2021 13:00)  HR: 69 (17 Sep 2021 04:52) (60 - 75)  BP: 165/62 (17 Sep 2021 04:52) (154/63 - 186/77)  BP(mean): 96 (17 Sep 2021 04:52) (96 - 113)  RR: 17 (17 Sep 2021 04:52) (17 - 18)  SpO2: 98% (17 Sep 2021 04:52) (97% - 98%)    Physical Exam:  General: NAD, resting comfortably in bed  Pulmonary: Nonlabored breathing, no respiratory distress  Cardiovascular: NSR  Abdominal: soft, NT/ND, incision clean/dry/intact  Extremities: WWP, normal strength  Neuro: A/O x 3, CNs II-XII grossly intact, no focal deficits    I&O's Summary    16 Sep 2021 07:01  -  17 Sep 2021 07:00  --------------------------------------------------------  IN: 800 mL / OUT: 1500 mL / NET: -700 mL        LABS:                        7.2    7.68  )-----------( 161      ( 17 Sep 2021 05:54 )             23.1     09-17    140  |  110<H>  |  38<H>  ----------------------------<  99  4.7   |  22  |  2.72<H>    Ca    8.3<L>      17 Sep 2021 05:54  Phos  4.0     09-17  Mg     2.1     09-17          CAPILLARY BLOOD GLUCOSE      POCT Blood Glucose.: 125 mg/dL (17 Sep 2021 08:56)  POCT Blood Glucose.: 125 mg/dL (16 Sep 2021 17:12)  POCT Blood Glucose.: 139 mg/dL (16 Sep 2021 11:27)        RADIOLOGY & ADDITIONAL STUDIES:       SUBJECTIVE:  Doing well this AM. NAEON. Denies n/v. Endorses f/denies bm. Denies cp/sob. Pain is well controlled. Ambulating as tolerated. Tolerating diet.    MEDICATIONS  (STANDING):  acetaminophen   Tablet .. 1000 milliGRAM(s) Oral every 6 hours  allopurinol 100 milliGRAM(s) Oral daily  alvimopan 12 milliGRAM(s) Oral two times a day  amLODIPine   Tablet 10 milliGRAM(s) Oral daily  atorvastatin 80 milliGRAM(s) Oral at bedtime  BUpivacaine liposome 1.3% Injectable (no eMAR) 20 milliLiter(s) Local Injection once  carvedilol 12.5 milliGRAM(s) Oral every 12 hours  dextrose 40% Gel 15 Gram(s) Oral once  dextrose 5%. 1000 milliLiter(s) (50 mL/Hr) IV Continuous <Continuous>  dextrose 5%. 1000 milliLiter(s) (100 mL/Hr) IV Continuous <Continuous>  dextrose 50% Injectable 25 Gram(s) IV Push once  dextrose 50% Injectable 12.5 Gram(s) IV Push once  dextrose 50% Injectable 25 Gram(s) IV Push once  glucagon  Injectable 1 milliGRAM(s) IntraMuscular once  heparin   Injectable 5000 Unit(s) SubCutaneous every 8 hours  hydrALAZINE 100 milliGRAM(s) Oral every 8 hours  insulin lispro (ADMELOG) corrective regimen sliding scale   SubCutaneous Before meals and at bedtime  lactated ringers. 1000 milliLiter(s) (40 mL/Hr) IV Continuous <Continuous>    MEDICATIONS  (PRN):  HYDROmorphone  Injectable 0.5 milliGRAM(s) IV Push every 4 hours PRN Severe Pain (7 - 10)  ondansetron Injectable 4 milliGRAM(s) IV Push every 6 hours PRN Nausea and/or Vomiting      Vital Signs Last 24 Hrs  T(C): 36.7 (17 Sep 2021 04:52), Max: 36.9 (16 Sep 2021 13:00)  T(F): 98.1 (17 Sep 2021 04:52), Max: 98.4 (16 Sep 2021 13:00)  HR: 69 (17 Sep 2021 04:52) (60 - 75)  BP: 165/62 (17 Sep 2021 04:52) (154/63 - 186/77)  BP(mean): 96 (17 Sep 2021 04:52) (96 - 113)  RR: 17 (17 Sep 2021 04:52) (17 - 18)  SpO2: 98% (17 Sep 2021 04:52) (97% - 98%)    Physical Exam:  General: NAD, resting comfortably in bed  Pulmonary: Nonlabored breathing, no respiratory distress  Cardiovascular: NSR  Abdominal: soft, NT/ND, incision clean/dry/intact  Extremities: WWP, normal strength  Neuro: A/O x 3, CNs II-XII grossly intact, no focal deficits    I&O's Summary    16 Sep 2021 07:01  -  17 Sep 2021 07:00  --------------------------------------------------------  IN: 800 mL / OUT: 1500 mL / NET: -700 mL        LABS:                        7.2    7.68  )-----------( 161      ( 17 Sep 2021 05:54 )             23.1     09-17    140  |  110<H>  |  38<H>  ----------------------------<  99  4.7   |  22  |  2.72<H>    Ca    8.3<L>      17 Sep 2021 05:54  Phos  4.0     09-17  Mg     2.1     09-17          CAPILLARY BLOOD GLUCOSE      POCT Blood Glucose.: 125 mg/dL (17 Sep 2021 08:56)  POCT Blood Glucose.: 125 mg/dL (16 Sep 2021 17:12)  POCT Blood Glucose.: 139 mg/dL (16 Sep 2021 11:27)        RADIOLOGY & ADDITIONAL STUDIES:

## 2021-09-17 NOTE — DISCHARGE NOTE NURSING/CASE MANAGEMENT/SOCIAL WORK - NSDCPEFALRISK_GEN_ALL_CORE
For information on Fall & injury Prevention, visit [email or web address]

## 2021-09-17 NOTE — DISCHARGE NOTE PROVIDER - NSDCMRMEDTOKEN_GEN_ALL_CORE_FT
allopurinol 100 mg oral tablet: 1 tab(s) orally once a day  atorvastatin 80 mg oral tablet: 1 tab(s) orally once a day  furosemide 40 mg oral tablet: 1 tab(s) orally once a day  hydrALAZINE 100 mg oral tablet: 1 tab(s) orally 3 times a day

## 2021-09-17 NOTE — DISCHARGE NOTE NURSING/CASE MANAGEMENT/SOCIAL WORK - PATIENT PORTAL LINK FT
You can access the FollowMyHealth Patient Portal offered by [facility] by registering at the following website: [email or web address]. By joining [employer]’s FollowMyHealth portal, you will also be able to view your health information using other applications (apps) compatible with our system.

## 2021-09-17 NOTE — DISCHARGE NOTE PROVIDER - NSDCFUADDINST_GEN_ALL_CORE_FT
You may shower; soap and water over incision sites. Do not scrub. Pat dry when done. No tub bathing or swimming until cleared. Keep incision sites out of the sun as scars will darken. Ambulate as tolerated, but no heavy lifting (>10lbs) or strenuous exercise. You may resume regular diet. You should be urinating at least 3-4x per day. Call the office if you experience increasing abdominal pain, nausea, vomiting, or temperature >101 F.  Warning Signs:  Please call your doctor or nurse practitioner if you experience the following:  *You experience new chest pain, pressure, squeezing or tightness.  *New or worsening cough, shortness of breath, or wheeze.  *If you are vomiting and cannot keep down fluids or your medications.  *You are getting dehydrated due to continued vomiting, diarrhea, or other reasons. Signs of dehydration include dry mouth, rapid heartbeat, or feeling dizzy or faint when standing.  *You see blood or dark/black material when you vomit or have a bowel movement.  *You experience burning when you urinate, have blood in your urine, or experience a discharge.  *Your pain is not improving within 8-12 hours or is not gone within 24 hours. Call or return immediately if your pain is getting worse, changes location, or moves to your chest or back.  *You have shaking chills, or fever greater than 101.5 degrees Fahrenheit or 38 degrees Celsius.  *Any change in your symptoms, or any new symptoms that concern you.

## 2021-09-17 NOTE — CHART NOTE - NSCHARTNOTEFT_GEN_A_CORE
Spoke with Gladys Ellis in pharmacy, confirmed pt not usually on Nifedipine at home per home pharmacy. Will continue on previous HTN regimen.

## 2021-09-17 NOTE — PROGRESS NOTE ADULT - ASSESSMENT
67yo M with PMH HTN, pre-diabetes and PSH R inguinal hernia repair who presents for pre-operative bowel preparation, s/p small bowel resection with 2U pRBCs given in OR 9/14.    LRD/IVF   Pain/nausea control PRN   Home meds as appropriate  OOBA/IS  SCDs/SQH  AM Labs

## 2021-09-17 NOTE — DISCHARGE NOTE NURSING/CASE MANAGEMENT/SOCIAL WORK - NSDCFUADDAPPT_GEN_ALL_CORE_FT
Please follow up with Dr. Grady next week. Call his office to schedule an appointment: [phone]. Call the office if you experience increasing pain, nausea, vomiting, swelling, redness, or leakage from stoma site, temperature >101.4F.

## 2021-09-17 NOTE — PROGRESS NOTE ADULT - SUBJECTIVE AND OBJECTIVE BOX
Cardiology Consult    O/N:  Interval History: patient was seen and examined in am. Reports feeling good, noted BPs are still elevated  Telemetry:    OBJECTIVE  Vitals:  T(C): 36.7 (09-17-21 @ 04:52), Max: 37.7 (09-16-21 @ 09:28)  HR: 69 (09-17-21 @ 04:52) (60 - 75)  BP: 165/62 (09-17-21 @ 04:52) (154/63 - 186/77)  RR: 17 (09-17-21 @ 04:52) (17 - 18)  SpO2: 98% (09-17-21 @ 04:52) (96% - 98%)  Wt(kg): --    I/O:  I&O's Summary    16 Sep 2021 07:01  -  17 Sep 2021 07:00  --------------------------------------------------------  IN: 800 mL / OUT: 1500 mL / NET: -700 mL        PHYSICAL EXAM:  Appearance: NAD. Speaking in full sentences.   HEENT: No pallor noted.    Cardiovascular: RRR with no murmurs.  Respiratory: Lungs CTAB. 	  Extremities: No edema b/l  Neurologic:  Alert and awake.  LABS:                        7.2    7.68  )-----------( 161      ( 17 Sep 2021 05:54 )             23.1     09-17    140  |  110<H>  |  38<H>  ----------------------------<  99  4.7   |  22  |  2.72<H>    Ca    8.3<L>      17 Sep 2021 05:54  Phos  4.0     09-17  Mg     2.1     09-17            RADIOLOGY & ADDITIONAL TESTS:  Reviewed .    MEDICATIONS  (STANDING):  acetaminophen   Tablet .. 1000 milliGRAM(s) Oral every 6 hours  allopurinol 100 milliGRAM(s) Oral daily  alvimopan 12 milliGRAM(s) Oral two times a day  amLODIPine   Tablet 10 milliGRAM(s) Oral daily  atorvastatin 80 milliGRAM(s) Oral at bedtime  BUpivacaine liposome 1.3% Injectable (no eMAR) 20 milliLiter(s) Local Injection once  carvedilol 12.5 milliGRAM(s) Oral every 12 hours  dextrose 40% Gel 15 Gram(s) Oral once  dextrose 5%. 1000 milliLiter(s) (50 mL/Hr) IV Continuous <Continuous>  dextrose 5%. 1000 milliLiter(s) (100 mL/Hr) IV Continuous <Continuous>  dextrose 50% Injectable 25 Gram(s) IV Push once  dextrose 50% Injectable 12.5 Gram(s) IV Push once  dextrose 50% Injectable 25 Gram(s) IV Push once  glucagon  Injectable 1 milliGRAM(s) IntraMuscular once  heparin   Injectable 5000 Unit(s) SubCutaneous every 8 hours  hydrALAZINE 100 milliGRAM(s) Oral every 8 hours  insulin lispro (ADMELOG) corrective regimen sliding scale   SubCutaneous Before meals and at bedtime  lactated ringers. 1000 milliLiter(s) (40 mL/Hr) IV Continuous <Continuous>    MEDICATIONS  (PRN):  HYDROmorphone  Injectable 0.5 milliGRAM(s) IV Push every 4 hours PRN Severe Pain (7 - 10)  ondansetron Injectable 4 milliGRAM(s) IV Push every 6 hours PRN Nausea and/or Vomiting

## 2021-09-17 NOTE — DISCHARGE NOTE PROVIDER - CARE PROVIDER_API CALL
Ricki Grady)  ColonRectal Surgery; Surgery  [facility], [address] [place] [address]  Phone: [phone]  Fax: [phone]  Follow Up Time: 1 week

## 2021-09-17 NOTE — DISCHARGE NOTE PROVIDER - HOSPITAL COURSE
66M with PMH HTN, DM, and PSH R inguinal hernia repair admitted for bowel prep and OR for small bowel resection secondary to GI bleed. In ED, pt was noted to have H/H 8.3 without active BRBPR. Pt was taken to OR and received a push endoscopy which showed a point of AVM that was resected with primary anastamosis. Pt tolerated the procedure well w/o complication. His postoperative course was unremarkable with advancement of diet, passing trial of void, and pain control. On day of discharge patient was stable to be d/c'd home.
Spine appears normal, range of motion is not limited, no muscle or joint tenderness

## 2021-09-17 NOTE — DISCHARGE NOTE PROVIDER - NSDCCPCAREPLAN_GEN_ALL_CORE_FT
PRINCIPAL DISCHARGE DIAGNOSIS  Diagnosis: Melena  Assessment and Plan of Treatment: S/p small bowel resection

## 2021-09-17 NOTE — DISCHARGE NOTE PROVIDER - NSDCFUADDAPPT_GEN_ALL_CORE_FT
Please follow up with Dr. Grady next week. Call his office to schedule an appointment: [phone]. Call the office if you experience increasing pain, nausea, vomiting, swelling, redness, or leakage from stoma site, temperature >101.4F.

## 2021-09-22 LAB — SURGICAL PATHOLOGY STUDY: SIGNIFICANT CHANGE UP

## 2021-09-28 DIAGNOSIS — I10 ESSENTIAL (PRIMARY) HYPERTENSION: ICD-10-CM

## 2021-09-28 DIAGNOSIS — E11.9 TYPE 2 DIABETES MELLITUS WITHOUT COMPLICATIONS: ICD-10-CM

## 2021-09-28 DIAGNOSIS — K55.21 ANGIODYSPLASIA OF COLON WITH HEMORRHAGE: ICD-10-CM

## 2021-09-28 DIAGNOSIS — D64.9 ANEMIA, UNSPECIFIED: ICD-10-CM

## 2021-09-28 DIAGNOSIS — K92.2 GASTROINTESTINAL HEMORRHAGE, UNSPECIFIED: ICD-10-CM

## 2021-09-28 DIAGNOSIS — E03.9 HYPOTHYROIDISM, UNSPECIFIED: ICD-10-CM

## 2021-10-08 ENCOUNTER — APPOINTMENT (OUTPATIENT)
Dept: COLORECTAL SURGERY | Facility: CLINIC | Age: 66
End: 2021-10-08
Payer: MEDICARE

## 2021-10-08 VITALS
SYSTOLIC BLOOD PRESSURE: 135 MMHG | DIASTOLIC BLOOD PRESSURE: 70 MMHG | HEIGHT: 67 IN | TEMPERATURE: 99.2 F | WEIGHT: 174 LBS | BODY MASS INDEX: 27.31 KG/M2 | HEART RATE: 67 BPM

## 2021-10-08 PROCEDURE — 99024 POSTOP FOLLOW-UP VISIT: CPT

## 2021-10-08 NOTE — PHYSICAL EXAM
[Abdomen Masses] : No abdominal masses [Abdomen Tenderness] : ~T No ~M abdominal tenderness [de-identified] : Abdomen is soft, nontender, nondistended. Incisions are well healed. No hernia or masses\par  staples removed.

## 2021-10-08 NOTE — HISTORY OF PRESENT ILLNESS
[FreeTextEntry1] : 67 yo M presents for f/u GI bleed and AVM of small bowel s/p open small bowel resection combined w/ intraoperative push enteroscopy w/ GI Ele on 9/13/21\par \par h/o capsule endoscopy on 6/15/21 w/ outpatient GI, patient told he had brisk bleeding in small bowel\par Admitted to [facility] 6/17/21 for melena, s/p single balloon enteroscopy and push enteroscopy attempted (Dr. Marlow), could only reach proximal jejunum, no active bleeding or stigmata noted\par Previously admitted 4/26-5/4/21 for weakness, hgb 3.9 s/p 4 units PRBCs\par Also admitted 1/20-1/22/21 for anemia, hgb 5, s/p 2 units PRBCs. EGD/Colonoscopy completed, 1 ascending polyp removed, pathology c/w TA\par \par 9/13/21: Push enteroscopy accomplished within 10 inches of the ICV which noted single AVM in the distal ileum.This was identified and marked with a stay suture.  Remaining  inspection of the small bowel was performed.  There was a small area in the proximal jejunum 8 cm from the ligament of Treitz with a questionable secondary area consistent with potential AVM and the scope was withdrawn.\par \par Specimen(s) Submitted\par Ileum\par \par Final Diagnosis\par Ileum, partial enterectomy:\par - Small bowel with focus of vascular proliferation and distortion in\par submucosa consistent with arteriovenous malformation\par - Associated area of mild superficial submucosal fibrosis\par \par - Note: Diagnosis is supported by CD31 immunohistochemical staining\par \par Pt reports he is doing well. Requesting staples to be removed and c/o slight itching\par Denies fever, body aches, chills, abd pain, n/v/c/d or BRBPR or tarry stools\par \par BH: 2-3 times per week, admits to some straining\par Eating adequate dietary fiber and drinking\par Denies taking stool softeners or fiber supplement

## 2021-10-13 PROCEDURE — C1889: CPT

## 2021-10-13 PROCEDURE — 85018 HEMOGLOBIN: CPT

## 2021-10-13 PROCEDURE — 82330 ASSAY OF CALCIUM: CPT

## 2021-10-13 PROCEDURE — 88341 IMHCHEM/IMCYTCHM EA ADD ANTB: CPT

## 2021-10-13 PROCEDURE — 71045 X-RAY EXAM CHEST 1 VIEW: CPT

## 2021-10-13 PROCEDURE — 86769 SARS-COV-2 COVID-19 ANTIBODY: CPT

## 2021-10-13 PROCEDURE — 80053 COMPREHEN METABOLIC PANEL: CPT

## 2021-10-13 PROCEDURE — 85025 COMPLETE CBC W/AUTO DIFF WBC: CPT

## 2021-10-13 PROCEDURE — 88304 TISSUE EXAM BY PATHOLOGIST: CPT

## 2021-10-13 PROCEDURE — 82962 GLUCOSE BLOOD TEST: CPT

## 2021-10-13 PROCEDURE — 86923 COMPATIBILITY TEST ELECTRIC: CPT

## 2021-10-13 PROCEDURE — 86850 RBC ANTIBODY SCREEN: CPT

## 2021-10-13 PROCEDURE — 84295 ASSAY OF SERUM SODIUM: CPT

## 2021-10-13 PROCEDURE — U0005: CPT

## 2021-10-13 PROCEDURE — 85027 COMPLETE CBC AUTOMATED: CPT

## 2021-10-13 PROCEDURE — 83036 HEMOGLOBIN GLYCOSYLATED A1C: CPT

## 2021-10-13 PROCEDURE — 80048 BASIC METABOLIC PNL TOTAL CA: CPT

## 2021-10-13 PROCEDURE — 85730 THROMBOPLASTIN TIME PARTIAL: CPT

## 2021-10-13 PROCEDURE — 36415 COLL VENOUS BLD VENIPUNCTURE: CPT

## 2021-10-13 PROCEDURE — 86900 BLOOD TYPING SEROLOGIC ABO: CPT

## 2021-10-13 PROCEDURE — 83735 ASSAY OF MAGNESIUM: CPT

## 2021-10-13 PROCEDURE — U0003: CPT

## 2021-10-13 PROCEDURE — 99285 EMERGENCY DEPT VISIT HI MDM: CPT | Mod: 25

## 2021-10-13 PROCEDURE — 84132 ASSAY OF SERUM POTASSIUM: CPT

## 2021-10-13 PROCEDURE — 36430 TRANSFUSION BLD/BLD COMPNT: CPT

## 2021-10-13 PROCEDURE — C9399: CPT

## 2021-10-13 PROCEDURE — 93005 ELECTROCARDIOGRAM TRACING: CPT

## 2021-10-13 PROCEDURE — 85610 PROTHROMBIN TIME: CPT

## 2021-10-13 PROCEDURE — P9016: CPT

## 2021-10-13 PROCEDURE — 86901 BLOOD TYPING SEROLOGIC RH(D): CPT

## 2021-10-13 PROCEDURE — 84100 ASSAY OF PHOSPHORUS: CPT

## 2021-10-27 ENCOUNTER — INPATIENT (INPATIENT)
Facility: HOSPITAL | Age: 66
LOS: 2 days | Discharge: HOME | DRG: 811 | End: 2021-10-30
Attending: HOSPITALIST | Admitting: HOSPITALIST
Payer: MEDICARE

## 2021-10-27 VITALS
TEMPERATURE: 98 F | OXYGEN SATURATION: 97 % | HEART RATE: 70 BPM | HEIGHT: 67 IN | DIASTOLIC BLOOD PRESSURE: 55 MMHG | SYSTOLIC BLOOD PRESSURE: 137 MMHG | RESPIRATION RATE: 18 BRPM | WEIGHT: 184.97 LBS

## 2021-10-27 DIAGNOSIS — N17.9 ACUTE KIDNEY FAILURE, UNSPECIFIED: ICD-10-CM

## 2021-10-27 DIAGNOSIS — E11.9 TYPE 2 DIABETES MELLITUS WITHOUT COMPLICATIONS: ICD-10-CM

## 2021-10-27 DIAGNOSIS — R63.8 OTHER SYMPTOMS AND SIGNS CONCERNING FOOD AND FLUID INTAKE: ICD-10-CM

## 2021-10-27 DIAGNOSIS — D64.9 ANEMIA, UNSPECIFIED: ICD-10-CM

## 2021-10-27 DIAGNOSIS — K40.90 UNILATERAL INGUINAL HERNIA, WITHOUT OBSTRUCTION OR GANGRENE, NOT SPECIFIED AS RECURRENT: ICD-10-CM

## 2021-10-27 DIAGNOSIS — R77.8 OTHER SPECIFIED ABNORMALITIES OF PLASMA PROTEINS: ICD-10-CM

## 2021-10-27 DIAGNOSIS — Z98.890 OTHER SPECIFIED POSTPROCEDURAL STATES: Chronic | ICD-10-CM

## 2021-10-27 DIAGNOSIS — E03.9 HYPOTHYROIDISM, UNSPECIFIED: ICD-10-CM

## 2021-10-27 DIAGNOSIS — I21.A1 MYOCARDIAL INFARCTION TYPE 2: ICD-10-CM

## 2021-10-27 DIAGNOSIS — I10 ESSENTIAL (PRIMARY) HYPERTENSION: ICD-10-CM

## 2021-10-27 LAB
ALBUMIN SERPL ELPH-MCNC: 4.3 G/DL — SIGNIFICANT CHANGE UP (ref 3.3–5)
ALP SERPL-CCNC: 153 U/L — HIGH (ref 40–120)
ALT FLD-CCNC: 26 U/L — SIGNIFICANT CHANGE UP (ref 10–45)
ANION GAP SERPL CALC-SCNC: 11 MMOL/L — SIGNIFICANT CHANGE UP (ref 5–17)
APTT BLD: 40.3 SEC — HIGH (ref 27.5–35.5)
AST SERPL-CCNC: 25 U/L — SIGNIFICANT CHANGE UP (ref 10–40)
BASOPHILS # BLD AUTO: 0.02 K/UL — SIGNIFICANT CHANGE UP (ref 0–0.2)
BASOPHILS NFR BLD AUTO: 0.3 % — SIGNIFICANT CHANGE UP (ref 0–2)
BILIRUB SERPL-MCNC: 0.4 MG/DL — SIGNIFICANT CHANGE UP (ref 0.2–1.2)
BLD GP AB SCN SERPL QL: NEGATIVE — SIGNIFICANT CHANGE UP
BUN SERPL-MCNC: 72 MG/DL — HIGH (ref 7–23)
CALCIUM SERPL-MCNC: 8.2 MG/DL — LOW (ref 8.4–10.5)
CHLORIDE SERPL-SCNC: 102 MMOL/L — SIGNIFICANT CHANGE UP (ref 96–108)
CO2 SERPL-SCNC: 26 MMOL/L — SIGNIFICANT CHANGE UP (ref 22–31)
CREAT SERPL-MCNC: 4.21 MG/DL — HIGH (ref 0.5–1.3)
EOSINOPHIL # BLD AUTO: 0.31 K/UL — SIGNIFICANT CHANGE UP (ref 0–0.5)
EOSINOPHIL NFR BLD AUTO: 5.4 % — SIGNIFICANT CHANGE UP (ref 0–6)
GLUCOSE SERPL-MCNC: 155 MG/DL — HIGH (ref 70–99)
HCT VFR BLD CALC: 23.6 % — LOW (ref 39–50)
HGB BLD-MCNC: 7.1 G/DL — LOW (ref 13–17)
IMM GRANULOCYTES NFR BLD AUTO: 0.7 % — SIGNIFICANT CHANGE UP (ref 0–1.5)
INR BLD: 1.27 — HIGH (ref 0.88–1.16)
LYMPHOCYTES # BLD AUTO: 0.94 K/UL — LOW (ref 1–3.3)
LYMPHOCYTES # BLD AUTO: 16.3 % — SIGNIFICANT CHANGE UP (ref 13–44)
MAGNESIUM SERPL-MCNC: 2.5 MG/DL — SIGNIFICANT CHANGE UP (ref 1.6–2.6)
MCHC RBC-ENTMCNC: 25.9 PG — LOW (ref 27–34)
MCHC RBC-ENTMCNC: 30.1 GM/DL — LOW (ref 32–36)
MCV RBC AUTO: 86.1 FL — SIGNIFICANT CHANGE UP (ref 80–100)
MONOCYTES # BLD AUTO: 0.55 K/UL — SIGNIFICANT CHANGE UP (ref 0–0.9)
MONOCYTES NFR BLD AUTO: 9.5 % — SIGNIFICANT CHANGE UP (ref 2–14)
NEUTROPHILS # BLD AUTO: 3.92 K/UL — SIGNIFICANT CHANGE UP (ref 1.8–7.4)
NEUTROPHILS NFR BLD AUTO: 67.8 % — SIGNIFICANT CHANGE UP (ref 43–77)
NRBC # BLD: 0 /100 WBCS — SIGNIFICANT CHANGE UP (ref 0–0)
PHOSPHATE SERPL-MCNC: 4.9 MG/DL — HIGH (ref 2.5–4.5)
PLATELET # BLD AUTO: 196 K/UL — SIGNIFICANT CHANGE UP (ref 150–400)
POTASSIUM SERPL-MCNC: 4.3 MMOL/L — SIGNIFICANT CHANGE UP (ref 3.5–5.3)
POTASSIUM SERPL-SCNC: 4.3 MMOL/L — SIGNIFICANT CHANGE UP (ref 3.5–5.3)
PROT SERPL-MCNC: 6.8 G/DL — SIGNIFICANT CHANGE UP (ref 6–8.3)
PROTHROM AB SERPL-ACNC: 15.1 SEC — HIGH (ref 10.6–13.6)
RBC # BLD: 2.74 M/UL — LOW (ref 4.2–5.8)
RBC # FLD: 20 % — HIGH (ref 10.3–14.5)
RH IG SCN BLD-IMP: POSITIVE — SIGNIFICANT CHANGE UP
SARS-COV-2 RNA SPEC QL NAA+PROBE: NEGATIVE — SIGNIFICANT CHANGE UP
SODIUM SERPL-SCNC: 139 MMOL/L — SIGNIFICANT CHANGE UP (ref 135–145)
T4 AB SER-ACNC: 7.94 UG/DL — SIGNIFICANT CHANGE UP (ref 4.5–11.7)
TSH SERPL-MCNC: 5.29 UIU/ML — HIGH (ref 0.27–4.2)
WBC # BLD: 5.78 K/UL — SIGNIFICANT CHANGE UP (ref 3.8–10.5)
WBC # FLD AUTO: 5.78 K/UL — SIGNIFICANT CHANGE UP (ref 3.8–10.5)

## 2021-10-27 PROCEDURE — 99285 EMERGENCY DEPT VISIT HI MDM: CPT

## 2021-10-27 PROCEDURE — 71045 X-RAY EXAM CHEST 1 VIEW: CPT | Mod: 26

## 2021-10-27 PROCEDURE — 99232 SBSQ HOSP IP/OBS MODERATE 35: CPT | Mod: GC

## 2021-10-27 RX ORDER — INSULIN LISPRO 100/ML
VIAL (ML) SUBCUTANEOUS
Refills: 0 | Status: DISCONTINUED | OUTPATIENT
Start: 2021-10-27 | End: 2021-10-30

## 2021-10-27 RX ORDER — DEXTROSE 50 % IN WATER 50 %
15 SYRINGE (ML) INTRAVENOUS ONCE
Refills: 0 | Status: DISCONTINUED | OUTPATIENT
Start: 2021-10-27 | End: 2021-10-30

## 2021-10-27 RX ORDER — GLUCAGON INJECTION, SOLUTION 0.5 MG/.1ML
1 INJECTION, SOLUTION SUBCUTANEOUS ONCE
Refills: 0 | Status: DISCONTINUED | OUTPATIENT
Start: 2021-10-27 | End: 2021-10-30

## 2021-10-27 RX ORDER — SODIUM CHLORIDE 9 MG/ML
1000 INJECTION, SOLUTION INTRAVENOUS
Refills: 0 | Status: DISCONTINUED | OUTPATIENT
Start: 2021-10-27 | End: 2021-10-30

## 2021-10-27 RX ORDER — PANTOPRAZOLE SODIUM 20 MG/1
40 TABLET, DELAYED RELEASE ORAL EVERY 12 HOURS
Refills: 0 | Status: DISCONTINUED | OUTPATIENT
Start: 2021-10-27 | End: 2021-10-30

## 2021-10-27 RX ORDER — DEXTROSE 50 % IN WATER 50 %
25 SYRINGE (ML) INTRAVENOUS ONCE
Refills: 0 | Status: DISCONTINUED | OUTPATIENT
Start: 2021-10-27 | End: 2021-10-30

## 2021-10-27 RX ORDER — ATORVASTATIN CALCIUM 80 MG/1
80 TABLET, FILM COATED ORAL AT BEDTIME
Refills: 0 | Status: DISCONTINUED | OUTPATIENT
Start: 2021-10-27 | End: 2021-10-30

## 2021-10-27 RX ORDER — LANOLIN ALCOHOL/MO/W.PET/CERES
3 CREAM (GRAM) TOPICAL AT BEDTIME
Refills: 0 | Status: DISCONTINUED | OUTPATIENT
Start: 2021-10-27 | End: 2021-10-30

## 2021-10-27 RX ORDER — ACETAMINOPHEN 500 MG
650 TABLET ORAL EVERY 6 HOURS
Refills: 0 | Status: DISCONTINUED | OUTPATIENT
Start: 2021-10-27 | End: 2021-10-30

## 2021-10-27 RX ORDER — ONDANSETRON 8 MG/1
4 TABLET, FILM COATED ORAL EVERY 8 HOURS
Refills: 0 | Status: DISCONTINUED | OUTPATIENT
Start: 2021-10-27 | End: 2021-10-30

## 2021-10-27 NOTE — H&P ADULT - PROBLEM SELECTOR PLAN 7
reducible. w.o signs of incarceration.  -will brittny Fluids: none  Electrolytes: Mg>2, K>4  Nutrition:  No IVF currently needed, replete lytes PRN  Prophylaxis:   Activity: AAT, OOBTC  GI: none  C: FC  Dispo: Admit to F

## 2021-10-27 NOTE — ED ADULT NURSE NOTE - ASSOCIATED SYMPTOMS
Name  Karen Angel DOE 19       MRN  [id number]  Provider SHAKIR         49   [facility]       Age  67   [facility]       Sex  Female   Visit Type Video       Education  14   Platform AmWell       Handedness Right                        ORIENTATION     CLOCK DRAWING      Time  -82    Command  IMPAIRED     Personal Information    Copy  0     Place  0.5 /2          Presidents     ORAL TRAILS              Raw z Errors   WAIS-IV      Trails A  19 -5.3 0     Raw SS RDS  Trails B  no way - forget it -13.08 0   Digit Span  9 1 5         Vocabulary  39 10   TSAT       Matrix Reasoning 1 1     Raw z    Similarities  18 7   Total Time  423 -9.3125    EST VIQ   ~93   Total Errors  35 -13.08                 BOSTON NAMING TEST     RBANS       Raw 24       Raw z SS/%ile   SS 2 %ile <1   Story Immediate 4 -4.11 1   T 0 MAS 0   Story Delay  0 -4.43 1   Total Stim. Correct 3           Total Phon. Correct 10    HVLT               Raw T    COWAT      Trial 1  2     Form CFL     Trial 2  3     Raw 11     Trial 3  2     SS 2     Learning  1     %ile <1 z 0   Total Recall  7 <20          Delayed Recall 0 <20    ANIMAL FLUENCY     Percent Retention 0 <20    Raw 3     True Positives 3     SS 0 z 0   False Positives 4     T 8     Discrimination Index -1 <20                 COMPLEX IDEATIONAL MATERIAL    ILS HEALTH & SAFETY QUESTIONNAIRE    Raw  8    Total  19     SS  3    Classification LOW     T  13                 GDS             Total  2           Interpretation MINIMAL          low hemoglobin

## 2021-10-27 NOTE — H&P ADULT - PROBLEM SELECTOR PLAN 5
pt w pmh of HTN on furosemide 40mg qd, hydralazine 100mg TID  -will hold antihypertensives for now -hx of hypothyroidism. patient states he takes a thryoid medication. unsure of dose. pharmacy is closed  -call pharmacy for dose in AM   -f/u TSH, free T4

## 2021-10-27 NOTE — H&P ADULT - NSHPSOCIALHISTORY_GEN_ALL_CORE
Work:  Tobacco use:   EtOH use:  Illicit drug use:    Living situation: Work:x  Tobacco use: x  EtOH use:1 mixed vodka weekly   Illicit drug use:x    [place] situation[place] wife

## 2021-10-27 NOTE — H&P ADULT - NSHPLABSRESULTS_GEN_ALL_CORE
.  LABS:                         7.1    5.78  )-----------( 196      ( 27 Oct 2021 14:29 )             23.6     10-27    139  |  102  |  72<H>  ----------------------------<  155<H>  4.3   |  26  |  4.21<H>    Ca    8.2<L>      27 Oct 2021 14:29    TPro  6.8  /  Alb  4.3  /  TBili  0.4  /  DBili  x   /  AST  25  /  ALT  26  /  AlkPhos  153<H>  10-27    PT/INR - ( 27 Oct 2021 14:29 )   PT: 15.1 sec;   INR: 1.27          PTT - ( 27 Oct 2021 14:29 )  PTT:40.3 sec          RADIOLOGY, EKG & ADDITIONAL TESTS: Reviewed.

## 2021-10-27 NOTE — H&P ADULT - PROBLEM SELECTOR PLAN 3
p/w trop of 0.06. denies cp, sob. no hx of CAD, CHF or previous MI. likely related to underlying CKD and demand ischemia in setting of symptomatic anemia. EKG w. known RBBB, no st changes. likely type II MI.  -f/u repeat trop p/w trop of 0.06. denies cp, sob. no hx of CAD, CHF or previous MI. likely related to underlying CKD and demand ischemia in setting of symptomatic anemia. EKG w. known RBBB, no st changes. likely type II MI.  -f/u repeat trop  -f/u ECHO likely pre renal in setting of blood loss. bun/cr on admission 72/4.21 (from 38/2.72 9/17/21),  -f/u urine lytes  -transfuse  -trend Cr #CLOTILDE on CKD  likely pre renal in setting of blood loss. bun/cr on admission 72/4.21 (from 38/2.72 9/17/21),  -f/u urine lytes  -transfuse  -trend Cr #CLOTILDE on CKD  likely pre renal in setting of blood loss. bun/cr on admission 72/4.21 (from 38/2.72 9/17/21),  -hold allopurinol and furosemide for now    -f/u urine lytes  -transfuse  -trend Cr

## 2021-10-27 NOTE — H&P ADULT - PROBLEM SELECTOR PLAN 6
-hx of hypothyroidism. not on meds  -f/u TSH, free T4 reducible. w.o signs of incarceration. surgical scar well healed   -[place]

## 2021-10-27 NOTE — H&P ADULT - PROBLEM SELECTOR PROBLEM 7
Unilateral inguinal hernia without obstruction or gangrene, recurrence not specified Nutrition, metabolism, and development symptoms

## 2021-10-27 NOTE — H&P ADULT - PROBLEM SELECTOR PLAN 1
Pt p/w Hg of 7.1 w/ known hx of CKD., HOARCE and AVM s/p sm bowel resection on 9/13. Last hg 7.2 9/17/21 upon discharge after resection. p/w generalized weakness, found to have Hg of <7 by outpatient hematologist. No obvious signs of active bleeding. patient denies melena, BPR, hematochezia, hematemesis. RAINE in ED w brown stool  -patient seen by surgery in the ED, rec GI consult for possible scope  -transfuse for hg <7  -keep active T and screen  -monitor for signs of bleed #COELHO  Pt p/w Hg of 7.1 w/ known hx of CKD., HORACE and AVM s/p sm bowel resection on 9/13. Last hg 7.2 9/17/21 upon discharge after resection. p/w generalized weakness/COELHO, found to have Hg of <7 by outpatient hematologist. No obvious signs of active bleeding. patient denies melena, BPR, hematochezia, hematemesis. RAINE in ED w brown stool  -would still consider cardiac etiology given hx of possible COELHO, elevate trop (0.06), and b/l LE edema   -patient seen by surgery in the ED, rec GI consult for possible scope  -transfuse for hg <7  -keep active T and screen  -monitor for signs of bleed  -f/u ECHO  -trend trop  -LE dopplers #COELHO  Pt p/w Hg of 7.1 w/ known hx of CKD., HORACE and AVM s/p sm bowel resection on 9/13. Last hg 7.2 9/17/21 upon discharge after resection. p/w generalized weakness/COELHO, found to have Hg of <7 by outpatient hematologist. No obvious signs of active bleeding. patient denies melena, BPR, hematochezia, hematemesis. RAINE in ED w brown stool  -patient seen by surgery in the ED, rec GI consult for possible scope  -transfuse for hg <7  -keep active T and screen  -monitor for signs of bleed      #B/L LE edema  #R/O CHF #R/o DVT  -pt p/w b/l LE edema and generalized weakness/COELHO w/ elevated trop (EKG w.o ST changes). although found to have low Hg on admission and being treated for symptomatic anemia cannot r/o cardiac etiology vs DVT (non tender non erythematous)  -trend trop  -LE dopplers  -f/u ECHO #COELHO  Pt p/w Hg of 7.1 w/ known hx of CKD., HORACE and AVM s/p sm bowel resection on 9/13. Last hg 7.2 9/17/21 upon discharge after resection. p/w generalized weakness/COELHO, found to have Hg of <7 by outpatient hematologist. No obvious signs of active bleeding. patient denies melena, BPR, hematochezia, hematemesis. RAINE in ED w brown stool  -patient seen by surgery in the ED, rec GI consult for possible scope  -transfuse for hg <7  -keep active T and screen  -monitor for signs of bleed  -PPI BID    #B/L LE edema  #R/O CHF #R/o DVT  -pt p/w b/l LE edema and generalized weakness/COELHO w/ elevated trop (EKG w.o ST changes). although found to have low Hg on admission and being treated for symptomatic anemia cannot r/o cardiac etiology vs DVT (non tender non erythematous)  -trend trop  -LE dopplers  -f/u ECHO #COELHO  Pt p/w Hg of 7.1 w/ known hx of CKD., HORACE and AVM s/p sm bowel resection on 9/13. Last hg 7.2 9/17/21 upon discharge after resection. p/w generalized weakness/COELHO, found to have Hg of <7 by outpatient hematologist. No obvious signs of active bleeding. patient denies melena, BPR, hematochezia, hematemesis. RAINE in ED w brown stool  -patient seen by surgery in the ED, rec GI consult for possible scope  -transfuse for hg <7  -keep active T and screen  -monitor for signs of bleed  -PPI BID    #B/L LE edema  #R/O CHF #R/o DVT  -pt p/w b/l LE edema and generalized weakness/COELHO w/ elevated trop (EKG w.o ST changes). although found to have low Hg on admission and being treated for symptomatic anemia cannot r/o cardiac etiology vs DVT (non tender non erythematous)  -Echo from 6/21 w/ normal EF, no valvular disease.  -trend trop  -LE dopplers  -f/u repeat ECHO #COELHO  Pt p/w Hg of 7.1 w/ known hx of CKD., HORACE and AVM s/p sm bowel resection on 9/13. Last hg 7.2 9/17/21 upon discharge after resection. p/w generalized weakness/COELHO, found to have Hg of <7 by outpatient hematologist. No obvious signs of active bleeding. patient denies melena, BPR, hematochezia, hematemesis. RAINE in ED w brown stool  -patient seen by surgery in the ED, recs GI consult for possible scope  -GI consulted, recs pending   -heme consulted, recs pending   -transfuse for hg <7  -keep active T and screen  -monitor for signs of bleed  -PPI BID    #B/L LE edema  #R/O CHF #R/o DVT  -pt p/w b/l LE edema and generalized weakness/COELHO w/ elevated trop (EKG w.o ST changes). although found to have low Hg on admission and being treated for symptomatic anemia cannot r/o cardiac etiology vs DVT (non tender non erythematous)  -Echo from 6/21 w/ normal EF, no valvular disease.  -trend trop  -LE dopplers  -f/u repeat ECHO

## 2021-10-27 NOTE — ED PROVIDER NOTE - ATTENDING CONTRIBUTION TO CARE
67 yo hx of htn, DM, CKD,  HORACE receiving iron infusions as outpt, follows with hematologist/oncologist Dr. Sima Romero,  hypothyroidism, w fatigue and generalized weakness, referred to ED for low Hb by his oncologist, <7 as outpt. Denies cough, f/c, hematuria, GI bleed sx- no melena/brbpr, hds, abd soft, rectal w brown stool, Anemia likely multifactorial given HORACE, CKD. seen by surgery given recent admission. Denies current chest pain/sob, EKG w RBBB wo acute ischemic changes, no prior trop, suspect demand contributed by CKD.

## 2021-10-27 NOTE — ED PROVIDER NOTE - PROGRESS NOTE DETAILS
Mor: 67 yo hx of htn, DM, CKD, hypothyroidism, w R inguinal hernia repair w fatigue and generalized weakness, referred to ED for low Hb by his oncologist, <7 as outpt. Prior noted anemia. Denies cough, f/c, hematuria, GI bleed sx hgb 7.1, blood consent form signed    pending 1u PRBC

## 2021-10-27 NOTE — H&P ADULT - NSICDXPASTMEDICALHX_GEN_ALL_CORE_FT
PAST MEDICAL HISTORY:  Anemia     Cholecystitis     DM (diabetes mellitus)     HTN (hypertension)     Hypothyroidism     Unilateral inguinal hernia without obstruction or gangrene, recurrence not specified      PAST MEDICAL HISTORY:  Anemia     Cholecystitis     HTN (hypertension)     Hypothyroidism     Unilateral inguinal hernia without obstruction or gangrene, recurrence not specified

## 2021-10-27 NOTE — ED ADULT TRIAGE NOTE - CHIEF COMPLAINT QUOTE
65 y/o male sent by Dr. Goodrich for low hemoglobin. Pt endorses generalized weakness. hx of iron deficiency anemia.

## 2021-10-27 NOTE — ED PROVIDER NOTE - CLINICAL SUMMARY MEDICAL DECISION MAKING FREE TEXT BOX
PMH HTN, DM, CKD, Hypothyroidism, Iron deficiency anemia, R inguinal hernia presenting to ED for generalized weakness and fatigue, sent in by oncologist for low hemoglobin lab value (reports below hgb 7) and for blood transfusion. Denies hematuria, bloody stool, epitaxies, sob, cp, abd. pain, fever, chills. Rectal exam shows brown stool    unlikely GI bleed  most likely symptomatic anemia    pending labs  Onc/heme and GI consulted  will give PRBC PMH HTN, DM, CKD, Hypothyroidism, Iron deficiency anemia, R inguinal hernia presenting to ED for generalized weakness and fatigue, sent in by oncologist for low hemoglobin lab value (reports below hgb 7) and for blood transfusion. Denies hematuria, bloody stool, epitaxies, sob, cp, abd. pain, fever, chills. Rectal exam shows brown stool    unlikely GI bleed  most likely symptomatic anemia    pending labs  Onc/heme and GI consulted  admission to medicine for anemia management and workup

## 2021-10-27 NOTE — H&P ADULT - PROBLEM SELECTOR PLAN 2
likely pre renal in setting of blood loss. bun/cr on admission 72/4.21 (from 38/2.72 9/17/21),  -f/u urine lytes  -transfuse  -trend Cr p/w trop of 0.06. denies cp, sob. no hx of CAD, CHF or previous MI. likely related to underlying CKD and demand ischemia in setting of symptomatic anemia. EKG w. known RBBB, no st changes. likely type II MI.  -f/u repeat trop  -f/u ECHO p/w trop of 0.06. denies cp, sob. no hx of CAD, CHF or previous MI. likely related to underlying CKD and demand ischemia in setting of symptomatic anemia. EKG w. known RBBB, no st changes. likely type II MI.  -f/u repeat trop  -f/u repeat ECHO

## 2021-10-27 NOTE — H&P ADULT - ATTENDING COMMENTS
#Anemia: Hx of HORACE undergoing outpt iron infusions, presents to ED for low oupt hgb (5.5) along with symptoms of fatigue, COELHO. Hgb 7 in ED, no s/s of active bleeding. RAINE w/ brown stool. Hx of AVM s/p bowel resection. Surgery consulted in ED, no intervention. GI also consulted for possible scope. Anemia maybe 2/2 known HORACE, f/up iron studies. c/w PPI BID, NPO after midnight   #CLOTILDE on CKD: Likely prerenal 2/2 anemia. F/up urine lytes, trend cr s/p 1U PRBC. Avoid nephrotoxic agents    #CHF: Hx of HFpEF, on lasix?, need med rec in AM; presents w/ b/l LE edema, chronic per pt. Otherwise pt on RA, comfortable. No JVD. F/up BNP, may need lasix s/p blood transfusion, monitor resp status. Consider repeat echo. Pt otherwise denies CP/SOB, EKG w/ RBBB, non ischemic, trops elevated likely demand ischemia in setting of clotilde, continue to trend.

## 2021-10-27 NOTE — CONSULT NOTE ADULT - ASSESSMENT
65 yo M pmh of HTN, DM, CKD, hypothyroid, severe aortic stenosis, HORACE, SP small bowel resection for AVM on 9/13 by Dr. Grady and push enteroscopy by Dr. Marlow presented to the ED for symptomatic anemia and creatinine of 4.21 from a baseline of 2.7. Today there is no evidence of active GI bleed. our recs:   -admit to medicine for anemia work up  - Consult GI for possible scope  -Blood transfusion as indicated  -Team 1C will follow

## 2021-10-27 NOTE — H&P ADULT - HISTORY OF PRESENT ILLNESS
HPI: 66yoM PMH HTN, DM, CKD, Hypothyroidism, severe aortic stenosis, HORACE, R inguinal hernia, s/p small bowel resection for AVM on 9/13 by gen surgery Dr Grady (discharged w/ Hg of 7). Presenting to ED for generalized weakness and fatigue, sent in by hematologist/oncologist Dr. Sima Romero, for low hemoglobin lab value (reports below hgb 7) and for blood transfusion. Denies hematuria, bloody stool, epitaxies, sob, cp, abd. pain, fever, chills  -ED rectal exam w/ brown stool    In the ED:  Initial vital signs: T: 98.4F, HR: 70, BP: 137/55, R: 18, SpO2: 97% on RA  Labs: significant for wbc wnl, Hg 7.1 (7.2 9/17/21), plt wnl, pt/ptt/inr 15.1/40.3/1.27, lytes wnl, bun cr 72/4.21 (from 38/2.72 9/17/21), alk phos 153, trop 0.06. covid negative   Imaging:  CXR: No acute cardiopulmonary disease process. Cardiomegaly.  EKG: NSR, RBBB (on previous ekg) - qtc 501  Medications: none   Consults: surgery, heme/onc     HPI: 66yoM PMH HTN, CKD, Hypothyroidism, HORACE, R inguinal hernia, s/p small bowel resection for AVM on 9/13 by gen surgery Dr Grady (discharged w/ Hg of 7). Presenting to ED for generalized weakness and fatigue, sent in by hematologist/oncologist Dr. Sima Romero, for low hemoglobin lab value (reports below hgb 7) and for blood transfusion. Patient states he generally feels well. He feels weak/COELHO after walking several feet or climbing up stairs w/o chest pain. Denies Hx of MI, CAD or CHF. Denies hematuria, bloody stool, epitaxies, sob, cp, abd. pain, fever, chills.   states his last BM was within the past 24 hours and light brown in color.   -ED rectal exam w/ brown stool    In the ED:  Initial vital signs: T: 98.4F, HR: 70, BP: 137/55, R: 18, SpO2: 97% on RA  Labs: significant for wbc wnl, Hg 7.1 (7.2 9/17/21), plt wnl, pt/ptt/inr 15.1/40.3/1.27, lytes wnl, bun cr 72/4.21 (from 38/2.72 9/17/21), alk phos 153, trop 0.06. covid negative   Imaging:  CXR: No acute cardiopulmonary disease process. Cardiomegaly.  EKG: NSR, RBBB (on previous ekg) - qtc 501  Medications: none   Consults: surgery, heme/onc     HPI: 66yoM PMH HTN, CKD, Hypothyroidism, HORACE, R inguinal hernia, s/p small bowel resection for AVM on 9/13 by gen surgery Dr Grady (discharged w/ Hg of 7). Presenting to ED for generalized weakness and fatigue, sent in by hematologist/oncologist Dr. Sima Romero, for low hemoglobin lab value (reports below hgb 7) and for blood transfusion. Patient states he generally feels well. He feels weak/COELHO after walking several feet or climbing up stairs w/o chest pain. Denies Hx of MI, CAD or CHF. Denies hematuria, bloody stool, epitaxies, sob, cp, abd. pain, fever, chills.   states his last BM was within the past 24 hours and light brown in color.   -ED rectal exam w/ brown stool    In the ED:  Initial vital signs: T: 98.4F, HR: 70, BP: 137/55, R: 18, SpO2: 97% on RA  Labs: significant for wbc wnl, Hg 7.1 (7.2 9/17/21), plt wnl, pt/ptt/inr 15.1/40.3/1.27, lytes wnl, bun cr 72/4.21 (from 38/2.72 9/17/21), alk phos 153, trop 0.06. covid negative   Imaging:  CXR: No acute cardiopulmonary disease process. Cardiomegaly.  EKG: NSR, RBBB (on previous ekg) - qtc 501  Medications: none   Consults: surgery, heme/onc, GI

## 2021-10-27 NOTE — ED ADULT NURSE NOTE - CHIEF COMPLAINT QUOTE
67 y/o male sent by Dr. Goodrich for low hemoglobin. Pt endorses generalized weakness. hx of iron deficiency anemia.

## 2021-10-27 NOTE — H&P ADULT - NSHPPHYSICALEXAM_GEN_ALL_CORE
PHYSICAL EXAM:    General: WDWN  HEENT: NC/AT; PERRL, anicteric sclera; MMM  Neck: supple  Cardiovascular: +S1/S2, RRR  Respiratory: CTA B/L; no W/R/R  Gastrointestinal: soft, NT/ND; +BSx4  Extremities: WWP; no edema, clubbing or cyanosis  Vascular: 2+ radial, DP/PT pulses B/L  Neurological: AAOx3; no focal deficits  Psychiatric: pleasant mood and affect  Dermatologic: no appreciable wounds or damage to the skin PHYSICAL EXAM:    General: well appearing, NAD  HEENT: NC/AT; PERRL, anicteric sclera; MMM  Neck: supple  Cardiovascular: +S1/S2, RRR  Respiratory: CTA B/L; no W/R/R  Gastrointestinal: soft, 1ft long mid abdominal scar, C/D/I slight erythema, slightly tender, ND; +BSx4  Extremities: WWP; +2 pitting edema b/l, NT, no clubbing or cyanosis. small excoriations of RLE  Vascular: 2+ radial, DP/PT pulses B/L  Neurological: AAOx3; no focal deficits  Psychiatric: pleasant mood and affect  Dermatologic: no appreciable wounds or damage to skin other than mentioned above

## 2021-10-27 NOTE — ED PROVIDER NOTE - OBJECTIVE STATEMENT
66yoM PMH HTN, DM, CKD, Hypothyroidism, Iron deficiency anemia, R inguinal hernia presenting to ED for generalized weakness and fatigue, sent in by oncologist for low hemoglobin lab value (reports below hgb 7) and for blood transfusion. 66yoM PMH HTN, DM, CKD, Hypothyroidism, Iron deficiency anemia, R inguinal hernia presenting to ED for generalized weakness and fatigue, sent in by oncologist for low hemoglobin lab value (reports below hgb 7) and for blood transfusion. Denies hematuria, bloody stool, or epitaxies 66yoM PMH HTN, DM, CKD, Hypothyroidism, Iron deficiency anemia, R inguinal hernia presenting to ED for generalized weakness and fatigue, sent in by oncologist for low hemoglobin lab value (reports below hgb 7) and for blood transfusion. Denies hematuria, bloody stool, epitaxies, sob, cp, abd. pain, fever, chills

## 2021-10-27 NOTE — H&P ADULT - PROBLEM SELECTOR PLAN 8
Fluids:   Electrolytes: Mg>2, K>4  Nutrition:  No IVF currently needed, replete lytes PRN  Prophylaxis:   Activity: AAT, OOBTC  GI: none  C: FC  Dispo: Admit to F

## 2021-10-27 NOTE — H&P ADULT - ASSESSMENT
66yoM PMH HTN, DM, CKD, Hypothyroidism, severe aortic stenosis, HORACE, R inguinal hernia, s/p small bowel resection for AVM on 9/13 p/w generalized weakness and fatigue admitted for symptomatic anemia and CLOTILDE

## 2021-10-27 NOTE — H&P ADULT - PROBLEM SELECTOR PLAN 4
Hx of DM. blood sugar 155 on admission. not on meds. last a1c 9/15/21 5.8  -c/w LSS pt w pmh of HTN on furosemide 40mg qd, hydralazine 100mg TID  -will hold antihypertensives for now given CLOTILDE  -to start hydral in AM

## 2021-10-28 LAB
ALBUMIN SERPL ELPH-MCNC: 3.9 G/DL — SIGNIFICANT CHANGE UP (ref 3.3–5)
ALP SERPL-CCNC: 142 U/L — HIGH (ref 40–120)
ALT FLD-CCNC: 22 U/L — SIGNIFICANT CHANGE UP (ref 10–45)
ANION GAP SERPL CALC-SCNC: 16 MMOL/L — SIGNIFICANT CHANGE UP (ref 5–17)
APTT BLD: 41.2 SEC — HIGH (ref 27.5–35.5)
AST SERPL-CCNC: 19 U/L — SIGNIFICANT CHANGE UP (ref 10–40)
BILIRUB SERPL-MCNC: 0.7 MG/DL — SIGNIFICANT CHANGE UP (ref 0.2–1.2)
BUN SERPL-MCNC: 62 MG/DL — HIGH (ref 7–23)
CALCIUM SERPL-MCNC: 8.6 MG/DL — SIGNIFICANT CHANGE UP (ref 8.4–10.5)
CHLORIDE SERPL-SCNC: 106 MMOL/L — SIGNIFICANT CHANGE UP (ref 96–108)
CO2 SERPL-SCNC: 20 MMOL/L — LOW (ref 22–31)
COVID-19 SPIKE DOMAIN AB INTERP: POSITIVE
COVID-19 SPIKE DOMAIN ANTIBODY RESULT: >250 U/ML — HIGH
CREAT ?TM UR-MCNC: 39 MG/DL — SIGNIFICANT CHANGE UP
CREAT SERPL-MCNC: 3.71 MG/DL — HIGH (ref 0.5–1.3)
GLUCOSE BLDC GLUCOMTR-MCNC: 101 MG/DL — HIGH (ref 70–99)
GLUCOSE BLDC GLUCOMTR-MCNC: 121 MG/DL — HIGH (ref 70–99)
GLUCOSE BLDC GLUCOMTR-MCNC: 98 MG/DL — SIGNIFICANT CHANGE UP (ref 70–99)
GLUCOSE SERPL-MCNC: 90 MG/DL — SIGNIFICANT CHANGE UP (ref 70–99)
HCT VFR BLD CALC: 29.6 % — LOW (ref 39–50)
HGB BLD-MCNC: 8.5 G/DL — LOW (ref 13–17)
INR BLD: 1.23 — HIGH (ref 0.88–1.16)
MCHC RBC-ENTMCNC: 26.1 PG — LOW (ref 27–34)
MCHC RBC-ENTMCNC: 28.7 GM/DL — LOW (ref 32–36)
MCV RBC AUTO: 90.8 FL — SIGNIFICANT CHANGE UP (ref 80–100)
NRBC # BLD: 0 /100 WBCS — SIGNIFICANT CHANGE UP (ref 0–0)
NT-PROBNP SERPL-SCNC: 5261 PG/ML — HIGH (ref 0–300)
OSMOLALITY SERPL: 307 MOSM/KG — HIGH (ref 280–301)
PLATELET # BLD AUTO: 195 K/UL — SIGNIFICANT CHANGE UP (ref 150–400)
POTASSIUM SERPL-MCNC: 3.9 MMOL/L — SIGNIFICANT CHANGE UP (ref 3.5–5.3)
POTASSIUM SERPL-SCNC: 3.9 MMOL/L — SIGNIFICANT CHANGE UP (ref 3.5–5.3)
PROT SERPL-MCNC: 6.5 G/DL — SIGNIFICANT CHANGE UP (ref 6–8.3)
PROTHROM AB SERPL-ACNC: 14.6 SEC — HIGH (ref 10.6–13.6)
RBC # BLD: 3.26 M/UL — LOW (ref 4.2–5.8)
RBC # FLD: 19.4 % — HIGH (ref 10.3–14.5)
SARS-COV-2 IGG+IGM SERPL QL IA: >250 U/ML — HIGH
SARS-COV-2 IGG+IGM SERPL QL IA: POSITIVE
SODIUM SERPL-SCNC: 142 MMOL/L — SIGNIFICANT CHANGE UP (ref 135–145)
SODIUM UR-SCNC: 108 MMOL/L — SIGNIFICANT CHANGE UP
TROPONIN T SERPL-MCNC: 0.06 NG/ML — CRITICAL HIGH (ref 0–0.01)
UUN UR-MCNC: 275 MG/DL — SIGNIFICANT CHANGE UP
WBC # BLD: 4.86 K/UL — SIGNIFICANT CHANGE UP (ref 3.8–10.5)
WBC # FLD AUTO: 4.86 K/UL — SIGNIFICANT CHANGE UP (ref 3.8–10.5)

## 2021-10-28 PROCEDURE — 93306 TTE W/DOPPLER COMPLETE: CPT | Mod: 26

## 2021-10-28 PROCEDURE — 99233 SBSQ HOSP IP/OBS HIGH 50: CPT | Mod: GC

## 2021-10-28 PROCEDURE — 93010 ELECTROCARDIOGRAM REPORT: CPT

## 2021-10-28 PROCEDURE — 93970 EXTREMITY STUDY: CPT | Mod: 26

## 2021-10-28 PROCEDURE — 99222 1ST HOSP IP/OBS MODERATE 55: CPT | Mod: GC

## 2021-10-28 RX ORDER — HYDRALAZINE HCL 50 MG
1 TABLET ORAL
Qty: 0 | Refills: 0 | DISCHARGE

## 2021-10-28 RX ORDER — AMLODIPINE BESYLATE 2.5 MG/1
10 TABLET ORAL DAILY
Refills: 0 | Status: DISCONTINUED | OUTPATIENT
Start: 2021-10-28 | End: 2021-10-30

## 2021-10-28 RX ORDER — LEVOTHYROXINE SODIUM 125 MCG
1 TABLET ORAL
Qty: 0 | Refills: 0 | DISCHARGE

## 2021-10-28 RX ORDER — AMLODIPINE BESYLATE 2.5 MG/1
1 TABLET ORAL
Qty: 0 | Refills: 0 | DISCHARGE

## 2021-10-28 RX ORDER — LEVOTHYROXINE SODIUM 125 MCG
50 TABLET ORAL DAILY
Refills: 0 | Status: DISCONTINUED | OUTPATIENT
Start: 2021-10-28 | End: 2021-10-30

## 2021-10-28 RX ORDER — HYDRALAZINE HCL 50 MG
100 TABLET ORAL EVERY 8 HOURS
Refills: 0 | Status: DISCONTINUED | OUTPATIENT
Start: 2021-10-28 | End: 2021-10-30

## 2021-10-28 RX ORDER — CARVEDILOL PHOSPHATE 80 MG/1
12.5 CAPSULE, EXTENDED RELEASE ORAL EVERY 12 HOURS
Refills: 0 | Status: DISCONTINUED | OUTPATIENT
Start: 2021-10-28 | End: 2021-10-30

## 2021-10-28 RX ORDER — ATORVASTATIN CALCIUM 80 MG/1
1 TABLET, FILM COATED ORAL
Qty: 0 | Refills: 0 | DISCHARGE

## 2021-10-28 RX ADMIN — Medication 100 MILLIGRAM(S): at 22:22

## 2021-10-28 RX ADMIN — PANTOPRAZOLE SODIUM 40 MILLIGRAM(S): 20 TABLET, DELAYED RELEASE ORAL at 06:11

## 2021-10-28 RX ADMIN — CARVEDILOL PHOSPHATE 12.5 MILLIGRAM(S): 80 CAPSULE, EXTENDED RELEASE ORAL at 18:20

## 2021-10-28 RX ADMIN — Medication 100 MILLIGRAM(S): at 16:08

## 2021-10-28 RX ADMIN — Medication 650 MILLIGRAM(S): at 18:20

## 2021-10-28 RX ADMIN — PANTOPRAZOLE SODIUM 40 MILLIGRAM(S): 20 TABLET, DELAYED RELEASE ORAL at 18:21

## 2021-10-28 RX ADMIN — ATORVASTATIN CALCIUM 80 MILLIGRAM(S): 80 TABLET, FILM COATED ORAL at 22:22

## 2021-10-28 NOTE — PROGRESS NOTE ADULT - SUBJECTIVE AND OBJECTIVE BOX
INTERVAL HPI/OVERNIGHT EVENTS:    SUBJECTIVE: Patient seen and examined at bedside.     OBJECTIVE:    VITAL SIGNS:  ICU Vital Signs Last 24 Hrs  T(C): 37.2 (28 Oct 2021 06:12), Max: 37.2 (28 Oct 2021 06:12)  T(F): 99 (28 Oct 2021 06:12), Max: 99 (28 Oct 2021 06:12)  HR: 63 (28 Oct 2021 06:12) (62 - 73)  BP: 165/67 (28 Oct 2021 06:12) (137/55 - 165/67)  BP(mean): --  ABP: --  ABP(mean): --  RR: 17 (28 Oct 2021 06:12) (17 - 18)  SpO2: 97% (28 Oct 2021 06:12) (97% - 98%)        10-27 @ 07:01  -  10-28 @ 07:00  --------------------------------------------------------  IN: 0 mL / OUT: 550 mL / NET: -550 mL      CAPILLARY BLOOD GLUCOSE          PHYSICAL EXAM:    Constitutional: NAD, well-groomed, well-developed  HEENT: PERRLA, EOMI, Normal Hearing, MMM  Neck: No LAD, No JVD  Back: Normal spine flexure, No CVA tenderness  Respiratory: CTAB   Cardiovascular: S1 and S2, RRR, no M/G/R  Gastrointestinal: BS+, soft, NT/ND  Extremities: No peripheral edema  Vascular: 2+ peripheral pulses  Neurological: A/O x 3, no focal deficits  Psychiatric: Normal mood, normal affect  Musculoskeletal: 5/5 strength b/l upper and lower extremities  Skin: No rashes    MEDICATIONS:  MEDICATIONS  (STANDING):  atorvastatin 80 milliGRAM(s) Oral at bedtime  dextrose 40% Gel 15 Gram(s) Oral once  dextrose 5%. 1000 milliLiter(s) (50 mL/Hr) IV Continuous <Continuous>  dextrose 50% Injectable 25 Gram(s) IV Push once  glucagon  Injectable 1 milliGRAM(s) IntraMuscular once  hydrALAZINE 100 milliGRAM(s) Oral every 8 hours  insulin lispro (ADMELOG) corrective regimen sliding scale   SubCutaneous Before meals and at bedtime  pantoprazole  Injectable 40 milliGRAM(s) IV Push every 12 hours    MEDICATIONS  (PRN):  acetaminophen     Tablet .. 650 milliGRAM(s) Oral every 6 hours PRN Temp greater or equal to 38C (100.4F), Mild Pain (1 - 3)  melatonin 3 milliGRAM(s) Oral at bedtime PRN Insomnia  ondansetron Injectable 4 milliGRAM(s) IV Push every 8 hours PRN Nausea and/or Vomiting      ALLERGIES:  Allergies    No Known Allergies    Intolerances        LABS:                        8.5    4.86  )-----------( 195      ( 28 Oct 2021 06:52 )             29.6     10-27    139  |  102  |  72<H>  ----------------------------<  155<H>  4.3   |  26  |  4.21<H>    Ca    8.2<L>      27 Oct 2021 14:29  Phos  4.9     10-27  Mg     2.5     10-27    TPro  6.8  /  Alb  4.3  /  TBili  0.4  /  DBili  x   /  AST  25  /  ALT  26  /  AlkPhos  153<H>  10-27    PT/INR - ( 27 Oct 2021 14:29 )   PT: 15.1 sec;   INR: 1.27          PTT - ( 27 Oct 2021 14:29 )  PTT:40.3 sec      RADIOLOGY & ADDITIONAL TESTS: Reviewed. OVERNIGHT EVENTS:    SUBJECTIVE / INTERVAL HPI: Patient seen and examined at bedside. Patient lying comfortably in bed in NAD. Patient states they did not get much sleep s/p transfusion but also states that it has helped his weakness. No other overnight events or complaints. Patient currently denies weakness, SOB, CP, N/V/D, abdominal pain,     VITAL SIGNS:  Vital Signs Last 24 Hrs  T(C): 37.2 (28 Oct 2021 06:12), Max: 37.2 (28 Oct 2021 06:12)  T(F): 99 (28 Oct 2021 06:12), Max: 99 (28 Oct 2021 06:12)  HR: 63 (28 Oct 2021 06:12) (60 - 73)  BP: 165/67 (28 Oct 2021 06:12) (137/55 - 170/81)  BP(mean): --  RR: 17 (28 Oct 2021 06:12) (17 - 19)  SpO2: 97% (28 Oct 2021 06:12) (95% - 98%)    PHYSICAL EXAM:    General: Well developed, well nourished, no acute distress  HEENT: NC/AT; PERRL, anicteric sclera; MMM  Neck: supple  Cardiovascular: +S1/S2, RRR, no murmurs, rubs, gallops  Respiratory: CTA B/L; no W/R/R  Gastrointestinal: soft, NT/ND; +BSx4  Extremities: WWP; no edema, clubbing or cyanosis  Vascular: 2+ radial, DP/PT pulses B/L  Neurological: AAOx3; no focal deficits    MEDICATIONS:  MEDICATIONS  (STANDING):  atorvastatin 80 milliGRAM(s) Oral at bedtime  dextrose 40% Gel 15 Gram(s) Oral once  dextrose 5%. 1000 milliLiter(s) (50 mL/Hr) IV Continuous <Continuous>  dextrose 50% Injectable 25 Gram(s) IV Push once  glucagon  Injectable 1 milliGRAM(s) IntraMuscular once  hydrALAZINE 100 milliGRAM(s) Oral every 8 hours  insulin lispro (ADMELOG) corrective regimen sliding scale   SubCutaneous Before meals and at bedtime  pantoprazole  Injectable 40 milliGRAM(s) IV Push every 12 hours    MEDICATIONS  (PRN):  acetaminophen     Tablet .. 650 milliGRAM(s) Oral every 6 hours PRN Temp greater or equal to 38C (100.4F), Mild Pain (1 - 3)  melatonin 3 milliGRAM(s) Oral at bedtime PRN Insomnia  ondansetron Injectable 4 milliGRAM(s) IV Push every 8 hours PRN Nausea and/or Vomiting      ALLERGIES:  Allergies    No Known Allergies    Intolerances        LABS:                        8.5    4.86  )-----------( 195      ( 28 Oct 2021 06:52 )             29.6     10-28    142  |  106  |  62<H>  ----------------------------<  90  3.9   |  20<L>  |  3.71<H>    Ca    8.6      28 Oct 2021 06:52  Phos  4.9     10-27  Mg     2.5     10-27    TPro  6.5  /  Alb  3.9  /  TBili  0.7  /  DBili  x   /  AST  19  /  ALT  22  /  AlkPhos  142<H>  10-28    PT/INR - ( 27 Oct 2021 14:29 )   PT: 15.1 sec;   INR: 1.27          PTT - ( 27 Oct 2021 14:29 )  PTT:40.3 sec    CAPILLARY BLOOD GLUCOSE      POCT Blood Glucose.: 101 mg/dL (28 Oct 2021 08:19)      RADIOLOGY & ADDITIONAL TESTS: Reviewed.    PLAN:  OVERNIGHT EVENTS:    SUBJECTIVE / INTERVAL HPI: Patient seen and examined at bedside. Patient lying comfortably in bed in NAD. Patient states they did not get much sleep s/p transfusion but also states that it has helped his weakness. No other overnight events or complaints. Patient currently denies weakness, SOB, CP, N/V/D, cough, abdominal pain, hematuria, hematochezia, or fever/chills. All other ROS negative.    VITAL SIGNS:  Vital Signs Last 24 Hrs  T(C): 37.2 (28 Oct 2021 06:12), Max: 37.2 (28 Oct 2021 06:12)  T(F): 99 (28 Oct 2021 06:12), Max: 99 (28 Oct 2021 06:12)  HR: 63 (28 Oct 2021 06:12) (60 - 73)  BP: 165/67 (28 Oct 2021 06:12) (137/55 - 170/81)  BP(mean): --  RR: 17 (28 Oct 2021 06:12) (17 - 19)  SpO2: 97% (28 Oct 2021 06:12) (95% - 98%)    PHYSICAL EXAM:    General: Well developed, well nourished, good grooming and hygiene, no acute distress  HEENT: NC/AT; PERRL, anicteric sclera; MMM  Neck: supple, no goiter appreciated, no lymphadenopathy   Cardiovascular: +S1/S2, RRR, no murmurs, rubs, gallops  Respiratory: CTA B/L; no W/R/R  Gastrointestinal: soft, NT/ND; +BSx4; Pt has 1ft long mid abdominal scar, healing well, dry and intact, no drainage, erythema or tenderness   Extremities: WWP; +2 pitting edema B/L; no calf tenderness, clubbing or cyanosis  Vascular: 2+ radial, DP/PT pulses B/L and equal   Neurological: AAOx3; no focal deficits    MEDICATIONS:  MEDICATIONS  (STANDING):  atorvastatin 80 milliGRAM(s) Oral at bedtime  dextrose 40% Gel 15 Gram(s) Oral once  dextrose 5%. 1000 milliLiter(s) (50 mL/Hr) IV Continuous <Continuous>  dextrose 50% Injectable 25 Gram(s) IV Push once  glucagon  Injectable 1 milliGRAM(s) IntraMuscular once  hydrALAZINE 100 milliGRAM(s) Oral every 8 hours  insulin lispro (ADMELOG) corrective regimen sliding scale   SubCutaneous Before meals and at bedtime  pantoprazole  Injectable 40 milliGRAM(s) IV Push every 12 hours    MEDICATIONS  (PRN):  acetaminophen     Tablet .. 650 milliGRAM(s) Oral every 6 hours PRN Temp greater or equal to 38C (100.4F), Mild Pain (1 - 3)  melatonin 3 milliGRAM(s) Oral at bedtime PRN Insomnia  ondansetron Injectable 4 milliGRAM(s) IV Push every 8 hours PRN Nausea and/or Vomiting      ALLERGIES:  Allergies    No Known Allergies    Intolerances        LABS:                        8.5    4.86  )-----------( 195      ( 28 Oct 2021 06:52 )             29.6     10-28    142  |  106  |  62<H>  ----------------------------<  90  3.9   |  20<L>  |  3.71<H>    Ca    8.6      28 Oct 2021 06:52  Phos  4.9     10-27  Mg     2.5     10-27    TPro  6.5  /  Alb  3.9  /  TBili  0.7  /  DBili  x   /  AST  19  /  ALT  22  /  AlkPhos  142<H>  10-28    PT/INR - ( 27 Oct 2021 14:29 )   PT: 15.1 sec;   INR: 1.27          PTT - ( 27 Oct 2021 14:29 )  PTT:40.3 sec    CAPILLARY BLOOD GLUCOSE      POCT Blood Glucose.: 101 mg/dL (28 Oct 2021 08:19)      RADIOLOGY & ADDITIONAL TESTS: Reviewed.    PLAN:

## 2021-10-28 NOTE — PHYSICAL THERAPY INITIAL EVALUATION ADULT - GENERAL OBSERVATIONS, REHAB EVAL
Patient was seen sitting at EOB in room, in NAD +heplock. Patient was returned back to bed in supine, HOB elevated, in NAD, +heplock.

## 2021-10-28 NOTE — CONSULT NOTE ADULT - ASSESSMENT
66yoM PMH HTN, CKD, Hypothyroidism, HORACE, R inguinal hernia, s/p small bowel resection for AVM on 9/13 by gen surgery Dr Grady (discharged w/ Hg of 7). GI consulted for anemia.     #Anemia  - normocytic in nature, iron studies from April are consistent with HORACE  - brown stool on exam  - Hgb stable from last month's admission, but did not significantly improve   - tentatively plan for enteroscopy tomorrow, NPO past MN, though would also consider giving IV iron supplementation and monitoring Hgb trend     Kae Mcfarland MD  PGY-5, Gastroenterology Fellow  pager: [phone] 66yoM PMH HTN, CKD, Hypothyroidism, HORACE, R inguinal hernia, s/p small bowel resection for AVM on 9/13 by gen surgery Dr Grady (discharged w/ Hg of 7). GI consulted for anemia.     #Anemia  - normocytic in nature, iron studies from April are consistent with HORACE  - brown stool on exam  - Hgb stable from last month's admission, but did not significantly improve   - tentatively plan for video capsule endoscopy tomorrow, NPO past MN     Kae Mcfarland MD  PGY-5, Gastroenterology Fellow  pager: [phone]

## 2021-10-28 NOTE — PHYSICAL THERAPY INITIAL EVALUATION ADULT - PERTINENT HX OF CURRENT PROBLEM, REHAB EVAL
Pt is a 65yo M PMH HTN, CKD, Hypothyroidism, HORACE, R inguinal hernia, s/p small bowel resection for AVM on 9/13 by gen surgery Dr Grady (discharged w/ Hg of 7). Presenting to ED for generalized weakness and fatigue, sent in by hematologist/oncologist Dr. Sima Romero, for low hemoglobin lab value (reports below hgb 7) and for blood transfusion.

## 2021-10-28 NOTE — CONSULT NOTE ADULT - ASSESSMENT
per Internal Medicine    65 yo M PMH HTN, DM, CKD, Hypothyroidism, severe aortic stenosis, HORACE, R inguinal hernia, s/p small bowel resection for AVM on 9/13 p/w generalized weakness and fatigue admitted for symptomatic anemia and CLOTILDE    Problem/Plan - 1:  ·  Problem: Symptomatic anemia.   ·  Plan: #COELHO  Pt p/w Hg of 7.1 w/ known hx of CKD., HORACE and AVM s/p sm bowel resection on 9/13. Last hg 7.2 9/17/21 upon discharge after resection. p/w generalized weakness/COELHO, found to have Hg of <7 by outpatient hematologist. No obvious signs of active bleeding. patient denies melena, BPR, hematochezia, hematemesis. RAINE in ED w brown stool.  Patient responded well to transfusion of 1 unit of blood with symptomatic relief, Hgb from 7.1->8.5 and Hct from 23.6->29.6  -patient seen by surgery in the ED, recs GI consult for possible scope  -GI consulted, f/u recs    -heme consulted, f/u recs   -f/u iron studies, possibly replete pending results   -transfuse for hg <7  -keep active T and screen  -monitor for signs of bleed  -PPI BID    #B/L LE edema  #R/O CHF #R/o DVT  -pt p/w b/l LE edema and generalized weakness/COELHO w/ elevated trop (EKG w.o ST changes). although found to have low Hg on admission and being treated for symptomatic anemia cannot r/o cardiac etiology vs DVT (non tender non erythematous)  -Echo from 6/21 w/ normal EF, no valvular disease.  -trend trop  - U/S to be performed on 10/28 to r/o DVT  -f/u repeat ECHO.    Problem/Plan - 2:  ·  Problem: Elevated troponin.   ·  Plan: p/w trop of 0.06. denies cp, sob. no hx of CAD, CHF or previous MI. likely related to underlying CKD and demand ischemia in setting of symptomatic anemia. EKG w. known RBBB, no st changes. likely type II MI.  -f/u repeat trop  -f/u repeat ECHO.    Problem/Plan - 3:  ·  Problem: CLOTILDE (acute kidney injury).   ·  Plan: #CLOTILDE on CKD  Patient with known Cr level >1.5 from baseline. Patient with known Cr baseline of 2 admitted with Cr at 4.21, likely pre renal in setting of blood loss. bun/cr on admission 72/4.21, now 62/3.71 (10/28) after transfusion of 1 unit of blood likely indicative of pre-renal cause of CLOTILDE  -hold allopurinol and furosemide for now    -f/u urine lytes  -transfuse if hg <7  -trend Cr.    Problem/Plan - 4:  ·  Problem: HTN (hypertension).   ·  Plan: pt w pmh of HTN on furosemide 40mg qd, hydralazine 100mg TID  - c/w hydralazine 100 mg PO TID.    Problem/Plan - 5:  ·  Problem: Hypothyroidism.   ·  Plan: -hx of hypothyroidism. TSH levels 5.290 with T4 7.94. Patient takes levothyroxine 50 mcg QD for hypothyroidism.  -c/w Levothyroxine 50 mcg QD  -f/u TSH, free T4.    Problem/Plan - 6:  ·  Problem: Unilateral inguinal hernia without obstruction or gangrene, recurrence not specified.   ·  Plan: reducible. w.o signs of incarceration. surgical scar well healed   -will monitor.    Problem/Plan - 7:  ·  Problem: Nutrition, metabolism, and development symptoms.   ·  Plan: Fluids: 1 unit of blood in ED  Electrolytes: Mg>2, K>4  Nutrition:  No IVF currently needed, replete lytes PRN  Prophylaxis:   Activity: AAT, OOBTC  GI: none  C: FC  Dispo: Admit to [facility].

## 2021-10-28 NOTE — PROGRESS NOTE ADULT - PROBLEM SELECTOR PLAN 4
pt w pmh of HTN on furosemide 40mg qd, hydralazine 100mg TID  -will hold antihypertensives for now given CLOTILDE  -to start hydral in AM pt w pmh of HTN on furosemide 40mg qd, hydralazine 100mg TID  - c/w hydralazine 100 mg PO TID

## 2021-10-28 NOTE — PROGRESS NOTE ADULT - PROBLEM SELECTOR PLAN 6
reducible. w.o signs of incarceration. surgical scar well healed   -[place] reducible. w.o signs of incarceration. surgical scar well healed   -will monitor

## 2021-10-28 NOTE — PHYSICAL THERAPY INITIAL EVALUATION ADULT - ADDITIONAL COMMENTS
Pt lives in elevator apartment with wife, stairs to enter lobby but unable to quantify specifically. Reported ambulating independently without need for assistive device.

## 2021-10-28 NOTE — PHYSICAL THERAPY INITIAL EVALUATION ADULT - LEVEL OF INDEPENDENCE: SIT/STAND, REHAB EVAL
Pt with adequate strength to perform transfer. Maintains normal balance in initial stand./independent

## 2021-10-28 NOTE — PROGRESS NOTE ADULT - PROBLEM SELECTOR PLAN 2
p/w trop of 0.06. denies cp, sob. no hx of CAD, CHF or previous MI. likely related to underlying CKD and demand ischemia in setting of symptomatic anemia. EKG w. known RBBB, no st changes. likely type II MI.  -f/u repeat trop  -f/u repeat ECHO

## 2021-10-28 NOTE — PROGRESS NOTE ADULT - PROBLEM SELECTOR PLAN 5
-hx of hypothyroidism. patient states he takes a thryoid medication. unsure of dose. pharmacy is closed  -call pharmacy for dose in AM   -f/u TSH, free T4 -hx of hypothyroidism. TSH levels 5.290 with T4 7.94. Patient takes levothyroxine 50 mcg QD for hypothyroidism.  -c/w Levothyroxine 50 mcg QD  -f/u TSH, free T4

## 2021-10-28 NOTE — PROGRESS NOTE ADULT - ASSESSMENT
67 yo M pmh of HTN, DM, CKD, hypothyroid, severe aortic stenosis, HORACE, SP small bowel resection for AVM on 9/13 by Dr. Grady and push enteroscopy by Dr. Marlow presented to the ED for symptomatic anemia on 7.1 and creatinine of 4.21 from a baseline of 2.7.     Patient has a history of AVM, 2 lesions identified in EGD performed in January. Requires scope.       - Pending labs this morning, continue to follow H&H  - Consult GI for scope.   - Blood transfusion as indicated  - Continue care per primary team  - Team 1C will follow    Patient discussed with chief and Dr. Grady

## 2021-10-28 NOTE — CONSULT NOTE ADULT - ATTENDING COMMENTS
Pt seen with fellow today.  Brown stool, Hgb stable.  Will plan for small bowel capsule tomorrow AM.

## 2021-10-28 NOTE — PROGRESS NOTE ADULT - PROBLEM SELECTOR PLAN 1
#COELHO  Pt p/w Hg of 7.1 w/ known hx of CKD., HORACE and AVM s/p sm bowel resection on 9/13. Last hg 7.2 9/17/21 upon discharge after resection. p/w generalized weakness/COELHO, found to have Hg of <7 by outpatient hematologist. No obvious signs of active bleeding. patient denies melena, BPR, hematochezia, hematemesis. RAINE in ED w brown stool  -patient seen by surgery in the ED, recs GI consult for possible scope  -GI consulted, recs pending   -heme consulted, recs pending   -transfuse for hg <7  -keep active T and screen  -monitor for signs of bleed  -PPI BID    #B/L LE edema  #R/O CHF #R/o DVT  -pt p/w b/l LE edema and generalized weakness/COELHO w/ elevated trop (EKG w.o ST changes). although found to have low Hg on admission and being treated for symptomatic anemia cannot r/o cardiac etiology vs DVT (non tender non erythematous)  -Echo from 6/21 w/ normal EF, no valvular disease.  -trend trop  -LE dopplers  -f/u repeat ECHO #COELHO  Pt p/w Hg of 7.1 w/ known hx of CKD., HORACE and AVM s/p sm bowel resection on 9/13. Last hg 7.2 9/17/21 upon discharge after resection. p/w generalized weakness/COELHO, found to have Hg of <7 by outpatient hematologist. No obvious signs of active bleeding. patient denies melena, BPR, hematochezia, hematemesis. RAINE in ED w brown stool.  Patient responded well to transfusion of 1 unit of blood with symptomatic relief, Hgb from 7.1->8.5 and Hct from 23.6->29.6  -patient seen by surgery in the ED, recs GI consult for possible scope  -GI consulted, f/u recs    -heme consulted, f/u recs   -f/u iron studies, possibly replete pending results   -transfuse for hg <7  -keep active T and screen  -monitor for signs of bleed  -PPI BID    #B/L LE edema  #R/O CHF #R/o DVT  -pt p/w b/l LE edema and generalized weakness/COELHO w/ elevated trop (EKG w.o ST changes). although found to have low Hg on admission and being treated for symptomatic anemia cannot r/o cardiac etiology vs DVT (non tender non erythematous)  -Echo from 6/21 w/ normal EF, no valvular disease.  -trend trop  - U/S to be performed on 10/28 to r/o DVT  -f/u repeat ECHO

## 2021-10-28 NOTE — PHYSICAL THERAPY INITIAL EVALUATION ADULT - RANGE OF MOTION EXAMINATION, REHAB EVAL
R shoulder flex > L shoulder flexion 2/2 pain from carry "heavy backpack"/Left UE ROM was WNL (within normal limits)/Right UE ROM was WNL (within normal limits)

## 2021-10-28 NOTE — CONSULT NOTE ADULT - SUBJECTIVE AND OBJECTIVE BOX
Patient is a 66y old  Male who presents with a chief complaint of      HPI:  HPI: 66yoM PMH HTN, CKD, Hypothyroidism, HORACE, R inguinal hernia, s/p small bowel resection for AVM on 9/13 by gen surgery Dr Grady (discharged w/ Hg of 7). Presenting to ED for generalized weakness and fatigue, sent in by hematologist/oncologist Dr. Sima Romero, for low hemoglobin lab value (reports below hgb 7) and for blood transfusion. Patient states he generally feels well. He feels weak/COELHO after walking several feet or climbing up stairs w/o chest pain. Denies Hx of MI, CAD or CHF. Denies hematuria, bloody stool, epitaxies, sob, cp, abd. pain, fever, chills.   states his last BM was within the past 24 hours and light brown in color.   -ED rectal exam w/ brown stool    In the ED:  Initial vital signs: T: 98.4F, HR: 70, BP: 137/55, R: 18, SpO2: 97% on RA  Labs: significant for wbc wnl, Hg 7.1 (7.2 9/17/21), plt wnl, pt/ptt/inr 15.1/40.3/1.27, lytes wnl, bun cr 72/4.21 (from 38/2.72 9/17/21), alk phos 153, trop 0.06. covid negative   Imaging:  CXR: No acute cardiopulmonary disease process. Cardiomegaly.  EKG: NSR, RBBB (on previous ekg) - qtc 501  Medications: none   Consults: surgery, heme/onc, GI     (27 Oct 2021 20:26)      PAST MEDICAL & SURGICAL HISTORY:  HTN (hypertension)    Hypothyroidism    Cholecystitis    Unilateral inguinal hernia without obstruction or gangrene, recurrence not specified    Anemia    H/O right inguinal hernia repair        MEDICATIONS  (STANDING):  atorvastatin 80 milliGRAM(s) Oral at bedtime  dextrose 40% Gel 15 Gram(s) Oral once  dextrose 5%. 1000 milliLiter(s) (50 mL/Hr) IV Continuous <Continuous>  dextrose 50% Injectable 25 Gram(s) IV Push once  glucagon  Injectable 1 milliGRAM(s) IntraMuscular once  hydrALAZINE 100 milliGRAM(s) Oral every 8 hours  insulin lispro (ADMELOG) corrective regimen sliding scale   SubCutaneous Before meals and at bedtime  levothyroxine 50 MICROGram(s) Oral daily  pantoprazole  Injectable 40 milliGRAM(s) IV Push every 12 hours    MEDICATIONS  (PRN):  acetaminophen     Tablet .. 650 milliGRAM(s) Oral every 6 hours PRN Temp greater or equal to 38C (100.4F), Mild Pain (1 - 3)  melatonin 3 milliGRAM(s) Oral at bedtime PRN Insomnia  ondansetron Injectable 4 milliGRAM(s) IV Push every 8 hours PRN Nausea and/or Vomiting    FAMILY HISTORY:      CBC Full  -  ( 28 Oct 2021 06:52 )  WBC Count : 4.86 K/uL  RBC Count : 3.26 M/uL  Hemoglobin : 8.5 g/dL  Hematocrit : 29.6 %  Platelet Count - Automated : 195 K/uL  Mean Cell Volume : 90.8 fl  Mean Cell Hemoglobin : 26.1 pg  Mean Cell Hemoglobin Concentration : 28.7 gm/dL  Auto Neutrophil # : x  Auto Lymphocyte # : x  Auto Monocyte # : x  Auto Eosinophil # : x  Auto Basophil # : x  Auto Neutrophil % : x  Auto Lymphocyte % : x  Auto Monocyte % : x  Auto Eosinophil % : x  Auto Basophil % : x      10-28    142  |  106  |  62<H>  ----------------------------<  90  3.9   |  20<L>  |  3.71<H>    Ca    8.6      28 Oct 2021 06:52  Phos  4.9     10-27  Mg     2.5     10-27    TPro  6.5  /  Alb  3.9  /  TBili  0.7  /  DBili  x   /  AST  19  /  ALT  22  /  AlkPhos  142<H>  10-28            Radiology:    < from: Xray Chest 1 View-PORTABLE IMMEDIATE (10.27.21 @ 18:10) >    EXAM:  XR CHEST PORTABLE IMMED 1V                          PROCEDURE DATE:  10/27/2021          INTERPRETATION:  TECHNIQUE: Single portable view of the chest.    COMPARISON:  9/13/2021    CLINICAL HISTORY: sob    FINDINGS:    Single frontal view ofthe chest demonstrates the lungs to be clear. The cardiomediastinal silhouette is enlarged. No acute osseous abnormalities.    IMPRESSION: No acute cardiopulmonary disease process. Cardiomegaly.        Vital Signs Last 24 Hrs  T(C): 37.2 (28 Oct 2021 06:12), Max: 37.2 (28 Oct 2021 06:12)  T(F): 99 (28 Oct 2021 06:12), Max: 99 (28 Oct 2021 06:12)  HR: 63 (28 Oct 2021 06:12) (60 - 73)  BP: 165/67 (28 Oct 2021 06:12) (143/63 - 170/81)  BP(mean): --  RR: 17 (28 Oct 2021 06:12) (17 - 19)  SpO2: 97% (28 Oct 2021 06:12) (95% - 98%)        REVIEW OF SYSTEMS:    CONSTITUTIONAL: No fever, weight loss, or fatigue  EYES: No eye pain, visual disturbances, or discharge  ENMT:  No difficulty hearing, tinnitus, vertigo; No sinus or throat pain  NECK: No pain or stiffness  BREASTS: No pain, masses, or nipple discharge  RESPIRATORY: No cough, wheezing, chills or hemoptysis; No shortness of breath  CARDIOVASCULAR: No chest pain, palpitations, dizziness, or leg swelling  GASTROINTESTINAL: No abdominal or epigastric pain. No nausea, vomiting, or hematemesis; No diarrhea or constipation. No melena or hematochezia.  GENITOURINARY: No dysuria, frequency, hematuria, or incontinence  NEUROLOGICAL: No headaches, memory loss, loss of strength, numbness, or tremors  SKIN: No itching, burning, rashes, or lesions   LYMPH NODES: No enlarged glands  ENDOCRINE: No heat or cold intolerance; No hair loss  MUSCULOSKELETAL: No joint pain or swelling; No muscle, back, or extremity pain  PSYCHIATRIC: No depression, anxiety, mood swings, or difficulty sleeping  HEME/LYMPH:  anemia  ALLERGY AND IMMUNOLOGIC: No hives or eczema  VASCULAR: no swelling, erythema,           Physical Exam:  65 yo gentleman lying in semi Yousif's position, awake, alert, no acute complaints    Head: normocephalic, atraumatic    Eyes: PERRLA, EOMI, no nystagmus, sclera anicteric    ENT: nasal discharge, uvula midline, no oropharyngeal erythema/exudate    Neck: supple, negative JVD, negative carotid bruits, no thyromegaly    Chest: CTA bilaterally, neg wheeze/ rhonchi/ rales/ crackles/ egophany    Cardiovascular: regular rate and rhythm, neg murmurs/rubs/gallops    Abdomen: soft, mid abd scar, non tender to palpation in all 4 quadrants, negative rebound/guarding, normal bowel sounds    Extremities:  2 + LE edema    Neurologic Exam:    Alert and oriented x 3     Motor Exam:    Right UE:             no focal weakness > 3+/5    Left UE:                no focal weakness > 3+/5      Right LE:              no focal weakness > 3+/5    Left LE:               no focal weakness > 3+/5               Sensation:           intact to light touch x 4 extremities                                                DTR:                  biceps/brachioradialis: equal                                                  patella/ankle: equal                                                    neg clonus                           neg Babinski                        Gait:  not tested        PM&R Impression:    1) anemia/ CLOTILDE  2) no focal weakness    Recommendations/ Plan :    1) Physical therapy focusing on therapeutic exercises, bed mobility/transfer out of bed evaluation, progressive ambulation with assistive devices prn.    2) Anticipated Disposition Plan/Recs:    pending functional progress                  
GASTROENTEROLOGY CONSULT NOTE  HPI:  HPI: 66yoM PMH HTN, CKD, Hypothyroidism, HORACE, R inguinal hernia, s/p small bowel resection for AVM on 9/13 by gen surgery Dr Grady (discharged w/ Hg of 7). Presenting to ED for generalized weakness and fatigue, sent in by hematologist/oncologist Dr. Sima Romero, for low hemoglobin lab value (reports below hgb 7) and for blood transfusion. Patient states he generally feels well. He feels weak/COELHO after walking several feet or climbing up stairs w/o chest pain. Denies Hx of MI, CAD or CHF. Denies hematuria, bloody stool, epitaxies, sob, cp, abd. pain, fever, chills.   states his last BM was within the past 24 hours and light brown in color.   -ED rectal exam w/ brown stool    In the ED:  Initial vital signs: T: 98.4F, HR: 70, BP: 137/55, R: 18, SpO2: 97% on RA  Labs: significant for wbc wnl, Hg 7.1 (7.2 9/17/21), plt wnl, pt/ptt/inr 15.1/40.3/1.27, lytes wnl, bun cr 72/4.21 (from 38/2.72 9/17/21), alk phos 153, trop 0.06. covid negative   Imaging:  CXR: No acute cardiopulmonary disease process. Cardiomegaly.  EKG: NSR, RBBB (on previous ekg) - qtc 501  Medications: none   Consults: surgery, heme/onc, GI   (27 Oct 2021 20:26)    GI consulted for anemia. Patient seen and examined at bedside.     Allergies    No Known Allergies    Intolerances      Home Medications:  allopurinol 100 mg oral tablet: 1 tab(s) orally once a day (27 Oct 2021 21:47)  atorvastatin 80 mg oral tablet: 1 tab(s) orally once a day (27 Oct 2021 21:47)  furosemide 40 mg oral tablet: 1 tab(s) orally once a day (27 Oct 2021 21:47)  hydrALAZINE 100 mg oral tablet: 1 tab(s) orally 3 times a day (27 Oct 2021 21:47)    MEDICATIONS:  MEDICATIONS  (STANDING):  atorvastatin 80 milliGRAM(s) Oral at bedtime  dextrose 40% Gel 15 Gram(s) Oral once  dextrose 5%. 1000 milliLiter(s) (50 mL/Hr) IV Continuous <Continuous>  dextrose 50% Injectable 25 Gram(s) IV Push once  glucagon  Injectable 1 milliGRAM(s) IntraMuscular once  hydrALAZINE 100 milliGRAM(s) Oral every 8 hours  insulin lispro (ADMELOG) corrective regimen sliding scale   SubCutaneous Before meals and at bedtime  levothyroxine 50 MICROGram(s) Oral daily  pantoprazole  Injectable 40 milliGRAM(s) IV Push every 12 hours    MEDICATIONS  (PRN):  acetaminophen     Tablet .. 650 milliGRAM(s) Oral every 6 hours PRN Temp greater or equal to 38C (100.4F), Mild Pain (1 - 3)  melatonin 3 milliGRAM(s) Oral at bedtime PRN Insomnia  ondansetron Injectable 4 milliGRAM(s) IV Push every 8 hours PRN Nausea and/or Vomiting    PAST MEDICAL & SURGICAL HISTORY:  HTN (hypertension)    Hypothyroidism    Cholecystitis    Unilateral inguinal hernia without obstruction or gangrene, recurrence not specified    Anemia    H/O right inguinal hernia repair      FAMILY HISTORY:    SOCIAL HISTORY:  Tobacco: [ ] Current, [ ] Former, [ ] Never; Pack Years:  Alcohol:  Illicit Drugs:    REVIEW OF SYSTEMS:  CONSTITUTIONAL: No weakness, fevers or chills  HEENT: No visual changes; No vertigo or throat pain   NECK: No pain or stiffness  RESPIRATORY: No cough, wheezing, hemoptysis; No shortness of breath  CARDIOVASCULAR: No chest pain or palpitations  GASTROINTESTINAL: As above.  GENITOURINARY: No dysuria, frequency or hematuria  NEUROLOGICAL: No numbness or weakness  SKIN: No itching, burning, rashes, or lesions   All other 10 review of systems is negative unless indicated above.    Vital Signs Last 24 Hrs  T(C): 37.2 (28 Oct 2021 06:12), Max: 37.2 (28 Oct 2021 06:12)  T(F): 99 (28 Oct 2021 06:12), Max: 99 (28 Oct 2021 06:12)  HR: 63 (28 Oct 2021 06:12) (60 - 73)  BP: 165/67 (28 Oct 2021 06:12) (137/55 - 170/81)  BP(mean): --  RR: 17 (28 Oct 2021 06:12) (17 - 19)  SpO2: 97% (28 Oct 2021 06:12) (95% - 98%)    10-27 @ 07:01  -  10-28 @ 07:00  --------------------------------------------------------  IN: 0 mL / OUT: 550 mL / NET: -550 mL    10-28 @ 07:01  -  10-28 @ 13:03  --------------------------------------------------------  IN: 0 mL / OUT: 500 mL / NET: -500 mL        PHYSICAL EXAM:    General: in no acute distress  Eyes: Anicteric sclerae, moist conjunctivae  HENT: Moist mucous membranes  Neck: Trachea midline, supple  Lungs: Normal respiratory effort, no intercostal retractions  Cardiovascular: RRR  Abdomen: Soft, non-tender non-distended; No rebound or guarding  Extremities: Normal range of motion, No clubbing, cyanosis or edema  Neurological: Alert and oriented x3  Skin: Warm and dry. No obvious rash    LABS:                        8.5    4.86  )-----------( 195      ( 28 Oct 2021 06:52 )             29.6     10-28    142  |  106  |  62<H>  ----------------------------<  90  3.9   |  20<L>  |  3.71<H>    Ca    8.6      28 Oct 2021 06:52  Phos  4.9     10-27  Mg     2.5     10-27    TPro  6.5  /  Alb  3.9  /  TBili  0.7  /  DBili  x   /  AST  19  /  ALT  22  /  AlkPhos  142<H>  10-28        PT/INR - ( 27 Oct 2021 14:29 )   PT: 15.1 sec;   INR: 1.27          PTT - ( 27 Oct 2021 14:29 )  PTT:40.3 sec    RADIOLOGY & ADDITIONAL STUDIES:     Reviewed
Surgery Consult Note  67 yo M pmh of HTN, DM, CKD, hypothyroid, severe aortic stenosis, HORACE, SP small bowel resection for AVM on 9/13 by Dr. Grady and push enteroscopy by Dr. Montenegro. He was discharged with Hb of 7. Today, he is back for failing to improve Hb and symptomatic anemia. The patient endorsed COELHO, and slight dizziness. Denied abd pain, n, v. Still having regular BM, no diarrhea. He endorsed one black bowel movements 3 days ago. But denies any active melena, bloody stool or bleeding per rectum.   In the ED he is AFVSS, WBC is normal, CR. 4.21 from baseline of 2.7. Hb 7.1g/dl        PAST MEDICAL & SURGICAL HISTORY:  DM (diabetes mellitus)    HTN (hypertension)    Hypothyroidism    Cholecystitis    Unilateral inguinal hernia without obstruction or gangrene, recurrence not specified    Anemia    H/O right inguinal hernia repair        MEDICATIONS  (STANDING):    MEDICATIONS  (PRN):      Allergies    No Known Allergies    Intolerances        SOCIAL HISTORY:    FAMILY HISTORY:      Vital Signs Last 24 Hrs  T(C): 36.9 (27 Oct 2021 13:13), Max: 36.9 (27 Oct 2021 13:13)  T(F): 98.4 (27 Oct 2021 13:13), Max: 98.4 (27 Oct 2021 13:13)  HR: 70 (27 Oct 2021 13:13) (70 - 70)  BP: 137/55 (27 Oct 2021 13:13) (137/55 - 137/55)  BP(mean): --  RR: 18 (27 Oct 2021 13:13) (18 - 18)  SpO2: 97% (27 Oct 2021 13:13) (97% - 97%)    I&O's Summary      Physical Exam:  General: NAD, resting comfortably  HEENT: NC/AT, EOMI,  Pulmonary: normal resp effort  Cardiovascular: NSR,   Abdominal: soft, ND/NT  Extremities: WWP    Lines/drains/tubes:    LABS:                        7.1    5.78  )-----------( 196      ( 27 Oct 2021 14:29 )             23.6     10-27    139  |  102  |  72<H>  ----------------------------<  155<H>  4.3   |  26  |  4.21<H>    Ca    8.2<L>      27 Oct 2021 14:29    TPro  6.8  /  Alb  4.3  /  TBili  0.4  /  DBili  x   /  AST  25  /  ALT  26  /  AlkPhos  153<H>  10-27    PT/INR - ( 27 Oct 2021 14:29 )   PT: 15.1 sec;   INR: 1.27          PTT - ( 27 Oct 2021 14:29 )  PTT:40.3 sec    CAPILLARY BLOOD GLUCOSE        LIVER FUNCTIONS - ( 27 Oct 2021 14:29 )  Alb: 4.3 g/dL / Pro: 6.8 g/dL / ALK PHOS: 153 U/L / ALT: 26 U/L / AST: 25 U/L / GGT: x             Cultures:      RADIOLOGY & ADDITIONAL STUDIES:

## 2021-10-28 NOTE — PROGRESS NOTE ADULT - SUBJECTIVE AND OBJECTIVE BOX
SUBJECTIVE: Patient seen and examined bedside. No acute events overnight. Denies nausea or emesis. Denies abdominal pain. No melena or hematochezia. No chest pain or shortness of breath AM.     hydrALAZINE 100 milliGRAM(s) Oral every 8 hours      Vital Signs Last 24 Hrs  T(C): 37.2 (28 Oct 2021 06:12), Max: 37.2 (28 Oct 2021 06:12)  T(F): 99 (28 Oct 2021 06:12), Max: 99 (28 Oct 2021 06:12)  HR: 63 (28 Oct 2021 06:12) (62 - 73)  BP: 165/67 (28 Oct 2021 06:12) (137/55 - 165/67)  BP(mean): --  RR: 17 (28 Oct 2021 06:12) (17 - 18)  SpO2: 97% (28 Oct 2021 06:12) (97% - 98%)  I&O's Detail    27 Oct 2021 07:01  -  28 Oct 2021 07:00  --------------------------------------------------------  IN:  Total IN: 0 mL    OUT:    Voided (mL): 550 mL  Total OUT: 550 mL    Total NET: -550 mL      General: NAD, resting comfortably in bed  C/V: NSR  Pulm: Nonlabored breathing, no respiratory distress  Abd: soft, NT/ND.  Extrem: WWP, no edema, SCDs in place        LABS:                        7.1    5.78  )-----------( 196      ( 27 Oct 2021 14:29 )             23.6     10-27    139  |  102  |  72<H>  ----------------------------<  155<H>  4.3   |  26  |  4.21<H>    Ca    8.2<L>      27 Oct 2021 14:29  Phos  4.9     10-27  Mg     2.5     10-27    TPro  6.8  /  Alb  4.3  /  TBili  0.4  /  DBili  x   /  AST  25  /  ALT  26  /  AlkPhos  153<H>  10-27    PT/INR - ( 27 Oct 2021 14:29 )   PT: 15.1 sec;   INR: 1.27          PTT - ( 27 Oct 2021 14:29 )  PTT:40.3 sec      RADIOLOGY & ADDITIONAL STUDIES:

## 2021-10-28 NOTE — PROGRESS NOTE ADULT - PROBLEM SELECTOR PLAN 7
Fluids: none  Electrolytes: Mg>2, K>4  Nutrition:  No IVF currently needed, replete lytes PRN  Prophylaxis:   Activity: AAT, OOBTC  GI: none  C: FC  Dispo: Admit to [facility] Fluids: 1 unit of blood in ED  Electrolytes: Mg>2, K>4  Nutrition:  No IVF currently needed, replete lytes PRN  Prophylaxis:   Activity: AAT, OOBTC  GI: none  C: FC  Dispo: Admit to [facility]

## 2021-10-28 NOTE — PROGRESS NOTE ADULT - PROBLEM SELECTOR PLAN 3
#CLOTILDE on CKD  likely pre renal in setting of blood loss. bun/cr on admission 72/4.21 (from 38/2.72 9/17/21),  -hold allopurinol and furosemide for now    -f/u urine lytes  -transfuse  -trend Cr #CLOTILDE on CKD  Patient with known Cr level >1.5 from baseline. Patient with known Cr baseline of 2 admitted with Cr at 4.21, likely pre renal in setting of blood loss. bun/cr on admission 72/4.21, now 62/3.71 (10/28) after transfusion of 1 unit of blood likely indicative of pre-renal cause of CLOTILDE  -hold allopurinol and furosemide for now    -f/u urine lytes  -transfuse if hg <7  -trend Cr

## 2021-10-29 ENCOUNTER — TRANSCRIPTION ENCOUNTER (OUTPATIENT)
Age: 66
End: 2021-10-29

## 2021-10-29 DIAGNOSIS — R73.03 PREDIABETES: ICD-10-CM

## 2021-10-29 DIAGNOSIS — I50.9 HEART FAILURE, UNSPECIFIED: ICD-10-CM

## 2021-10-29 LAB
A1C WITH ESTIMATED AVERAGE GLUCOSE RESULT: 4.9 % — SIGNIFICANT CHANGE UP (ref 4–5.6)
ALBUMIN SERPL ELPH-MCNC: 3.9 G/DL — SIGNIFICANT CHANGE UP (ref 3.3–5)
ALBUMIN SERPL ELPH-MCNC: 4.2 G/DL — SIGNIFICANT CHANGE UP (ref 3.3–5)
ALP SERPL-CCNC: 140 U/L — HIGH (ref 40–120)
ALP SERPL-CCNC: 142 U/L — HIGH (ref 40–120)
ALT FLD-CCNC: 16 U/L — SIGNIFICANT CHANGE UP (ref 10–45)
ALT FLD-CCNC: 16 U/L — SIGNIFICANT CHANGE UP (ref 10–45)
ANION GAP SERPL CALC-SCNC: 12 MMOL/L — SIGNIFICANT CHANGE UP (ref 5–17)
ANION GAP SERPL CALC-SCNC: 12 MMOL/L — SIGNIFICANT CHANGE UP (ref 5–17)
ANION GAP SERPL CALC-SCNC: 16 MMOL/L — SIGNIFICANT CHANGE UP (ref 5–17)
APTT BLD: 43.7 SEC — HIGH (ref 27.5–35.5)
AST SERPL-CCNC: 14 U/L — SIGNIFICANT CHANGE UP (ref 10–40)
AST SERPL-CCNC: 15 U/L — SIGNIFICANT CHANGE UP (ref 10–40)
BILIRUB SERPL-MCNC: 0.8 MG/DL — SIGNIFICANT CHANGE UP (ref 0.2–1.2)
BILIRUB SERPL-MCNC: 0.8 MG/DL — SIGNIFICANT CHANGE UP (ref 0.2–1.2)
BLD GP AB SCN SERPL QL: NEGATIVE — SIGNIFICANT CHANGE UP
BUN SERPL-MCNC: 46 MG/DL — HIGH (ref 7–23)
BUN SERPL-MCNC: 49 MG/DL — HIGH (ref 7–23)
BUN SERPL-MCNC: 52 MG/DL — HIGH (ref 7–23)
CALCIUM SERPL-MCNC: 8.8 MG/DL — SIGNIFICANT CHANGE UP (ref 8.4–10.5)
CALCIUM SERPL-MCNC: 8.8 MG/DL — SIGNIFICANT CHANGE UP (ref 8.4–10.5)
CALCIUM SERPL-MCNC: 9.1 MG/DL — SIGNIFICANT CHANGE UP (ref 8.4–10.5)
CHLORIDE SERPL-SCNC: 105 MMOL/L — SIGNIFICANT CHANGE UP (ref 96–108)
CHLORIDE SERPL-SCNC: 106 MMOL/L — SIGNIFICANT CHANGE UP (ref 96–108)
CHLORIDE SERPL-SCNC: 109 MMOL/L — HIGH (ref 96–108)
CO2 SERPL-SCNC: 22 MMOL/L — SIGNIFICANT CHANGE UP (ref 22–31)
CO2 SERPL-SCNC: 22 MMOL/L — SIGNIFICANT CHANGE UP (ref 22–31)
CO2 SERPL-SCNC: 23 MMOL/L — SIGNIFICANT CHANGE UP (ref 22–31)
CREAT SERPL-MCNC: 3.19 MG/DL — HIGH (ref 0.5–1.3)
CREAT SERPL-MCNC: 3.29 MG/DL — HIGH (ref 0.5–1.3)
CREAT SERPL-MCNC: 3.34 MG/DL — HIGH (ref 0.5–1.3)
ESTIMATED AVERAGE GLUCOSE: 94 MG/DL — SIGNIFICANT CHANGE UP (ref 68–114)
GLUCOSE BLDC GLUCOMTR-MCNC: 140 MG/DL — HIGH (ref 70–99)
GLUCOSE BLDC GLUCOMTR-MCNC: 194 MG/DL — HIGH (ref 70–99)
GLUCOSE BLDC GLUCOMTR-MCNC: 86 MG/DL — SIGNIFICANT CHANGE UP (ref 70–99)
GLUCOSE BLDC GLUCOMTR-MCNC: 94 MG/DL — SIGNIFICANT CHANGE UP (ref 70–99)
GLUCOSE SERPL-MCNC: 150 MG/DL — HIGH (ref 70–99)
GLUCOSE SERPL-MCNC: 209 MG/DL — HIGH (ref 70–99)
GLUCOSE SERPL-MCNC: 76 MG/DL — SIGNIFICANT CHANGE UP (ref 70–99)
HCT VFR BLD CALC: 35 % — LOW (ref 39–50)
HGB BLD-MCNC: 10.4 G/DL — LOW (ref 13–17)
INR BLD: 1.21 — HIGH (ref 0.88–1.16)
MAGNESIUM SERPL-MCNC: 2.4 MG/DL — SIGNIFICANT CHANGE UP (ref 1.6–2.6)
MCHC RBC-ENTMCNC: 26.6 PG — LOW (ref 27–34)
MCHC RBC-ENTMCNC: 29.7 GM/DL — LOW (ref 32–36)
MCV RBC AUTO: 89.5 FL — SIGNIFICANT CHANGE UP (ref 80–100)
NRBC # BLD: 0 /100 WBCS — SIGNIFICANT CHANGE UP (ref 0–0)
PHOSPHATE SERPL-MCNC: 4.2 MG/DL — SIGNIFICANT CHANGE UP (ref 2.5–4.5)
PLATELET # BLD AUTO: 224 K/UL — SIGNIFICANT CHANGE UP (ref 150–400)
POTASSIUM SERPL-MCNC: 3.9 MMOL/L — SIGNIFICANT CHANGE UP (ref 3.5–5.3)
POTASSIUM SERPL-MCNC: 4.2 MMOL/L — SIGNIFICANT CHANGE UP (ref 3.5–5.3)
POTASSIUM SERPL-MCNC: 4.4 MMOL/L — SIGNIFICANT CHANGE UP (ref 3.5–5.3)
POTASSIUM SERPL-SCNC: 3.9 MMOL/L — SIGNIFICANT CHANGE UP (ref 3.5–5.3)
POTASSIUM SERPL-SCNC: 4.2 MMOL/L — SIGNIFICANT CHANGE UP (ref 3.5–5.3)
POTASSIUM SERPL-SCNC: 4.4 MMOL/L — SIGNIFICANT CHANGE UP (ref 3.5–5.3)
PROT SERPL-MCNC: 6.7 G/DL — SIGNIFICANT CHANGE UP (ref 6–8.3)
PROT SERPL-MCNC: 6.8 G/DL — SIGNIFICANT CHANGE UP (ref 6–8.3)
PROTHROM AB SERPL-ACNC: 14.4 SEC — HIGH (ref 10.6–13.6)
RBC # BLD: 3.91 M/UL — LOW (ref 4.2–5.8)
RBC # FLD: 20.4 % — HIGH (ref 10.3–14.5)
RH IG SCN BLD-IMP: POSITIVE — SIGNIFICANT CHANGE UP
SODIUM SERPL-SCNC: 140 MMOL/L — SIGNIFICANT CHANGE UP (ref 135–145)
SODIUM SERPL-SCNC: 143 MMOL/L — SIGNIFICANT CHANGE UP (ref 135–145)
SODIUM SERPL-SCNC: 144 MMOL/L — SIGNIFICANT CHANGE UP (ref 135–145)
WBC # BLD: 4.86 K/UL — SIGNIFICANT CHANGE UP (ref 3.8–10.5)
WBC # FLD AUTO: 4.86 K/UL — SIGNIFICANT CHANGE UP (ref 3.8–10.5)

## 2021-10-29 PROCEDURE — 99233 SBSQ HOSP IP/OBS HIGH 50: CPT | Mod: GC

## 2021-10-29 RX ORDER — SODIUM CHLORIDE 9 MG/ML
750 INJECTION INTRAMUSCULAR; INTRAVENOUS; SUBCUTANEOUS ONCE
Refills: 0 | Status: COMPLETED | OUTPATIENT
Start: 2021-10-29 | End: 2021-10-29

## 2021-10-29 RX ORDER — POTASSIUM CHLORIDE 20 MEQ
20 PACKET (EA) ORAL ONCE
Refills: 0 | Status: COMPLETED | OUTPATIENT
Start: 2021-10-29 | End: 2021-10-29

## 2021-10-29 RX ORDER — SODIUM CHLORIDE 9 MG/ML
250 INJECTION INTRAMUSCULAR; INTRAVENOUS; SUBCUTANEOUS ONCE
Refills: 0 | Status: COMPLETED | OUTPATIENT
Start: 2021-10-29 | End: 2021-10-29

## 2021-10-29 RX ADMIN — Medication 20 MILLIEQUIVALENT(S): at 12:08

## 2021-10-29 RX ADMIN — PANTOPRAZOLE SODIUM 40 MILLIGRAM(S): 20 TABLET, DELAYED RELEASE ORAL at 06:05

## 2021-10-29 RX ADMIN — Medication 1: at 17:21

## 2021-10-29 RX ADMIN — AMLODIPINE BESYLATE 10 MILLIGRAM(S): 2.5 TABLET ORAL at 06:05

## 2021-10-29 RX ADMIN — CARVEDILOL PHOSPHATE 12.5 MILLIGRAM(S): 80 CAPSULE, EXTENDED RELEASE ORAL at 17:23

## 2021-10-29 RX ADMIN — Medication 100 MILLIGRAM(S): at 22:50

## 2021-10-29 RX ADMIN — PANTOPRAZOLE SODIUM 40 MILLIGRAM(S): 20 TABLET, DELAYED RELEASE ORAL at 17:23

## 2021-10-29 RX ADMIN — Medication 100 MILLIGRAM(S): at 06:05

## 2021-10-29 RX ADMIN — SODIUM CHLORIDE 500 MILLILITER(S): 9 INJECTION INTRAMUSCULAR; INTRAVENOUS; SUBCUTANEOUS at 12:08

## 2021-10-29 RX ADMIN — ATORVASTATIN CALCIUM 80 MILLIGRAM(S): 80 TABLET, FILM COATED ORAL at 22:50

## 2021-10-29 RX ADMIN — CARVEDILOL PHOSPHATE 12.5 MILLIGRAM(S): 80 CAPSULE, EXTENDED RELEASE ORAL at 06:05

## 2021-10-29 RX ADMIN — Medication 100 MILLIGRAM(S): at 15:47

## 2021-10-29 RX ADMIN — Medication 50 MICROGRAM(S): at 06:05

## 2021-10-29 RX ADMIN — SODIUM CHLORIDE 1500 MILLILITER(S): 9 INJECTION INTRAMUSCULAR; INTRAVENOUS; SUBCUTANEOUS at 16:22

## 2021-10-29 NOTE — PROGRESS NOTE ADULT - PROBLEM SELECTOR PLAN 5
-hx of hypothyroidism. TSH levels 5.290 with T4 7.94. Patient takes levothyroxine 50 mcg QD for hypothyroidism.  -c/w Levothyroxine 50 mcg QD  -f/u TSH, free T4 pt w pmh of HTN on furosemide 40mg qd, hydralazine 100mg TID  - c/w hydralazine 100 mg PO TID

## 2021-10-29 NOTE — PROGRESS NOTE ADULT - PROBLEM SELECTOR PLAN 2
p/w trop of 0.06. denies cp, sob. no hx of CAD, CHF or previous MI. likely related to underlying CKD and demand ischemia in setting of symptomatic anemia. EKG w. known RBBB, no st changes. likely type II MI.  -f/u repeat trop  -f/u repeat ECHO On home coreg 12.5 TID, amlodipine 10mg qd, hydralazine 100mg qd, atorvastatin 80 mg, furosemide 40 mg qd   - TTE 10/28: LVEF 60-65%. Normal LV and RV size and systolic function. Grade II LV diastolic dysfunction. Biatrial enlargement. Mild TR. Pulmonary HTN (43 mmHg). Severely dilated LA. Small pericardial effusion without echocardiographic evidence of cardiac tamponade physiology.  - proBNP 5261(10/28)  - Trop 10/27-10/28 0.06 x2  - started on coreg 12.5 BID, amlodipine 10mg qd, hydralazine 100 q 8 hrs      #B/L LE edema  -pt p/w b/l LE edema and generalized weakness/COELHO w/ elevated trop (EKG w.o ST changes). although found to have low Hg on admission and being treated for symptomatic anemia cannot r/o cardiac etiology vs DVT (non tender non erythematous)  - BL LE doopplers negative for DVT  -swelling consistent with CHF

## 2021-10-29 NOTE — PROGRESS NOTE ADULT - SUBJECTIVE AND OBJECTIVE BOX
GASTROENTEROLOGY PROGRESS NOTE  Patient seen and examined at bedside. No acute issues. Undergoing video capsule endoscopy today.     PERTINENT REVIEW OF SYSTEMS:  CONSTITUTIONAL: No weakness, fevers or chills  HEENT: No visual changes; No vertigo or throat pain   GASTROINTESTINAL: As above.  NEUROLOGICAL: No numbness or weakness  SKIN: No itching, burning, rashes, or lesions     Allergies    No Known Allergies    Intolerances      MEDICATIONS:  MEDICATIONS  (STANDING):  amLODIPine   Tablet 10 milliGRAM(s) Oral daily  atorvastatin 80 milliGRAM(s) Oral at bedtime  carvedilol 12.5 milliGRAM(s) Oral every 12 hours  dextrose 40% Gel 15 Gram(s) Oral once  dextrose 5%. 1000 milliLiter(s) (50 mL/Hr) IV Continuous <Continuous>  dextrose 50% Injectable 25 Gram(s) IV Push once  glucagon  Injectable 1 milliGRAM(s) IntraMuscular once  hydrALAZINE 100 milliGRAM(s) Oral every 8 hours  insulin lispro (ADMELOG) corrective regimen sliding scale   SubCutaneous Before meals and at bedtime  levothyroxine 50 MICROGram(s) Oral daily  pantoprazole  Injectable 40 milliGRAM(s) IV Push every 12 hours    MEDICATIONS  (PRN):  acetaminophen     Tablet .. 650 milliGRAM(s) Oral every 6 hours PRN Temp greater or equal to 38C (100.4F), Mild Pain (1 - 3)  melatonin 3 milliGRAM(s) Oral at bedtime PRN Insomnia  ondansetron Injectable 4 milliGRAM(s) IV Push every 8 hours PRN Nausea and/or Vomiting    Vital Signs Last 24 Hrs  T(C): 36.7 (29 Oct 2021 12:13), Max: 36.8 (29 Oct 2021 06:09)  T(F): 98 (29 Oct 2021 12:13), Max: 98.2 (29 Oct 2021 06:09)  HR: 68 (29 Oct 2021 12:13) (62 - 72)  BP: 155/66 (29 Oct 2021 12:13) (148/65 - 155/70)  BP(mean): --  RR: 18 (29 Oct 2021 12:13) (17 - 18)  SpO2: 98% (29 Oct 2021 12:13) (96% - 98%)    10-28 @ 07:01  -  10-29 @ 07:00  --------------------------------------------------------  IN: 0 mL / OUT: 500 mL / NET: -500 mL      PHYSICAL EXAM:    General: in no acute distress  HEENT: MMM, conjunctiva and sclera clear  Gastrointestinal: Soft non-tender non-distended; No rebound or guarding  Skin: Warm and dry. No obvious rash    LABS:                        10.4   4.86  )-----------( 224      ( 29 Oct 2021 08:23 )             35.0     10-29    140  |  106  |  49<H>  ----------------------------<  209<H>  4.2   |  22  |  3.19<H>    Ca    8.8      29 Oct 2021 14:48  Phos  4.2     10-29  Mg     2.4     10-29    TPro  6.8  /  Alb  4.2  /  TBili  0.8  /  DBili  x   /  AST  15  /  ALT  16  /  AlkPhos  142<H>  10-29    PT/INR - ( 29 Oct 2021 08:23 )   PT: 14.4 sec;   INR: 1.21          PTT - ( 29 Oct 2021 08:23 )  PTT:43.7 sec                  RADIOLOGY & ADDITIONAL STUDIES:  Reviewed

## 2021-10-29 NOTE — PROGRESS NOTE ADULT - PROBLEM SELECTOR PROBLEM 7
Nutrition, metabolism, and development symptoms Unilateral inguinal hernia without obstruction or gangrene, recurrence not specified

## 2021-10-29 NOTE — PROGRESS NOTE ADULT - SUBJECTIVE AND OBJECTIVE BOX
INTERVAL HPI/OVERNIGHT EVENTS:    SUBJECTIVE: Patient seen and examined at bedside.     OBJECTIVE:    VITAL SIGNS:  ICU Vital Signs Last 24 Hrs  T(C): 36.8 (29 Oct 2021 06:09), Max: 36.8 (29 Oct 2021 06:09)  T(F): 98.2 (29 Oct 2021 06:09), Max: 98.2 (29 Oct 2021 06:09)  HR: 72 (29 Oct 2021 06:09) (62 - 72)  BP: 148/65 (29 Oct 2021 06:09) (148/65 - 166/67)  BP(mean): --  ABP: --  ABP(mean): --  RR: 18 (29 Oct 2021 06:09) (17 - 18)  SpO2: 98% (29 Oct 2021 06:09) (96% - 98%)        10-28 @ 07:01  -  10-29 @ 07:00  --------------------------------------------------------  IN: 0 mL / OUT: 500 mL / NET: -500 mL      CAPILLARY BLOOD GLUCOSE      POCT Blood Glucose.: 94 mg/dL (29 Oct 2021 08:13)      PHYSICAL EXAM:    Constitutional: NAD, well-groomed, well-developed  HEENT: PERRLA, EOMI, Normal Hearing, MMM  Neck: No LAD, No JVD  Back: Normal spine flexure, No CVA tenderness  Respiratory: CTAB   Cardiovascular: S1 and S2, RRR, no M/G/R  Gastrointestinal: BS+, soft, NT/ND  Extremities: No peripheral edema  Vascular: 2+ peripheral pulses  Neurological: A/O x 3, no focal deficits  Psychiatric: Normal mood, normal affect  Musculoskeletal: 5/5 strength b/l upper and lower extremities  Skin: No rashes    MEDICATIONS:  MEDICATIONS  (STANDING):  amLODIPine   Tablet 10 milliGRAM(s) Oral daily  atorvastatin 80 milliGRAM(s) Oral at bedtime  carvedilol 12.5 milliGRAM(s) Oral every 12 hours  dextrose 40% Gel 15 Gram(s) Oral once  dextrose 5%. 1000 milliLiter(s) (50 mL/Hr) IV Continuous <Continuous>  dextrose 50% Injectable 25 Gram(s) IV Push once  glucagon  Injectable 1 milliGRAM(s) IntraMuscular once  hydrALAZINE 100 milliGRAM(s) Oral every 8 hours  insulin lispro (ADMELOG) corrective regimen sliding scale   SubCutaneous Before meals and at bedtime  levothyroxine 50 MICROGram(s) Oral daily  pantoprazole  Injectable 40 milliGRAM(s) IV Push every 12 hours    MEDICATIONS  (PRN):  acetaminophen     Tablet .. 650 milliGRAM(s) Oral every 6 hours PRN Temp greater or equal to 38C (100.4F), Mild Pain (1 - 3)  melatonin 3 milliGRAM(s) Oral at bedtime PRN Insomnia  ondansetron Injectable 4 milliGRAM(s) IV Push every 8 hours PRN Nausea and/or Vomiting      ALLERGIES:  Allergies    No Known Allergies    Intolerances        LABS:                        10.4   4.86  )-----------( 224      ( 29 Oct 2021 08:23 )             35.0     10-28    142  |  106  |  62<H>  ----------------------------<  90  3.9   |  20<L>  |  3.71<H>    Ca    8.6      28 Oct 2021 06:52  Phos  4.9     10-27  Mg     2.5     10-27    TPro  6.5  /  Alb  3.9  /  TBili  0.7  /  DBili  x   /  AST  19  /  ALT  22  /  AlkPhos  142<H>  10-28    PT/INR - ( 29 Oct 2021 08:23 )   PT: 14.4 sec;   INR: 1.21          PTT - ( 29 Oct 2021 08:23 )  PTT:43.7 sec      RADIOLOGY & ADDITIONAL TESTS: Reviewed. INTERVAL HPI/OVERNIGHT EVENTS: 3 liquid BMs x3, held bowel reg. NPO at midnight     SUBJECTIVE: Patient seen and examined at bedside. Patient with no complaint under ROS questioning. Comfortable waiting for video capsule today.     OBJECTIVE:    VITAL SIGNS:  ICU Vital Signs Last 24 Hrs  T(C): 36.8 (29 Oct 2021 06:09), Max: 36.8 (29 Oct 2021 06:09)  T(F): 98.2 (29 Oct 2021 06:09), Max: 98.2 (29 Oct 2021 06:09)  HR: 72 (29 Oct 2021 06:09) (62 - 72)  BP: 148/65 (29 Oct 2021 06:09) (148/65 - 166/67)  BP(mean): --  ABP: --  ABP(mean): --  RR: 18 (29 Oct 2021 06:09) (17 - 18)  SpO2: 98% (29 Oct 2021 06:09) (96% - 98%)        10-28 @ 07:01  -  10-29 @ 07:00  --------------------------------------------------------  IN: 0 mL / OUT: 500 mL / NET: -500 mL      CAPILLARY BLOOD GLUCOSE      POCT Blood Glucose.: 94 mg/dL (29 Oct 2021 08:13)      PHYSICAL EXAM:    General: Well developed, well nourished, good grooming and hygiene, no acute distress  HEENT: NC/AT; PERRL, anicteric sclera; MMM  Neck: supple, no goiter appreciated, no lymphadenopathy   Cardiovascular: +S1/S2, RRR, no murmurs, rubs, gallops  Respiratory: CTA B/L; no W/R/R  Gastrointestinal: soft, NT/ND; +BSx4; Pt has 1ft long mid abdominal scar, healing well, dry and intact, no drainage, erythema or tenderness   Extremities: WWP; +2 pitting edema B/L; no calf tenderness, clubbing or cyanosis  Vascular: 2+ radial, DP/PT pulses B/L and equal   Neurological: AAOx3; no focal deficits    MEDICATIONS:  MEDICATIONS  (STANDING):  amLODIPine   Tablet 10 milliGRAM(s) Oral daily  atorvastatin 80 milliGRAM(s) Oral at bedtime  carvedilol 12.5 milliGRAM(s) Oral every 12 hours  dextrose 40% Gel 15 Gram(s) Oral once  dextrose 5%. 1000 milliLiter(s) (50 mL/Hr) IV Continuous <Continuous>  dextrose 50% Injectable 25 Gram(s) IV Push once  glucagon  Injectable 1 milliGRAM(s) IntraMuscular once  hydrALAZINE 100 milliGRAM(s) Oral every 8 hours  insulin lispro (ADMELOG) corrective regimen sliding scale   SubCutaneous Before meals and at bedtime  levothyroxine 50 MICROGram(s) Oral daily  pantoprazole  Injectable 40 milliGRAM(s) IV Push every 12 hours    MEDICATIONS  (PRN):  acetaminophen     Tablet .. 650 milliGRAM(s) Oral every 6 hours PRN Temp greater or equal to 38C (100.4F), Mild Pain (1 - 3)  melatonin 3 milliGRAM(s) Oral at bedtime PRN Insomnia  ondansetron Injectable 4 milliGRAM(s) IV Push every 8 hours PRN Nausea and/or Vomiting      ALLERGIES:  Allergies    No Known Allergies    Intolerances        LABS:                        10.4   4.86  )-----------( 224      ( 29 Oct 2021 08:23 )             35.0     10-28    142  |  106  |  62<H>  ----------------------------<  90  3.9   |  20<L>  |  3.71<H>    Ca    8.6      28 Oct 2021 06:52  Phos  4.9     10-27  Mg     2.5     10-27    TPro  6.5  /  Alb  3.9  /  TBili  0.7  /  DBili  x   /  AST  19  /  ALT  22  /  AlkPhos  142<H>  10-28    PT/INR - ( 29 Oct 2021 08:23 )   PT: 14.4 sec;   INR: 1.21          PTT - ( 29 Oct 2021 08:23 )  PTT:43.7 sec      RADIOLOGY & ADDITIONAL TESTS: Reviewed.

## 2021-10-29 NOTE — PROGRESS NOTE ADULT - PROBLEM SELECTOR PLAN 1
#COELHO  Pt p/w Hg of 7.1 w/ known hx of CKD., HORACE and AVM s/p sm bowel resection on 9/13. Last hg 7.2 9/17/21 upon discharge after resection. p/w generalized weakness/COELHO, found to have Hg of <7 by outpatient hematologist. No obvious signs of active bleeding. patient denies melena, BPR, hematochezia, hematemesis. RAINE in ED w brown stool.  Patient responded well to transfusion of 1 unit of blood with symptomatic relief, Hgb from 7.1->8.5 and Hct from 23.6->29.6  -patient seen by surgery in the ED, recs GI consult for possible scope  -GI consulted, f/u recs    -heme consulted, f/u recs   -f/u iron studies, possibly replete pending results   -transfuse for hg <7  -keep active T and screen  -monitor for signs of bleed  -PPI BID    #B/L LE edema  #R/O CHF #R/o DVT  -pt p/w b/l LE edema and generalized weakness/COELHO w/ elevated trop (EKG w.o ST changes). although found to have low Hg on admission and being treated for symptomatic anemia cannot r/o cardiac etiology vs DVT (non tender non erythematous)  -Echo from 6/21 w/ normal EF, no valvular disease.  -trend trop  - U/S to be performed on 10/28 to r/o DVT  -f/u repeat ECHO Pt p/w Hg of 7.1 w/ known hx of CKD., HORACE and AVM s/p sm bowel resection on 9/13. Last hg 7.2 9/17/21 upon discharge after resection. p/w generalized weakness/COELHO, found to have Hg of <7 by outpatient hematologist. No obvious signs of active bleeding. patient denies melena, BPR, hematochezia, hematemesis. RAINE in ED w brown stool.  Patient responded well to transfusion of 1 unit of blood with symptomatic relief, Hgb from 7.1->8.5 and Hct from 23.6->29.6  -patient seen by surgery in the ED, recs GI consult for possible scope  -GI consulted, f/u recs    -transfuse for hg <7  -keep active T and screen  -monitor for signs of bleed  -PPI BID  - F/U small bowel capsule today 10/29/21  - advance diet as indicated

## 2021-10-29 NOTE — DISCHARGE NOTE PROVIDER - NSDCCPCAREPLAN_GEN_ALL_CORE_FT
PRINCIPAL DISCHARGE DIAGNOSIS  Diagnosis: Anemia  Assessment and Plan of Treatment:        PRINCIPAL DISCHARGE DIAGNOSIS  Diagnosis: Anemia  Assessment and Plan of Treatment: Anemia is the medical term for when a person has too few red blood cells. Red blood cells are cells in your blood that carry oxygen. If you have too few red blood cells, your body might not get all the oxygen it needs. Many people with anemia have no symptoms. Of those who do, the most common symptoms include feeling tired/weak (especially when exercising), headache, and irritability. There are many causes of anemia, such as not consuming enough iron, blood loss, decreased absorption of iron, and long term health conditions. Please call your doctor if you experience any of the symptoms above.      SECONDARY DISCHARGE DIAGNOSES  Diagnosis: Acute kidney injury superimposed on CKD  Assessment and Plan of Treatment: Acute kidney injury (CLOTILDE) is when the kidneys suddenly stop working properly. Normally, the kidneys filter the blood and remove waste and excess salt and water. There are different causes of an CLOTILDE. It can happen when less blood than usual flows to the kidneys, when the kidney itself gets damaged, or when the path the urine takes to leave the body is blocked. Some people do not have any symptoms t first. People who are in the hospital might learn that they have an CLOTILDE after they had blood tests done for another reason. When people do have symptoms, the symptoms can include urinating less (or not urinating at all), blood in the urine (or urine that is red or brown in color), swelling (espeically in the legs or feet), vomiting, feeling weak, or acting confused. Call your doctor or nurse if you develop any of the symptoms mentioned above.     PRINCIPAL DISCHARGE DIAGNOSIS  Diagnosis: Anemia  Assessment and Plan of Treatment: Anemia is the medical term for when a person has too few red blood cells. Red blood cells are cells in your blood that carry oxygen. If you have too few red blood cells, your body might not get all the oxygen it needs. Many people with anemia have no symptoms. Of those who do, the most common symptoms include feeling tired/weak (especially when exercising), headache, and irritability. There are many causes of anemia, such as not consuming enough iron, blood loss, decreased absorption of iron, and long term health conditions. Please call your doctor if you experience any of the symptoms above. You had a study dont by our gastroenterology team. Please make an appointment with the number we provided in 1-2 weeks.      SECONDARY DISCHARGE DIAGNOSES  Diagnosis: Acute kidney injury superimposed on CKD  Assessment and Plan of Treatment: Acute kidney injury (CLOTILDE) is when the kidneys suddenly stop working properly. Normally, the kidneys filter the blood and remove waste and excess salt and water. There are different causes of an CLOTILDE. It can happen when less blood than usual flows to the kidneys, when the kidney itself gets damaged, or when the path the urine takes to leave the body is blocked. Some people do not have any symptoms t first. People who are in the hospital might learn that they have an CLOTILDE after they had blood tests done for another reason. When people do have symptoms, the symptoms can include urinating less (or not urinating at all), blood in the urine (or urine that is red or brown in color), swelling (espeically in the legs or feet), vomiting, feeling weak, or acting confused. Call your doctor or nurse if you develop any of the symptoms mentioned above.

## 2021-10-29 NOTE — PROGRESS NOTE ADULT - SUBJECTIVE AND OBJECTIVE BOX
SUBJECTIVE: Patient seen and examined bedside. Denies chest pain or Sob. no abdominal pain and no bowel movement. he is passing gas.     amLODIPine   Tablet 10 milliGRAM(s) Oral daily  carvedilol 12.5 milliGRAM(s) Oral every 12 hours  hydrALAZINE 100 milliGRAM(s) Oral every 8 hours      Vital Signs Last 24 Hrs  T(C): 36.8 (29 Oct 2021 06:09), Max: 36.8 (29 Oct 2021 06:09)  T(F): 98.2 (29 Oct 2021 06:09), Max: 98.2 (29 Oct 2021 06:09)  HR: 72 (29 Oct 2021 06:09) (62 - 72)  BP: 148/65 (29 Oct 2021 06:09) (148/65 - 166/67)  BP(mean): --  RR: 18 (29 Oct 2021 06:09) (17 - 18)  SpO2: 98% (29 Oct 2021 06:09) (96% - 98%)  I&O's Detail    28 Oct 2021 07:01  -  29 Oct 2021 07:00  --------------------------------------------------------  IN:  Total IN: 0 mL    OUT:    Voided (mL): 500 mL  Total OUT: 500 mL    Total NET: -500 mL          General: NAD, resting comfortably in bed  C/V: NSR  Pulm: Nonlabored breathing, no respiratory distress  Abd: soft, NT/ND.  Extrem: WWP, no edema, SCDs in place        LABS:                        10.4   4.86  )-----------( 224      ( 29 Oct 2021 08:23 )             35.0     10-29    143  |  105  |  52<H>  ----------------------------<  76  3.9   |  22  |  3.34<H>    Ca    9.1      29 Oct 2021 08:23  Phos  4.2     10-29  Mg     2.4     10-29    TPro  6.7  /  Alb  3.9  /  TBili  0.8  /  DBili  x   /  AST  14  /  ALT  16  /  AlkPhos  140<H>  10-29    PT/INR - ( 29 Oct 2021 08:23 )   PT: 14.4 sec;   INR: 1.21          PTT - ( 29 Oct 2021 08:23 )  PTT:43.7 sec      RADIOLOGY & ADDITIONAL STUDIES:

## 2021-10-29 NOTE — DISCHARGE NOTE PROVIDER - NSDCMRMEDTOKEN_GEN_ALL_CORE_FT
allopurinol 100 mg oral tablet: 1 tab(s) orally once a day  amLODIPine 10 mg oral tablet: 1 tab(s) orally once a day  atorvastatin 80 mg oral tablet: 1 tab(s) orally once a day  Coreg 12.5 mg oral tablet: 12.5 milligram(s) orally 3 times a day  furosemide 40 mg oral tablet: 1 tab(s) orally once a day  hydrALAZINE 100 mg oral tablet: 1 tab(s) orally 3 times a day  levothyroxine 50 mcg (0.05 mg) oral tablet: 1 tab(s) orally once a day

## 2021-10-29 NOTE — PROGRESS NOTE ADULT - PROBLEM SELECTOR PLAN 7
Fluids: 1 unit of blood in ED  Electrolytes: Mg>2, K>4  Nutrition:  No IVF currently needed, replete lytes PRN  Prophylaxis:   Activity: AAT, OOBTC  GI: none  C: FC  Dispo: Admit to [facility] reducible. w.o signs of incarceration. surgical scar well healed   -will monitor

## 2021-10-29 NOTE — PROGRESS NOTE ADULT - PROBLEM SELECTOR PLAN 9
Fluids: 1 unit of blood in ED, s/p 250ml NS bolus   Electrolytes: Mg>2, K>4  Nutrition:  DASH/TLC   Prophylaxis: protonix   Activity: AAT, OOBTC  GI: none  C: FC  Dispo: Admit to [facility]

## 2021-10-29 NOTE — PROGRESS NOTE ADULT - ASSESSMENT
66yoM PMH HTN, DM, CKD, Hypothyroidism, severe aortic stenosis, HORACE, R inguinal hernia, s/p small bowel resection for AVM on 9/13 p/w generalized weakness and fatigue admitted for symptomatic anemia and CLOTILDE 66yoM PMH HTN, preDM, CKD, Hypothyroidism, severe aortic stenosis, HORACE, R inguinal hernia, s/p small bowel resection for AVM on 9/13 p/w generalized weakness and fatigue admitted for symptomatic anemia and CLOTILDE

## 2021-10-29 NOTE — DISCHARGE NOTE PROVIDER - PROVIDER TOKENS
PROVIDER:[TOKEN:[[id number]],FOLLOWUP:[1 week]],FREE:[LAST:[Raul],FIRST:[Sarah],PHONE:[phone]],FAX:[(   )    -],FOLLOWUP:[1 week]]

## 2021-10-29 NOTE — DISCHARGE NOTE PROVIDER - CARE PROVIDER_API CALL
Suhas Reed J  GASTROENTEROLOGY  [address] [address]  Phone: [phone]  Fax: [phone]  Follow Up Time: 1 week    Sarah Carter  Phone: [phone]  Fax: (   )    -  Follow Up Time: 1 week

## 2021-10-29 NOTE — PROGRESS NOTE ADULT - ASSESSMENT
66yoM PMH HTN, CKD, Hypothyroidism, HORACE, R inguinal hernia, s/p small bowel resection for AVM on 9/13 by gen surgery Dr Grady (discharged w/ Hg of 7). GI consulted for anemia.     #Anemia  - normocytic in nature, iron studies from April are consistent with HORACE  - brown stool on exam  - Hgb stable from last month's admission, but did not significantly improve   - undergoing video capsule endoscopy today, will read over the weekend and assess role of enteroscopy     Kae Mcfarland MD  PGY-5, Gastroenterology Fellow  pager: [phone]

## 2021-10-29 NOTE — DISCHARGE NOTE PROVIDER - HOSPITAL COURSE
66yoM PMH HTN, preDM, CKD, Hypothyroidism, severe aortic stenosis, HORACE, R inguinal hernia, s/p small bowel resection for AVM on 9/13 p/w generalized weakness and fatigue admitted for symptomatic anemia and CLOTILDE     Problem/Plan - 1:  ·  Problem: Symptomatic anemia.   ·  Plan: Pt p/w Hg of 7.1 w/ known hx of CKD., HORACE and AVM s/p sm bowel resection on 9/13. Last hg 7.2 9/17/21 upon discharge after resection. p/w generalized weakness/COELHO, found to have Hg of <7 by outpatient hematologist. No obvious signs of active bleeding. patient denies melena, BPR, hematochezia, hematemesis. RAINE in ED w brown stool.  Patient responded well to transfusion of 1 unit of blood with symptomatic relief, Hgb from 7.1->8.5 and Hct from 23.6->29.6  -patient seen by surgery in the ED, recs GI consult for possible scope  -GI consulted, f/u recs    -transfuse for hg <7  -keep active T and screen  -monitor for signs of bleed  -PPI BID  - F/U small bowel capsule today 10/29/21  - advance diet as indicated.       Problem/Plan - 2:  ·  Problem: CHF (congestive heart failure).  ·  Plan: On home coreg 12.5 TID, amlodipine 10mg qd, hydralazine 100mg qd, atorvastatin 80 mg, furosemide 40 mg qd   - TTE 10/28: LVEF 60-65%. Normal LV and RV size and systolic function. Grade II LV diastolic dysfunction. Biatrial enlargement. Mild TR. Pulmonary HTN (43 mmHg). Severely dilated LA. Small pericardial effusion without echocardiographic evidence of cardiac tamponade physiology.  - proBNP 5261(10/28)  - Trop 10/27-10/28 0.06 x2  - started on coreg 12.5 BID, amlodipine 10mg qd, hydralazine 100 q 8 hrs      #B/L LE edema  -pt p/w b/l LE edema and generalized weakness/COELHO w/ elevated trop (EKG w.o ST changes). although found to have low Hg on admission and being treated for symptomatic anemia cannot r/o cardiac etiology vs DVT (non tender non erythematous)  - BL LE doopplers negative for DVT  -swelling consistent with CHF.       Problem/Plan - 3:  ·  Problem: Elevated troponin.  ·  Plan: p/w trop of 0.06. denies cp, sob. no hx of CAD, CHF or previous MI. likely related to underlying CKD and demand ischemia in setting of symptomatic anemia. EKG w. known RBBB, no st changes. likely type II MI.  - Trop 10/27-10/28 0.06 x2, likely elevated 2/2 CLOTILDE over CKD.       Problem/Plan - 4:  ·  Problem: CLOTILDE (acute kidney injury).  ·  Plan: #CLOTILDE on CKD  Patient with known Cr level >1.5 from baseline. Patient with known Cr baseline of 2 admitted with Cr at 4.21, likely pre renal in setting of blood loss. bun/cr on admission 72/4.21, now 62/3.71 (10/28) after transfusion of 1 unit of blood likely indicative of pre-renal cause of CLOTILDE  -hold allopurinol and furosemide for now    -f/u urine lytes  -transfuse if hg <7  -trend Cr  - s/p 250 cc bolus NS.       Problem/Plan - 5:  ·  Problem: HTN (hypertension).  ·  Plan: pt w pmh of HTN on furosemide 40mg qd, hydralazine 100mg TID  - c/w hydralazine 100 mg PO TID.       Problem/Plan - 6:  ·  Problem: Hypothyroidism.  ·  Plan: -hx of hypothyroidism. TSH levels 5.290 with T4 7.94. Patient takes levothyroxine 50 mcg QD for hypothyroidism.  - TSH 5.29 elevated, free T4 7.94 WNL 10/28  - c/w Levothyroxine 50 mcg QD.       Problem/Plan - 7:  ·  Problem: Unilateral inguinal hernia without obstruction or gangrene, recurrence not specified.  ·  Plan: reducible. w.o signs of incarceration. surgical scar well healed   -will monitor.       Problem/Plan - 8:  ·  Problem: Prediabetes.   ·  Plan: Patient with history of elevated A1C 8.5 on 09/15/21.   - A1c 4.9 10/29  - ISS  - Regular diet.   66yoM PMH HTN, preDM, CKD, Hypothyroidism, severe aortic stenosis, HORACE, R inguinal hernia, s/p small bowel resection for AVM on 9/13 p/w generalized weakness and fatigue admitted for symptomatic anemia and CLOTILDE    #Symptomatic anemia.   P/w Hg of 7.1 w/ known hx of CKD., HORACE and AVM s/p sm bowel resection on 9/13. Last hg 7.2 9/17/21 upon discharge after resection. p/w generalized weakness/COELHO, found to have Hg of <7 by outpatient hematologist. No obvious signs of active bleeding. patient denies melena, BPR, hematochezia, hematemesis. RAINE in ED w brown stool.  Patient responded well to transfusion of 1 unit of blood with symptomatic relief, Hgb from 7.1->8.5 and Hct from 23.6->29.6  - f/u capsule endoscopy from 10/29/2021  - PPI      #CLOTILDE on CKD  Patient with known Cr level >1.5 from baseline. Patient with known Cr baseline of 2 admitted with Cr at 4.21, likely pre renal in setting of blood loss. bun/cr on admission 72/4.21, now 62/3.71 (10/28) after transfusion of 1 unit of blood likely indicative of pre-renal cause of CLOTILDE  - encouraged increased oral hydration      #CHF (congestive heart failure).  On home coreg 12.5 TID, amlodipine 10mg qd, hydralazine 100mg qd, atorvastatin 80 mg, furosemide 40 mg qd   - TTE 10/28: LVEF 60-65%. Normal LV and RV size and systolic function. Grade II LV diastolic dysfunction. Biatrial enlargement. Mild TR. Pulmonary HTN (43 mmHg). Severely dilated LA. Small pericardial effusion without echocardiographic evidence of cardiac tamponade physiology.  - continue to monitor BP and volume status   - c/w coreg 12.5 BID      #HTN (hypertension).  - c/w amlodipine 10mg qd  - c/w hydralazine 100 mg PO TID.      #Hypothyroidism.  Hx of hypothyroidism. TSH levels 5.290 with T4 7.94. Patient takes levothyroxine 50 mcg QD for hypothyroidism.  - TSH 5.29 elevated, free T4 7.94 WNL 10/28  - c/w Levothyroxine 50 mcg QD.

## 2021-10-29 NOTE — PROGRESS NOTE ADULT - PROBLEM SELECTOR PLAN 6
reducible. w.o signs of incarceration. surgical scar well healed   -will monitor -hx of hypothyroidism. TSH levels 5.290 with T4 7.94. Patient takes levothyroxine 50 mcg QD for hypothyroidism.  - TSH 5.29 elevated, free T4 7.94 WNL 10/28  - c/w Levothyroxine 50 mcg QD

## 2021-10-29 NOTE — PROGRESS NOTE ADULT - PROBLEM SELECTOR PLAN 4
pt w pmh of HTN on furosemide 40mg qd, hydralazine 100mg TID  - c/w hydralazine 100 mg PO TID #CLOTILDE on CKD  Patient with known Cr level >1.5 from baseline. Patient with known Cr baseline of 2 admitted with Cr at 4.21, likely pre renal in setting of blood loss. bun/cr on admission 72/4.21, now 62/3.71 (10/28) after transfusion of 1 unit of blood likely indicative of pre-renal cause of CLOTILDE  -hold allopurinol and furosemide for now    -f/u urine lytes  -transfuse if hg <7  -trend Cr  - s/p 250 cc bolus NS

## 2021-10-29 NOTE — PROGRESS NOTE ADULT - PROBLEM SELECTOR PLAN 3
#CLOTILDE on CKD  Patient with known Cr level >1.5 from baseline. Patient with known Cr baseline of 2 admitted with Cr at 4.21, likely pre renal in setting of blood loss. bun/cr on admission 72/4.21, now 62/3.71 (10/28) after transfusion of 1 unit of blood likely indicative of pre-renal cause of CLOTILDE  -hold allopurinol and furosemide for now    -f/u urine lytes  -transfuse if hg <7  -trend Cr p/w trop of 0.06. denies cp, sob. no hx of CAD, CHF or previous MI. likely related to underlying CKD and demand ischemia in setting of symptomatic anemia. EKG w. known RBBB, no st changes. likely type II MI.  - Trop 10/27-10/28 0.06 x2, likely elevated 2/2 CLOTILDE over CKD

## 2021-10-29 NOTE — PROGRESS NOTE ADULT - ASSESSMENT
67 yo M pmh of HTN, DM, CKD, hypothyroid, severe aortic stenosis, HORACE, SP small bowel resection for AVM on 9/13 by Dr. Grady and push enteroscopy by Dr. Marlow presented to the ED for symptomatic anemia on 7.1 and creatinine of 4.21 from a baseline of 2.7.     Patient hemodynamically stable, no episode of GI bleed. Hgb today 10.4 and creatinine decreasing.     - Per GI patient will undergo GI capsule today, we will follow the results   - Blood transfusion as indicated  - Continue care per primary team  - Team 1C will follow    Patient discussed with chief and Dr. Grady

## 2021-10-30 ENCOUNTER — TRANSCRIPTION ENCOUNTER (OUTPATIENT)
Age: 66
End: 2021-10-30

## 2021-10-30 VITALS
SYSTOLIC BLOOD PRESSURE: 162 MMHG | OXYGEN SATURATION: 96 % | RESPIRATION RATE: 17 BRPM | HEART RATE: 61 BPM | TEMPERATURE: 98 F | DIASTOLIC BLOOD PRESSURE: 74 MMHG

## 2021-10-30 LAB
GLUCOSE BLDC GLUCOMTR-MCNC: 107 MG/DL — HIGH (ref 70–99)
GLUCOSE BLDC GLUCOMTR-MCNC: 117 MG/DL — HIGH (ref 70–99)

## 2021-10-30 PROCEDURE — 99232 SBSQ HOSP IP/OBS MODERATE 35: CPT

## 2021-10-30 RX ADMIN — AMLODIPINE BESYLATE 10 MILLIGRAM(S): 2.5 TABLET ORAL at 05:48

## 2021-10-30 RX ADMIN — Medication 100 MILLIGRAM(S): at 05:47

## 2021-10-30 RX ADMIN — PANTOPRAZOLE SODIUM 40 MILLIGRAM(S): 20 TABLET, DELAYED RELEASE ORAL at 05:48

## 2021-10-30 RX ADMIN — Medication 100 MILLIGRAM(S): at 13:01

## 2021-10-30 RX ADMIN — Medication 50 MICROGRAM(S): at 05:48

## 2021-10-30 RX ADMIN — CARVEDILOL PHOSPHATE 12.5 MILLIGRAM(S): 80 CAPSULE, EXTENDED RELEASE ORAL at 05:47

## 2021-10-30 NOTE — PROGRESS NOTE ADULT - PROBLEM SELECTOR PLAN 8
Patient with history of elevated A1C 8.5 on 09/15/21.   - A1c 4.9 10/29
Patient with history of elevated A1C 8.5 on 09/15/21.   - A1c 4.9 10/29  - ISS  - Regular diet

## 2021-10-30 NOTE — PROGRESS NOTE ADULT - ASSESSMENT
per Internal Medicine    66 yoM PMH HTN, preDM, CKD, Hypothyroidism, severe aortic stenosis, HORACE, R inguinal hernia, s/p small bowel resection for AVM on 9/13 p/w generalized weakness and fatigue admitted for symptomatic anemia and CLOTILDE    Problem/Plan - 1:  ·  Problem: Symptomatic anemia.   ·  Plan: Pt p/w Hg of 7.1 w/ known hx of CKD., HORACE and AVM s/p sm bowel resection on 9/13. Last hg 7.2 9/17/21 upon discharge after resection. p/w generalized weakness/COELHO, found to have Hg of <7 by outpatient hematologist. No obvious signs of active bleeding. patient denies melena, BPR, hematochezia, hematemesis. RAINE in ED w brown stool.  Patient responded well to transfusion of 1 unit of blood with symptomatic relief, Hgb from 7.1->8.5 and Hct from 23.6->29.6  -patient seen by surgery in the ED, recs GI consult for possible scope  -GI consulted, f/u recs    -transfuse for hg <7  -keep active T and screen  -monitor for signs of bleed  -PPI BID  - F/U small bowel capsule today 10/29/21  - advance diet as indicated.    Problem/Plan - 2:  ·  Problem: CHF (congestive heart failure).   ·  Plan: On home coreg 12.5 TID, amlodipine 10mg qd, hydralazine 100mg qd, atorvastatin 80 mg, furosemide 40 mg qd   - TTE 10/28: LVEF 60-65%. Normal LV and RV size and systolic function. Grade II LV diastolic dysfunction. Biatrial enlargement. Mild TR. Pulmonary HTN (43 mmHg). Severely dilated LA. Small pericardial effusion without echocardiographic evidence of cardiac tamponade physiology.  - proBNP 5261(10/28)  - Trop 10/27-10/28 0.06 x2  - started on coreg 12.5 BID, amlodipine 10mg qd, hydralazine 100 q 8 hrs      #B/L LE edema  -pt p/w b/l LE edema and generalized weakness/COELHO w/ elevated trop (EKG w.o ST changes). although found to have low Hg on admission and being treated for symptomatic anemia cannot r/o cardiac etiology vs DVT (non tender non erythematous)  - BL LE doopplers negative for DVT  -swelling consistent with CHF.    Problem/Plan - 3:  ·  Problem: Elevated troponin.   ·  Plan: p/w trop of 0.06. denies cp, sob. no hx of CAD, CHF or previous MI. likely related to underlying CKD and demand ischemia in setting of symptomatic anemia. EKG w. known RBBB, no st changes. likely type II MI.  - Trop 10/27-10/28 0.06 x2, likely elevated 2/2 CLOTILDE over CKD.    Problem/Plan - 4:  ·  Problem: CLOTILDE (acute kidney injury).   ·  Plan: #CLOTILDE on CKD  Patient with known Cr level >1.5 from baseline. Patient with known Cr baseline of 2 admitted with Cr at 4.21, likely pre renal in setting of blood loss. bun/cr on admission 72/4.21, now 62/3.71 (10/28) after transfusion of 1 unit of blood likely indicative of pre-renal cause of CLOTILDE  -hold allopurinol and furosemide for now    -f/u urine lytes  -transfuse if hg <7  -trend Cr  - s/p 250 cc bolus NS.    Problem/Plan - 5:  ·  Problem: HTN (hypertension).   ·  Plan: pt w pmh of HTN on furosemide 40mg qd, hydralazine 100mg TID  - c/w hydralazine 100 mg PO TID.    Problem/Plan - 6:  ·  Problem: Hypothyroidism.   ·  Plan: -hx of hypothyroidism. TSH levels 5.290 with T4 7.94. Patient takes levothyroxine 50 mcg QD for hypothyroidism.  - TSH 5.29 elevated, free T4 7.94 WNL 10/28  - c/w Levothyroxine 50 mcg QD.    Problem/Plan - 7:  ·  Problem: Unilateral inguinal hernia without obstruction or gangrene, recurrence not specified.   ·  Plan: reducible. w.o signs of incarceration. surgical scar well healed   -will monitor.    Problem/Plan - 8:  ·  Problem: Prediabetes.   ·  Plan: Patient with history of elevated A1C 8.5 on 09/15/21.   - A1c 4.9 10/29  - ISS  - Regular diet.    Problem/Plan - 9:  ·  Problem: Nutrition, metabolism, and development symptoms.   ·  Plan: Fluids: 1 unit of blood in ED, s/p 250ml NS bolus   Electrolytes: Mg>2, K>4  Nutrition:  DASH/TLC   Prophylaxis: protonix   Activity: AAT, OOBTC  GI: none  C: FC  Dispo: Admit to F.

## 2021-10-30 NOTE — PROGRESS NOTE ADULT - SUBJECTIVE AND OBJECTIVE BOX
Medicine Progress Note    Patient is a 66y old  Male who presents with a chief complaint of symptomatic anemia and CLOTILDE (29 Oct 2021 08:46)      SUBJECTIVE / OVERNIGHT EVENTS:    ADDITIONAL REVIEW OF SYSTEMS:    MEDICATIONS  (STANDING):  amLODIPine   Tablet 10 milliGRAM(s) Oral daily  atorvastatin 80 milliGRAM(s) Oral at bedtime  carvedilol 12.5 milliGRAM(s) Oral every 12 hours  dextrose 40% Gel 15 Gram(s) Oral once  dextrose 5%. 1000 milliLiter(s) (50 mL/Hr) IV Continuous <Continuous>  dextrose 50% Injectable 25 Gram(s) IV Push once  glucagon  Injectable 1 milliGRAM(s) IntraMuscular once  hydrALAZINE 100 milliGRAM(s) Oral every 8 hours  insulin lispro (ADMELOG) corrective regimen sliding scale   SubCutaneous Before meals and at bedtime  levothyroxine 50 MICROGram(s) Oral daily  pantoprazole  Injectable 40 milliGRAM(s) IV Push every 12 hours    MEDICATIONS  (PRN):  acetaminophen     Tablet .. 650 milliGRAM(s) Oral every 6 hours PRN Temp greater or equal to 38C (100.4F), Mild Pain (1 - 3)  melatonin 3 milliGRAM(s) Oral at bedtime PRN Insomnia  ondansetron Injectable 4 milliGRAM(s) IV Push every 8 hours PRN Nausea and/or Vomiting    CAPILLARY BLOOD GLUCOSE      POCT Blood Glucose.: 117 mg/dL (30 Oct 2021 12:25)  POCT Blood Glucose.: 107 mg/dL (30 Oct 2021 08:33)  POCT Blood Glucose.: 140 mg/dL (29 Oct 2021 22:00)  POCT Blood Glucose.: 194 mg/dL (29 Oct 2021 17:01)    I&O's Summary      PHYSICAL EXAM:  Vital Signs Last 24 Hrs  T(C): 36.6 (30 Oct 2021 12:42), Max: 37 (29 Oct 2021 21:45)  T(F): 97.9 (30 Oct 2021 12:42), Max: 98.6 (29 Oct 2021 21:45)  HR: 61 (30 Oct 2021 12:42) (61 - 67)  BP: 162/74 (30 Oct 2021 12:42) (160/64 - 163/74)  BP(mean): 104 (29 Oct 2021 21:45) (104 - 104)  RR: 17 (30 Oct 2021 12:42) (17 - 18)  SpO2: 96% (30 Oct 2021 12:42) (96% - 97%)  CONSTITUTIONAL: NAD, well-developed, well-groomed  ENMT: Moist oral mucosa, no pharyngeal injection or exudates; normal dentition  RESPIRATORY: Normal respiratory effort; lungs are clear to auscultation bilaterally  CARDIOVASCULAR: Regular rate and rhythm, normal S1 and S2, no murmur/rub/gallop; No lower extremity edema; Peripheral pulses are 2+ bilaterally  ABDOMEN: Nontender to palpation, normoactive bowel sounds, no rebound/guarding; No hepatosplenomegaly  PSYCH: A+O to person, place, and time; affect appropriate  NEUROLOGY: CN 2-12 are intact and symmetric; no gross sensory deficits   SKIN: No rashes; no palpable lesions    LABS:                        10.4   4.86  )-----------( 224      ( 29 Oct 2021 08:23 )             35.0     10-29    144  |  109<H>  |  46<H>  ----------------------------<  150<H>  4.4   |  23  |  3.29<H>    Ca    8.8      29 Oct 2021 19:12  Phos  4.2     10-29  Mg     2.4     10-29    TPro  6.8  /  Alb  4.2  /  TBili  0.8  /  DBili  x   /  AST  15  /  ALT  16  /  AlkPhos  142<H>  10-29    PT/INR - ( 29 Oct 2021 08:23 )   PT: 14.4 sec;   INR: 1.21          PTT - ( 29 Oct 2021 08:23 )  PTT:43.7 sec  CARDIAC MARKERS ( 28 Oct 2021 17:00 )  x     / 0.06 ng/mL / x     / x     / x              COVID-19 PCR: Negative (27 Oct 2021 14:29)  COVID-19 PCR: NotDetec (13 Sep 2021 18:50)  COVID-19 PCR: Negative (16 Jun 2021 21:17)      RADIOLOGY & ADDITIONAL TESTS:  Imaging from Last 24 Hours:    Electrocardiogram/QTc Interval:    COORDINATION OF CARE:  Care Discussed with Consultants/Other Providers:

## 2021-10-30 NOTE — PROGRESS NOTE ADULT - ATTENDING COMMENTS
Plan as above
agree with assessment and plan as documented by resident.  --h/h with appropriate response after 1u prbc  --f/u iron studies  --f/u gi recommendations for possible scope for hx of AVM  --f/u echo for peripheral edema and enlarged heart on xray iso elevated bnp   --tsh elevated, t4 wnl - continue current synthroid dose f/u outpatient repeat TFTs 4-6 weeks    PT eval for dispo
agree with assessment and plan as documented by resident.   --plan for capsule endoscopy today per GI team  --CLOTILDE c/w prerenal - will give IVF   --likely d/c home tomorrow pending improvement in CLOTILDE and GI recs

## 2021-10-30 NOTE — PROGRESS NOTE ADULT - SUBJECTIVE AND OBJECTIVE BOX
Physical Medicine and Rehabilitation Progress Note:    Patient is a 66y old  Male who presents with a chief complaint of symptomatic anemia and CLOTILDE (29 Oct 2021 08:46)      HPI:  HPI: 66yoM PMH HTN, CKD, Hypothyroidism, HORACE, R inguinal hernia, s/p small bowel resection for AVM on 9/13 by gen surgery Dr Grady (discharged w/ Hg of 7). Presenting to ED for generalized weakness and fatigue, sent in by hematologist/oncologist Dr. Sima Romero, for low hemoglobin lab value (reports below hgb 7) and for blood transfusion. Patient states he generally feels well. He feels weak/COELHO after walking several feet or climbing up stairs w/o chest pain. Denies Hx of MI, CAD or CHF. Denies hematuria, bloody stool, epitaxies, sob, cp, abd. pain, fever, chills.   states his last BM was within the past 24 hours and light brown in color.   -ED rectal exam w/ brown stool    In the ED:  Initial vital signs: T: 98.4F, HR: 70, BP: 137/55, R: 18, SpO2: 97% on RA  Labs: significant for wbc wnl, Hg 7.1 (7.2 9/17/21), plt wnl, pt/ptt/inr 15.1/40.3/1.27, lytes wnl, bun cr 72/4.21 (from 38/2.72 9/17/21), alk phos 153, trop 0.06. covid negative   Imaging:  CXR: No acute cardiopulmonary disease process. Cardiomegaly.  EKG: NSR, RBBB (on previous ekg) - qtc 501  Medications: none   Consults: surgery, heme/onc, GI     (27 Oct 2021 20:26)                            10.4   4.86  )-----------( 224      ( 29 Oct 2021 08:23 )             35.0       10-29    144  |  109<H>  |  46<H>  ----------------------------<  150<H>  4.4   |  23  |  3.29<H>    Ca    8.8      29 Oct 2021 19:12  Phos  4.2     10-29  Mg     2.4     10-29    TPro  6.8  /  Alb  4.2  /  TBili  0.8  /  DBili  x   /  AST  15  /  ALT  16  /  AlkPhos  142<H>  10-29    Vital Signs Last 24 Hrs  T(C): 36.6 (30 Oct 2021 06:38), Max: 37 (29 Oct 2021 21:45)  T(F): 97.8 (30 Oct 2021 06:38), Max: 98.6 (29 Oct 2021 21:45)  HR: 65 (30 Oct 2021 06:38) (65 - 68)  BP: 160/64 (30 Oct 2021 06:38) (155/66 - 163/74)  BP(mean): 104 (29 Oct 2021 21:45) (104 - 104)  RR: 18 (30 Oct 2021 06:38) (18 - 18)  SpO2: 97% (30 Oct 2021 06:38) (96% - 98%)    MEDICATIONS  (STANDING):  amLODIPine   Tablet 10 milliGRAM(s) Oral daily  atorvastatin 80 milliGRAM(s) Oral at bedtime  carvedilol 12.5 milliGRAM(s) Oral every 12 hours  dextrose 40% Gel 15 Gram(s) Oral once  dextrose 5%. 1000 milliLiter(s) (50 mL/Hr) IV Continuous <Continuous>  dextrose 50% Injectable 25 Gram(s) IV Push once  glucagon  Injectable 1 milliGRAM(s) IntraMuscular once  hydrALAZINE 100 milliGRAM(s) Oral every 8 hours  insulin lispro (ADMELOG) corrective regimen sliding scale   SubCutaneous Before meals and at bedtime  levothyroxine 50 MICROGram(s) Oral daily  pantoprazole  Injectable 40 milliGRAM(s) IV Push every 12 hours    MEDICATIONS  (PRN):  acetaminophen     Tablet .. 650 milliGRAM(s) Oral every 6 hours PRN Temp greater or equal to 38C (100.4F), Mild Pain (1 - 3)  melatonin 3 milliGRAM(s) Oral at bedtime PRN Insomnia  ondansetron Injectable 4 milliGRAM(s) IV Push every 8 hours PRN Nausea and/or Vomiting    Currently Undergoing Physical/ Occupational Therapy at bedside.    Functional Status Assessment:   10/28/2021      Previous Level of Function:     · Ambulation Skills	independent  · Transfer Skills	independent  · ADL Skills	independent  · Work/Leisure Activity	independent  · Additional Comments	Pt lives in elevator apartment with wife, stairs to enter lobby but unable to quantify specifically. Reported ambulating independently without need for assistive device.    Cognitive Status Examination:   · Orientation	oriented to person, place, time and situation  · Level of Consciousness	alert  · Follows Commands and Answers Questions	100% of the time  · Personal Safety and Judgment	intact    Range of Motion Exam:   · Range of Motion Examination	Left UE ROM was WNL (within normal limits); Right UE ROM was WNL (within normal limits); R shoulder flex > L shoulder flexion 2/2 pain from carry "heavy backpack"  · Active Range of Motion Examination	Left UE Active ROM was WNL (within normal limits); Right UE Active ROM was WNL (within normal limits)    Manual Muscle Testing:   · Manual Muscle Testing Results	Grossly 4/5 throughout all BUE and BLE    Bed Mobility: Rolling/Turning:     · Level of Mobile	independent    Bed Mobility: Scooting/Bridging:     · Level of Mobile	independent    Bed Mobility: Sit to Supine:     · Level of Mobile	independent    Bed Mobility: Supine to Sit:     · Level of Mobile	independent    Transfer: Sit to Stand:     · Level of Mobile	independent; Pt with adequate strength to perform transfer. Maintains normal balance in initial stand.  · Weight-Bearing Restrictions	full weight-bearing    Transfer: Stand to Sit:     · Level of Mobile	independent; Pt displays good eccentric control performing transfer back to EOB.    Gait Skills:     · Level of Mobile	independent  · Gait Distance	200 feet    Stair Negotiation:     · Level of Mobile	independent; 10 steps ascending, 10 steps descending in reciprocal pattern    Balance Skills Assessment:     · Sitting Balance: Static	normal balance  · Sitting Balance: Dynamic	normal balance  · Sit-to-Stand Balance	normal balance  · Standing Balance: Static	normal balance  · Standing Balance: Dynamic	normal balance        PM&R Impression: as above    Current Disposition Plan Recommendations:   d/c home with no post discharge rehab needs

## 2021-10-30 NOTE — PROGRESS NOTE ADULT - PROBLEM SELECTOR PLAN 6
-hx of hypothyroidism. TSH levels 5.290 with T4 7.94. Patient takes levothyroxine 50 mcg QD for hypothyroidism.  - TSH 5.29 elevated, free T4 7.94 WNL 10/28  - c/w Levothyroxine 50 mcg QD

## 2021-10-30 NOTE — PROGRESS NOTE ADULT - PROBLEM SELECTOR PLAN 1
Hb/hct stable   s/p transfusion  -PPI BID  - F/U small bowel capsule results  - advance diet as indicated    Keep Hb high due to underlying CAD

## 2021-10-30 NOTE — PROGRESS NOTE ADULT - PROVIDER SPECIALTY LIST ADULT
Gastroenterology
Colorectal Surgery
Colorectal Surgery
Rehab Medicine
Colorectal Surgery
Internal Medicine
Internal Medicine
Hospitalist

## 2021-10-30 NOTE — PROGRESS NOTE ADULT - PROBLEM SELECTOR PLAN 2
- TTE 10/28: LVEF 60-65%. Normal LV and RV size and systolic function. Grade II LV diastolic dysfunction. Biatrial enlargement. Mild TR. Pulmonary HTN (43 mmHg). Severely dilated LA. Small pericardial effusion without echocardiographic evidence of cardiac tamponade physiology.    cont coreg 12.5 BID, amlodipine 10mg qd, hydralazine 100 q 8 hrs,atorvastatin 80 mg, furosemide 40 mg qd

## 2021-10-30 NOTE — PROGRESS NOTE ADULT - SUBJECTIVE AND OBJECTIVE BOX
c/o being in hospital.  Clinically well, no blood per rectum  AFVSS  Benign abdomen    hgb 10    Imp: Anemia, s/p small bowel resection for AVM.  Rec:  Awaiting capsule study results to determine if enteroscopy is needed.

## 2021-10-30 NOTE — DISCHARGE NOTE NURSING/CASE MANAGEMENT/SOCIAL WORK - PATIENT PORTAL LINK FT
You can access the FollowMyHealth Patient Portal offered by [facility] by registering at the following website: [email or web address]. By joining [employer]’s FollowMyHealth portal, you will also be able to view your health information using other applications (apps) compatible with our system.

## 2021-11-04 DIAGNOSIS — I25.10 ATHEROSCLEROTIC HEART DISEASE OF NATIVE CORONARY ARTERY WITHOUT ANGINA PECTORIS: ICD-10-CM

## 2021-11-04 DIAGNOSIS — D64.9 ANEMIA, UNSPECIFIED: ICD-10-CM

## 2021-11-04 DIAGNOSIS — Z90.49 ACQUIRED ABSENCE OF OTHER SPECIFIED PARTS OF DIGESTIVE TRACT: ICD-10-CM

## 2021-11-04 DIAGNOSIS — K40.91 UNILATERAL INGUINAL HERNIA, WITHOUT OBSTRUCTION OR GANGRENE, RECURRENT: ICD-10-CM

## 2021-11-04 DIAGNOSIS — N18.9 CHRONIC KIDNEY DISEASE, UNSPECIFIED: ICD-10-CM

## 2021-11-04 DIAGNOSIS — I51.7 CARDIOMEGALY: ICD-10-CM

## 2021-11-04 DIAGNOSIS — R73.03 PREDIABETES: ICD-10-CM

## 2021-11-04 DIAGNOSIS — D50.0 IRON DEFICIENCY ANEMIA SECONDARY TO BLOOD LOSS (CHRONIC): ICD-10-CM

## 2021-11-04 DIAGNOSIS — N17.9 ACUTE KIDNEY FAILURE, UNSPECIFIED: ICD-10-CM

## 2021-11-04 DIAGNOSIS — E03.9 HYPOTHYROIDISM, UNSPECIFIED: ICD-10-CM

## 2021-11-04 DIAGNOSIS — I35.0 NONRHEUMATIC AORTIC (VALVE) STENOSIS: ICD-10-CM

## 2021-11-04 DIAGNOSIS — I24.8 OTHER FORMS OF ACUTE ISCHEMIC HEART DISEASE: ICD-10-CM

## 2021-11-04 DIAGNOSIS — I50.32 CHRONIC DIASTOLIC (CONGESTIVE) HEART FAILURE: ICD-10-CM

## 2021-11-04 DIAGNOSIS — I13.0 HYPERTENSIVE HEART AND CHRONIC KIDNEY DISEASE WITH HEART FAILURE AND STAGE 1 THROUGH STAGE 4 CHRONIC KIDNEY DISEASE, OR UNSPECIFIED CHRONIC KIDNEY DISEASE: ICD-10-CM

## 2021-11-04 DIAGNOSIS — I27.20 PULMONARY HYPERTENSION, UNSPECIFIED: ICD-10-CM

## 2021-11-10 ENCOUNTER — INPATIENT (INPATIENT)
Facility: HOSPITAL | Age: 66
LOS: 12 days | Discharge: HOME | End: 2021-11-23
Attending: HOSPITALIST | Admitting: HOSPITALIST
Payer: MEDICARE

## 2021-11-10 VITALS
OXYGEN SATURATION: 98 % | HEART RATE: 67 BPM | TEMPERATURE: 99 F | RESPIRATION RATE: 16 BRPM | SYSTOLIC BLOOD PRESSURE: 122 MMHG | DIASTOLIC BLOOD PRESSURE: 61 MMHG | HEIGHT: 67 IN

## 2021-11-10 DIAGNOSIS — I35.0 NONRHEUMATIC AORTIC (VALVE) STENOSIS: ICD-10-CM

## 2021-11-10 DIAGNOSIS — Z98.890 OTHER SPECIFIED POSTPROCEDURAL STATES: Chronic | ICD-10-CM

## 2021-11-10 DIAGNOSIS — R73.03 PREDIABETES: ICD-10-CM

## 2021-11-10 DIAGNOSIS — I10 ESSENTIAL (PRIMARY) HYPERTENSION: ICD-10-CM

## 2021-11-10 DIAGNOSIS — E78.5 HYPERLIPIDEMIA, UNSPECIFIED: ICD-10-CM

## 2021-11-10 DIAGNOSIS — Z90.49 ACQUIRED ABSENCE OF OTHER SPECIFIED PARTS OF DIGESTIVE TRACT: Chronic | ICD-10-CM

## 2021-11-10 DIAGNOSIS — R07.9 CHEST PAIN, UNSPECIFIED: ICD-10-CM

## 2021-11-10 DIAGNOSIS — D64.9 ANEMIA, UNSPECIFIED: ICD-10-CM

## 2021-11-10 DIAGNOSIS — Z29.9 ENCOUNTER FOR PROPHYLACTIC MEASURES, UNSPECIFIED: ICD-10-CM

## 2021-11-10 DIAGNOSIS — E03.9 HYPOTHYROIDISM, UNSPECIFIED: ICD-10-CM

## 2021-11-10 DIAGNOSIS — N17.9 ACUTE KIDNEY FAILURE, UNSPECIFIED: ICD-10-CM

## 2021-11-10 DIAGNOSIS — I50.9 HEART FAILURE, UNSPECIFIED: ICD-10-CM

## 2021-11-10 LAB
ALBUMIN SERPL ELPH-MCNC: 4.1 G/DL — SIGNIFICANT CHANGE UP (ref 3.3–5)
ALP SERPL-CCNC: 165 U/L — HIGH (ref 40–120)
ALT FLD-CCNC: 16 U/L — SIGNIFICANT CHANGE UP (ref 4–41)
ANION GAP SERPL CALC-SCNC: 16 MMOL/L — HIGH (ref 7–14)
AST SERPL-CCNC: 16 U/L — SIGNIFICANT CHANGE UP (ref 4–40)
BASOPHILS # BLD AUTO: 0.03 K/UL — SIGNIFICANT CHANGE UP (ref 0–0.2)
BASOPHILS NFR BLD AUTO: 0.6 % — SIGNIFICANT CHANGE UP (ref 0–2)
BILIRUB SERPL-MCNC: 0.4 MG/DL — SIGNIFICANT CHANGE UP (ref 0.2–1.2)
BLOOD GAS VENOUS COMPREHENSIVE RESULT: SIGNIFICANT CHANGE UP
BUN SERPL-MCNC: 81 MG/DL — HIGH (ref 7–23)
CALCIUM SERPL-MCNC: 8.4 MG/DL — SIGNIFICANT CHANGE UP (ref 8.4–10.5)
CHLORIDE SERPL-SCNC: 98 MMOL/L — SIGNIFICANT CHANGE UP (ref 98–107)
CK MB BLD-MCNC: 2.9 % — HIGH (ref 0–2.5)
CK MB CFR SERPL CALC: 2.9 NG/ML — SIGNIFICANT CHANGE UP
CK SERPL-CCNC: 100 U/L — SIGNIFICANT CHANGE UP (ref 30–200)
CO2 SERPL-SCNC: 20 MMOL/L — LOW (ref 22–31)
CREAT SERPL-MCNC: 6.06 MG/DL — HIGH (ref 0.5–1.3)
EOSINOPHIL # BLD AUTO: 0.2 K/UL — SIGNIFICANT CHANGE UP (ref 0–0.5)
EOSINOPHIL NFR BLD AUTO: 4.3 % — SIGNIFICANT CHANGE UP (ref 0–6)
GLUCOSE SERPL-MCNC: 137 MG/DL — HIGH (ref 70–99)
HCT VFR BLD CALC: 27.5 % — LOW (ref 39–50)
HGB BLD-MCNC: 8.3 G/DL — LOW (ref 13–17)
IANC: 3.26 K/UL — SIGNIFICANT CHANGE UP (ref 1.5–8.5)
IMM GRANULOCYTES NFR BLD AUTO: 0.2 % — SIGNIFICANT CHANGE UP (ref 0–1.5)
LYMPHOCYTES # BLD AUTO: 0.71 K/UL — LOW (ref 1–3.3)
LYMPHOCYTES # BLD AUTO: 15.4 % — SIGNIFICANT CHANGE UP (ref 13–44)
MCHC RBC-ENTMCNC: 25.2 PG — LOW (ref 27–34)
MCHC RBC-ENTMCNC: 30.2 GM/DL — LOW (ref 32–36)
MCV RBC AUTO: 83.3 FL — SIGNIFICANT CHANGE UP (ref 80–100)
MONOCYTES # BLD AUTO: 0.41 K/UL — SIGNIFICANT CHANGE UP (ref 0–0.9)
MONOCYTES NFR BLD AUTO: 8.9 % — SIGNIFICANT CHANGE UP (ref 2–14)
NEUTROPHILS # BLD AUTO: 3.26 K/UL — SIGNIFICANT CHANGE UP (ref 1.8–7.4)
NEUTROPHILS NFR BLD AUTO: 70.6 % — SIGNIFICANT CHANGE UP (ref 43–77)
NRBC # BLD: 0 /100 WBCS — SIGNIFICANT CHANGE UP
NRBC # FLD: 0 K/UL — SIGNIFICANT CHANGE UP
NT-PROBNP SERPL-SCNC: HIGH PG/ML
PLATELET # BLD AUTO: 130 K/UL — LOW (ref 150–400)
POTASSIUM SERPL-MCNC: 4.8 MMOL/L — SIGNIFICANT CHANGE UP (ref 3.5–5.3)
POTASSIUM SERPL-SCNC: 4.8 MMOL/L — SIGNIFICANT CHANGE UP (ref 3.5–5.3)
PROT SERPL-MCNC: 7 G/DL — SIGNIFICANT CHANGE UP (ref 6–8.3)
RBC # BLD: 3.3 M/UL — LOW (ref 4.2–5.8)
RBC # FLD: 18.8 % — HIGH (ref 10.3–14.5)
SODIUM SERPL-SCNC: 134 MMOL/L — LOW (ref 135–145)
TROPONIN T, HIGH SENSITIVITY RESULT: 82 NG/L — CRITICAL HIGH
TROPONIN T, HIGH SENSITIVITY RESULT: 84 NG/L — CRITICAL HIGH
TROPONIN T, HIGH SENSITIVITY RESULT: 90 NG/L — CRITICAL HIGH
WBC # BLD: 4.62 K/UL — SIGNIFICANT CHANGE UP (ref 3.8–10.5)
WBC # FLD AUTO: 4.62 K/UL — SIGNIFICANT CHANGE UP (ref 3.8–10.5)

## 2021-11-10 PROCEDURE — 76770 US EXAM ABDO BACK WALL COMP: CPT | Mod: 26

## 2021-11-10 PROCEDURE — 99223 1ST HOSP IP/OBS HIGH 75: CPT

## 2021-11-10 PROCEDURE — 99285 EMERGENCY DEPT VISIT HI MDM: CPT | Mod: 25

## 2021-11-10 PROCEDURE — 71046 X-RAY EXAM CHEST 2 VIEWS: CPT | Mod: 26

## 2021-11-10 PROCEDURE — 93010 ELECTROCARDIOGRAM REPORT: CPT

## 2021-11-10 RX ORDER — HEPARIN SODIUM 5000 [USP'U]/ML
5000 INJECTION INTRAVENOUS; SUBCUTANEOUS EVERY 12 HOURS
Refills: 0 | Status: DISCONTINUED | OUTPATIENT
Start: 2021-11-10 | End: 2021-11-15

## 2021-11-10 RX ORDER — CARVEDILOL PHOSPHATE 80 MG/1
12.5 CAPSULE, EXTENDED RELEASE ORAL EVERY 12 HOURS
Refills: 0 | Status: DISCONTINUED | OUTPATIENT
Start: 2021-11-10 | End: 2021-11-11

## 2021-11-10 RX ORDER — PANTOPRAZOLE SODIUM 20 MG/1
1 TABLET, DELAYED RELEASE ORAL
Qty: 0 | Refills: 0 | DISCHARGE

## 2021-11-10 RX ORDER — LEVOTHYROXINE SODIUM 125 MCG
50 TABLET ORAL DAILY
Refills: 0 | Status: DISCONTINUED | OUTPATIENT
Start: 2021-11-10 | End: 2021-11-23

## 2021-11-10 RX ORDER — ALUMINUM SULFATE
0 POWDER (GRAM) MISCELLANEOUS
Qty: 0 | Refills: 0 | DISCHARGE

## 2021-11-10 RX ORDER — HYDRALAZINE HCL 50 MG
100 TABLET ORAL THREE TIMES A DAY
Refills: 0 | Status: DISCONTINUED | OUTPATIENT
Start: 2021-11-10 | End: 2021-11-23

## 2021-11-10 RX ORDER — FUROSEMIDE 40 MG
1 TABLET ORAL
Qty: 0 | Refills: 0 | DISCHARGE

## 2021-11-10 RX ORDER — METRONIDAZOLE 7.5 MG/G
1 GEL VAGINAL
Qty: 0 | Refills: 0 | DISCHARGE

## 2021-11-10 RX ORDER — SODIUM CHLORIDE 9 MG/ML
3 INJECTION INTRAMUSCULAR; INTRAVENOUS; SUBCUTANEOUS EVERY 8 HOURS
Refills: 0 | Status: DISCONTINUED | OUTPATIENT
Start: 2021-11-10 | End: 2021-11-23

## 2021-11-10 RX ORDER — BUMETANIDE 0.25 MG/ML
1 INJECTION INTRAMUSCULAR; INTRAVENOUS ONCE
Refills: 0 | Status: DISCONTINUED | OUTPATIENT
Start: 2021-11-10 | End: 2021-11-10

## 2021-11-10 RX ORDER — PANTOPRAZOLE SODIUM 20 MG/1
40 TABLET, DELAYED RELEASE ORAL
Refills: 0 | Status: DISCONTINUED | OUTPATIENT
Start: 2021-11-10 | End: 2021-11-15

## 2021-11-10 RX ORDER — BUMETANIDE 0.25 MG/ML
1 INJECTION INTRAMUSCULAR; INTRAVENOUS ONCE
Refills: 0 | Status: COMPLETED | OUTPATIENT
Start: 2021-11-10 | End: 2021-11-10

## 2021-11-10 RX ORDER — ATORVASTATIN CALCIUM 80 MG/1
80 TABLET, FILM COATED ORAL AT BEDTIME
Refills: 0 | Status: DISCONTINUED | OUTPATIENT
Start: 2021-11-10 | End: 2021-11-23

## 2021-11-10 RX ORDER — MOMETASONE FUROATE 1 MG/G
1 CREAM TOPICAL
Qty: 0 | Refills: 0 | DISCHARGE

## 2021-11-10 RX ADMIN — SODIUM CHLORIDE 3 MILLILITER(S): 9 INJECTION INTRAMUSCULAR; INTRAVENOUS; SUBCUTANEOUS at 21:34

## 2021-11-10 RX ADMIN — BUMETANIDE 1 MILLIGRAM(S): 0.25 INJECTION INTRAMUSCULAR; INTRAVENOUS at 19:02

## 2021-11-10 NOTE — ED PROVIDER NOTE - PHYSICAL EXAMINATION
Well appearing, well nourished, awake, alert, oriented to person, place, time/situation and in no apparent distress.    Airway patent    Eyes without scleral injection. No jaundice.     Strong pulse. No M/R/G.    Respirations unlabored. Lungs clear.    Abdomen soft, non-tender, no guarding.    Spine appears normal, range of motion is not limited, no muscle or joint tenderness. 2+ (B) LE edema, not red/warm.    Alert and oriented, no gross motor or sensory deficits.    Skin normal color for race, warm, dry and intact. No evidence of rash.    No SI/HI.

## 2021-11-10 NOTE — ED PROVIDER NOTE - ATTENDING CONTRIBUTION TO CARE
I performed a face-to-face evaluation of the patient and performed a history and physical examination. I agree with the history and physical examination.    Exertional CP. Consider ACS. Not likely PNA: no infectious Sx (eg, purulent cough). Not likely PE or other VTE: PERC or Wells low score, EKG no e/o R-heart strain. Check trop, EKG, CXR, Hb. If results unremarkable, consider [id number]-vr-Day admission for stress test.

## 2021-11-10 NOTE — ED PROVIDER NOTE - NSICDXPASTMEDICALHX_GEN_ALL_CORE_FT
PAST MEDICAL HISTORY:  Anemia     Cholecystitis     HTN (hypertension)     Hypothyroidism     Unilateral inguinal hernia without obstruction or gangrene, recurrence not specified

## 2021-11-10 NOTE — ED PROVIDER NOTE - PROGRESS NOTE DETAILS
laura: pt bedside US bladder scan shows 350 cc of urine. PVR approx 50 cc due to amt in urinal. tba for raymundo, chf, cp. tropx2 elevated secondary to kidney failure, no sig delta change from initial result. with no active cp at this time.

## 2021-11-10 NOTE — H&P ADULT - PROBLEM SELECTOR PLAN 3
Patient with ProBNP of 73594.  Patient denies history of heart failure.  Patient also with 2+ bilateral pitting edema.  Echo from October 28/21 showing EF of 60-65% with Grade II left ventricular diastolic dysfunction.  Patient s/p Bumex 1 mg IVP in ED.  Patient on Furosemide 40 mg daily at home.  Will continue Bumex 1 mg daily.  Call for Cardiology consult in AM.

## 2021-11-10 NOTE — H&P ADULT - NSHPLABSRESULTS_GEN_ALL_CORE
Vital Signs Last 24 Hrs  T(C): 36.6 (10 Nov 2021 19:28), Max: 37 (10 Nov 2021 12:14)  T(F): 97.9 (10 Nov 2021 19:28), Max: 98.6 (10 Nov 2021 12:14)  HR: 60 (10 Nov 2021 19:28) (60 - 70)  BP: 150/64 (10 Nov 2021 19:28) (122/61 - 150/64)  BP(mean): --  RR: 16 (10 Nov 2021 19:28) (16 - 17)  SpO2: 98% (10 Nov 2021 19:28) (98% - 100%)                          8.3    4.62  )-----------( 130      ( 10 Nov 2021 14:22 )             27.5   11-10    134<L>  |  98  |  81<H>  ----------------------------<  137<H>  4.8   |  20<L>  |  6.06<H>    Ca    8.4      10 Nov 2021 14:22    TPro  7.0  /  Alb  4.1  /  TBili  0.4  /  DBili  x   /  AST  16  /  ALT  16  /  AlkPhos  165<H>  11-10    Troponin 90-->84  ProBNP: 07239    15:56 - VBG - pH: 7.36  | pCO2: 38    | pO2: 47    | Lactate: 1.0      US Kidney and Bladder:    FINDINGS:    Right kidney: 9.5 cm. No renal mass, hydronephrosis or calculi.    Left kidney: 9.7 cm. No renal mass, hydronephrosis or calculi.    Urinary bladder: Not well distended, limiting assessment. Mild bladder wall thickening cannot be excluded.    IMPRESSION:    No bilateral hydronephrosis.    CXR:  FINDINGS:  The heart is enlarged, unchanged from prior chest x-ray. Calcified aortic knob.  The lungs are clear.  Trace bilateral pleural effusions. No pneumothorax.  Surgical clips overlying the right upper abdomen, consistent with postcholecystectomy.    IMPRESSION:  Cardiomegaly and trace bilateral pleural effusions but no significant pulmonary edema to suggest congestive heart failure.    EKG: Sinus rhythm at 66 bpm with RBBB. (Unchanged from prior). QT/QTc 462/484. Vital Signs Last 24 Hrs  T(C): 36.6 (10 Nov 2021 19:28), Max: 37 (10 Nov 2021 12:14)  T(F): 97.9 (10 Nov 2021 19:28), Max: 98.6 (10 Nov 2021 12:14)  HR: 60 (10 Nov 2021 19:28) (60 - 70)  BP: 150/64 (10 Nov 2021 19:28) (122/61 - 150/64)  BP(mean): --  RR: 16 (10 Nov 2021 19:28) (16 - 17)  SpO2: 98% (10 Nov 2021 19:28) (98% - 100%)                          8.3    4.62  )-----------( 130      ( 10 Nov 2021 14:22 )             27.5   11-10    134<L>  |  98  |  81<H>  ----------------------------<  137<H>  4.8   |  20<L>  |  6.06<H>    Ca    8.4      10 Nov 2021 14:22    TPro  7.0  /  Alb  4.1  /  TBili  0.4  /  DBili  x   /  AST  16  /  ALT  16  /  AlkPhos  165<H>  11-10    Troponin 90-->84  ProBNP: 80729    15:56 - VBG - pH: 7.36  | pCO2: 38    | pO2: 47    | Lactate: 1.0      US Kidney and Bladder:    FINDINGS:    Right kidney: 9.5 cm. No renal mass, hydronephrosis or calculi.    Left kidney: 9.7 cm. No renal mass, hydronephrosis or calculi.    Urinary bladder: Not well distended, limiting assessment. Mild bladder wall thickening cannot be excluded.    IMPRESSION:    No bilateral hydronephrosis.    CXR:  FINDINGS:  The heart is enlarged, unchanged from prior chest x-ray. Calcified aortic knob.  The lungs are clear.  Trace bilateral pleural effusions. No pneumothorax.  Surgical clips overlying the right upper abdomen, consistent with postcholecystectomy.    IMPRESSION:  Cardiomegaly and trace bilateral pleural effusions but no significant pulmonary edema to suggest congestive heart failure.    EKG: Sinus rhythm at 66 bpm with RBBB. (Unchanged from prior). QT/QTc 462/484.      October 28,2021 TTE.  FINDINGS:    Left Ventricle:  The left ventricle is normal in size, wall thickness, and systolic function with a calculated ejection fraction of 60-65%. There are no regional wall motion abnormalities seen. Analysis of mitral annular tissue Doppler, left atrial volume index, and tricuspid regurgitant velocity reveals: grade II left ventricular diastolic dysfunction with elevated filling pressure.    Right Ventricle:  The right ventricle is normal in size. Right ventricular systolic function is normal. The tricuspid annular plane systolic excursion (TAPSE) is 26.60 mm (normal >=17 mm).    Left Atrium:  The left atrium is severely dilated. Left atrial volume index (LONNIE) is 59.1 ml/m².    Right Atrium:  The right atrium is dilated.    Aortic Valve:  The aortic valve is tricuspid with normal structure and function without stenosis. There is no evidence of aortic regurgitation.    Mitral Valve:  Structurally normal mitral valve with normal leaflet excursion. There is mild mitral annular calcification. There is trace mitral regurgitation.    Tricuspid Valve:  Structurally normal tricuspid valve with normal leaflet excursion. There is mild tricuspid regurgitation. There is mild pulmonary hypertension, pulmonary artery systolic pressure is 43 mmHg.    Inferior Vena Cava:  The inferior vena cava is not well visualized.    Pulmonic Valve:  Structurally normal pulmonic valve with normal leaflet excursion. There is trace pulmonic regurgitation.    Aorta:  The aortic root is normal in size. The aortic arch is normal without evidence of aortic coarctation.    Pericardium:  No pericardial effusion is seen. There is a small pericardial effusion without echocardiographic evidence of cardiac tamponade. There is no systolic invagination of the right atrium. There is no diastolic invagination of the right ventricle. No significant respiratory changes in mitral inflow velocities. Vital Signs Last 24 Hrs  T(C): 36.6 (10 Nov 2021 19:28), Max: 37 (10 Nov 2021 12:14)  T(F): 97.9 (10 Nov 2021 19:28), Max: 98.6 (10 Nov 2021 12:14)  HR: 60 (10 Nov 2021 19:28) (60 - 70)  BP: 150/64 (10 Nov 2021 19:28) (122/61 - 150/64)  BP(mean): --  RR: 16 (10 Nov 2021 19:28) (16 - 17)  SpO2: 98% (10 Nov 2021 19:28) (98% - 100%)                          8.3    4.62  )-----------( 130      ( 10 Nov 2021 14:22 )             27.5   11-10    134<L>  |  98  |  81<H>  ----------------------------<  137<H>  4.8   |  20<L>  |  6.06<H>    Ca    8.4      10 Nov 2021 14:22    TPro  7.0  /  Alb  4.1  /  TBili  0.4  /  DBili  x   /  AST  16  /  ALT  16  /  AlkPhos  165<H>  11-10    Troponin 90-->84-->82  CK: 100  CKMB: 2.9  ProBNP: 00501    15:56 - VBG - pH: 7.36  | pCO2: 38    | pO2: 47    | Lactate: 1.0      US Kidney and Bladder:    FINDINGS:    Right kidney: 9.5 cm. No renal mass, hydronephrosis or calculi.    Left kidney: 9.7 cm. No renal mass, hydronephrosis or calculi.    Urinary bladder: Not well distended, limiting assessment. Mild bladder wall thickening cannot be excluded.    IMPRESSION:    No bilateral hydronephrosis.    CXR:  FINDINGS:  The heart is enlarged, unchanged from prior chest x-ray. Calcified aortic knob.  The lungs are clear.  Trace bilateral pleural effusions. No pneumothorax.  Surgical clips overlying the right upper abdomen, consistent with postcholecystectomy.    IMPRESSION:  Cardiomegaly and trace bilateral pleural effusions but no significant pulmonary edema to suggest congestive heart failure.    EKG: Sinus rhythm at 66 bpm with RBBB. (Unchanged from prior). QT/QTc 462/484.      October 28,2021 TTE.  FINDINGS:    Left Ventricle:  The left ventricle is normal in size, wall thickness, and systolic function with a calculated ejection fraction of 60-65%. There are no regional wall motion abnormalities seen. Analysis of mitral annular tissue Doppler, left atrial volume index, and tricuspid regurgitant velocity reveals: grade II left ventricular diastolic dysfunction with elevated filling pressure.    Right Ventricle:  The right ventricle is normal in size. Right ventricular systolic function is normal. The tricuspid annular plane systolic excursion (TAPSE) is 26.60 mm (normal >=17 mm).    Left Atrium:  The left atrium is severely dilated. Left atrial volume index (LONNIE) is 59.1 ml/m².    Right Atrium:  The right atrium is dilated.    Aortic Valve:  The aortic valve is tricuspid with normal structure and function without stenosis. There is no evidence of aortic regurgitation.    Mitral Valve:  Structurally normal mitral valve with normal leaflet excursion. There is mild mitral annular calcification. There is trace mitral regurgitation.    Tricuspid Valve:  Structurally normal tricuspid valve with normal leaflet excursion. There is mild tricuspid regurgitation. There is mild pulmonary hypertension, pulmonary artery systolic pressure is 43 mmHg.    Inferior Vena Cava:  The inferior vena cava is not well visualized.    Pulmonic Valve:  Structurally normal pulmonic valve with normal leaflet excursion. There is trace pulmonic regurgitation.    Aorta:  The aortic root is normal in size. The aortic arch is normal without evidence of aortic coarctation.    Pericardium:  No pericardial effusion is seen. There is a small pericardial effusion without echocardiographic evidence of cardiac tamponade. There is no systolic invagination of the right atrium. There is no diastolic invagination of the right ventricle. No significant respiratory changes in mitral inflow velocities.

## 2021-11-10 NOTE — H&P ADULT - NSICDXPASTMEDICALHX_GEN_ALL_CORE_FT
PAST MEDICAL HISTORY:  Anemia     Aortic stenosis     Cholecystitis     Gastric AVM     HTN (hypertension)     Hyperlipidemia     Hypothyroidism     Pre-diabetes     Unilateral inguinal hernia without obstruction or gangrene, recurrence not specified

## 2021-11-10 NOTE — ED ADULT NURSE NOTE - OBJECTIVE STATEMENT
pt received spot 20. pt A+Ox3, c/o episode of chest pain this morning. pt denies cp/sob at this time. NSR noted on CM. VSS. NAD noted. labs drawn and sent. call bell within reach, will monitor.

## 2021-11-10 NOTE — H&P ADULT - ASSESSMENT
67 y/o M with PMH of HTN, HLD, Aortic stenosis, pre-diabetes, HTN, HLD, CKD not on dialysis, Aortic stenosis, Anemia, small bowel resection 9/13/21 secondary to AVM presents to ED after episode of chest pain associated with shortness of breath. Patient admitted for chest pain, CLOTILDE on CKD and CHF. 67 y/o M with PMH of HTN, HLD, pre-diabetes, HTN, HLD, CKD not on dialysis, Anemia, small bowel resection 9/13/21 secondary to AVM presents to ED after episode of chest pain associated with shortness of breath. Patient admitted for chest pain, CLOTILDE on CKD and CHF. 67 y/o M with PMH of Hypothyroidism,, pre-diabetes, HTN, HLD, CKD not on dialysis, Anemia, small bowel resection 9/13/21 secondary to AVM presents to ED after episode of chest pain associated with shortness of breath. Patient admitted for chest pain, CLOTILDE on CKD and CHF.

## 2021-11-10 NOTE — H&P ADULT - HISTORY OF PRESENT ILLNESS
65 y/o M with PMH of HTN, HLD, Aortic stenosis, pre-diabetes, HTN, HLD, CKD not on dialysis, Aortic stenosis, Anemia, small bowel resection 9/13/21 secondary to AVM presents to ED after episode of chest pain. Patient states that he was going to clinic because of swelling in legs, he reports while walking be developed midsternal chest pressure and shortness of breath. Patient states that he was given aspirin by EMS which helped relieve pain. Patient denies previous episodes of chest pain. Currently patient denies chest pain and shortness of breath. Patient reports intermittently having lower extremity swelling over the last 3 weeks. Patient reports that over the last few days lower extremity swelling has become worse despite taking lasix at home. Patient denies history of heart failure, fever, chills, abdominal pain, nausea, vomiting, blood in stools, change in stool, difficulty urinating.    In ED patient was found to have troponin of 90 which down-trended to 84. ProBNP [place] at [address].Cr was found to be 6.06. Renal ultrasound was found to be negative for bilateral hydronephrosis. CXR showed Cardiomegaly and trace bilateral pleural effusions but no significant pulmonary edema to suggest congestive heart failure. Patient received 1 mg IVP of Bumex in ED.   65 y/o M with PMH of HTN, HLD, pre-diabetes, HTN, HLD, CKD not on dialysis, Anemia, small bowel resection 9/13/21 secondary to AVM presents to ED after episode of chest pain. Patient states that he was going to clinic because of swelling in legs, he reports while walking be developed midsternal chest pressure and shortness of breath. Patient states that he was given aspirin by EMS which helped relieve pain. Patient denies previous episodes of chest pain. Currently patient denies chest pain and shortness of breath. Patient reports intermittently having lower extremity swelling over the last 3 weeks. Patient reports that over the last few days lower extremity swelling has become worse despite taking lasix at home. Patient denies history of heart failure, fever, chills, abdominal pain, nausea, vomiting, blood in stools, BRBPR, change in stool, difficulty urinating.    In ED patient was found to have troponin of 90 which down-trended to 84. ProBNP was elevated at 9[address].Cr was found to be 6.06. Renal ultrasound was found to be negative for bilateral hydronephrosis. CXR showed Cardiomegaly and trace bilateral pleural effusions but no significant pulmonary edema to suggest congestive heart failure. Patient received 1 mg IVP of Bumex in ED.   67 y/o M with PMH of Hypothyroidism,, pre-diabetes, HTN, HLD, CKD not on dialysis, Anemia, small bowel resection 9/13/21 secondary to AVM presents to ED after episode of chest pain. Patient states that he was going to clinic because of swelling in legs, he reports while walking be developed midsternal chest pressure and shortness of breath. Patient states that he was given aspirin by EMS which helped relieve pain. Patient denies previous episodes of chest pain. Currently patient denies chest pain and shortness of breath. Patient reports intermittently having lower extremity swelling over the last 3 weeks. Patient reports that over the last few days lower extremity swelling has become worse despite taking lasix at home. Patient denies history of heart failure, fever, chills, abdominal pain, nausea, vomiting, blood in stools, BRBPR, change in stool, difficulty urinating.    In ED patient was found to have troponin of 90 which down-trended to 84. ProBNP was elevated at 56475.Cr was found to be 6.06. Renal ultrasound was found to be negative for bilateral hydronephrosis. CXR showed Cardiomegaly and trace bilateral pleural effusions but no significant pulmonary edema to suggest congestive heart failure. Patient received 1 mg IVP of Bumex in ED.

## 2021-11-10 NOTE — ED PROVIDER NOTE - CLINICAL SUMMARY MEDICAL DECISION MAKING FREE TEXT BOX
Fanny: Exertional CP. Consider ACS. Not likely PNA: no infectious Sx (eg, purulent cough). Not likely PE or other VTE: PERC or Wells low score, EKG no e/o R-heart strain. Check trop, EKG, CXR, Hb. If results unremarkable, consider [facility] admission for stress test.

## 2021-11-10 NOTE — H&P ADULT - PROBLEM SELECTOR PLAN 5
Continue monitoring.  Patient on hydralazine 100 mg TID, Amlodipine 10 mg daily, Carvedilol 12.5 mg TID.  Will continue Hydralazine with hold parameters.  Will hold amlodipine in setting of Heart failure.  DASH/TLC diet once dysphagia screen is passed.

## 2021-11-10 NOTE — ED PROVIDER NOTE - OBJECTIVE STATEMENT
66yM w/pmhx preDM, HTN, HLD, aortic stenosis, small bowel resection 2/2 AVM presenting with chest pain. Pt states he was walking to the clinic today when he developed shortness of breath and midsternal chest pressure. Pt sat down, was given Aspirin 324mg which helped to relieve the chest pressure. 66yM w/pmhx preDM, HTN, HLD, aortic stenosis, small bowel resection 2/2 AVM presenting with chest pain. Pt states he was walking to the clinic today when he developed shortness of breath and midsternal chest pressure. Pt sat down, was given Aspirin 324mg which helped to relieve the chest pressure. CP was non-radiating. Not likely PNA: no infectious Sx (eg, purulent cough). Not likely PE or other VTE: PERC or Wells low score, EKG no e/o R-heart strain.

## 2021-11-10 NOTE — H&P ADULT - PROBLEM SELECTOR PLAN 2
Patient with Cr of 6.06.  Baseline approximately 3.  US Kidney and bladder negative for bilateral hydronephrosis.  GFR 9, previously 18 in October 29,2021.  Call for renal consult in AM.  Will order urine electrolytes. Patient with Cr of 6.06.  Baseline approximately 3.  US Kidney and bladder negative for bilateral hydronephrosis.  GFR 9, previously 18 in October 29,2021.  Call for renal consult in AM.  Will order urine electrolytes.  Patient on ferric citrate at home. Will need to have patient bring in his own.

## 2021-11-10 NOTE — H&P ADULT - PROBLEM SELECTOR PLAN 4
Patient with hemoglobin of 8.3, previously 10.4 on October 29th.  Patient denies BRBPR, blood in stool.  Patient with history of GI bleed from small bowel in September requiring small bowel resection due to AVM.  Will send TIBC, Ferritin, Reticulocyte count.  Will order fecal occult.  Will Email GI consult.  CKD could also be contributing to patient's anemia.  Continue to trend Hemoglobin Patient with hemoglobin of 8.3, previously 10.4 on October 29th.  Patient denies BRBPR, blood in stool.  Patient with history of GI bleed from small bowel in September requiring small bowel resection due to AVM.  Patient on ferric citrate at home.  Will send TIBC, Ferritin, Reticulocyte count.  Will order fecal occult.  Will Email GI consult.  CKD could also be contributing to patient's anemia.  Continue to trend Hemoglobin Patient with hemoglobin of 8.3, previously 10.4 on October 29th.  Patient denies BRBPR, blood in stool.  Patient with history of GI bleed from small bowel in September requiring small bowel resection due to AVM.  Patient on ferric citrate at home.  Will send TIBC, Ferritin, Reticulocyte count.  Will order fecal occult.  Will recheck CBC in AM, if not improved consider GI consult.  CKD could also be contributing to patient's anemia.  Continue to trend Hemoglobin Patient with hemoglobin of 8.3, previously 10.4 on October 29th.  Patient denies BRBPR, blood in stool.  Patient with history of GI bleed from small bowel in September requiring small bowel resection due to AVM.  Will send TIBC, Ferritin, Reticulocyte count.  Will order fecal occult.  Will recheck CBC in AM, if not improved consider GI consult.  CKD could also be contributing to patient's anemia.  Continue to trend Hemoglobin Patient with hemoglobin of 8.3, previously 10.4 on oct 29 but overall trend moreso in the 8s  Patient denies BRBPR, blood in stool.  Patient with history of GI bleed from small bowel in September requiring small bowel resection due to AVM.  Will send TIBC, Ferritin, Reticulocyte count.  Will order fecal occult.  Will recheck CBC in AM, if worsening will consider GI consult.  CKD could also be contributing to patient's anemia.  Continue to trend Hemoglobin

## 2021-11-10 NOTE — H&P ADULT - ATTENDING COMMENTS
67 y/o male with hypothyroid, diastolic HF, HTN, CKD, recent small bowel resection presents to the ER with CP and shortness of breath found to have CLOTILDE on CKD. overall concerning for hypervolemia from CHF exacerbation causing dyspnea and CLOTILDE. Currently symptoms improved after bumex IV in ER. Will trend bmp and obtrain urine lytes. Monitor I/O. Trops stable, EKG with RBBB. Echo recently done.

## 2021-11-10 NOTE — H&P ADULT - PROBLEM SELECTOR PLAN 7
Hemoglobin a1c in AM.  Consistent carbohydrate diet ordered. TSH in AM.  Continue levothyroxine 50 mcg daily.

## 2021-11-10 NOTE — H&P ADULT - PROBLEM SELECTOR PLAN 1
Admit to tele, continue monitoring.  Troponin 90-->84  EKG with known RBBB, no TWI or ST elevations.  Patient currently chest pain free and denies shortness of breath.  Troponin possibly elevated in setting of CKD and possible heart failure, however will need to rule out ACS.  Troponin #3 ordered.  Echo ordered.  Stat EKG and Cardiac enzymes if patient develops chest pain. Admit to tele, continue monitoring.  Troponin 90-->84  EKG with known RBBB, no TWI or ST elevations.  Patient currently chest pain free and denies shortness of breath.  Troponin possibly elevated in setting of CKD and possible heart failure, however will need to rule out ACS.  Troponin #3 ordered.  Echo ordered.  Stat EKG and Cardiac enzymes if patient develops chest pain.  Consider stress test Admit to tele, continue monitoring.  Troponin 90-->84-->82.  CK: 100, CKMB: 2.9  EKG with known RBBB, no TWI or ST elevations.  Patient currently chest pain free and denies shortness of breath.  Troponin possibly elevated in setting of CKD and possible heart failure, however will need to rule out ACS.  Echo ordered.  Stat EKG and Cardiac enzymes if patient develops chest pain.  Consider stress test Admit to tele, continue monitoring.  Troponin 90-->84-->82.  CK: 100, CKMB: 2.9  EKG with known RBBB, no TWI or ST elevations.  Patient currently chest pain free and denies shortness of breath.  Troponin possibly elevated in setting of CKD and possible heart failure, however will need to rule out ACS.  Stat EKG and Cardiac enzymes if patient develops chest pain.  Echo from October 28/21 showing EF of 60-65% with Grade II left ventricular diastolic dysfunction.  Consider stress test

## 2021-11-10 NOTE — ED ADULT TRIAGE NOTE - CHIEF COMPLAINT QUOTE
Pt c/o intermittent chest pain since 10 am, now resolved sob and B/L LE edema . Asp 324 mg given by ems.  IV 20 L AC

## 2021-11-11 LAB
A1C WITH ESTIMATED AVERAGE GLUCOSE RESULT: 5.1 % — SIGNIFICANT CHANGE UP (ref 4–5.6)
ANION GAP SERPL CALC-SCNC: 16 MMOL/L — HIGH (ref 7–14)
APPEARANCE UR: CLEAR — SIGNIFICANT CHANGE UP
BACTERIA # UR AUTO: NEGATIVE — SIGNIFICANT CHANGE UP
BASOPHILS # BLD AUTO: 0.02 K/UL — SIGNIFICANT CHANGE UP (ref 0–0.2)
BASOPHILS NFR BLD AUTO: 0.4 % — SIGNIFICANT CHANGE UP (ref 0–2)
BILIRUB UR-MCNC: NEGATIVE — SIGNIFICANT CHANGE UP
BUN SERPL-MCNC: 82 MG/DL — HIGH (ref 7–23)
CALCIUM SERPL-MCNC: 8.5 MG/DL — SIGNIFICANT CHANGE UP (ref 8.4–10.5)
CHLORIDE SERPL-SCNC: 100 MMOL/L — SIGNIFICANT CHANGE UP (ref 98–107)
CHLORIDE UR-SCNC: 70 MMOL/L — SIGNIFICANT CHANGE UP
CHOLEST SERPL-MCNC: 92 MG/DL — SIGNIFICANT CHANGE UP
CO2 SERPL-SCNC: 20 MMOL/L — LOW (ref 22–31)
COLOR SPEC: SIGNIFICANT CHANGE UP
COVID-19 NUCLEOCAPSID GAM AB INTERP: POSITIVE
COVID-19 NUCLEOCAPSID TOTAL GAM ANTIBODY RESULT: 12.1 INDEX — HIGH
COVID-19 SPIKE DOMAIN AB INTERP: POSITIVE
COVID-19 SPIKE DOMAIN ANTIBODY RESULT: >250 U/ML — HIGH
CREAT SERPL-MCNC: 5.65 MG/DL — HIGH (ref 0.5–1.3)
DIFF PNL FLD: NEGATIVE — SIGNIFICANT CHANGE UP
EOSINOPHIL # BLD AUTO: 0.27 K/UL — SIGNIFICANT CHANGE UP (ref 0–0.5)
EOSINOPHIL NFR BLD AUTO: 5.1 % — SIGNIFICANT CHANGE UP (ref 0–6)
EPI CELLS # UR: 0 /HPF — SIGNIFICANT CHANGE UP (ref 0–5)
ESTIMATED AVERAGE GLUCOSE: 100 — SIGNIFICANT CHANGE UP
FERRITIN SERPL-MCNC: 183 NG/ML — SIGNIFICANT CHANGE UP (ref 30–400)
GLUCOSE SERPL-MCNC: 130 MG/DL — HIGH (ref 70–99)
GLUCOSE UR QL: NEGATIVE — SIGNIFICANT CHANGE UP
HCT VFR BLD CALC: 27.5 % — LOW (ref 39–50)
HDLC SERPL-MCNC: 27 MG/DL — LOW
HGB BLD-MCNC: 8.4 G/DL — LOW (ref 13–17)
HYALINE CASTS # UR AUTO: 3 /LPF — SIGNIFICANT CHANGE UP (ref 0–7)
IANC: 3.59 K/UL — SIGNIFICANT CHANGE UP (ref 1.5–8.5)
IMM GRANULOCYTES NFR BLD AUTO: 0.2 % — SIGNIFICANT CHANGE UP (ref 0–1.5)
IRON SATN MFR SERPL: 12 % — LOW (ref 14–50)
IRON SATN MFR SERPL: 29 UG/DL — LOW (ref 45–165)
KETONES UR-MCNC: NEGATIVE — SIGNIFICANT CHANGE UP
LACTATE SERPL-SCNC: 0.7 MMOL/L — SIGNIFICANT CHANGE UP (ref 0.5–2)
LEUKOCYTE ESTERASE UR-ACNC: NEGATIVE — SIGNIFICANT CHANGE UP
LIPID PNL WITH DIRECT LDL SERPL: 44 MG/DL — SIGNIFICANT CHANGE UP
LYMPHOCYTES # BLD AUTO: 1.01 K/UL — SIGNIFICANT CHANGE UP (ref 1–3.3)
LYMPHOCYTES # BLD AUTO: 19.2 % — SIGNIFICANT CHANGE UP (ref 13–44)
MAGNESIUM SERPL-MCNC: 2.7 MG/DL — HIGH (ref 1.6–2.6)
MCHC RBC-ENTMCNC: 25.2 PG — LOW (ref 27–34)
MCHC RBC-ENTMCNC: 30.5 GM/DL — LOW (ref 32–36)
MCV RBC AUTO: 82.6 FL — SIGNIFICANT CHANGE UP (ref 80–100)
MONOCYTES # BLD AUTO: 0.45 K/UL — SIGNIFICANT CHANGE UP (ref 0–0.9)
MONOCYTES NFR BLD AUTO: 8.6 % — SIGNIFICANT CHANGE UP (ref 2–14)
NEUTROPHILS # BLD AUTO: 3.5 K/UL — SIGNIFICANT CHANGE UP (ref 1.8–7.4)
NEUTROPHILS NFR BLD AUTO: 66.5 % — SIGNIFICANT CHANGE UP (ref 43–77)
NITRITE UR-MCNC: NEGATIVE — SIGNIFICANT CHANGE UP
NON HDL CHOLESTEROL: 65 MG/DL — SIGNIFICANT CHANGE UP
NRBC # BLD: 0 /100 WBCS — SIGNIFICANT CHANGE UP
NRBC # FLD: 0 K/UL — SIGNIFICANT CHANGE UP
PH UR: 5.5 — SIGNIFICANT CHANGE UP (ref 5–8)
PHOSPHATE SERPL-MCNC: 5.9 MG/DL — HIGH (ref 2.5–4.5)
PLATELET # BLD AUTO: 122 K/UL — LOW (ref 150–400)
POTASSIUM SERPL-MCNC: 4.6 MMOL/L — SIGNIFICANT CHANGE UP (ref 3.5–5.3)
POTASSIUM SERPL-SCNC: 4.6 MMOL/L — SIGNIFICANT CHANGE UP (ref 3.5–5.3)
POTASSIUM UR-SCNC: 39.7 MMOL/L — SIGNIFICANT CHANGE UP
PROT UR-MCNC: ABNORMAL
RBC # BLD: 3.33 M/UL — LOW (ref 4.2–5.8)
RBC # BLD: 3.33 M/UL — LOW (ref 4.2–5.8)
RBC # FLD: 18.6 % — HIGH (ref 10.3–14.5)
RBC CASTS # UR COMP ASSIST: 1 /HPF — SIGNIFICANT CHANGE UP (ref 0–4)
RETICS #: 51 K/UL — SIGNIFICANT CHANGE UP (ref 25–125)
RETICS/RBC NFR: 1.5 % — SIGNIFICANT CHANGE UP (ref 0.5–2.5)
SARS-COV-2 IGG+IGM SERPL QL IA: 12.1 INDEX — HIGH
SARS-COV-2 IGG+IGM SERPL QL IA: >250 U/ML — HIGH
SARS-COV-2 IGG+IGM SERPL QL IA: POSITIVE
SARS-COV-2 IGG+IGM SERPL QL IA: POSITIVE
SARS-COV-2 RNA SPEC QL NAA+PROBE: SIGNIFICANT CHANGE UP
SODIUM SERPL-SCNC: 136 MMOL/L — SIGNIFICANT CHANGE UP (ref 135–145)
SODIUM UR-SCNC: 66 MMOL/L — SIGNIFICANT CHANGE UP
SP GR SPEC: 1.01 — SIGNIFICANT CHANGE UP (ref 1–1.05)
T3 SERPL-MCNC: 62 NG/DL — LOW (ref 80–200)
T4 AB SER-ACNC: 8.06 UG/DL — SIGNIFICANT CHANGE UP (ref 5.1–13)
T4 FREE SERPL-MCNC: 1.3 NG/DL — SIGNIFICANT CHANGE UP (ref 0.9–1.8)
TIBC SERPL-MCNC: 240 UG/DL — SIGNIFICANT CHANGE UP (ref 220–430)
TRIGL SERPL-MCNC: 103 MG/DL — SIGNIFICANT CHANGE UP
TSH SERPL-MCNC: 5.27 UIU/ML — HIGH (ref 0.27–4.2)
UIBC SERPL-MCNC: 211 UG/DL — SIGNIFICANT CHANGE UP (ref 110–370)
UROBILINOGEN FLD QL: SIGNIFICANT CHANGE UP
WBC # BLD: 5.26 K/UL — SIGNIFICANT CHANGE UP (ref 3.8–10.5)
WBC # FLD AUTO: 5.26 K/UL — SIGNIFICANT CHANGE UP (ref 3.8–10.5)
WBC UR QL: 0 /HPF — SIGNIFICANT CHANGE UP (ref 0–5)

## 2021-11-11 PROCEDURE — 99233 SBSQ HOSP IP/OBS HIGH 50: CPT

## 2021-11-11 PROCEDURE — 99223 1ST HOSP IP/OBS HIGH 75: CPT

## 2021-11-11 RX ORDER — BUMETANIDE 0.25 MG/ML
1 INJECTION INTRAMUSCULAR; INTRAVENOUS DAILY
Refills: 0 | Status: DISCONTINUED | OUTPATIENT
Start: 2021-11-11 | End: 2021-11-12

## 2021-11-11 RX ADMIN — SODIUM CHLORIDE 3 MILLILITER(S): 9 INJECTION INTRAMUSCULAR; INTRAVENOUS; SUBCUTANEOUS at 12:58

## 2021-11-11 RX ADMIN — CARVEDILOL PHOSPHATE 12.5 MILLIGRAM(S): 80 CAPSULE, EXTENDED RELEASE ORAL at 05:10

## 2021-11-11 RX ADMIN — HEPARIN SODIUM 5000 UNIT(S): 5000 INJECTION INTRAVENOUS; SUBCUTANEOUS at 05:09

## 2021-11-11 RX ADMIN — PANTOPRAZOLE SODIUM 40 MILLIGRAM(S): 20 TABLET, DELAYED RELEASE ORAL at 07:54

## 2021-11-11 RX ADMIN — SODIUM CHLORIDE 3 MILLILITER(S): 9 INJECTION INTRAMUSCULAR; INTRAVENOUS; SUBCUTANEOUS at 07:51

## 2021-11-11 RX ADMIN — Medication 100 MILLIGRAM(S): at 21:03

## 2021-11-11 RX ADMIN — ATORVASTATIN CALCIUM 80 MILLIGRAM(S): 80 TABLET, FILM COATED ORAL at 21:04

## 2021-11-11 RX ADMIN — Medication 100 MILLIGRAM(S): at 05:09

## 2021-11-11 RX ADMIN — BUMETANIDE 1 MILLIGRAM(S): 0.25 INJECTION INTRAMUSCULAR; INTRAVENOUS at 07:54

## 2021-11-11 RX ADMIN — Medication 100 MILLIGRAM(S): at 14:04

## 2021-11-11 RX ADMIN — Medication 50 MICROGRAM(S): at 05:09

## 2021-11-11 NOTE — CONSULT NOTE ADULT - SUBJECTIVE AND OBJECTIVE BOX
CHIEF COMPLAINT  Chest Pain    HISTORY OF PRESENT ILLNESS  MLOLY ANNA is a 66y Male who presents with a chief complaint of chest pain.    He is a patient of Dr. Romero being followed for anemia. He had required multiple units of transfusions throughout this calendar year and has had intermittent melena. VCE had showed duodenal bleeding and he underwent resection in September. However in October he again had melena. He was supposed to be evaluated by colorectal surgery in [facility] to undergo further workup (Dr. Grady) though he did not go.    Patient had developed chest pressure and dyspnea with exertion, and was given aspirin which helped with the pain. Troponin was elevated, and patient was admitted for further workup.    PAST MEDICAL AND SURGICAL HISTORY  GI bleed  Hypertension  Hyperlipidemia  Hypothyroidism  Gastric AVM    FAMILY HISTORY  Non-contributory    SOCIAL HISTORY  Denies tobacco or alcohol    REVIEW OF SYSTEMS  A complete review of systems was performed; negative except per HPI    PHYSICAL EXAM  T(C): 36.8 (11-11-21 @ 07:54), Max: 37 (11-10-21 @ 12:14)  HR: 64 (11-11-21 @ 07:54) (60 - 70)  BP: 134/59 (11-11-21 @ 07:54) (122/61 - 150/64)  RR: 18 (11-11-21 @ 07:54) (16 - 18)  SpO2: 99% (11-11-21 @ 07:54) (97% - 100%)  Constitutional: alert, awake, in no acute distress  Eyes: PERRL, EOMI  HEENT: normocephalic, atraumatic  Neck: supple, non-tender  Cardiovascular: normal perfusion, no peripheral edema  Respiratory: normal respiratory efforts; no increased use of accessory muscles  Gastrointestinal: soft, non-tender  Musculoskeletal: normal range of motion, no deformities noted  Neurological: alert, CN II to XI grossly intact  Skin: warm, dry    LABORATORY DATA                        8.3    4.62  )-----------( 130      ( 10 Nov 2021 14:22 )             27.5     RADIOLOGY REVIEW  11-10    134<L>  |  98  |  81<H>  ----------------------------<  137<H>  4.8   |  20<L>  |  6.06<H>    Ca    8.4      10 Nov 2021 14:22    TPro  7.0  /  Alb  4.1  /  TBili  0.4  /  DBili  x   /  AST  16  /  ALT  16  /  AlkPhos  165<H>  11-10

## 2021-11-11 NOTE — PROGRESS NOTE ADULT - PROBLEM SELECTOR PLAN 4
Patient with hemoglobin of 8.3, previously 10.4 on oct 29 but overall trend more in the 8s  Patient denies BRBPR, blood in stool.  Patient with history of GI bleed from small bowel in September requiring small bowel resection due to AVM  S/p Capsule Endoscopy at [facility] 10/29, will f/up results  Keep Hg above 8  CKD could also be contributing to patient's anemia  Apprec Hem recs

## 2021-11-11 NOTE — CONSULT NOTE ADULT - SUBJECTIVE AND OBJECTIVE BOX
[facility] - S Chester / Agata S /D Rossana/ S Jordyn/ ONEL Manzano/ Kirill Rod / GIRISH Stahlu/ O Dolores  service -[phone], office [phone]  ---------------------------------------------------------------------------------------------------------------    Patient is a 66y Male whom presented to the hospital with   Denied recent NSAID use/Abx use/iv contrast studies.    Patient seen and examined    PAST MEDICAL & SURGICAL HISTORY:  HTN (hypertension)    Hypothyroidism    Cholecystitis    Unilateral inguinal hernia without obstruction or gangrene, recurrence not specified    Anemia    Hyperlipidemia    Pre-diabetes    Aortic stenosis    Gastric AVM    H/O right inguinal hernia repair    S/P small bowel resection  21        Allergies: No Known Allergies    Home Medications Reviewed  Hospital Medications:   MEDICATIONS  (STANDING):  atorvastatin 80 milliGRAM(s) Oral at bedtime  buMETAnide Injectable 1 milliGRAM(s) IV Push daily  heparin   Injectable 5000 Unit(s) SubCutaneous every 12 hours  hydrALAZINE 100 milliGRAM(s) Oral three times a day  levothyroxine 50 MICROGram(s) Oral daily  pantoprazole    Tablet 40 milliGRAM(s) Oral before breakfast  sodium chloride 0.9% lock flush 3 milliLiter(s) IV Push every 8 hours    SOCIAL HISTORY:  Denies ETOh,Smoking, illicit drug use  FAMILY HISTORY:  FH: hypertension        REVIEW OF SYSTEMS:  CONSTITUTIONAL: No weakness, fevers or chills  EYES/ENT: No visual changes;  No vertigo or throat pain   NECK: No pain or stiffness  RESPIRATORY: No cough, wheezing, hemoptysis; No shortness of breath  CARDIOVASCULAR: No chest pain or palpitations.  GASTROINTESTINAL: No abdominal or epigastric pain. No nausea, vomiting, or hematemesis; No diarrhea or constipation. No melena or hematochezia.  GENITOURINARY: No dysuria, frequency, foamy urine, urinary urgency, incontinence or hematuria  NEUROLOGICAL: No numbness or weakness  SKIN: No itching, burning, rashes, or lesions   VASCULAR: No bilateral lower extremity edema.   All other review of systems is negative unless indicated above.    VITALS:  T(F): 98.2 (21 @ 07:54), Max: 98.6 (11-10-21 @ 22:10)  HR: 64 (21 @ 07:54)  BP: 134/59 (21 @ 07:54)  RR: 18 (21 @ 07:54)  SpO2: 99% (21 @ 07:54)  Wt(kg): --        PHYSICAL EXAM:  Constitutional: NAD  HEENT: anicteric sclera, oropharynx clear, MMM  Neck: No JVD  Respiratory: CTAB, no wheezes, rales or rhonchi  Cardiovascular: S1, S2, RRR  Gastrointestinal: BS+, soft, NT/ND  Extremities: No cyanosis or clubbing. No peripheral edema  Neurological: A/O x 3, no focal deficits  Psychiatric: Normal mood, normal affect  : No CVA tenderness. No tang.   Skin: No rashes  Vascular Access:    LABS:      136  |  100  |  82<H>  ----------------------------<  130<H>  4.6   |  20<L>  |  5.65<H>    Ca    8.5      2021 12:42  Phos  5.9       Mg     2.70         TPro  7.0  /  Alb  4.1  /  TBili  0.4  /  DBili      /  AST  16  /  ALT  16  /  AlkPhos  165<H>  11-10    Creatinine Trend: 5.65 <--, 6.06 <--                        8.4    5.26  )-----------( 122      ( 2021 12:42 )             27.5     Urine Studies:  Urinalysis Basic - ( 2021 12:30 )    Color: Light Yellow / Appearance: Clear / S.012 / pH:   Gluc:  / Ketone: Negative  / Bili: Negative / Urobili: <2 mg/dL   Blood:  / Protein: Trace / Nitrite: Negative   Leuk Esterase: Negative / RBC: 1 /HPF / WBC 0 /HPF   Sq Epi:  / Non Sq Epi: 0 /HPF / Bacteria: Negative      Sodium, Random Urine: 66 mmol/L ( @ 13:43)  Potassium, Random Urine: 39.7 mmol/L ( @ 13:43)  Chloride, Random Urine: 70 mmol/L ( @ :43)      RADIOLOGY & ADDITIONAL STUDIES:                 [facility] - S Chester / Agata S /D Rossana/ S Jordyn/ S Natacha/ Kirill Rod / M Maribethu/ O Dolores  service -[phone], office [phone]  ---------------------------------------------------------------------------------------------------------------  Patient seen and examined bedside    65 y/o M with PMH of Hypothyroidism,, pre-diabetes, HTN, HLD, CKD not on dialysis, Anemia, small bowel resection 21 secondary to AVM presents to ED after episode of chest pain. Patient states that he was going to clinic because of swelling in legs, he reports while walking be developed midsternal chest pressure and shortness of breath. Patient states that he was given aspirin by EMS which helped relieve pain. Patient denies previous episodes of chest pain. Currently patient denies chest pain and shortness of breath. Patient reports intermittently having lower extremity swelling over the last 3 weeks. Patient reports that over the last few days lower extremity swelling has become worse despite taking lasix at home. Patient denies history of heart failure, fever, chills, abdominal pain, nausea, vomiting, blood in stools, BRBPR, change in stool, difficulty urinating.    In ED patient was found to have troponin of 90 which down-trended to 84. [place] at [address].Cr was found to be 6.06. Renal ultrasound was found to be negative for bilateral hydronephrosis. CXR showed Cardiomegaly and trace bilateral pleural effusions but no significant pulmonary edema to suggest congestive heart failure. Patient received 1 mg IVP of Bumex in ED.  Patient admitted for chest pain, CLOTILDE on CKD and CHF.  Renal consulted for CLOTILDE/CKD Mx. Pt reports sob improving. denied cp. Denied recent NSAID use/Abx use/iv contrast studies. reports adherence w/home lasix dose.     PAST MEDICAL & SURGICAL HISTORY:  HTN (hypertension)    Hypothyroidism    Cholecystitis    Unilateral inguinal hernia without obstruction or gangrene, recurrence not specified    Anemia    Hyperlipidemia    Pre-diabetes    Aortic stenosis    Gastric AVM    H/O right inguinal hernia repair    S/P small bowel resection  21    Allergies: No Known Allergies    Home Medications Reviewed  Hospital Medications:   MEDICATIONS  (STANDING):  atorvastatin 80 milliGRAM(s) Oral at bedtime  buMETAnide Injectable 1 milliGRAM(s) IV Push daily  heparin   Injectable 5000 Unit(s) SubCutaneous every 12 hours  hydrALAZINE 100 milliGRAM(s) Oral three times a day  levothyroxine 50 MICROGram(s) Oral daily  pantoprazole    Tablet 40 milliGRAM(s) Oral before breakfast  sodium chloride 0.9% lock flush 3 milliLiter(s) IV Push every 8 hours    SOCIAL HISTORY:  Denies ETOh,Smoking, illicit drug use  FAMILY HISTORY:  FH: hypertension    REVIEW OF SYSTEMS:  CONSTITUTIONAL: No weakness, fevers or chills  EYES/ENT: No visual changes;  No vertigo or throat pain   NECK: No pain or stiffness  RESPIRATORY: No cough, wheezing, hemoptysis; + shortness of breath  CARDIOVASCULAR: No chest pain or palpitations.  GASTROINTESTINAL: No abdominal or epigastric pain. No nausea, vomiting, or hematemesis; No diarrhea or constipation. No melena or hematochezia.  GENITOURINARY: No dysuria, frequency, foamy urine, urinary urgency, incontinence or hematuria  NEUROLOGICAL: No numbness or weakness  SKIN: No itching, burning, rashes, or lesions   VASCULAR: + bilateral lower extremity edema.   All other review of systems is negative unless indicated above.    VITALS:  T(F): 98.2 (21 @ 07:54), Max: 98.6 (11-10-21 @ 22:10)  HR: 64 (21 @ 07:54)  BP: 134/59 (21 @ 07:54)  RR: 18 (21 @ 07:54)  SpO2: 99% (21 @ 07:54)  Wt(kg): --      PHYSICAL EXAM:  Constitutional: NAD  HEENT: anicteric sclera  Neck: No JVD  Respiratory: dec bibasilar BS, no wheezes  Cardiovascular: S1, S2, RRR  Gastrointestinal: BS+, soft, NT,  Extremities: 2+ peripheral edema b/l  Neurological: A/O x 3  Psychiatric: Normal mood, normal affect  : No tang.     LABS:      136  |  100  |  82<H>  ----------------------------<  130<H>  4.6   |  20<L>  |  5.65<H>    Ca    8.5      2021 12:42  Phos  5.9       Mg     2.70         TPro  7.0  /  Alb  4.1  /  TBili  0.4  /  DBili      /  AST  16  /  ALT  16  /  AlkPhos  165<H>  11-10    Creatinine Trend: 5.65 <--, 6.06 <--                        8.4    5.26  )-----------( 122      ( 2021 12:42 )             27.5     Urine Studies:  Urinalysis Basic - ( 2021 12:30 )    Color: Light Yellow / Appearance: Clear / S.012 / pH:   Gluc:  / Ketone: Negative  / Bili: Negative / Urobili: <2 mg/dL   Blood:  / Protein: Trace / Nitrite: Negative   Leuk Esterase: Negative / RBC: 1 /HPF / WBC 0 /HPF   Sq Epi:  / Non Sq Epi: 0 /HPF / Bacteria: Negative      Sodium, Random Urine: 66 mmol/L ( @ 13:43)  Potassium, Random Urine: 39.7 mmol/L ( @ 13:43)  Chloride, Random Urine: 70 mmol/L ( @ 13:43)      RADIOLOGY & ADDITIONAL STUDIES:    < from: US Kidney and Bladder (11.10.21 @ 18:17) >  FINDINGS:    Right kidney: 9.5 cm. No renal mass, hydronephrosis or calculi.    Left kidney: 9.7 cm. No renal mass, hydronephrosis or calculi.    Urinary bladder: Not well distended, limiting assessment. Mild bladder wall thickening cannot be excluded.    IMPRESSION:    No bilateral hydronephrosis.    < end of copied text >        < from: Xray Chest 2 Views PA/Lat (11.10.21 @ 14:20) >  IMPRESSION:  Cardiomegaly and trace bilateral pleural effusions but no significant pulmonary edema to suggest congestive heart failure.    --- End of Report ---    < end of copied text >

## 2021-11-11 NOTE — PROGRESS NOTE ADULT - PROBLEM SELECTOR PLAN 3
Patient with ProBNP of 53698.  Patient denies history of heart failure.  Patient also with 2+ bilateral pitting edema.  Echo from October 28/21 showing EF of 60-65% with Grade II left ventricular diastolic dysfunction  Mild acute diastolic CHF  C/w IV bumex 1mg Daily  On lasix at home-40mg daily  Apprec Cards recs-suspect renal etiology

## 2021-11-11 NOTE — CONSULT NOTE ADULT - ASSESSMENT
MOLLY ANNA is a 66y Male who presents with a chief complaint of chest pain.    Anemia  - Patient with history of recurrent melena and requirements of blood transfusions over the past year.  - He is supposed to have further workup with colorectal surgery at [facility].  - Hemoglobin 8.3 on admission. Continue to trend CBC. Transfuse to hemoglobin goal of 7.  - May require GI/colorectal surgery consultation if there is positive FOBT or hemoglobin downtrends    Otherwise patient is being managed for chest pain, congestive heart failure, and acute kidney injury with chronic kidney disease. Cardiology and nephrology have been consulted.    We will continue to follow.    Tone Quinn MD  Hematology/Oncology  C: [phone]  O: [phone]

## 2021-11-11 NOTE — PROGRESS NOTE ADULT - PROBLEM SELECTOR PLAN 5
Continue monitoring-BP acceptable at present  Patient on hydralazine 100 mg TID, Amlodipine 10 mg daily, Carvedilol 12.5 mg TID.  Will continue Hydralazine with hold parameters.  Holding amlodipine/coreg for now in the setting of acute CHF

## 2021-11-11 NOTE — CONSULT NOTE ADULT - ASSESSMENT
CLOTILDE on CKD3: likely 2/2 pre renal azotemia/CHF.  no obstructive uropathy. U/O good.   hypervolemic, CHF.   c/w buMETAnide  ivp  monitor daily weights and U/O,  c/w low Na diet and fluid restriction  monitor  BMP daily and u/o   dose all meds for eGFR<15ml/min.   avoid ACEi/ARB/NSAIDs/Nephrotoxics. 65 y/o M with PMH of Hypothyroidism,, pre-diabetes, HTN, HLD, CKD not on dialysis, Anemia, small bowel resection 9/13/21 secondary to AVM presents to ED after episode of chest pain associated with shortness of breath. Patient admitted for chest pain, CLOTILDE on CKD and CHF.  Renal consulted for CLOTILDE/CKD Mx.    CLOTILDE on CKD4:   CLOTILDE likely 2/2 cardiorenal/CHF.  Cr 6>5.65  10/29/21 Cr was 3.29  no e/o obstructive uropathy.   hypervolemic, CHF.   s/p Bumex 1 mg IVP in ED.  K wnl. bicarb low but cceptable    c/w buMETAnide ivp 1mg -rec to inc bid if ok w/cardiology  If renal function doesn't improve or if pt resistant to iv diuresis, will need to start RRT/hemodialysis. explained to pt in detail pt agreeable   monitor daily weights and U/O,  low Na/phos in diet and fluid restriction 1L/day  monitor BMP daily and u/o   dose all meds for eGFR<15ml/min.   avoid ACEi/ARB/NSAIDs/Nephrotoxics.    chest pain, elevated Tps  now cp free  f/u w/cardiology eval  Echo from October 28/21 showing EF of 60-65% with Grade II left ventricular diastolic dysfunction.    Hypertension. controlled  Patient on hydralazine 100 mg TID,    Carvedilol 12.5 mg TID.-per cardio  continue Hydralazine with hold parameters.  agree w/d/c Amlodipine     Anemia. likely 2/2 CKD. r/o Fe def  Patient with hemoglobin of 8.3, previously 10.4 on oct 29 but overall trend moreso in the 8s  Patient denies BRBPR, blood in stool.  Patient with history of GI bleed from small bowel in September requiring small bowel resection due to AVM.  check  Fe profile, Ferritin, Reticulocyte count.  watch H/H  if Fe adequate, will give ROSA inj    labs, rad, chart reviewed  Thanks for consulting. will closely follow up.   poc d/w pt  For any question, pl call:  Dr Briggs  Cell # [phone]  Pager # [phone]  office # [phone]

## 2021-11-11 NOTE — PROGRESS NOTE ADULT - PROBLEM SELECTOR PLAN 2
Patient with Cr of 6.06.  Baseline approximately 3-CKD stage 3  US Kidney and bladder negative for bilateral hydronephrosis.  Seen by Renal, apprec recs-suspicion is d/t pre-renal azotemia/CHF  F/up FeUREA as patient was on lasix at home  C/w IV bumetanide 1mg daily  monitor daily weights and U/O  c/w low Na diet and fluid restriction  Avoid nephrotoxic agents  F/up BMP in AM Patient with Cr of 6.06.  Baseline approximately 3-CKD stage 4  US Kidney and bladder negative for bilateral hydronephrosis.  Seen by Renal, apprec recs-suspicion is d/t pre-renal azotemia/CHF  F/up FeUREA as patient was on lasix at home  C/w IV bumetanide 1mg daily  monitor daily weights and U/O  c/w low Na diet and fluid restriction  Avoid nephrotoxic agents  F/up BMP in AM

## 2021-11-11 NOTE — CONSULT NOTE ADULT - SUBJECTIVE AND OBJECTIVE BOX
Patient seen and evaluated at bedside    HPI:  65 y/o M with PMH of Hypothyroidism,, pre-diabetes, HTN, HLD, CKD not on dialysis, Anemia, small bowel resection 9/13/21 secondary to AVM presents to ED after episode of chest pain. Patient states that he was going to clinic because of swelling in legs, he reports while walking be developed midsternal chest pressure and shortness of breath. Patient states that he was given aspirin by EMS which helped relieve pain. Patient denies previous episodes of chest pain. Currently patient denies chest pain and shortness of breath. Patient reports intermittently having lower extremity swelling over the last 3 weeks. Patient reports that over the last few days lower extremity swelling has become worse despite taking lasix at home. Patient denies history of heart failure, fever, chills, abdominal pain, nausea, vomiting, blood in stools, BRBPR, change in stool, difficulty urinating.    In ED patient was found to have troponin of 90 which down-trended to 84. ProBNP was elevated at 149[address].Cr was found to be 6.06. Renal ultrasound was found to be negative for bilateral hydronephrosis. CXR showed Cardiomegaly and trace bilateral pleural effusions but no significant pulmonary edema to suggest congestive heart failure. Patient received 1 mg IVP of Bumex in ED.    On my interview the patient denies having a cardiologist, has never had heart problems before. He denies palpitations, orthopnea, current chest pain.    REVIEW OF SYSTEMS Negative unless otherwise mentioned    PMHx:   No pertinent past medical history  DM (diabetes mellitus)  HTN (hypertension)  Hypothyroidism  Cholecystitis  Unilateral inguinal hernia without obstruction or gangrene, recurrence not specified  Anemia  Hyperlipidemia  Pre-diabetes  Aortic stenosis  Gastric AVM      PSHx:   No significant past surgical history  H/O right inguinal hernia repair  S/P small bowel resection    Soc:  No smoking no ETOH no illicits    FH: hypertension    Allergies:  No Known Allergies      Home Meds:  Allopurinol 100mg  Amlodipine 10mg  Atorvastatin 80mg  Carvedilol 12.5 BID  Furosemide 50mg daily  hydralazine 100mg  TID  Levothyroxine 50 mcg    Current Medications:   atorvastatin 80 milliGRAM(s) Oral at bedtime  buMETAnide Injectable 1 milliGRAM(s) IV Push daily  carvedilol 12.5 milliGRAM(s) Oral every 12 hours  heparin   Injectable 5000 Unit(s) SubCutaneous every 12 hours  hydrALAZINE 100 milliGRAM(s) Oral three times a day  levothyroxine 50 MICROGram(s) Oral daily  pantoprazole    Tablet 40 milliGRAM(s) Oral before breakfast  sodium chloride 0.9% lock flush 3 milliLiter(s) IV Push every 8 hours      Physical Exam:  T(F): 98.2 (11-11), Max: 98.6 (11-10)  HR: 64 (11-11) (60 - 70)  BP: 134/59 (11-11) (122/61 - 150/64)  RR: 18 (11-11)  SpO2: 99% (11-11)    Well appearing, lying flat  JVP unable to assess  RRR, systolic murmur heard best at apex. PMI non displaced laterally, but diffuse  CTAB  Soft NTND  WWP, 1+ Pitting edema    Cardiovascular Diagnostic Testing:    EKG RBBB    CXR Cephalization LAE?    Labs: reviewed    Assessment & Plan:  65 y/o M PMH DM, HTN, HLD, CKD (b/l ~3), anemia, Gastric AVM s/p small bowel resection presenting with sudden onset CP and VOL. CP atypical in nature, substernal but without classic aggrevating/relieving factors. Currently chest pain free. Low level troponin iso nonoliguric renal failure, EKG with known RBBB, low concern for ACS at this time and would not treat for this. Patient VOL -> Unclear if this is due to acute renal failure or cardiac in nature. proBNP >10K but this can be falsely elevated iso decreased clearance. Agree with treating for HF until gain more understanding of presentation. Low concern for PE on room air not tachycardic (on BB).    Impression: VOL, ddx renal failure vs. Acute Decompensated Heart Failure, Class 2 Stage C. Currently there are no signs of developing shock or lack of forward flow.    --Please order TTE, will expedite  --Please Diurese patient to achieve NN 1-2L a day. If diuresing multiple times a day, consider BID electrolytes  --Strict I/Os at all times. Generally, a tang catheter is discouraged due to risk for nosocomial infection.  --GDMT: Would hold beta blocker while decompensated, would treat hypertension with hydralazine vs. imdur vs. isordil  --Appreciate nephrology involvement  --Please monitor on telemetry  --Please obtain STANDING daily morning pre-prandial post-voiding weights  --K >4 Mg > 2  --If patient develops signs of shock, please call    Any Questions or Concerns please call    Recommendations not final unless countersigned by attending    Slick Ferguson PGY4  Cardiology Fellow

## 2021-11-11 NOTE — PROGRESS NOTE ADULT - ASSESSMENT
65 y/o M with PMH of Hypothyroidism,, pre-diabetes, HTN, HLD, CKD not on dialysis, Anemia, small bowel resection 9/13/21 secondary to AVM presents to ED after episode of chest pain associated with shortness of breath. Patient admitted for chest pain, CLOTILDE on CKD and CHF.

## 2021-11-11 NOTE — PROGRESS NOTE ADULT - PROBLEM SELECTOR PLAN 1
RESOLVED  Continue telemetry monitoring  Troponin 90-->84-->82.  EKG with known RBBB, no TWI or ST elevations.  Patient currently chest pain free and denies shortness of breath  Troponin possibly elevated in setting of CKD and possible heart failure, ACS has been ruled out  Echo from October 28/21 showing EF of 60-65% with Grade II left ventricular diastolic dysfunction  F/up repeat TTE  Consider nuclear stress test inpatient Vs outpatient  Apprec Cards recs

## 2021-11-11 NOTE — PROGRESS NOTE ADULT - SUBJECTIVE AND OBJECTIVE BOX
Patient is a 66y old  Male who presents with a chief complaint of Chest pain (2021 16:21)      INTERVAL HPI/OVERNIGHT EVENTS:  Seen by me this morning, feels better. Improved LE swelling, no SOB. No chest pain.    Review of Systems: 12 point review of systems otherwise negative    MEDICATIONS  (STANDING):  atorvastatin 80 milliGRAM(s) Oral at bedtime  buMETAnide Injectable 1 milliGRAM(s) IV Push daily  heparin   Injectable 5000 Unit(s) SubCutaneous every 12 hours  hydrALAZINE 100 milliGRAM(s) Oral three times a day  levothyroxine 50 MICROGram(s) Oral daily  pantoprazole    Tablet 40 milliGRAM(s) Oral before breakfast  sodium chloride 0.9% lock flush 3 milliLiter(s) IV Push every 8 hours    MEDICATIONS  (PRN):      Allergies    No Known Allergies    Intolerances          Vital Signs Last 24 Hrs  T(C): 36.9 (2021 18:15), Max: 37 (10 Nov 2021 22:10)  T(F): 98.5 (2021 18:15), Max: 98.6 (10 Nov 2021 22:10)  HR: 68 (2021 18:15) (60 - 69)  BP: 143/55 (2021 18:15) (134/59 - 150/64)  BP(mean): --  RR: 19 (2021 18:15) (16 - 19)  SpO2: 96% (2021 18:15) (96% - 99%)  CAPILLARY BLOOD GLUCOSE           @ 07:01  -   @ 18:28  --------------------------------------------------------  IN: 0 mL / OUT: 800 mL / NET: -800 mL        Physical Exam:    Daily     Daily   General:  Well appearing, NAD, not cachetic  HEENT:  Nonicteric, PERRLA  CV:  RRR  Lungs:  CTA B/L, no wheezes, rales, rhonchi  Abdomen:  Soft, non-tender, no distended, positive BS  Extremities:  2+ pulses, no c/c, +B/L LE edema  Skin:  Warm and dry, no rashes  :  No tang  Neuro:  AAOx3, non-focal, CN II-XII grossly intact  No Restraints    LABS:                        8.4    5.26  )-----------( 122      ( 2021 12:42 )             27.5         136  |  100  |  82<H>  ----------------------------<  130<H>  4.6   |  20<L>  |  5.65<H>    Ca    8.5      2021 12:42  Phos  5.9       Mg     2.70         TPro  7.0  /  Alb  4.1  /  TBili  0.4  /  DBili  x   /  AST  16  /  ALT  16  /  AlkPhos  165<H>  11-10      Urinalysis Basic - ( 2021 12:30 )    Color: Light Yellow / Appearance: Clear / S.012 / pH: x  Gluc: x / Ketone: Negative  / Bili: Negative / Urobili: <2 mg/dL   Blood: x / Protein: Trace / Nitrite: Negative   Leuk Esterase: Negative / RBC: 1 /HPF / WBC 0 /HPF   Sq Epi: x / Non Sq Epi: 0 /HPF / Bacteria: Negative          RADIOLOGY & ADDITIONAL TESTS:  Reviewed by me

## 2021-11-12 LAB
ALBUMIN SERPL ELPH-MCNC: 3.9 G/DL — SIGNIFICANT CHANGE UP (ref 3.3–5)
ALP SERPL-CCNC: 134 U/L — HIGH (ref 40–120)
ALT FLD-CCNC: 12 U/L — SIGNIFICANT CHANGE UP (ref 4–41)
ANION GAP SERPL CALC-SCNC: 15 MMOL/L — HIGH (ref 7–14)
ANION GAP SERPL CALC-SCNC: 16 MMOL/L — HIGH (ref 7–14)
AST SERPL-CCNC: 10 U/L — SIGNIFICANT CHANGE UP (ref 4–40)
BILIRUB SERPL-MCNC: 0.4 MG/DL — SIGNIFICANT CHANGE UP (ref 0.2–1.2)
BUN SERPL-MCNC: 94 MG/DL — HIGH (ref 7–23)
BUN SERPL-MCNC: 94 MG/DL — HIGH (ref 7–23)
CALCIUM SERPL-MCNC: 8.4 MG/DL — SIGNIFICANT CHANGE UP (ref 8.4–10.5)
CALCIUM SERPL-MCNC: 8.4 MG/DL — SIGNIFICANT CHANGE UP (ref 8.4–10.5)
CHLORIDE SERPL-SCNC: 97 MMOL/L — LOW (ref 98–107)
CHLORIDE SERPL-SCNC: 99 MMOL/L — SIGNIFICANT CHANGE UP (ref 98–107)
CO2 SERPL-SCNC: 20 MMOL/L — LOW (ref 22–31)
CO2 SERPL-SCNC: 22 MMOL/L — SIGNIFICANT CHANGE UP (ref 22–31)
CREAT ?TM UR-MCNC: 70 MG/DL — SIGNIFICANT CHANGE UP
CREAT ?TM UR-MCNC: 70 MG/DL — SIGNIFICANT CHANGE UP
CREAT SERPL-MCNC: 5.38 MG/DL — HIGH (ref 0.5–1.3)
CREAT SERPL-MCNC: 5.44 MG/DL — HIGH (ref 0.5–1.3)
GLUCOSE SERPL-MCNC: 107 MG/DL — HIGH (ref 70–99)
GLUCOSE SERPL-MCNC: 152 MG/DL — HIGH (ref 70–99)
HCT VFR BLD CALC: 25.4 % — LOW (ref 39–50)
HGB BLD-MCNC: 8 G/DL — LOW (ref 13–17)
MAGNESIUM SERPL-MCNC: 2.7 MG/DL — HIGH (ref 1.6–2.6)
MAGNESIUM SERPL-MCNC: 2.8 MG/DL — HIGH (ref 1.6–2.6)
MCHC RBC-ENTMCNC: 25.7 PG — LOW (ref 27–34)
MCHC RBC-ENTMCNC: 31.5 GM/DL — LOW (ref 32–36)
MCV RBC AUTO: 81.7 FL — SIGNIFICANT CHANGE UP (ref 80–100)
NRBC # BLD: 0 /100 WBCS — SIGNIFICANT CHANGE UP
NRBC # FLD: 0 K/UL — SIGNIFICANT CHANGE UP
PHOSPHATE SERPL-MCNC: 5 MG/DL — HIGH (ref 2.5–4.5)
PHOSPHATE SERPL-MCNC: 5.7 MG/DL — HIGH (ref 2.5–4.5)
PLATELET # BLD AUTO: 141 K/UL — LOW (ref 150–400)
POTASSIUM SERPL-MCNC: 4.3 MMOL/L — SIGNIFICANT CHANGE UP (ref 3.5–5.3)
POTASSIUM SERPL-MCNC: 4.5 MMOL/L — SIGNIFICANT CHANGE UP (ref 3.5–5.3)
POTASSIUM SERPL-SCNC: 4.3 MMOL/L — SIGNIFICANT CHANGE UP (ref 3.5–5.3)
POTASSIUM SERPL-SCNC: 4.5 MMOL/L — SIGNIFICANT CHANGE UP (ref 3.5–5.3)
PROT SERPL-MCNC: 6.7 G/DL — SIGNIFICANT CHANGE UP (ref 6–8.3)
RBC # BLD: 3.11 M/UL — LOW (ref 4.2–5.8)
RBC # FLD: 18.8 % — HIGH (ref 10.3–14.5)
SODIUM SERPL-SCNC: 133 MMOL/L — LOW (ref 135–145)
SODIUM SERPL-SCNC: 136 MMOL/L — SIGNIFICANT CHANGE UP (ref 135–145)
SODIUM UR-SCNC: 63 MMOL/L — SIGNIFICANT CHANGE UP
UUN UR-MCNC: 339 MG/DL — SIGNIFICANT CHANGE UP
UUN UR-MCNC: 443 MG/DL — SIGNIFICANT CHANGE UP
WBC # BLD: 5.38 K/UL — SIGNIFICANT CHANGE UP (ref 3.8–10.5)
WBC # FLD AUTO: 5.38 K/UL — SIGNIFICANT CHANGE UP (ref 3.8–10.5)

## 2021-11-12 PROCEDURE — 99233 SBSQ HOSP IP/OBS HIGH 50: CPT

## 2021-11-12 RX ORDER — BUMETANIDE 0.25 MG/ML
1 INJECTION INTRAMUSCULAR; INTRAVENOUS
Refills: 0 | Status: DISCONTINUED | OUTPATIENT
Start: 2021-11-12 | End: 2021-11-16

## 2021-11-12 RX ADMIN — Medication 100 MILLIGRAM(S): at 14:24

## 2021-11-12 RX ADMIN — SODIUM CHLORIDE 3 MILLILITER(S): 9 INJECTION INTRAMUSCULAR; INTRAVENOUS; SUBCUTANEOUS at 06:19

## 2021-11-12 RX ADMIN — Medication 100 MILLIGRAM(S): at 06:20

## 2021-11-12 RX ADMIN — HEPARIN SODIUM 5000 UNIT(S): 5000 INJECTION INTRAVENOUS; SUBCUTANEOUS at 06:18

## 2021-11-12 RX ADMIN — BUMETANIDE 1 MILLIGRAM(S): 0.25 INJECTION INTRAMUSCULAR; INTRAVENOUS at 06:19

## 2021-11-12 RX ADMIN — HEPARIN SODIUM 5000 UNIT(S): 5000 INJECTION INTRAVENOUS; SUBCUTANEOUS at 17:33

## 2021-11-12 RX ADMIN — SODIUM CHLORIDE 3 MILLILITER(S): 9 INJECTION INTRAMUSCULAR; INTRAVENOUS; SUBCUTANEOUS at 14:15

## 2021-11-12 RX ADMIN — PANTOPRAZOLE SODIUM 40 MILLIGRAM(S): 20 TABLET, DELAYED RELEASE ORAL at 06:19

## 2021-11-12 RX ADMIN — BUMETANIDE 1 MILLIGRAM(S): 0.25 INJECTION INTRAMUSCULAR; INTRAVENOUS at 17:33

## 2021-11-12 RX ADMIN — SODIUM CHLORIDE 3 MILLILITER(S): 9 INJECTION INTRAMUSCULAR; INTRAVENOUS; SUBCUTANEOUS at 21:13

## 2021-11-12 RX ADMIN — Medication 50 MICROGRAM(S): at 06:19

## 2021-11-12 RX ADMIN — Medication 100 MILLIGRAM(S): at 21:27

## 2021-11-12 RX ADMIN — ATORVASTATIN CALCIUM 80 MILLIGRAM(S): 80 TABLET, FILM COATED ORAL at 21:28

## 2021-11-12 NOTE — PROGRESS NOTE ADULT - PROBLEM SELECTOR PLAN 1
RESOLVED  Continue telemetry monitoring  EKG with known RBBB, no TWI or ST elevations.  Patient currently chest pain free and denies shortness of breath  Troponin possibly elevated in setting of CKD and possible heart failure, not ACS  Echo from October 28/21 showing EF of 60-65% with Grade II left ventricular diastolic dysfunction  F/up repeat TTE  Consider nuclear stress test inpatient Vs outpatient  Apprec Cards recs, believe is more renal related

## 2021-11-12 NOTE — PROGRESS NOTE ADULT - PROBLEM SELECTOR PLAN 3
Patient with ProBNP of 95245  Echo from October 28/21 showing EF of 60-65% with Grade II left ventricular diastolic dysfunction  Mild acute diastolic CHF-improving  C/w IV bumex 1mg BID per Renal recs  On lasix at home-40mg daily  Apprec Cards recs-suspect renal etiology  Not on ACE-I/ARB due to CLOTILDE  Holding home dose of coreg for now-resume as tolerated

## 2021-11-12 NOTE — PROGRESS NOTE ADULT - PROBLEM SELECTOR PLAN 5
Continue monitoring-BP acceptable at present  Patient on hydralazine 100 mg TID, Amlodipine 10 mg daily, Carvedilol 12.5 mg TID.  Will continue Hydralazine with holding parameters  Holding amlodipine/coreg for now in the setting of acute CHF, resume as tolerated

## 2021-11-12 NOTE — PROGRESS NOTE ADULT - ASSESSMENT
67 y/o M with PMH of Hypothyroidism,, pre-diabetes, HTN, HLD, CKD not on dialysis, Anemia, small bowel resection 9/13/21 secondary to AVM presents to ED after episode of chest pain associated with shortness of breath. Patient admitted for chest pain, CLOTILDE on CKD and CHF.  Renal consulted for CLOTILDE/CKD Mx.    CLOTILDE on CKD4:   CLOTILDE likely 2/2 cardiorenal/CHF.  Cr 6>5.65> 5.4  10/29/21 Cr was 3.29  no e/o obstructive uropathy.   hypervolemic, CHF.   Agreed with increase of Bumex to 1mg BID IV  K wnl. bicarb low but cceptable    If renal function doesn't improve or if pt resistant to iv diuresis, will need to start RRT/hemodialysis. explained to pt in detail pt agreeable   monitor daily weights and U/O,  Good UOP so far, labs stable, patient does not want HD at this time, considering if situation worsens  low Na/phos in diet and fluid restriction 1L/day  monitor BMP daily and u/o   dose all meds for eGFR<15ml/min.   avoid ACEi/ARB/NSAIDs/Nephrotoxics.    chest pain, elevated Tps  now cp free  f/u w/cardiology eval  Echo from October 28/21 showing EF of 60-65% with Grade II left ventricular diastolic dysfunction.    Hypertension. controlled  Patient on hydralazine 100 mg TID,    Carvedilol 12.5 mg TID.-per cardio  continue Hydralazine with hold parameters.  agree w/d/c Amlodipine     Anemia. likely 2/2 CKD. r/o Fe def  Patient with hemoglobin of 8.3, previously 10.4 on oct 29 but overall trend moreso in the 8s  Patient denies BRBPR, blood in stool.  Patient with history of GI bleed from small bowel in September requiring small bowel resection due to AVM.  check  Fe profile, Ferritin, Reticulocyte count.  watch H/H  if Fe adequate, will give ROSA inj    labs, rad, chart reviewed  For any question, call:  Cell # [phone]  Pager # [phone]  Callback # [phone]

## 2021-11-12 NOTE — PROGRESS NOTE ADULT - SUBJECTIVE AND OBJECTIVE BOX
Patient seen and evaluated at bedside    GUEIRTAEON. NN 1300 to bumex 1mg x 1    REVIEW OF SYSTEMS Negative unless otherwise mentioned    Current Medications:   atorvastatin 80 milliGRAM(s) Oral at bedtime  buMETAnide Injectable 1 milliGRAM(s) IV Push daily  heparin   Injectable 5000 Unit(s) SubCutaneous every 12 hours  hydrALAZINE 100 milliGRAM(s) Oral three times a day  levothyroxine 50 MICROGram(s) Oral daily  pantoprazole    Tablet 40 milliGRAM(s) Oral before breakfast  sodium chloride 0.9% lock flush 3 milliLiter(s) IV Push every 8 hours      Physical Exam:  T(F): 97.9 (11-12), Max: 99 (11-12)  HR: 75 (11-12) (64 - 75)  BP: 132/50 (11-12) (132/50 - 143/63)  RR: 18 (11-12)  SpO2: 99% (11-12)    Well appearing, lying flat  JVP unable to assess  RRR, systolic murmur heard best at apex. PMI non displaced laterally, but diffuse  CTAB  Soft NTND  WWP, trace Pitting edema      Cardiovascular Diagnostic Testing:  EKG RBBB    CXR Cephalization LAE?    Labs: reviewed    Assessment & Plan:  65 y/o M PMH DM, HTN, HLD, CKD (b/l ~3), anemia, Gastric AVM s/p small bowel resection presenting with sudden onset CP and VOL. CP atypical in nature, substernal but without classic aggrevating/relieving factors. Currently chest pain free. Low level troponin iso nonoliguric renal failure, EKG with known RBBB, low concern for ACS at this time and would not treat for this. Patient VOL -> Unclear if this is due to acute renal failure or cardiac in nature. proBNP >10K but this can be falsely elevated iso decreased clearance. Agree with treating for CHF until gain more understanding of presentation. Low concern for PE on room air not tachycardic (on BB).     Impression: VOL, ddx renal failure vs. HFpEF exacerbation. Currently there are no signs of developing shock or lack of forward flow.    --Please order TTE, will expedite. TTE 10/28 at [facility] preserved EF + RV function, Grade II diastolic dysfunction  --Please Diurese patient to achieve NN 1-2L a day. If diuresing multiple times a day, consider BID electrolytes  --Strict I/Os at all times. Generally, a tang catheter is discouraged due to risk for nosocomial infection.  --GDMT: Would hold beta blocker while decompensated, would treat hypertension with hydralazine vs. imdur vs. isordil  --Appreciate nephrology involvement  --Please monitor on telemetry  --Please obtain STANDING daily morning pre-prandial post-voiding weights  --K >4 Mg > 2  --If patient develops signs of shock, please call      Recommendations not final unless countersigned by attending    Slick Ferguson PGY4  Cardiology Fellow

## 2021-11-12 NOTE — PROGRESS NOTE ADULT - SUBJECTIVE AND OBJECTIVE BOX
[facility] - EMMETT Briggs / EMMETT Parmar / GENA Tracy/ EMMETT Cobb/ EMMETT Manzano/ ESTEE Rod / KAMINI Major / LATASHA Bernal  ---------------------------------------------------------------------------------------------------------------  seen and examined today for CLOTILDE on CKD  Interval : serum creatinine stable  VITALS:  T(F): 97.9 (11-12-21 @ 06:19), Max: 99 (11-12-21 @ 01:29)  HR: 75 (11-12-21 @ 06:19)  BP: 132/50 (11-12-21 @ 06:19)  RR: 18 (11-12-21 @ 06:19)  SpO2: 99% (11-12-21 @ 06:19)  Wt(kg): --    11-11 @ 07:01  -  11-12 @ 07:00  --------------------------------------------------------  IN: 180 mL / OUT: 1450 mL / NET: -1270 mL      Physical Exam :-  Constitutional: NAD  Neck: Supple. JVD +  Respiratory: Bilateral equal breath sounds,  Cardiovascular: S1, S2 normal,  Gastrointestinal: Bowel Sounds present, soft, non tender.  Extremities: 2+ edema  Neurological: Alert and Oriented x 3, no focal deficits  Psychiatric: Normal mood, normal affect  Data:-  Allergies :   No Known Allergies    Hospital Medications:   MEDICATIONS  (STANDING):  atorvastatin 80 milliGRAM(s) Oral at bedtime  buMETAnide Injectable 1 milliGRAM(s) IV Push two times a day  heparin   Injectable 5000 Unit(s) SubCutaneous every 12 hours  hydrALAZINE 100 milliGRAM(s) Oral three times a day  levothyroxine 50 MICROGram(s) Oral daily  pantoprazole    Tablet 40 milliGRAM(s) Oral before breakfast  sodium chloride 0.9% lock flush 3 milliLiter(s) IV Push every 8 hours    11-12    133<L>  |  97<L>  |  94<H>  ----------------------------<  107<H>  4.3   |  20<L>  |  5.44<H>    Ca    8.4      12 Nov 2021 08:03  Phos  5.7     11-12  Mg     2.80     11-12    TPro  6.7  /  Alb  3.9  /  TBili  0.4  /  DBili      /  AST  10  /  ALT  12  /  AlkPhos  134<H>  11-12    Creatinine Trend: 5.44 <--, 5.65 <--, 6.06 <--                        8.0    5.38  )-----------( 141      ( 12 Nov 2021 08:03 )             25.4

## 2021-11-12 NOTE — PROGRESS NOTE ADULT - PROBLEM SELECTOR PLAN 4
Patient with hemoglobin of 8.0 previously 10.4 on oct 29 but overall trend more in the 8s  Patient denies BRBPR, blood in stool.  Patient with history of GIB from small bowel in September requiring small bowel resection due to AVM  S/p Capsule Endoscopy at [facility] 10/29, results to be followed up  Keep Hg above 8  CKD could also be contributing to patient's anemia  Apprec Hem recs

## 2021-11-12 NOTE — PROGRESS NOTE ADULT - SUBJECTIVE AND OBJECTIVE BOX
Patient is a 66y old  Male who presents with a chief complaint of Chest pain (2021 11:07)      INTERVAL HPI/OVERNIGHT EVENTS:  Seen by me this morning, doing well. No SOB or chest pain. States that his leg swelling has improved.    Review of Systems: 12 point review of systems otherwise negative    MEDICATIONS  (STANDING):  atorvastatin 80 milliGRAM(s) Oral at bedtime  buMETAnide Injectable 1 milliGRAM(s) IV Push two times a day  heparin   Injectable 5000 Unit(s) SubCutaneous every 12 hours  hydrALAZINE 100 milliGRAM(s) Oral three times a day  levothyroxine 50 MICROGram(s) Oral daily  pantoprazole    Tablet 40 milliGRAM(s) Oral before breakfast  sodium chloride 0.9% lock flush 3 milliLiter(s) IV Push every 8 hours    MEDICATIONS  (PRN):      Allergies    No Known Allergies    Intolerances          Vital Signs Last 24 Hrs  T(C): 36.6 (2021 14:20), Max: 37.2 (2021 01:29)  T(F): 97.8 (2021 14:20), Max: 99 (2021 01:29)  HR: 73 (2021 14:20) (67 - 75)  BP: 139/54 (2021 14:20) (132/50 - 166/51)  BP(mean): --  RR: 18 (2021 14:20) (17 - 19)  SpO2: 100% (2021 14:20) (96% - 100%)  CAPILLARY BLOOD GLUCOSE          - @ 07:01  -  - @ 07:00  --------------------------------------------------------  IN: 180 mL / OUT: 1450 mL / NET: -1270 mL        Physical Exam:    Daily     Daily   General:  Well appearing, NAD, not cachetic  HEENT:  Nonicteric, PERRLA  CV:  RRR  Lungs:  CTA B/L, no wheezes, rales, rhonchi  Abdomen:  Soft, non-tender, no distended, positive BS  Extremities:  2+ pulses, no c/c, +LE edema B/L  Skin:  Warm and dry, no rashes  :  No tang  Neuro:  AAOx3, non-focal, CN II-XII grossly intact  No Restraints    LABS:                        8.0    5.38  )-----------( 141      ( 2021 08:03 )             25.4     -    133<L>  |  97<L>  |  94<H>  ----------------------------<  107<H>  4.3   |  20<L>  |  5.44<H>    Ca    8.4      2021 08:03  Phos  5.7       Mg     2.80         TPro  6.7  /  Alb  3.9  /  TBili  0.4  /  DBili  x   /  AST  10  /  ALT  12  /  AlkPhos  134<H>  -12      Urinalysis Basic - ( 2021 12:30 )    Color: Light Yellow / Appearance: Clear / S.012 / pH: x  Gluc: x / Ketone: Negative  / Bili: Negative / Urobili: <2 mg/dL   Blood: x / Protein: Trace / Nitrite: Negative   Leuk Esterase: Negative / RBC: 1 /HPF / WBC 0 /HPF   Sq Epi: x / Non Sq Epi: 0 /HPF / Bacteria: Negative          RADIOLOGY & ADDITIONAL TESTS:  Reviewed by me

## 2021-11-12 NOTE — PROGRESS NOTE ADULT - PROBLEM SELECTOR PLAN 2
There has been no significant change since I saw patient as documented in [facility]. Surgery revisted. To proceed as planned. Violet Mcdowell MD FACS Patient with Cr of 6.06.  Baseline approximately 3-CKD stage 4  US Kidney and bladder negative for bilateral hydronephrosis.  Seen by Renal, apprec recs-suspicion is d/t cardio-renal/CHF  FeUREA as pt was on IV lasix at home  Increasing bumex to 1mg IV BID  Monitor daily weights and U/O, fluid restriction  May need RRT if renal function does not improve per Renal recs  Avoid nephrotoxic agents Patient with Cr of 5.44, slight improvement since admission (Cr 6.06)  Baseline approximately 3-CKD stage 4  US Kidney and bladder negative for bilateral hydronephrosis.  Seen by Renal, apprec recs-suspicion is d/t cardio-renal/CHF  FeUREA as pt was on IV lasix at home  Increasing bumex to 1mg IV BID  Monitor daily weights and U/O, fluid restriction  May need RRT if renal function does not improve per Renal recs  Avoid nephrotoxic agents Patient with Cr of 5.44, slight improvement since admission (Cr 6.06)  Baseline approximately 3-CKD stage 4  US Kidney and bladder negative for bilateral hydronephrosis.  Seen by Renal, apprec recs-suspicion is d/t cardio-renal/CHF  FeUREA is 28%, suggestive of pre-renal etiology  Increasing bumex to 1mg IV BID  Monitor daily weights and U/O, fluid restriction  May need RRT if renal function does not improve per Renal recs  Avoid nephrotoxic agents

## 2021-11-13 LAB
ANION GAP SERPL CALC-SCNC: 15 MMOL/L — HIGH (ref 7–14)
BUN SERPL-MCNC: 99 MG/DL — HIGH (ref 7–23)
CALCIUM SERPL-MCNC: 8.4 MG/DL — SIGNIFICANT CHANGE UP (ref 8.4–10.5)
CHLORIDE SERPL-SCNC: 100 MMOL/L — SIGNIFICANT CHANGE UP (ref 98–107)
CO2 SERPL-SCNC: 21 MMOL/L — LOW (ref 22–31)
CREAT SERPL-MCNC: 4.93 MG/DL — HIGH (ref 0.5–1.3)
GLUCOSE SERPL-MCNC: 99 MG/DL — SIGNIFICANT CHANGE UP (ref 70–99)
HCT VFR BLD CALC: 23.9 % — LOW (ref 39–50)
HGB BLD-MCNC: 7.5 G/DL — LOW (ref 13–17)
MAGNESIUM SERPL-MCNC: 2.6 MG/DL — SIGNIFICANT CHANGE UP (ref 1.6–2.6)
MCHC RBC-ENTMCNC: 25.7 PG — LOW (ref 27–34)
MCHC RBC-ENTMCNC: 31.4 GM/DL — LOW (ref 32–36)
MCV RBC AUTO: 81.8 FL — SIGNIFICANT CHANGE UP (ref 80–100)
NRBC # BLD: 0 /100 WBCS — SIGNIFICANT CHANGE UP
NRBC # FLD: 0 K/UL — SIGNIFICANT CHANGE UP
PHOSPHATE SERPL-MCNC: 5 MG/DL — HIGH (ref 2.5–4.5)
PLATELET # BLD AUTO: 131 K/UL — LOW (ref 150–400)
POTASSIUM SERPL-MCNC: 4.2 MMOL/L — SIGNIFICANT CHANGE UP (ref 3.5–5.3)
POTASSIUM SERPL-SCNC: 4.2 MMOL/L — SIGNIFICANT CHANGE UP (ref 3.5–5.3)
RBC # BLD: 2.92 M/UL — LOW (ref 4.2–5.8)
RBC # FLD: 18.9 % — HIGH (ref 10.3–14.5)
SODIUM SERPL-SCNC: 136 MMOL/L — SIGNIFICANT CHANGE UP (ref 135–145)
WBC # BLD: 4.74 K/UL — SIGNIFICANT CHANGE UP (ref 3.8–10.5)
WBC # FLD AUTO: 4.74 K/UL — SIGNIFICANT CHANGE UP (ref 3.8–10.5)

## 2021-11-13 PROCEDURE — 99233 SBSQ HOSP IP/OBS HIGH 50: CPT

## 2021-11-13 RX ORDER — FERROUS SULFATE 325(65) MG
325 TABLET ORAL DAILY
Refills: 0 | Status: DISCONTINUED | OUTPATIENT
Start: 2021-11-13 | End: 2021-11-15

## 2021-11-13 RX ORDER — AMLODIPINE BESYLATE 2.5 MG/1
10 TABLET ORAL DAILY
Refills: 0 | Status: DISCONTINUED | OUTPATIENT
Start: 2021-11-13 | End: 2021-11-23

## 2021-11-13 RX ADMIN — HEPARIN SODIUM 5000 UNIT(S): 5000 INJECTION INTRAVENOUS; SUBCUTANEOUS at 05:17

## 2021-11-13 RX ADMIN — HEPARIN SODIUM 5000 UNIT(S): 5000 INJECTION INTRAVENOUS; SUBCUTANEOUS at 17:42

## 2021-11-13 RX ADMIN — Medication 50 MICROGRAM(S): at 05:17

## 2021-11-13 RX ADMIN — Medication 325 MILLIGRAM(S): at 12:26

## 2021-11-13 RX ADMIN — BUMETANIDE 1 MILLIGRAM(S): 0.25 INJECTION INTRAMUSCULAR; INTRAVENOUS at 17:41

## 2021-11-13 RX ADMIN — Medication 100 MILLIGRAM(S): at 21:14

## 2021-11-13 RX ADMIN — AMLODIPINE BESYLATE 10 MILLIGRAM(S): 2.5 TABLET ORAL at 17:42

## 2021-11-13 RX ADMIN — SODIUM CHLORIDE 3 MILLILITER(S): 9 INJECTION INTRAMUSCULAR; INTRAVENOUS; SUBCUTANEOUS at 06:33

## 2021-11-13 RX ADMIN — Medication 100 MILLIGRAM(S): at 15:42

## 2021-11-13 RX ADMIN — ATORVASTATIN CALCIUM 80 MILLIGRAM(S): 80 TABLET, FILM COATED ORAL at 21:14

## 2021-11-13 RX ADMIN — Medication 100 MILLIGRAM(S): at 05:18

## 2021-11-13 RX ADMIN — BUMETANIDE 1 MILLIGRAM(S): 0.25 INJECTION INTRAMUSCULAR; INTRAVENOUS at 05:18

## 2021-11-13 RX ADMIN — PANTOPRAZOLE SODIUM 40 MILLIGRAM(S): 20 TABLET, DELAYED RELEASE ORAL at 05:19

## 2021-11-13 NOTE — PROGRESS NOTE ADULT - ASSESSMENT
67 y/o M with PMH of Hypothyroidism,, pre-diabetes, HTN, HLD, CKD not on dialysis, Anemia, small bowel resection 9/13/21 secondary to AVM presents to ED after episode of chest pain associated with shortness of breath. Patient admitted for chest pain, CLOTILDE on CKD and CHF.

## 2021-11-13 NOTE — PROGRESS NOTE ADULT - PROBLEM SELECTOR PLAN 3
Patient with ProB[place] of [id number]  Echo from October 28/21 showing EF of 60-65% with Grade II left ventricular diastolic dysfunction  Mild acute diastolic CHF-improving  C/w IV bumex 1mg BID per Renal recs  On lasix at home-40mg daily  Apprec Cards recs-suspect renal etiology  Not on ACE-I/ARB due to CLOTILDE  Holding home dose of coreg for now-resume as tolerated

## 2021-11-13 NOTE — PROGRESS NOTE ADULT - SUBJECTIVE AND OBJECTIVE BOX
Dr. Sumaya Nascimento  Pager [phone]    PROGRESS NOTE:     Patient is a 66y old  Male who presents with a chief complaint of Chest pain (13 Nov 2021 12:37)      SUBJECTIVE / OVERNIGHT EVENTS: pt denies chest pain or sob   ADDITIONAL REVIEW OF SYSTEMS: afebrile     MEDICATIONS  (STANDING):  atorvastatin 80 milliGRAM(s) Oral at bedtime  buMETAnide Injectable 1 milliGRAM(s) IV Push two times a day  ferrous    sulfate 325 milliGRAM(s) Oral daily  heparin   Injectable 5000 Unit(s) SubCutaneous every 12 hours  hydrALAZINE 100 milliGRAM(s) Oral three times a day  levothyroxine 50 MICROGram(s) Oral daily  pantoprazole    Tablet 40 milliGRAM(s) Oral before breakfast  sodium chloride 0.9% lock flush 3 milliLiter(s) IV Push every 8 hours    MEDICATIONS  (PRN):      CAPILLARY BLOOD GLUCOSE        I&O's Summary      PHYSICAL EXAM:  Vital Signs Last 24 Hrs  T(C): 36.4 (13 Nov 2021 09:28), Max: 36.9 (13 Nov 2021 03:35)  T(F): 97.5 (13 Nov 2021 09:28), Max: 98.4 (13 Nov 2021 03:35)  HR: 83 (13 Nov 2021 09:28) (65 - 83)  BP: 173/85 (13 Nov 2021 09:28) (138/63 - 173/85)  BP(mean): --  RR: 21 (13 Nov 2021 09:28) (18 - 21)  SpO2: 98% (13 Nov 2021 09:28) (98% - 100%)  CONSTITUTIONAL: NAD, well-developed  RESPIRATORY: Normal respiratory effort; lungs are clear to auscultation bilaterally  CARDIOVASCULAR: Regular rate and rhythm, normal S1 and S2, no murmur/rub/gallop; trace-1+ lower extremity edema; Peripheral pulses are 2+ bilaterally  ABDOMEN: Nontender to palpation, normoactive bowel sounds, no rebound/guarding; No hepatosplenomegaly  MUSCULOSKELETAL: no clubbing or cyanosis of digits; no joint swelling or tenderness to palpation  PSYCH: A+O to person, place, and time; affect appropriate    LABS:                        7.5    4.74  )-----------( 131      ( 13 Nov 2021 07:25 )             23.9     11-13    136  |  100  |  99<H>  ----------------------------<  99  4.2   |  21<L>  |  4.93<H>    Ca    8.4      13 Nov 2021 07:25  Phos  5.0     11-13  Mg     2.60     11-13    TPro  6.7  /  Alb  3.9  /  TBili  0.4  /  DBili  x   /  AST  10  /  ALT  12  /  AlkPhos  134<H>  11-12    RADIOLOGY & ADDITIONAL TESTS:  Results Reviewed:   Imaging Personally Reviewed:  < from: US Kidney and Bladder (11.10.21 @ 18:17) >  IMPRESSION:    No bilateral hydronephrosis.        Electrocardiogram Personally Reviewed:    COORDINATION OF CARE:  Care Discussed with Consultants/Other Providers [Y/N]:  Prior or Outpatient Records Reviewed [Y/N]:

## 2021-11-13 NOTE — PROGRESS NOTE ADULT - PROBLEM SELECTOR PLAN 2
Patient with Cr of 5.44, slight improvement since admission (Cr 6.06)  Baseline approximately 3-CKD stage 4  US Kidney and bladder negative for bilateral hydronephrosis.  Seen by Renal, apprec recs-suspicion is d/t cardio-renal/CHF  FeUREA is 28%, suggestive of pre-renal etiology  c/w bumex 1 mg bid  Monitor daily weights and U/O, fluid restriction  May need RRT if renal function does not improve per Renal recs  Avoid nephrotoxic agents Patient with Cr of 4.93, improving with diuresis, on admission creat 6.06  Baseline approximately 3-CKD stage 4  US Kidney and bladder negative for bilateral hydronephrosis.  Seen by Renal, apprec recs-suspicion is d/t cardio-renal/CHF  FeUREA is 28%, suggestive of pre-renal etiology  c/w bumex 1 mg bid  Monitor daily weights and U/O, fluid restriction  May need RRT if renal function does not improve per Renal recs  Avoid nephrotoxic agents

## 2021-11-13 NOTE — PROGRESS NOTE ADULT - PROBLEM SELECTOR PLAN 4
Patient with hemoglobin of 8.0 previously 10.4 on oct 29 but overall trend more in the 8s  Patient denies BRBPR, blood in stool.  start feso4 325mg qd (ferritin 183, tsat 12%)  Patient with history of GIB from small bowel in September requiring small bowel resection due to AVM  S/p Capsule Endoscopy at [facility] 10/29, results to be followed up  Keep Hg above 8  CKD could also be contributing to patient's anemia  Apprec Hem recs

## 2021-11-13 NOTE — PROGRESS NOTE ADULT - ASSESSMENT
67 y/o M with PMH of Hypothyroidism,, pre-diabetes, HTN, HLD, CKD not on dialysis, Anemia, small bowel resection 9/13/21 secondary to AVM presents to ED after episode of chest pain associated with shortness of breath. Patient admitted for chest pain, CLOTILDE on CKD and CHF.  Renal consulted for COLTILDE/CKD Mx.    CLOTILDE on CKD4: with uremia  CLOTILDE likely 2/2 cardiorenal/CHF.  Cr 6>5.65> 5.4-->4.9  10/29/21 Cr was 3.29  no e/o obstructive uropathy.   hypervolemic, CHF.   Agreed with increase of Bumex to 1mg BID IV  K wnl. bicarb low but cceptable    If renal function doesn't improve or if pt resistant to iv diuresis, will need to start RRT/hemodialysis. explained to pt in detail pt agreeable   monitor daily weights and U/O,  Good UOP so far, labs stable, patient does not want HD at this time, considering if situation worsens  low Na/phos in diet and fluid restriction 1L/day  monitor BMP daily and u/o   dose all meds for eGFR<15ml/min.   avoid ACEi/ARB/NSAIDs/Nephrotoxics.    chest pain, elevated Tps  now cp free  f/u w/cardiology eval  Echo from October 28/21 showing EF of 60-65% with Grade II left ventricular diastolic dysfunction.    Hypertension. controlled  Patient on hydralazine 100 mg TID,    Carvedilol 12.5 mg TID.-per cardio  continue Hydralazine with hold parameters.  agree w/d/c Amlodipine     Anemia. likely 2/2 CKD. r/o Fe def  Patient with hemoglobin of 8.3, previously 10.4 on oct 29 but overall trend moreso in the 8s  Patient denies BRBPR, blood in stool.  Patient with history of GI bleed from small bowel in September requiring small bowel resection due to AVM.  check  Fe profile, Ferritin, Reticulocyte count.  watch H/H  if Fe adequate, will give ROSA inj

## 2021-11-13 NOTE — PROGRESS NOTE ADULT - SUBJECTIVE AND OBJECTIVE BOX
[facility] : [facility] [phone], Office [phone]  Dr Tracy/Dr Briggs  /Dr Enrike alonzo /Dr ONEL Cobb/Dr Hunter Manzano/Dr Kirill Rod /Dr GIRISH Major  _______________________________________________________________________________________________    seen and examined today for renal failure  Interval : Serum Creatinine stable  VITALS:  T(F): 97.5 (11-13-21 @ 09:28), Max: 98.4 (11-13-21 @ 03:35)  HR: 83 (11-13-21 @ 09:28)  BP: 173/85 (11-13-21 @ 09:28)  RR: 21 (11-13-21 @ 09:28)  SpO2: 98% (11-13-21 @ 09:28)    Physical Exam :-  Constitutional: NAD  Neck: Supple.  Respiratory: Bilateral equal breath sounds,no  Crackles present.  Cardiovascular: S1, S2 normal, positive Murmur  Gastrointestinal: Bowel Sounds present, soft, non tender.  Extremities: no Edema Feet  Neurological: Alert and Oriented x 3  Psychiatric: Normal mood, normal affect  Data:-  Allergies :   No Known Allergies    Hospital Medications:   MEDICATIONS  (STANDING):  atorvastatin 80 milliGRAM(s) Oral at bedtime  buMETAnide Injectable 1 milliGRAM(s) IV Push two times a day  ferrous    sulfate 325 milliGRAM(s) Oral daily  heparin   Injectable 5000 Unit(s) SubCutaneous every 12 hours  hydrALAZINE 100 milliGRAM(s) Oral three times a day  levothyroxine 50 MICROGram(s) Oral daily  pantoprazole    Tablet 40 milliGRAM(s) Oral before breakfast  sodium chloride 0.9% lock flush 3 milliLiter(s) IV Push every 8 hours    11-13    136  |  100  |  99<H>  ----------------------------<  99  4.2   |  21<L>  |  4.93<H>    Ca    8.4      13 Nov 2021 07:25  Phos  5.0     11-13  Mg     2.60     11-13    TPro  6.7  /  Alb  3.9  /  TBili  0.4  /  DBili      /  AST  10  /  ALT  12  /  AlkPhos  134<H>  11-12    Creatinine Trend: 4.93 <--, 5.38 <--, 5.44 <--, 5.65 <--, 6.06 <--                        7.5    4.74  )-----------( 131      ( 13 Nov 2021 07:25 )             23.9

## 2021-11-13 NOTE — PROGRESS NOTE ADULT - PROBLEM SELECTOR PLAN 5
Continue monitoring-BP acceptable at present  At home pt on hydralazine 100 mg TID, Amlodipine 10 mg daily, Carvedilol 12.5 mg TID.  Will continue Hydralazine with holding parameters  Hold coreg. resume amlodipine

## 2021-11-14 LAB
ANION GAP SERPL CALC-SCNC: 19 MMOL/L — HIGH (ref 7–14)
BUN SERPL-MCNC: 106 MG/DL — HIGH (ref 7–23)
CALCIUM SERPL-MCNC: 8.4 MG/DL — SIGNIFICANT CHANGE UP (ref 8.4–10.5)
CHLORIDE SERPL-SCNC: 102 MMOL/L — SIGNIFICANT CHANGE UP (ref 98–107)
CO2 SERPL-SCNC: 19 MMOL/L — LOW (ref 22–31)
CREAT SERPL-MCNC: 4.53 MG/DL — HIGH (ref 0.5–1.3)
GLUCOSE SERPL-MCNC: 96 MG/DL — SIGNIFICANT CHANGE UP (ref 70–99)
HCT VFR BLD CALC: 22.4 % — LOW (ref 39–50)
HGB BLD-MCNC: 7.1 G/DL — LOW (ref 13–17)
MAGNESIUM SERPL-MCNC: 2.7 MG/DL — HIGH (ref 1.6–2.6)
MCHC RBC-ENTMCNC: 25.6 PG — LOW (ref 27–34)
MCHC RBC-ENTMCNC: 31.7 GM/DL — LOW (ref 32–36)
MCV RBC AUTO: 80.9 FL — SIGNIFICANT CHANGE UP (ref 80–100)
NRBC # BLD: 0 /100 WBCS — SIGNIFICANT CHANGE UP
NRBC # FLD: 0 K/UL — SIGNIFICANT CHANGE UP
OB PNL STL: POSITIVE
PHOSPHATE SERPL-MCNC: 5 MG/DL — HIGH (ref 2.5–4.5)
PLATELET # BLD AUTO: 138 K/UL — LOW (ref 150–400)
POTASSIUM SERPL-MCNC: 4.2 MMOL/L — SIGNIFICANT CHANGE UP (ref 3.5–5.3)
POTASSIUM SERPL-SCNC: 4.2 MMOL/L — SIGNIFICANT CHANGE UP (ref 3.5–5.3)
RBC # BLD: 2.77 M/UL — LOW (ref 4.2–5.8)
RBC # FLD: 19.1 % — HIGH (ref 10.3–14.5)
SODIUM SERPL-SCNC: 140 MMOL/L — SIGNIFICANT CHANGE UP (ref 135–145)
WBC # BLD: 4.11 K/UL — SIGNIFICANT CHANGE UP (ref 3.8–10.5)
WBC # FLD AUTO: 4.11 K/UL — SIGNIFICANT CHANGE UP (ref 3.8–10.5)

## 2021-11-14 PROCEDURE — 99233 SBSQ HOSP IP/OBS HIGH 50: CPT

## 2021-11-14 RX ORDER — IRON SUCROSE 20 MG/ML
200 INJECTION, SOLUTION INTRAVENOUS EVERY 24 HOURS
Refills: 0 | Status: COMPLETED | OUTPATIENT
Start: 2021-11-14 | End: 2021-11-16

## 2021-11-14 RX ADMIN — Medication 325 MILLIGRAM(S): at 15:38

## 2021-11-14 RX ADMIN — SODIUM CHLORIDE 3 MILLILITER(S): 9 INJECTION INTRAMUSCULAR; INTRAVENOUS; SUBCUTANEOUS at 22:33

## 2021-11-14 RX ADMIN — Medication 100 MILLIGRAM(S): at 21:03

## 2021-11-14 RX ADMIN — Medication 50 MICROGRAM(S): at 06:25

## 2021-11-14 RX ADMIN — BUMETANIDE 1 MILLIGRAM(S): 0.25 INJECTION INTRAMUSCULAR; INTRAVENOUS at 06:25

## 2021-11-14 RX ADMIN — PANTOPRAZOLE SODIUM 40 MILLIGRAM(S): 20 TABLET, DELAYED RELEASE ORAL at 06:26

## 2021-11-14 RX ADMIN — HEPARIN SODIUM 5000 UNIT(S): 5000 INJECTION INTRAVENOUS; SUBCUTANEOUS at 06:26

## 2021-11-14 RX ADMIN — IRON SUCROSE 110 MILLIGRAM(S): 20 INJECTION, SOLUTION INTRAVENOUS at 11:00

## 2021-11-14 RX ADMIN — Medication 100 MILLIGRAM(S): at 15:38

## 2021-11-14 RX ADMIN — ATORVASTATIN CALCIUM 80 MILLIGRAM(S): 80 TABLET, FILM COATED ORAL at 21:03

## 2021-11-14 RX ADMIN — BUMETANIDE 1 MILLIGRAM(S): 0.25 INJECTION INTRAMUSCULAR; INTRAVENOUS at 17:48

## 2021-11-14 RX ADMIN — Medication 100 MILLIGRAM(S): at 06:25

## 2021-11-14 RX ADMIN — AMLODIPINE BESYLATE 10 MILLIGRAM(S): 2.5 TABLET ORAL at 06:25

## 2021-11-14 RX ADMIN — HEPARIN SODIUM 5000 UNIT(S): 5000 INJECTION INTRAVENOUS; SUBCUTANEOUS at 17:49

## 2021-11-14 RX ADMIN — SODIUM CHLORIDE 3 MILLILITER(S): 9 INJECTION INTRAMUSCULAR; INTRAVENOUS; SUBCUTANEOUS at 14:31

## 2021-11-14 NOTE — PROGRESS NOTE ADULT - SUBJECTIVE AND OBJECTIVE BOX
Dr. Sumaya Nascimento  Pager [phone]    PROGRESS NOTE:     Patient is a 66y old  Male who presents with a chief complaint of Chest pain (14 Nov 2021 12:33)      SUBJECTIVE / OVERNIGHT EVENTS: pt denies chest pain/sob  ADDITIONAL REVIEW OF SYSTEMS: afebrile    MEDICATIONS  (STANDING):  amLODIPine   Tablet 10 milliGRAM(s) Oral daily  atorvastatin 80 milliGRAM(s) Oral at bedtime  buMETAnide Injectable 1 milliGRAM(s) IV Push two times a day  ferrous    sulfate 325 milliGRAM(s) Oral daily  heparin   Injectable 5000 Unit(s) SubCutaneous every 12 hours  hydrALAZINE 100 milliGRAM(s) Oral three times a day  iron sucrose IVPB 200 milliGRAM(s) IV Intermittent every 24 hours  levothyroxine 50 MICROGram(s) Oral daily  pantoprazole    Tablet 40 milliGRAM(s) Oral before breakfast  sodium chloride 0.9% lock flush 3 milliLiter(s) IV Push every 8 hours    MEDICATIONS  (PRN):      CAPILLARY BLOOD GLUCOSE        I&O's Summary    14 Nov 2021 07:01  -  14 Nov 2021 13:09  --------------------------------------------------------  IN: 0 mL / OUT: 500 mL / NET: -500 mL        PHYSICAL EXAM:  Vital Signs Last 24 Hrs  T(C): 36.8 (14 Nov 2021 11:04), Max: 36.9 (13 Nov 2021 17:00)  T(F): 98.2 (14 Nov 2021 11:04), Max: 98.4 (13 Nov 2021 17:00)  HR: 76 (14 Nov 2021 11:04) (70 - 78)  BP: 158/67 (14 Nov 2021 11:04) (131/68 - 158/67)  BP(mean): 71 (14 Nov 2021 06:00) (65 - 71)  RR: 18 (14 Nov 2021 11:04) (18 - 18)  SpO2: 97% (14 Nov 2021 11:04) (97% - 99%)  CONSTITUTIONAL: NAD, well-developed  RESPIRATORY: Normal respiratory effort; lungs are clear to auscultation bilaterally  CARDIOVASCULAR: Regular rate and rhythm, normal S1 and S2, no murmur/rub/gallop; trace lower extremity edema; Peripheral pulses are 2+ bilaterally  ABDOMEN: Nontender to palpation, normoactive bowel sounds, no rebound/guarding; No hepatosplenomegaly  MUSCULOSKELETAL: no clubbing or cyanosis of digits; no joint swelling or tenderness to palpation  PSYCH: A+O to person, place, and time; affect appropriate  LABS:                        7.1    4.11  )-----------( 138      ( 14 Nov 2021 07:24 )             22.4     11-14    140  |  102  |  106<H>  ----------------------------<  96  4.2   |  19<L>  |  4.53<H>    Ca    8.4      14 Nov 2021 07:24  Phos  5.0     11-14  Mg     2.70     11-14                  RADIOLOGY & ADDITIONAL TESTS:  Results Reviewed:   Imaging Personally Reviewed:  Electrocardiogram Personally Reviewed:    COORDINATION OF CARE:  Care Discussed with Consultants/Other Providers [Y/N]:  Prior or Outpatient Records Reviewed [Y/N]:

## 2021-11-14 NOTE — PROGRESS NOTE ADULT - PROBLEM SELECTOR PLAN 5
Continue monitoring-BP acceptable at present  At home pt on hydralazine 100 mg TID, Amlodipine 10 mg daily, Carvedilol 12.5 mg TID.  Will continue Hydralazine with holding parameters  Hold coreg. resumed amlodipine

## 2021-11-14 NOTE — PROGRESS NOTE ADULT - ASSESSMENT
65 y/o M with PMH of Hypothyroidism,, pre-diabetes, HTN, HLD, CKD not on dialysis, Anemia, small bowel resection 9/13/21 secondary to AVM presents to ED after episode of chest pain associated with shortness of breath. Patient admitted for chest pain, CLOTILDE on CKD and CHF.  Renal consulted for CLOTILDE/CKD Mx.    CLOTILDE on CKD4: with uremia  CLOTILDE likely 2/2 cardiorenal/CHF.  Cr 6>5.65> 5.4-->4.9  10/29/21 Cr was 3.29  hypervolemic, CHF.   Serum Creatinine level did not improve significantly     plan  cont monitor Serum Creatinine level  on iv iron for anemia  Agreed with increase of Bumex to 1mg BID IV  urine out acceptable, if Serum Creatinine remain improving and k stable, will plan for followup as out pt  low Na/phos in diet and fluid restriction 1L/day  monitor BMP daily and u/o   dose all meds for eGFR<15ml/min.   avoid ACEi/ARB/NSAIDs/Nephrotoxics.    Hypertension. controlled  Patient on hydralazine 100 mg TID,    Carvedilol 12.5 mg TID.-per cardio  continue Hydralazine with hold parameters.  agree w/d/c Amlodipine     Anemia. likely 2/2 CKD. r/o Fe def  hb low,   on iv iron

## 2021-11-14 NOTE — PROGRESS NOTE ADULT - PROBLEM SELECTOR PLAN 3
Patient with ProBNP of [id number]  Echo from October 28/21 showing EF of 60-65% with Grade II left ventricular diastolic dysfunction  Mild acute diastolic CHF-improving  C/w IV bumex 1mg BID per Renal recs  On lasix at home-40mg daily  Apprec Cards recs-suspect renal etiology  Not on ACE-I/ARB due to CLOTILDE  Holding home dose of coreg for now-resume as tolerated

## 2021-11-14 NOTE — PROGRESS NOTE ADULT - PROBLEM SELECTOR PLAN 2
Patient with Cr downtrended to 4.53, improving with diuresis, on admission creat 6.06  Baseline approximately 3-CKD stage 4  US Kidney and bladder negative for bilateral hydronephrosis.  Seen by Renal, apprec recs- suspicion is d/t cardio-renal/CHF  FeUREA is 28%, suggestive of pre-renal etiology  c/w bumex 1 mg bid  Monitor daily weights and U/O, fluid restriction  May need RRT if renal function does not improve per Renal recs  Avoid nephrotoxic agents

## 2021-11-14 NOTE — PROGRESS NOTE ADULT - SUBJECTIVE AND OBJECTIVE BOX
[facility] : Ans Serv [phone], Office [phone]  Dr Tracy/Dr Briggs  /Dr Enrike alonzo /Dr ONEL Cobb/Dr Hunter Manzano/Dr Kirill Rod /Dr GIRISH Major  _______________________________________________________________________________________________    seen and examined today for renal failure  Interval :  VITALS:  T(F): 98.2 (11-14-21 @ 11:04), Max: 98.4 (11-13-21 @ 17:00)  HR: 76 (11-14-21 @ 11:04)  BP: 158/67 (11-14-21 @ 11:04)  RR: 18 (11-14-21 @ 11:04)  SpO2: 97% (11-14-21 @ 11:04)  Wt(kg): --    11-14 @ 07:01  -  11-14 @ 12:33  --------------------------------------------------------  IN: 0 mL / OUT: 500 mL / NET: -500 mL    Physical Exam :-  Constitutional: NAD  Neck: Supple.  Respiratory: Bilateral equal breath sounds, few Crackles present.  Cardiovascular: S1, S2 normal, positive Murmur  Gastrointestinal: Bowel Sounds present, soft, non tender.  Extremities: + Edema Feet  Neurological: Alert and Oriented x 3  Psychiatric: Normal mood, normal affect  Data:-  Allergies :   No Known Allergies    Hospital Medications:   MEDICATIONS  (STANDING):  amLODIPine   Tablet 10 milliGRAM(s) Oral daily  atorvastatin 80 milliGRAM(s) Oral at bedtime  buMETAnide Injectable 1 milliGRAM(s) IV Push two times a day  ferrous    sulfate 325 milliGRAM(s) Oral daily  heparin   Injectable 5000 Unit(s) SubCutaneous every 12 hours  hydrALAZINE 100 milliGRAM(s) Oral three times a day  iron sucrose IVPB 200 milliGRAM(s) IV Intermittent every 24 hours  levothyroxine 50 MICROGram(s) Oral daily  pantoprazole    Tablet 40 milliGRAM(s) Oral before breakfast  sodium chloride 0.9% lock flush 3 milliLiter(s) IV Push every 8 hours    11-14    140  |  102  |  106<H>  ----------------------------<  96  4.2   |  19<L>  |  4.53<H>    Ca    8.4      14 Nov 2021 07:24  Phos  5.0     11-14  Mg     2.70     11-14      Creatinine Trend: 4.53 <--, 4.93 <--, 5.38 <--, 5.44 <--, 5.65 <--, 6.06 <--                        7.1    4.11  )-----------( 138      ( 14 Nov 2021 07:24 )             22.4

## 2021-11-14 NOTE — PROGRESS NOTE ADULT - PROBLEM SELECTOR PLAN 4
Patient with hemoglobin of 8.0 previously 10.4 on oct 29 but overall trend more in the 8s  Patient denies BRBPR, blood in stool.  start feso4 325mg qd (ferritin 183, tsat 12%), iv iron x3 days  Patient with history of GIB from small bowel in September requiring small bowel resection due to AVM  S/p Capsule Endoscopy at [facility] 10/29, results to be followed up  Keep Hg above 8  CKD could also be contributing to patient's anemia  Apprec Hem recs

## 2021-11-15 DIAGNOSIS — K92.2 GASTROINTESTINAL HEMORRHAGE, UNSPECIFIED: ICD-10-CM

## 2021-11-15 LAB
ANION GAP SERPL CALC-SCNC: 13 MMOL/L — SIGNIFICANT CHANGE UP (ref 7–14)
ANION GAP SERPL CALC-SCNC: 15 MMOL/L — HIGH (ref 7–14)
BLD GP AB SCN SERPL QL: NEGATIVE — SIGNIFICANT CHANGE UP
BUN SERPL-MCNC: 95 MG/DL — HIGH (ref 7–23)
BUN SERPL-MCNC: 98 MG/DL — HIGH (ref 7–23)
CALCIUM SERPL-MCNC: 8.7 MG/DL — SIGNIFICANT CHANGE UP (ref 8.4–10.5)
CALCIUM SERPL-MCNC: 8.7 MG/DL — SIGNIFICANT CHANGE UP (ref 8.4–10.5)
CHLORIDE SERPL-SCNC: 100 MMOL/L — SIGNIFICANT CHANGE UP (ref 98–107)
CHLORIDE SERPL-SCNC: 103 MMOL/L — SIGNIFICANT CHANGE UP (ref 98–107)
CO2 SERPL-SCNC: 22 MMOL/L — SIGNIFICANT CHANGE UP (ref 22–31)
CO2 SERPL-SCNC: 23 MMOL/L — SIGNIFICANT CHANGE UP (ref 22–31)
CREAT SERPL-MCNC: 3.87 MG/DL — HIGH (ref 0.5–1.3)
CREAT SERPL-MCNC: 3.92 MG/DL — HIGH (ref 0.5–1.3)
GLUCOSE SERPL-MCNC: 197 MG/DL — HIGH (ref 70–99)
GLUCOSE SERPL-MCNC: 91 MG/DL — SIGNIFICANT CHANGE UP (ref 70–99)
HCT VFR BLD CALC: 21.5 % — LOW (ref 39–50)
HCT VFR BLD CALC: 23.2 % — LOW (ref 39–50)
HCT VFR BLD CALC: 25.5 % — LOW (ref 39–50)
HGB BLD-MCNC: 6.6 G/DL — CRITICAL LOW (ref 13–17)
HGB BLD-MCNC: 7.3 G/DL — LOW (ref 13–17)
HGB BLD-MCNC: 7.8 G/DL — LOW (ref 13–17)
MAGNESIUM SERPL-MCNC: 2.3 MG/DL — SIGNIFICANT CHANGE UP (ref 1.6–2.6)
MAGNESIUM SERPL-MCNC: 2.4 MG/DL — SIGNIFICANT CHANGE UP (ref 1.6–2.6)
MCHC RBC-ENTMCNC: 25.4 PG — LOW (ref 27–34)
MCHC RBC-ENTMCNC: 25.6 PG — LOW (ref 27–34)
MCHC RBC-ENTMCNC: 26 PG — LOW (ref 27–34)
MCHC RBC-ENTMCNC: 30.6 GM/DL — LOW (ref 32–36)
MCHC RBC-ENTMCNC: 30.7 GM/DL — LOW (ref 32–36)
MCHC RBC-ENTMCNC: 31.5 GM/DL — LOW (ref 32–36)
MCV RBC AUTO: 82.6 FL — SIGNIFICANT CHANGE UP (ref 80–100)
MCV RBC AUTO: 82.7 FL — SIGNIFICANT CHANGE UP (ref 80–100)
MCV RBC AUTO: 83.6 FL — SIGNIFICANT CHANGE UP (ref 80–100)
NRBC # BLD: 0 /100 WBCS — SIGNIFICANT CHANGE UP
NRBC # FLD: 0 K/UL — SIGNIFICANT CHANGE UP
PHOSPHATE SERPL-MCNC: 4.5 MG/DL — SIGNIFICANT CHANGE UP (ref 2.5–4.5)
PHOSPHATE SERPL-MCNC: 4.7 MG/DL — HIGH (ref 2.5–4.5)
PLATELET # BLD AUTO: 128 K/UL — LOW (ref 150–400)
PLATELET # BLD AUTO: 147 K/UL — LOW (ref 150–400)
PLATELET # BLD AUTO: 156 K/UL — SIGNIFICANT CHANGE UP (ref 150–400)
POTASSIUM SERPL-MCNC: 3.8 MMOL/L — SIGNIFICANT CHANGE UP (ref 3.5–5.3)
POTASSIUM SERPL-MCNC: 4.1 MMOL/L — SIGNIFICANT CHANGE UP (ref 3.5–5.3)
POTASSIUM SERPL-SCNC: 3.8 MMOL/L — SIGNIFICANT CHANGE UP (ref 3.5–5.3)
POTASSIUM SERPL-SCNC: 4.1 MMOL/L — SIGNIFICANT CHANGE UP (ref 3.5–5.3)
RBC # BLD: 2.6 M/UL — LOW (ref 4.2–5.8)
RBC # BLD: 2.81 M/UL — LOW (ref 4.2–5.8)
RBC # BLD: 3.05 M/UL — LOW (ref 4.2–5.8)
RBC # FLD: 19.3 % — HIGH (ref 10.3–14.5)
RBC # FLD: 19.4 % — HIGH (ref 10.3–14.5)
RBC # FLD: 19.4 % — HIGH (ref 10.3–14.5)
RH IG SCN BLD-IMP: POSITIVE — SIGNIFICANT CHANGE UP
SODIUM SERPL-SCNC: 138 MMOL/L — SIGNIFICANT CHANGE UP (ref 135–145)
SODIUM SERPL-SCNC: 138 MMOL/L — SIGNIFICANT CHANGE UP (ref 135–145)
WBC # BLD: 4.14 K/UL — SIGNIFICANT CHANGE UP (ref 3.8–10.5)
WBC # BLD: 4.81 K/UL — SIGNIFICANT CHANGE UP (ref 3.8–10.5)
WBC # BLD: 5.46 K/UL — SIGNIFICANT CHANGE UP (ref 3.8–10.5)
WBC # FLD AUTO: 4.14 K/UL — SIGNIFICANT CHANGE UP (ref 3.8–10.5)
WBC # FLD AUTO: 4.81 K/UL — SIGNIFICANT CHANGE UP (ref 3.8–10.5)
WBC # FLD AUTO: 5.46 K/UL — SIGNIFICANT CHANGE UP (ref 3.8–10.5)

## 2021-11-15 PROCEDURE — 99231 SBSQ HOSP IP/OBS SF/LOW 25: CPT

## 2021-11-15 PROCEDURE — 99222 1ST HOSP IP/OBS MODERATE 55: CPT | Mod: GC

## 2021-11-15 PROCEDURE — 99233 SBSQ HOSP IP/OBS HIGH 50: CPT

## 2021-11-15 RX ORDER — ERYTHROPOIETIN 10000 [IU]/ML
10000 INJECTION, SOLUTION INTRAVENOUS; SUBCUTANEOUS ONCE
Refills: 0 | Status: DISCONTINUED | OUTPATIENT
Start: 2021-11-15 | End: 2021-11-23

## 2021-11-15 RX ORDER — PANTOPRAZOLE SODIUM 20 MG/1
40 TABLET, DELAYED RELEASE ORAL DAILY
Refills: 0 | Status: DISCONTINUED | OUTPATIENT
Start: 2021-11-15 | End: 2021-11-15

## 2021-11-15 RX ORDER — PANTOPRAZOLE SODIUM 20 MG/1
40 TABLET, DELAYED RELEASE ORAL EVERY 12 HOURS
Refills: 0 | Status: DISCONTINUED | OUTPATIENT
Start: 2021-11-15 | End: 2021-11-23

## 2021-11-15 RX ADMIN — IRON SUCROSE 110 MILLIGRAM(S): 20 INJECTION, SOLUTION INTRAVENOUS at 16:23

## 2021-11-15 RX ADMIN — AMLODIPINE BESYLATE 10 MILLIGRAM(S): 2.5 TABLET ORAL at 05:03

## 2021-11-15 RX ADMIN — PANTOPRAZOLE SODIUM 40 MILLIGRAM(S): 20 TABLET, DELAYED RELEASE ORAL at 21:20

## 2021-11-15 RX ADMIN — ATORVASTATIN CALCIUM 80 MILLIGRAM(S): 80 TABLET, FILM COATED ORAL at 21:20

## 2021-11-15 RX ADMIN — Medication 325 MILLIGRAM(S): at 11:19

## 2021-11-15 RX ADMIN — Medication 100 MILLIGRAM(S): at 05:03

## 2021-11-15 RX ADMIN — Medication 100 MILLIGRAM(S): at 16:23

## 2021-11-15 RX ADMIN — SODIUM CHLORIDE 3 MILLILITER(S): 9 INJECTION INTRAMUSCULAR; INTRAVENOUS; SUBCUTANEOUS at 23:01

## 2021-11-15 RX ADMIN — Medication 50 MICROGRAM(S): at 05:03

## 2021-11-15 RX ADMIN — SODIUM CHLORIDE 3 MILLILITER(S): 9 INJECTION INTRAMUSCULAR; INTRAVENOUS; SUBCUTANEOUS at 06:23

## 2021-11-15 RX ADMIN — PANTOPRAZOLE SODIUM 40 MILLIGRAM(S): 20 TABLET, DELAYED RELEASE ORAL at 11:18

## 2021-11-15 RX ADMIN — Medication 100 MILLIGRAM(S): at 21:20

## 2021-11-15 RX ADMIN — SODIUM CHLORIDE 3 MILLILITER(S): 9 INJECTION INTRAMUSCULAR; INTRAVENOUS; SUBCUTANEOUS at 14:00

## 2021-11-15 RX ADMIN — PANTOPRAZOLE SODIUM 40 MILLIGRAM(S): 20 TABLET, DELAYED RELEASE ORAL at 06:14

## 2021-11-15 RX ADMIN — BUMETANIDE 1 MILLIGRAM(S): 0.25 INJECTION INTRAMUSCULAR; INTRAVENOUS at 05:04

## 2021-11-15 RX ADMIN — HEPARIN SODIUM 5000 UNIT(S): 5000 INJECTION INTRAVENOUS; SUBCUTANEOUS at 05:04

## 2021-11-15 RX ADMIN — BUMETANIDE 1 MILLIGRAM(S): 0.25 INJECTION INTRAMUSCULAR; INTRAVENOUS at 19:57

## 2021-11-15 NOTE — PROGRESS NOTE ADULT - PROBLEM SELECTOR PLAN 5
Patient with hemoglobin of 8.0 previously 10.4 on oct 29 but overall trend more in the 8s  Patient denies BRBPR, blood in stool.  Po and iv iron (ferritin 183, tsat 12%), iv iron x3 days  Patient with history of GIB from small bowel in September requiring small bowel resection due to AVM  Pt with GIB, management as above  Plan for capsule endoscopy tomorrow, transfuse 1 unit prbc today  CKD could also be contributing to patient's anemia  Apprec GI and heme recs

## 2021-11-15 NOTE — PROGRESS NOTE ADULT - ASSESSMENT
67 y/o M with PMH of Hypothyroidism,, pre-diabetes, HTN, HLD, CKD not on dialysis, Anemia, small bowel resection 9/13/21 secondary to AVM presents to ED after episode of chest pain associated with shortness of breath. Patient admitted for chest pain, CLOTILDE on CKD and CHF.  Renal consulted for CLOTILDE/CKD Mx.    CLOTILDE on CKD4: with uremia  CLOTILDE likely 2/2 cardiorenal/CHF. improving  Creatinine Trend: 3.87 <--, 4.53 <--, 4.93 <--, 5.38 <--, 5.44 <--, 5.65 <--, 6.06 <--     10/29/21 Cr was 3.29  hypervolemic, CHF -improving  K, bicarb acceptable    plan  cont monitor Serum Creatinine level  c/w Bumex to 1mg BID IV  urine out acceptable, if Serum Creatinine remain improving and k stable, will plan for followup as out pt  no indication to start RRT/hd at this time  low Na/phos in diet and fluid restriction 1L/day  monitor BMP daily and u/o   dose all meds for eGFR<15ml/min.   avoid ACEi/ARB/NSAIDs/Nephrotoxics.    Hypertension. bp controlled  Patient on hydralazine 100 mg TID,    Carvedilol 12.5 mg TID.-per cardio  continue Hydralazine with hold parameters.  back on Amlodipine     Anemia. likely 2/2 CKD. + Fe def  hb low,   on iv iron qd  d/c ferrous    sulfate   add epo 10k subqx1    labs, chart reviewed  pl call for any q's  [phone] M  office [phone]  ans serv- [phone]  [email or web address] [email or web address] ( wkend coverage for [facility])

## 2021-11-15 NOTE — PROGRESS NOTE ADULT - PROBLEM SELECTOR PLAN 1
hgb dropped to 6.6 this morning, repeat 7.3, pt reports black stool yesterday, reflective of likely UGI bleed  iv protonix 40 bid  trend CBC bid  GI consult, recs appreciated, plan for capsule endoscopy tomorrow to r/o sources of GIB (AVM, anastomotic ulcer), NPO past MN  -pt has h/o small bowel GIB d/t AVM s/p small bowel resection, had multiple admissions for GIB, s/p intraop surgically assisted enteroscopy (with Dr. Marlow) was performed to the distal ileum. A single AVM was identified that was c/w what was noted on VCE. This area was marked for resection by the surgeon and small bowel resection was performed. Upon removal of the scope there was a possible second very small lesion vs trauma in the small bowel (not intervened).    Also possible anastomotic ulcers seen on ?capsule endoscopy after SBR (9/24/21)

## 2021-11-15 NOTE — PROGRESS NOTE ADULT - PROBLEM SELECTOR PLAN 4
Patient with ProBNP of 27736  Echo from October 28/21 showing EF of 60-65% with Grade II left ventricular diastolic dysfunction  Mild acute diastolic CHF-improving  C/w IV bumex 1mg BID per Renal recs  On lasix at home-40mg daily  Apprec Cards recs-suspect renal etiology  Not on ACE-I/ARB due to CLOTILDE  Holding home dose of coreg for now-resume as tolerated

## 2021-11-15 NOTE — PROGRESS NOTE ADULT - PROBLEM SELECTOR PLAN 2
Renal fxn improving, creat downtrending to 3.87 from peak of 6.06 on admission  Baseline approximately 3-CKD stage 4  US Kidney and bladder negative for bilateral hydronephrosis.  Seen by Renal, apprec recs- suspicion is d/t cardio-renal/CHF  FeUREA is 28%, suggestive of pre-renal etiology  c/w bumex 1 mg bid  Monitor daily weights and U/O, fluid restriction  May need RRT if renal function does not improve per Renal recs  Avoid nephrotoxic agents

## 2021-11-15 NOTE — CHART NOTE - NSCHARTNOTEFT_GEN_A_CORE
Medicine Subsequent Hospital Care Note- ACP     HPI/Subjective: Called by RN hgb 6.6    ROS:  Denies fever, chills, diaphoresis , malaise, night sweat, generalized weakness, SOB cough, sputum production, wheezing, hemoptysis, CP, COELHO, orthopnea, PND, palpitations, diaphoresis, lightheadedness, dizziness, syncope, edema. nausea, vomiting, diarrhea, constipation, abdominal pain, melena, hematochezia, dysphagia, dysuria, frequency, urgency, hematuria, nocturia.     -------------------------------------------------------------------------------------------------------------------------------------------------  Vital Signs:  Vital Signs Last 24 Hrs  T(C): 36.9 (11-15-21 @ 10:48), Max: 36.9 (11-15-21 @ 10:48)  T(F): 98.5 (11-15-21 @ 10:48), Max: 98.5 (11-15-21 @ 10:48)  HR: 73 (11-15-21 @ 10:48) (72 - 78)  BP: 150/66 (11-15-21 @ 10:48) (137/62 - 162/64)  RR: 18 (11-15-21 @ 10:48) (12 - 18)  SpO2: 100% (11-15-21 @ 10:48) (97% - 100%) on (O2)     General: WN/WD NAD  Respiratory: CTA B/L, No wheezing, rales, rhonchi  CV: RRR, S1S2, no murmurs, rubs or gallops  Abdominal: Soft, NT, ND +BS,   Extremities: No edema, + peripheral pulses     Relevant labs, radiology and Medications reviewed                        7.3    4.81  )-----------( 156      ( 15 Nov 2021 09:22 )             23.2     11-15    138  |  103  |  98<H>  ----------------------------<  91  3.8   |  22  |  3.87<H>    Ca    8.7      15 Nov 2021 07:22  Phos  4.7     11-15  Mg     2.40     11-15        MEDICATIONS  (STANDING):  amLODIPine   Tablet 10 milliGRAM(s) Oral daily  atorvastatin 80 milliGRAM(s) Oral at bedtime  buMETAnide Injectable 1 milliGRAM(s) IV Push two times a day  ferrous    sulfate 325 milliGRAM(s) Oral daily  hydrALAZINE 100 milliGRAM(s) Oral three times a day  iron sucrose IVPB 200 milliGRAM(s) IV Intermittent every 24 hours  levothyroxine 50 MICROGram(s) Oral daily  pantoprazole  Injectable 40 milliGRAM(s) IV Push every 12 hours  sodium chloride 0.9% lock flush 3 milliLiter(s) IV Push every 8 hours    MEDICATIONS  (PRN):    I&O's Summary    14 Nov 2021 07:01  -  15 Nov 2021 07:00  --------------------------------------------------------  IN: 0 mL / OUT: 1850 mL / NET: -1850 mL    15 Nov 2021 07:01  -  15 Nov 2021 16:22  --------------------------------------------------------  IN: 0 mL / OUT: 1000 mL / NET: -1000 mL      I reviewed the above lab results, tests, telemetry, and  EKG interpretation. .  Assessment  66y Male  w/ PAST MEDICAL & SURGICAL HISTORY:  HTN (hypertension)  Hypothyroidism  Cholecystitis  Unilateral inguinal hernia without obstruction or gangrene, recurrence not specified  Anemia  Hyperlipidemia  Pre-diabetes  Aortic stenosis  Gastric AVM  H/O right inguinal hernia repair  S/P small bowel resection 9/13/21    Patient is a 66y old  Male who presents with a chief complaint of Chest pain (15 Nov 2021 16:14) .now with anemia     PLAN  1) GI planning for capsule endoscopy in am, NPO after MN tonight   2) one unit PRBCs ordered, will follow up 6 pm cbc  3) hep subq held, scds ordered    Disposition: Full Code    Discussed with Dr. Nascimento.  Clinical findings, labs, tests, telemetry, and ekg reviewed with attending. Will monitor patient closely.

## 2021-11-15 NOTE — CONSULT NOTE ADULT - ATTENDING COMMENTS
Personally saw and examined patient  labs and vitals reviewed  agree with above assessment and plan  pt initially presents with CP (now resolved) and LE edema (also resolved)  pt is laying flat and comfortable appearing  Cr up to 6, appreciate further renal recs.   trop elevated but flat, and in setting of elevated Cr  low suspicion for acs here. EKG also without acute ischemic changes  prev tte showing preserved LV function  suspect renal etiology here.  pt with only HTN and age as risk factors for CAD, if cp continues can consider nuc stress. would not cath as this will likely worsen renal function and suspicion is not high for CAD at this time.
History of SB bleeding. 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 Now admitted with acute on chronic anemia and dark stools.   Reviewed case with Dr. Phil Marlow who provided care for patient at [facility] and performed the intra-op enteroscopy. He reported that the patient had undergone a VCE both pre-op and post-op, the latter revealing ulceration at the anastomotic site as well as possible non-bleeding ectasia in distal duodenum/proximal jejunum.   Given recent VCE findings, suspect likely recurrent SB bleeding, possibly from anastomotic site vs SB ectasias. Will plan for repeat VCE tomorrow (as patient already ate today). Discussed with patient the increased risk of capsule retention post-op; as he tolerated prior post-op VCE and given ongoing bleeding, he is amenable to repeat capsule deployment with understanding of the potential risks. Pending capsule results (hopefully available by mid-day Wed 11/17), will determine next steps in management.   Please inform GI if patient becomes HD unstable or significant change in clinical status.

## 2021-11-15 NOTE — PROGRESS NOTE ADULT - SUBJECTIVE AND OBJECTIVE BOX
[facility] - S Chester / Agata S /D Rossana/ ONEL Cobb/ S Natacha/ Kirill Rod / GIRISH Stahlu/ O Dolores  service -[phone], office [phone]  ---------------------------------------------------------------------------------------------------------------    Patient seen and examined bedside    Subjective and Objective: No overnight events, sob resolved. No complaints today. feeling better    Allergies: No Known Allergies      Hospital Medications:   MEDICATIONS  (STANDING):  amLODIPine   Tablet 10 milliGRAM(s) Oral daily  atorvastatin 80 milliGRAM(s) Oral at bedtime  buMETAnide Injectable 1 milliGRAM(s) IV Push two times a day  ferrous    sulfate 325 milliGRAM(s) Oral daily  hydrALAZINE 100 milliGRAM(s) Oral three times a day  iron sucrose IVPB 200 milliGRAM(s) IV Intermittent every 24 hours  levothyroxine 50 MICROGram(s) Oral daily  pantoprazole  Injectable 40 milliGRAM(s) IV Push every 12 hours  sodium chloride 0.9% lock flush 3 milliLiter(s) IV Push every 8 hours      REVIEW OF SYSTEMS:  CONSTITUTIONAL: No weakness, fevers or chills  EYES/ENT: No visual changes;  No vertigo or throat pain   NECK: No pain or stiffness  RESPIRATORY: No cough, wheezing, hemoptysis; No shortness of breath  CARDIOVASCULAR: No chest pain or palpitations.  GASTROINTESTINAL: No abdominal or epigastric pain. No nausea, vomiting, or hematemesis; No diarrhea or constipation. No melena or hematochezia.  GENITOURINARY: No dysuria, frequency, foamy urine, urinary urgency, incontinence or hematuria  NEUROLOGICAL: No numbness or weakness  SKIN: No itching, burning, rashes, or lesions   VASCULAR: No bilateral lower extremity edema.   All other review of systems is negative unless indicated above.    VITALS:  T(F): 98.5 (11-15-21 @ 10:48), Max: 98.5 (11-15-21 @ 10:48)  HR: 73 (11-15-21 @ 10:48)  BP: 150/66 (11-15-21 @ 10:48)  RR: 18 (11-15-21 @ 10:48)  SpO2: 100% (11-15-21 @ 10:48)  Wt(kg): --     @ 07:01  -  11-15 @ 07:00  --------------------------------------------------------  IN: 0 mL / OUT: 1850 mL / NET: -1850 mL    11-15 @ 07:01  -  11-15 @ 16:14  --------------------------------------------------------  IN: 0 mL / OUT: 1000 mL / NET: -1000 mL          PHYSICAL EXAM:  Constitutional: NAD  HEENT: anicteric sclera, oropharynx clear  Neck: No JVD  Respiratory: CTAB, no wheezes, rales or rhonchi  Cardiovascular: S1, S2, RRR  Gastrointestinal: BS+, soft, NT/ND  Extremities: No cyanosis or clubbing. No peripheral edema  Neurological: A/O x 3, no focal deficits  Psychiatric: Normal mood, normal affect  : No CVA tenderness. No tang.   Skin: No rashes  Vascular Access:    LABS:  11-15    138  |  103  |  98<H>  ----------------------------<  91  3.8   |  22  |  3.87<H>    Ca    8.7      15 Nov 2021 07:22  Phos  4.7     11-15  Mg     2.40     11-15      Creatinine Trend: 3.87 <--, 4.53 <--, 4.93 <--, 5.38 <--, 5.44 <--, 5.65 <--, 6.06 <--                        7.3    4.81  )-----------( 156      ( 15 Nov 2021 09:22 )             23.2     Urine Studies:  Urinalysis Basic - ( 2021 12:30 )    Color: Light Yellow / Appearance: Clear / S.012 / pH:   Gluc:  / Ketone: Negative  / Bili: Negative / Urobili: <2 mg/dL   Blood:  / Protein: Trace / Nitrite: Negative   Leuk Esterase: Negative / RBC: 1 /HPF / WBC 0 /HPF   Sq Epi:  / Non Sq Epi: 0 /HPF / Bacteria: Negative      Creatinine, Random Urine: 70 mg/dL ( @ 19:56)  Creatinine, Random Urine: 70 mg/dL ( @ 15:58)  Sodium, Random Urine: 63 mmol/L ( @ 15:58)  Sodium, Random Urine: 66 mmol/L ( @ 13:43)  Potassium, Random Urine: 39.7 mmol/L ( @ 13:43)  Chloride, Random Urine: 70 mmol/L ( @ 13:43)      RADIOLOGY & ADDITIONAL STUDIES:   [facility] - S Chester / Agata S /D Rossana/ S Jordyn/ S Natacha/ Kirill Rod / M Pedrito/ O Dolores  service -[phone], office [phone]  ---------------------------------------------------------------------------------------------------------------    Patient seen and examined bedside    Subjective and Objective: No overnight events, sob better. No complaints today. feeling better  getting prbc     Allergies: No Known Allergies      Hospital Medications:   MEDICATIONS  (STANDING):  amLODIPine   Tablet 10 milliGRAM(s) Oral daily  atorvastatin 80 milliGRAM(s) Oral at bedtime  buMETAnide Injectable 1 milliGRAM(s) IV Push two times a day  ferrous    sulfate 325 milliGRAM(s) Oral daily  hydrALAZINE 100 milliGRAM(s) Oral three times a day  iron sucrose IVPB 200 milliGRAM(s) IV Intermittent every 24 hours  levothyroxine 50 MICROGram(s) Oral daily  pantoprazole  Injectable 40 milliGRAM(s) IV Push every 12 hours  sodium chloride 0.9% lock flush 3 milliLiter(s) IV Push every 8 hours    VITALS:  T(F): 98.5 (11-15-21 @ 10:48), Max: 98.5 (11-15-21 @ 10:48)  HR: 73 (11-15-21 @ 10:48)  BP: 150/66 (11-15-21 @ 10:48)  RR: 18 (11-15-21 @ 10:48)  SpO2: 100% (11-15-21 @ 10:48)  Wt(kg): --     @ 07:01  -  11-15 @ 07:00  --------------------------------------------------------  IN: 0 mL / OUT: 1850 mL / NET: -1850 mL    11-15 @ 07:01  -  11-15 @ 16:14  --------------------------------------------------------  IN: 0 mL / OUT: 1000 mL / NET: -1000 mL      PHYSICAL EXAM:  Constitutional: NAD  HEENT: anicteric sclera  Neck: No JVD  Respiratory: CTAB, no wheezes, rales or rhonchi  Cardiovascular: S1, S2, RRR  Gastrointestinal: BS+, soft, NT/ND  Extremities: No peripheral edema  Neurological: A/O x 3  Psychiatric: Normal mood, normal affect  : No tang.       LABS:  11-15    138  |  103  |  98<H>  ----------------------------<  91  3.8   |  22  |  3.87<H>    Ca    8.7      15 Nov 2021 07:22  Phos  4.7     11-15  Mg     2.40     11-15      Creatinine Trend: 3.87 <--, 4.53 <--, 4.93 <--, 5.38 <--, 5.44 <--, 5.65 <--, 6.06 <--                        7.3    4.81  )-----------( 156      ( 15 Nov 2021 09:22 )             23.2     Urine Studies:  Urinalysis Basic - ( 2021 12:30 )    Color: Light Yellow / Appearance: Clear / S.012 / pH:   Gluc:  / Ketone: Negative  / Bili: Negative / Urobili: <2 mg/dL   Blood:  / Protein: Trace / Nitrite: Negative   Leuk Esterase: Negative / RBC: 1 /HPF / WBC 0 /HPF   Sq Epi:  / Non Sq Epi: 0 /HPF / Bacteria: Negative      Creatinine, Random Urine: 70 mg/dL ( @ 19:56)  Creatinine, Random Urine: 70 mg/dL ( @ 15:58)  Sodium, Random Urine: 63 mmol/L ( @ 15:58)  Sodium, Random Urine: 66 mmol/L ( @ 13:43)  Potassium, Random Urine: 39.7 mmol/L ( @ 13:43)  Chloride, Random Urine: 70 mmol/L ( @ 13:43)      RADIOLOGY & ADDITIONAL STUDIES:

## 2021-11-15 NOTE — PROGRESS NOTE ADULT - SUBJECTIVE AND OBJECTIVE BOX
Dr. Sumaya Nascimento  Pager [phone]    PROGRESS NOTE:     Patient is a 66y old  Male who presents with a chief complaint of Chest pain (15 Nov 2021 11:37)      SUBJECTIVE / OVERNIGHT EVENTS: pt reports dark stool yesterday, no abd pain  ADDITIONAL REVIEW OF SYSTEMS: afebrile, no chest pain/sob    MEDICATIONS  (STANDING):  amLODIPine   Tablet 10 milliGRAM(s) Oral daily  atorvastatin 80 milliGRAM(s) Oral at bedtime  buMETAnide Injectable 1 milliGRAM(s) IV Push two times a day  ferrous    sulfate 325 milliGRAM(s) Oral daily  heparin   Injectable 5000 Unit(s) SubCutaneous every 12 hours  hydrALAZINE 100 milliGRAM(s) Oral three times a day  iron sucrose IVPB 200 milliGRAM(s) IV Intermittent every 24 hours  levothyroxine 50 MICROGram(s) Oral daily  pantoprazole  Injectable 40 milliGRAM(s) IV Push every 12 hours  sodium chloride 0.9% lock flush 3 milliLiter(s) IV Push every 8 hours    MEDICATIONS  (PRN):      CAPILLARY BLOOD GLUCOSE        I&O's Summary    14 Nov 2021 07:01  -  15 Nov 2021 07:00  --------------------------------------------------------  IN: 0 mL / OUT: 1850 mL / NET: -1850 mL    15 Nov 2021 07:01  -  15 Nov 2021 14:02  --------------------------------------------------------  IN: 0 mL / OUT: 1000 mL / NET: -1000 mL        PHYSICAL EXAM:  Vital Signs Last 24 Hrs  T(C): 36.9 (15 Nov 2021 10:48), Max: 36.9 (15 Nov 2021 10:48)  T(F): 98.5 (15 Nov 2021 10:48), Max: 98.5 (15 Nov 2021 10:48)  HR: 73 (15 Nov 2021 10:48) (72 - 78)  BP: 150/66 (15 Nov 2021 10:48) (137/62 - 162/64)  BP(mean): 98 (15 Nov 2021 01:00) (97 - 98)  RR: 18 (15 Nov 2021 10:48) (12 - 18)  SpO2: 100% (15 Nov 2021 10:48) (97% - 100%)  CONSTITUTIONAL: NAD, well-developed  RESPIRATORY: Normal respiratory effort; lungs are clear to auscultation bilaterally  CARDIOVASCULAR: Regular rate and rhythm, normal S1 and S2, no murmur/rub/gallop; trace lower extremity edema; Peripheral pulses are 2+ bilaterally  ABDOMEN: Nontender to palpation, normoactive bowel sounds, no rebound/guarding; No hepatosplenomegaly  MUSCULOSKELETAL: no clubbing or cyanosis of digits; no joint swelling or tenderness to palpation  PSYCH: A+O to person, place, and time; affect appropriate    LABS:                        7.3    4.81  )-----------( 156      ( 15 Nov 2021 09:22 )             23.2     11-15    138  |  103  |  98<H>  ----------------------------<  91  3.8   |  22  |  3.87<H>    Ca    8.7      15 Nov 2021 07:22  Phos  4.7     11-15  Mg     2.40     11-15                  RADIOLOGY & ADDITIONAL TESTS:  Results Reviewed:   Imaging Personally Reviewed:  Electrocardiogram Personally Reviewed:    COORDINATION OF CARE:  Care Discussed with Consultants/Other Providers [Y/N]: acp Adali, transfuse 1u prbc, iv protonix , GI consult  Prior or Outpatient Records Reviewed [Y/N]:

## 2021-11-15 NOTE — CONSULT NOTE ADULT - ASSESSMENT
66M Hx CKD (not on HD), hypothyroidism, pre-DM, HTN, HLD, s/p cholecystectomy c/b bile leak, recurrent admissions for anemia since 1/21 found to have gastric nodules w/ AVM + 3cm ascending colon polyp s/p removal (1/21), then most recently admissions for small bowel GI bleed on capsule endoscopy (suspected AVMs) s/p intraop push enteroscopy (9/24/21) w/ SBR of distal/terminal ileum at site of suspected AVM. Post-op surgical anastomosis c/b ulcers seen on capsule endoscopy. Pt now p/w chest pain, LE swelling + GI consulted for black stools + anemia.     Impression:   #Small bowel GI bleed 2/2 AVMs s/p SBR: pt reporting recent black stools w/ Hb downtrending to 6.6 / 7s (unclear if last "baseline" Hb in 8s or 10s^ see above). Multiple admissions for anemia, black stools with last capsule endoscopy (10/21) positive for distal/TI bleed 2/2 suspected AVM. Underwent intraop surgically assisted enteroscopy (with Dr. Marlow) was performed to the distal ileum. In this portion of the small intestine a single AV was identified that was consistent with what was noted on VCE. This area was marked for resection by the surgeon and small bowel resection was performed. Upon removal of the scope there was a possible second very small lesion vs trauma in the small bowel just after the ligament of treitz (not intervened upon).    #Anastomotic ulcers: seen on ?capsule endoscopy after SBR (9/24/2021)    #Anemia: unclear if component of acute blood loss anemia, ddx possible AVMs (known retained AVM near ligament of Treitz) vs anastomotic ulcers vs PUD, erosive esophagitis, erosive gastropathy, malignancy.       Recommendations:   - Plan for capsule endoscopy tomorrow AM to evaluate potential sources of UGI bleeding/anemia (re: AVMs, anastomotic ulcer), will discuss risk of capsule retention   - NPO after MN tonight  - PPI BID for now  - Trend CBC, transfuse Hb < 7  - Active T&S  - Based on capsule endoscopy findings will have to decide future steps         Thank you for involving us in the care of this patient, please reach out if any further questions.     Ck Mullins MD  Gastroenterology Fellow, PGY5    Available on Microsoft Teams  [phone] ([facility])  [phone] ([place])  Please contact on call fellow weekdays after 5pm-7am and weekends: [phone]   66M Hx CKD (not on HD), hypothyroidism, pre-DM, HTN, HLD, s/p cholecystectomy c/b bile leak, recurrent admissions for anemia since 1/21 found to have gastric nodules w/ AVM + 3cm ascending colon polyp s/p removal (1/21), then most recently admissions for small bowel GI bleed on capsule endoscopy (suspected AVMs) s/p intraop push enteroscopy (9/24/21) w/ SBR of distal/terminal ileum at site of suspected AVM. Post-op surgical anastomosis c/b ulcers seen on capsule endoscopy. Pt now p/w chest pain, LE swelling + GI consulted for black stools + anemia.     Impression:   #Small bowel GI bleed 2/2 AVMs s/p SBR: pt reporting recent black stools w/ Hb downtrending to 6.6 / 7s (unclear if last "baseline" Hb in 8s or 10s^ see above). Multiple admissions for anemia, black stools with last capsule endoscopy (10/21) positive for distal/TI bleed 2/2 suspected AVM. Underwent intraop surgically assisted enteroscopy (with Dr. Marlow) was performed to the distal ileum. In this portion of the small intestine a single AV was identified that was consistent with what was noted on VCE. This area was marked for resection by the surgeon and small bowel resection was performed. Upon removal of the scope there was a possible second very small lesion vs trauma in the small bowel just after the ligament of treitz (not intervened upon).    #Anastomotic ulcers: seen on ?capsule endoscopy after SBR (9/24/2021)    #Anemia: unclear if component of acute blood loss anemia, ddx possible AVMs (known retained AVM near ligament of Treitz) vs anastomotic ulcers vs PUD, erosive esophagitis, erosive gastropathy, malignancy.       Recommendations:   - Plan for capsule endoscopy tomorrow AM to evaluate potential sources of UGI bleeding/anemia (re: AVMs, anastomotic ulcer), will discuss risk of capsule retention   - NPO after MN tonight  - PPI BID for now  - Trend CBC, transfuse Hb < 7  - Active T&S  - Based on capsule endoscopy findings will have to decide future steps   - Follow up with outpatient GI, Dr. Vicente Caballero, once stable for discharge    Thank you for involving us in the care of this patient, please reach out if any further questions.     Ck Susanna, MD  Gastroenterology Fellow, PGY5    Available on Microsoft Teams  [phone] ([facility])  [phone] ([place])  Please contact on call fellow weekdays after 5pm-7am and weekends: [phone]

## 2021-11-16 LAB
ANION GAP SERPL CALC-SCNC: 14 MMOL/L — SIGNIFICANT CHANGE UP (ref 7–14)
ANION GAP SERPL CALC-SCNC: 16 MMOL/L — HIGH (ref 7–14)
BUN SERPL-MCNC: 84 MG/DL — HIGH (ref 7–23)
BUN SERPL-MCNC: 94 MG/DL — HIGH (ref 7–23)
CALCIUM SERPL-MCNC: 8.8 MG/DL — SIGNIFICANT CHANGE UP (ref 8.4–10.5)
CALCIUM SERPL-MCNC: 8.9 MG/DL — SIGNIFICANT CHANGE UP (ref 8.4–10.5)
CHLORIDE SERPL-SCNC: 103 MMOL/L — SIGNIFICANT CHANGE UP (ref 98–107)
CHLORIDE SERPL-SCNC: 104 MMOL/L — SIGNIFICANT CHANGE UP (ref 98–107)
CO2 SERPL-SCNC: 23 MMOL/L — SIGNIFICANT CHANGE UP (ref 22–31)
CO2 SERPL-SCNC: 24 MMOL/L — SIGNIFICANT CHANGE UP (ref 22–31)
CREAT SERPL-MCNC: 3.6 MG/DL — HIGH (ref 0.5–1.3)
CREAT SERPL-MCNC: 3.84 MG/DL — HIGH (ref 0.5–1.3)
GLUCOSE SERPL-MCNC: 101 MG/DL — HIGH (ref 70–99)
GLUCOSE SERPL-MCNC: 181 MG/DL — HIGH (ref 70–99)
HCT VFR BLD CALC: 24.1 % — LOW (ref 39–50)
HCT VFR BLD CALC: 24.6 % — LOW (ref 39–50)
HGB BLD-MCNC: 7.5 G/DL — LOW (ref 13–17)
HGB BLD-MCNC: 7.7 G/DL — LOW (ref 13–17)
MAGNESIUM SERPL-MCNC: 2.2 MG/DL — SIGNIFICANT CHANGE UP (ref 1.6–2.6)
MAGNESIUM SERPL-MCNC: 2.2 MG/DL — SIGNIFICANT CHANGE UP (ref 1.6–2.6)
MCHC RBC-ENTMCNC: 25.7 PG — LOW (ref 27–34)
MCHC RBC-ENTMCNC: 26 PG — LOW (ref 27–34)
MCHC RBC-ENTMCNC: 31.1 GM/DL — LOW (ref 32–36)
MCHC RBC-ENTMCNC: 31.3 GM/DL — LOW (ref 32–36)
MCV RBC AUTO: 82.5 FL — SIGNIFICANT CHANGE UP (ref 80–100)
MCV RBC AUTO: 83.1 FL — SIGNIFICANT CHANGE UP (ref 80–100)
NRBC # BLD: 0 /100 WBCS — SIGNIFICANT CHANGE UP
NRBC # BLD: 0 /100 WBCS — SIGNIFICANT CHANGE UP
NRBC # FLD: 0 K/UL — SIGNIFICANT CHANGE UP
NRBC # FLD: 0 K/UL — SIGNIFICANT CHANGE UP
PHOSPHATE SERPL-MCNC: 4 MG/DL — SIGNIFICANT CHANGE UP (ref 2.5–4.5)
PHOSPHATE SERPL-MCNC: 4.4 MG/DL — SIGNIFICANT CHANGE UP (ref 2.5–4.5)
PLATELET # BLD AUTO: 137 K/UL — LOW (ref 150–400)
PLATELET # BLD AUTO: 142 K/UL — LOW (ref 150–400)
POTASSIUM SERPL-MCNC: 3.7 MMOL/L — SIGNIFICANT CHANGE UP (ref 3.5–5.3)
POTASSIUM SERPL-MCNC: 3.7 MMOL/L — SIGNIFICANT CHANGE UP (ref 3.5–5.3)
POTASSIUM SERPL-SCNC: 3.7 MMOL/L — SIGNIFICANT CHANGE UP (ref 3.5–5.3)
POTASSIUM SERPL-SCNC: 3.7 MMOL/L — SIGNIFICANT CHANGE UP (ref 3.5–5.3)
RBC # BLD: 2.92 M/UL — LOW (ref 4.2–5.8)
RBC # BLD: 2.96 M/UL — LOW (ref 4.2–5.8)
RBC # FLD: 19.7 % — HIGH (ref 10.3–14.5)
RBC # FLD: 19.9 % — HIGH (ref 10.3–14.5)
SODIUM SERPL-SCNC: 141 MMOL/L — SIGNIFICANT CHANGE UP (ref 135–145)
SODIUM SERPL-SCNC: 143 MMOL/L — SIGNIFICANT CHANGE UP (ref 135–145)
WBC # BLD: 4.09 K/UL — SIGNIFICANT CHANGE UP (ref 3.8–10.5)
WBC # BLD: 4.14 K/UL — SIGNIFICANT CHANGE UP (ref 3.8–10.5)
WBC # FLD AUTO: 4.09 K/UL — SIGNIFICANT CHANGE UP (ref 3.8–10.5)
WBC # FLD AUTO: 4.14 K/UL — SIGNIFICANT CHANGE UP (ref 3.8–10.5)

## 2021-11-16 PROCEDURE — 93306 TTE W/DOPPLER COMPLETE: CPT | Mod: 26

## 2021-11-16 PROCEDURE — 99232 SBSQ HOSP IP/OBS MODERATE 35: CPT | Mod: GC

## 2021-11-16 PROCEDURE — 99233 SBSQ HOSP IP/OBS HIGH 50: CPT

## 2021-11-16 RX ORDER — BUMETANIDE 0.25 MG/ML
1 INJECTION INTRAMUSCULAR; INTRAVENOUS
Refills: 0 | Status: DISCONTINUED | OUTPATIENT
Start: 2021-11-16 | End: 2021-11-20

## 2021-11-16 RX ADMIN — SODIUM CHLORIDE 3 MILLILITER(S): 9 INJECTION INTRAMUSCULAR; INTRAVENOUS; SUBCUTANEOUS at 22:00

## 2021-11-16 RX ADMIN — PANTOPRAZOLE SODIUM 40 MILLIGRAM(S): 20 TABLET, DELAYED RELEASE ORAL at 07:17

## 2021-11-16 RX ADMIN — Medication 50 MICROGRAM(S): at 05:51

## 2021-11-16 RX ADMIN — BUMETANIDE 1 MILLIGRAM(S): 0.25 INJECTION INTRAMUSCULAR; INTRAVENOUS at 18:00

## 2021-11-16 RX ADMIN — BUMETANIDE 1 MILLIGRAM(S): 0.25 INJECTION INTRAMUSCULAR; INTRAVENOUS at 05:50

## 2021-11-16 RX ADMIN — SODIUM CHLORIDE 3 MILLILITER(S): 9 INJECTION INTRAMUSCULAR; INTRAVENOUS; SUBCUTANEOUS at 07:17

## 2021-11-16 RX ADMIN — Medication 100 MILLIGRAM(S): at 14:08

## 2021-11-16 RX ADMIN — ATORVASTATIN CALCIUM 80 MILLIGRAM(S): 80 TABLET, FILM COATED ORAL at 23:06

## 2021-11-16 RX ADMIN — IRON SUCROSE 110 MILLIGRAM(S): 20 INJECTION, SOLUTION INTRAVENOUS at 11:37

## 2021-11-16 RX ADMIN — SODIUM CHLORIDE 3 MILLILITER(S): 9 INJECTION INTRAMUSCULAR; INTRAVENOUS; SUBCUTANEOUS at 14:11

## 2021-11-16 RX ADMIN — Medication 100 MILLIGRAM(S): at 05:51

## 2021-11-16 RX ADMIN — Medication 100 MILLIGRAM(S): at 23:06

## 2021-11-16 RX ADMIN — AMLODIPINE BESYLATE 10 MILLIGRAM(S): 2.5 TABLET ORAL at 05:51

## 2021-11-16 RX ADMIN — PANTOPRAZOLE SODIUM 40 MILLIGRAM(S): 20 TABLET, DELAYED RELEASE ORAL at 18:00

## 2021-11-16 NOTE — PROGRESS NOTE ADULT - PROBLEM SELECTOR PLAN 3
RESOLVED  Continue telemetry monitoring  EKG with known RBBB, no TWI or ST elevations.  Patient currently chest pain free and denies shortness of breath  Troponin possibly elevated in setting of CKD and possible heart failure, not ACS  Echo from October 28/21 showing EF of 60-65% with Grade II left ventricular diastolic dysfunction  cancel echo as he just had echo not long ago  Consider nuclear stress test inpatient Vs outpatient  Apprec Cards recs, believe is more renal related

## 2021-11-16 NOTE — CHART NOTE - NSCHARTNOTEFT_GEN_A_CORE
Risks of video capsule endoscopy explained, including risk of retained capsule, potentially requiring surgery for removal.  Patient understands and agrees to risks.    Capsule ID: [id number]    Capsule swallowed at: 0800    NPO now.   Resume Clear liquid diet at:  1000   Resume regular consistency diet at: 1200    NPO after midnight.    Equipment can be removed at:    GI fellow will retrieve equipment in the morning.    NO MRI UNTIL CAPSULE CLEARANCE CONFIRMED. CAPSULE IS NOT MRI COMPATIBLE.       Sammi Swain MD  Gastroenterology/Hepatology Fellow, PGY-V    NON-URGENT CONSULTS:  Please email [email or web address]  AT NIGHT AND ON WEEKENDS:  Contact on-call GI fellow via answering service ([phone]) from 5pm-8am and on weekends/holidays  MONDAY-FRIDAY 8AM-5PM:  Pager# [phone] ([facility])  GI Phone# [phone] ([facility])

## 2021-11-16 NOTE — PROGRESS NOTE ADULT - ASSESSMENT
MOLLY ANNA is a 66y Male who presents with a chief complaint of chest pain.    Anemia  - Patient with history of recurrent melena and requirements of blood transfusions over the past year.  - He is supposed to have further workup with colorectal surgery at [facility].  - Hemoglobin 8.3 on admission. Continue to trend CBC. Transfuse to hemoglobin goal of 7.  - hg at 7.5 with guiac positve   - gi following and having capsule endo   - keep hg>7   - follow up with Dr Romero as outpt   \  Otherwise patient is being managed for chest pain, congestive heart failure, and acute kidney injury with chronic kidney disease. Cardiology and nephrology have been consulted.    Dequan Silveira MD  Hematology Oncology   O:   C: [phone]

## 2021-11-16 NOTE — PROGRESS NOTE ADULT - SUBJECTIVE AND OBJECTIVE BOX
Patient seen and examined at bedside. pt s/p capusle endo     MEDICATIONS  (STANDING):  amLODIPine   Tablet 10 milliGRAM(s) Oral daily  atorvastatin 80 milliGRAM(s) Oral at bedtime  buMETAnide Injectable 1 milliGRAM(s) IV Push two times a day  epoetin stephany-epbx (RETACRIT) Injectable 67856 Unit(s) SubCutaneous once  hydrALAZINE 100 milliGRAM(s) Oral three times a day  iron sucrose IVPB 200 milliGRAM(s) IV Intermittent every 24 hours  levothyroxine 50 MICROGram(s) Oral daily  pantoprazole  Injectable 40 milliGRAM(s) IV Push every 12 hours  sodium chloride 0.9% lock flush 3 milliLiter(s) IV Push every 8 hours    MEDICATIONS  (PRN):        Vital Signs Last 24 Hrs  T(C): 36.8 (16 Nov 2021 05:00), Max: 36.9 (15 Nov 2021 10:48)  T(F): 98.3 (16 Nov 2021 05:00), Max: 98.5 (15 Nov 2021 10:48)  HR: 72 (16 Nov 2021 05:00) (71 - 75)  BP: 154/86 (16 Nov 2021 05:00) (142/60 - 154/86)  BP(mean): --  RR: 18 (16 Nov 2021 05:00) (18 - 18)  SpO2: 100% (16 Nov 2021 05:00) (97% - 100%)      PHYSICAL EXAM:     GENERAL:  Appears stated age, well-groomed  HEENT:  NC/AT,   CHEST:  CTA b/l  HEART:  S1 s2+   ABDOMEN:  Soft, non-tender, non-distended  EXTEREMITIES:  no cyanosis,clubbing or edema  NEURO:  Alert, oriented, no asterixis                            7.5    4.09  )-----------( 137      ( 16 Nov 2021 07:27 )             24.1       11-16    143  |  104  |  94<H>  ----------------------------<  101<H>  3.7   |  23  |  3.84<H>    Ca    8.9      16 Nov 2021 07:27  Phos  4.4     11-16  Mg     2.20     11-16

## 2021-11-16 NOTE — PROGRESS NOTE ADULT - SUBJECTIVE AND OBJECTIVE BOX
[facility] - S [place] / Agata S /D Rossana/ S Jordyn/ S Natacha/ Kirill Rod / GIRISH Stahlu/ O Dolores  service -[phone], office [phone]  ---------------------------------------------------------------------------------------------------------------    Patient seen and examined bedside    Subjective and Objective: No overnight events, sob resolved. No complaints today. feeling better    Allergies: No Known Allergies      Hospital Medications:   MEDICATIONS  (STANDING):  amLODIPine   Tablet 10 milliGRAM(s) Oral daily  atorvastatin 80 milliGRAM(s) Oral at bedtime  buMETAnide 1 milliGRAM(s) Oral two times a day  epoetin stephany-epbx (RETACRIT) Injectable 75524 Unit(s) SubCutaneous once  hydrALAZINE 100 milliGRAM(s) Oral three times a day  levothyroxine 50 MICROGram(s) Oral daily  pantoprazole  Injectable 40 milliGRAM(s) IV Push every 12 hours  sodium chloride 0.9% lock flush 3 milliLiter(s) IV Push every 8 hours      REVIEW OF SYSTEMS:  CONSTITUTIONAL: No weakness, fevers or chills  EYES/ENT: No visual changes;  No vertigo or throat pain   NECK: No pain or stiffness  RESPIRATORY: No cough, wheezing, hemoptysis; No shortness of breath  CARDIOVASCULAR: No chest pain or palpitations.  GASTROINTESTINAL: No abdominal or epigastric pain. No nausea, vomiting, or hematemesis; No diarrhea or constipation. No melena or hematochezia.  GENITOURINARY: No dysuria, frequency, foamy urine, urinary urgency, incontinence or hematuria  NEUROLOGICAL: No numbness or weakness  SKIN: No itching, burning, rashes, or lesions   VASCULAR: No bilateral lower extremity edema.   All other review of systems is negative unless indicated above.    VITALS:  T(F): 98.1 (21 @ 14:00), Max: 98.5 (21 @ 00:00)  HR: 72 (21 @ 14:00)  BP: 146/62 (21 @ 14:00)  RR: 14 (21 @ 14:00)  SpO2: 100% (21 @ 14:00)  Wt(kg): --    11-15 @ 07:01  -   @ 07:00  --------------------------------------------------------  IN: 0 mL / OUT: 2000 mL / NET: -2000 mL     @ 07:01  -   @ 14:37  --------------------------------------------------------  IN: 238 mL / OUT: 650 mL / NET: -412 mL          PHYSICAL EXAM:  Constitutional: NAD  HEENT: anicteric sclera, oropharynx clear  Neck: No JVD  Respiratory: CTAB, no wheezes, rales or rhonchi  Cardiovascular: S1, S2, RRR  Gastrointestinal: BS+, soft, NT/ND  Extremities: No cyanosis or clubbing. No peripheral edema  Neurological: A/O x 3, no focal deficits  Psychiatric: Normal mood, normal affect  : No CVA tenderness. No tang.   Skin: No rashes  Vascular Access:    LABS:      143  |  104  |  94<H>  ----------------------------<  101<H>  3.7   |  23  |  3.84<H>    Ca    8.9      2021 07:27  Phos  4.4       Mg     2.20           Creatinine Trend: 3.84 <--, 3.92 <--, 3.87 <--, 4.53 <--, 4.93 <--, 5.38 <--, 5.44 <--, 5.65 <--, 6.06 <--                        7.5    4.09  )-----------( 137      ( 2021 07:27 )             24.1     Urine Studies:  Urinalysis Basic - ( 2021 12:30 )    Color: Light Yellow / Appearance: Clear / S.012 / pH:   Gluc:  / Ketone: Negative  / Bili: Negative / Urobili: <2 mg/dL   Blood:  / Protein: Trace / Nitrite: Negative   Leuk Esterase: Negative / RBC: 1 /HPF / WBC 0 /HPF   Sq Epi:  / Non Sq Epi: 0 /HPF / Bacteria: Negative      Creatinine, Random Urine: 70 mg/dL ( @ 19:56)  Creatinine, Random Urine: 70 mg/dL ( @ 15:58)  Sodium, Random Urine: 63 mmol/L ( @ 15:58)  Sodium, Random Urine: 66 mmol/L ( @ 13:43)  Potassium, Random Urine: 39.7 mmol/L ( @ 13:43)  Chloride, Random Urine: 70 mmol/L ( @ 13:43)      RADIOLOGY & ADDITIONAL STUDIES:   [facility] - S [place] / Agata S /D Rossana/ S Jordyn/ S Natacha/ Kirill Rod / M Pedrito/ O Dolores  service -[phone], office [phone]  ---------------------------------------------------------------------------------------------------------------    Patient seen and examined bedside    Subjective and Objective: No overnight events, sob resolved. No complaints today. feeling better    Allergies: No Known Allergies      Hospital Medications:   MEDICATIONS  (STANDING):  amLODIPine   Tablet 10 milliGRAM(s) Oral daily  atorvastatin 80 milliGRAM(s) Oral at bedtime  buMETAnide 1 milliGRAM(s) Oral two times a day  epoetin stephany-epbx (RETACRIT) Injectable 10673 Unit(s) SubCutaneous once  hydrALAZINE 100 milliGRAM(s) Oral three times a day  levothyroxine 50 MICROGram(s) Oral daily  pantoprazole  Injectable 40 milliGRAM(s) IV Push every 12 hours  sodium chloride 0.9% lock flush 3 milliLiter(s) IV Push every 8 hours    VITALS:  T(F): 98.1 (21 @ 14:00), Max: 98.5 (21 @ 00:00)  HR: 72 (21 @ 14:00)  BP: 146/62 (21 @ 14:00)  RR: 14 (21 @ 14:00)  SpO2: 100% (21 @ 14:00)  Wt(kg): --    11-15 @ 07:01  -   @ 07:00  --------------------------------------------------------  IN: 0 mL / OUT: 2000 mL / NET: -2000 mL     @ 07:01  -   @ 14:37  --------------------------------------------------------  IN: 238 mL / OUT: 650 mL / NET: -412 mL      PHYSICAL EXAM:  Constitutional: NAD  HEENT: anicteric sclera  Neck: No JVD  Respiratory: CTAB, no wheezes, rales or rhonchi  Cardiovascular: S1, S2, RRR  Gastrointestinal: BS+, soft, NT/ND  Extremities: No peripheral edema  Neurological: A/O x 3  Psychiatric: Normal mood, normal affect  : No tang.     LABS:      143  |  104  |  94<H>  ----------------------------<  101<H>  3.7   |  23  |  3.84<H>    Ca    8.9      2021 07:27  Phos  4.4       Mg     2.20           Creatinine Trend: 3.84 <--, 3.92 <--, 3.87 <--, 4.53 <--, 4.93 <--, 5.38 <--, 5.44 <--, 5.65 <--, 6.06 <--                        7.5    4.09  )-----------( 137      ( 2021 07:27 )             24.1     Urine Studies:  Urinalysis Basic - ( 2021 12:30 )    Color: Light Yellow / Appearance: Clear / S.012 / pH:   Gluc:  / Ketone: Negative  / Bili: Negative / Urobili: <2 mg/dL   Blood:  / Protein: Trace / Nitrite: Negative   Leuk Esterase: Negative / RBC: 1 /HPF / WBC 0 /HPF   Sq Epi:  / Non Sq Epi: 0 /HPF / Bacteria: Negative      Creatinine, Random Urine: 70 mg/dL ( @ 19:56)  Creatinine, Random Urine: 70 mg/dL ( @ 15:58)  Sodium, Random Urine: 63 mmol/L ( @ 15:58)  Sodium, Random Urine: 66 mmol/L ( @ 13:43)  Potassium, Random Urine: 39.7 mmol/L ( @ 13:43)  Chloride, Random Urine: 70 mmol/L ( @ 13:43)      RADIOLOGY & ADDITIONAL STUDIES:

## 2021-11-16 NOTE — PROGRESS NOTE ADULT - PROBLEM SELECTOR PLAN 4
[place] 75[address]  Echo from October 28/21 showing EF of 60-65% with Grade II left ventricular diastolic dysfunction  Mild acute diastolic CHF-improving  C/w IV bumex 1mg BID per Renal recs  On lasix at home-40mg daily  Apprec Cards recs-suspect renal etiology  Not on ACE-I/ARB due to CLOTILDE  Holding home dose of coreg for now-resume as tolerated Patient with ProBNP of 912[address]  Echo from October 28/21 showing EF of 60-65% with Grade II left ventricular diastolic dysfunction  Mild acute diastolic CHF-improving  Change bumex to po 1mg bid  On lasix at home-40mg daily  Apprec Cards recs-suspect renal etiology  Not on ACE-I/ARB due to CLOTILDE  Holding home dose of coreg for now-resume as tolerated

## 2021-11-16 NOTE — PROGRESS NOTE ADULT - PROBLEM SELECTOR PLAN 2
Renal fxn improving, creat downtrending to 3.87 from peak of 6.06 on admission  Baseline approximately 3-CKD stage 4  US Kidney and bladder negative for bilateral hydronephrosis.  Seen by Renal, apprec recs- suspicion is d/t cardio-renal/CHF  FeUREA is 28%, suggestive of pre-renal etiology  c/w bumex 1 mg bid, change to po  Monitor daily weights and U/O, fluid restriction  No plan for RRT at this time as renal fxn improving  Avoid nephrotoxic agents

## 2021-11-16 NOTE — PROGRESS NOTE ADULT - SUBJECTIVE AND OBJECTIVE BOX
Dr. Sumaya Nascimento  Pager [phone]    PROGRESS NOTE:     Patient is a 66y old  Male who presents with a chief complaint of Chest pain (16 Nov 2021 10:43)      SUBJECTIVE / OVERNIGHT EVENTS: pt denies chest pain or sob   ADDITIONAL REVIEW OF SYSTEMS: afebrile     MEDICATIONS  (STANDING):  amLODIPine   Tablet 10 milliGRAM(s) Oral daily  atorvastatin 80 milliGRAM(s) Oral at bedtime  buMETAnide Injectable 1 milliGRAM(s) IV Push two times a day  epoetin stephany-epbx (RETACRIT) Injectable 10300 Unit(s) SubCutaneous once  hydrALAZINE 100 milliGRAM(s) Oral three times a day  levothyroxine 50 MICROGram(s) Oral daily  pantoprazole  Injectable 40 milliGRAM(s) IV Push every 12 hours  sodium chloride 0.9% lock flush 3 milliLiter(s) IV Push every 8 hours    MEDICATIONS  (PRN):      CAPILLARY BLOOD GLUCOSE      POCT Blood Glucose.: 141 mg/dL (16 Nov 2021 12:10)  POCT Blood Glucose.: 105 mg/dL (16 Nov 2021 07:39)    I&O's Summary    15 Nov 2021 07:01  -  16 Nov 2021 07:00  --------------------------------------------------------  IN: 0 mL / OUT: 2000 mL / NET: -2000 mL    16 Nov 2021 07:01  -  16 Nov 2021 13:00  --------------------------------------------------------  IN: 238 mL / OUT: 650 mL / NET: -412 mL        PHYSICAL EXAM:  Vital Signs Last 24 Hrs  T(C): 36.8 (16 Nov 2021 05:00), Max: 36.9 (16 Nov 2021 00:00)  T(F): 98.3 (16 Nov 2021 05:00), Max: 98.5 (16 Nov 2021 00:00)  HR: 72 (16 Nov 2021 05:00) (71 - 75)  BP: 154/86 (16 Nov 2021 05:00) (142/60 - 154/86)  BP(mean): --  RR: 18 (16 Nov 2021 05:00) (18 - 18)  SpO2: 100% (16 Nov 2021 05:00) (97% - 100%)  CONSTITUTIONAL: NAD, well-developed  RESPIRATORY: Normal respiratory effort; lungs are clear to auscultation bilaterally  CARDIOVASCULAR: Regular rate and rhythm, normal S1 and S2, no murmur/rub/gallop; no lower extremity edema; Peripheral pulses are 2+ bilaterally  ABDOMEN: Nontender to palpation, normoactive bowel sounds, no rebound/guarding; No hepatosplenomegaly  MUSCULOSKELETAL: no clubbing or cyanosis of digits; no joint swelling or tenderness to palpation  PSYCH: A+O to person, place, and time; affect appropriate      LABS:                        7.5    4.09  )-----------( 137      ( 16 Nov 2021 07:27 )             24.1     11-16    143  |  104  |  94<H>  ----------------------------<  101<H>  3.7   |  23  |  3.84<H>    Ca    8.9      16 Nov 2021 07:27  Phos  4.4     11-16  Mg     2.20     11-16                  RADIOLOGY & ADDITIONAL TESTS:  Results Reviewed:   Imaging Personally Reviewed:  Electrocardiogram Personally Reviewed:    COORDINATION OF CARE:  Care Discussed with Consultants/Other Providers [Y/N]: acp capsule endoscopy in progress  Prior or Outpatient Records Reviewed [Y/N]:

## 2021-11-16 NOTE — PROGRESS NOTE ADULT - ASSESSMENT
67 y/o M with PMH of Hypothyroidism,, pre-diabetes, HTN, HLD, CKD not on dialysis, Anemia, small bowel resection 9/13/21 secondary to AVM presents to ED after episode of chest pain associated with shortness of breath. Patient admitted for chest pain, CLOTILDE on CKD and CHF.  Renal consulted for CLOTILDE/CKD Mx.    CLOTILDE on CKD4: with uremia  CLOTILDE likely 2/2 cardiorenal/CHF. improving  Creatinine Trend: 3.84 <-3.87 <--, 4.53 <--, 4.93 <--, 5.38 <--, 5.44 <--, 5.65 <--, 6.06 <--  10/29/21 Cr was 3.29  hypervolemic, CHF -improving  K, bicarb acceptable    plan  cont monitor Serum Creatinine level  agree w/changing Bumex to 1mg BID IV to po   no indication to start RRT/hd at this time  low Na/phos in diet and fluid restriction 1L/day  monitor BMP daily and u/o   dose all meds for eGFR<15ml/min.   avoid ACEi/ARB/NSAIDs/Nephrotoxics.    Hypertension. bp controlled  c/w hydralazine 100 mg TID,    Carvedilol 12.5 mg TID.-per cardio  continue with holding parameters.  back on Amlodipine     Anemia. likely 2/2 CKD. + Fe def  hb low,   on iv iron qd  d/c ferrous    sulfate   s/p epo 10k subqx1    labs, chart reviewed  pl call for any q's  [phone]   office [phone]  ans serv- [phone]  [email or web address] [facility] ( wkend coverage for Hospital)

## 2021-11-16 NOTE — PROGRESS NOTE ADULT - PROBLEM SELECTOR PLAN 1
hgb dropped to 6.6 this morning, repeat 7.3, pt reports black stool yesterday, reflective of likely UGI bleed  iv protonix 40 bid  trend CBC bid  GI consulted, capsule endoscopy in progress to r/o source of bleed vs. anastomotic ulcer  -F/U GI recs  -pt has h/o small bowel GIB d/t AVM s/p small bowel resection, had multiple admissions for GIB, s/p intraop surgically assisted enteroscopy (with Dr. Marlow) was performed to the distal ileum. A single AVM was identified that was c/w what was noted on VCE. This area was marked for resection by the surgeon and small bowel resection was performed. Upon removal of the scope there was a possible second very small lesion vs trauma in the small bowel (not intervened).    Also possible anastomotic ulcers seen on ?capsule endoscopy after SBR (9/24/21) s/p 1 unit prbc on 11/15  -GI consulted, capsule endoscopy in progress to r/o source of bleed vs. anastomotic ulcer  -F/U GI recs  -pt has h/o small bowel GIB d/t AVM s/p small bowel resection, had multiple admissions for GIB, s/p intraop surgically assisted enteroscopy (with Dr. Marlow) was performed to the distal ileum. A single AVM was identified that was c/w what was noted on VCE. This area was marked for resection by the surgeon and small bowel resection was performed. Upon removal of the scope there was a possible second very small lesion vs trauma in the small bowel (not intervened).    Also possible anastomotic ulcers seen on ?capsule endoscopy after SBR (9/24/21)

## 2021-11-16 NOTE — PROGRESS NOTE ADULT - PROBLEM SELECTOR PLAN 5
Patient with hemoglobin of 8.0 previously 10.4 on oct 29 but overall trend more in the 8s  Patient denies BRBPR, blood in stool.  Po and iv iron (ferritin 183, tsat 12%), iv iron x3 days  Patient with history of GIB from small bowel in September requiring small bowel resection due to AVM  Pt with GIB, management as above  Plan for capsule endoscopy tomorrow, transfuse 1 unit prbc today  CKD could also be contributing to patient's anemia  Apprec GI and heme recs Patient with hemoglobin of 8.0 previously 10.4 on oct 29 but overall trend more in the 8s  Patient denies BRBPR, blood in stool.  S/P IRON, iv iron (ferritin 183, tsat 12%), iv iron x3 days  epogen 10k x1 per renal  Patient with history of GIB from small bowel in September requiring small bowel resection due to AVM  Pt with GIB, management as above  VCE in progress per GI  CKD could also be contributing to patient's anemia  Apprec GI and heme recs

## 2021-11-16 NOTE — PROGRESS NOTE ADULT - SUBJECTIVE AND OBJECTIVE BOX
Interval Events: Reports feeling much better today. States had two dark stools, no red/maroon BM. Otherwise no new complaints. Capsule swallowed this morning.     Allergies:  No Known Allergies        Hospital Medications:  amLODIPine   Tablet 10 milliGRAM(s) Oral daily  atorvastatin 80 milliGRAM(s) Oral at bedtime  buMETAnide 1 milliGRAM(s) Oral two times a day  epoetin stephany-epbx (RETACRIT) Injectable 59974 Unit(s) SubCutaneous once  hydrALAZINE 100 milliGRAM(s) Oral three times a day  levothyroxine 50 MICROGram(s) Oral daily  pantoprazole  Injectable 40 milliGRAM(s) IV Push every 12 hours  sodium chloride 0.9% lock flush 3 milliLiter(s) IV Push every 8 hours      PMHX/PSHX:  No pertinent past medical history    DM (diabetes mellitus)    HTN (hypertension)    Hypothyroidism    Cholecystitis    Unilateral inguinal hernia without obstruction or gangrene, recurrence not specified    Anemia    Hyperlipidemia    Pre-diabetes    Aortic stenosis    Gastric AVM    No significant past surgical history    H/O right inguinal hernia repair    S/P small bowel resection        Family history:  No pertinent family history in first degree relatives    FH: hypertension        ROS:     General:  No wt loss, fevers, chills, night sweats, fatigue,   Eyes:  Good vision, no reported pain  ENT:  No sore throat, pain, runny nose, dysphagia  CV:  No pain, palpitations, hypo/hypertension  Pulm:  No dyspnea, cough, tachypnea, wheezing  GI:  No pain, No nausea, No vomiting, No diarrhea, No constipation, No weight loss, No fever, No pruritis, No rectal bleeding, No tarry stools, No dysphagia  :  No pain, bleeding, incontinence, nocturia  Muscle:  No pain, weakness  Neuro:  No weakness, tingling, memory problems  Psych:  No fatigue, insomnia, mood problems, depression  Endocrine:  No polyuria, polydipsia, cold/heat intolerance  Heme:  No petechiae, ecchymosis, easy bruisability  Skin:  No rash, tattoos, scars, edema      PHYSICAL EXAM:   Vital Signs:  Vital Signs Last 24 Hrs  T(C): 36.7 (16 Nov 2021 14:00), Max: 36.9 (16 Nov 2021 00:00)  T(F): 98.1 (16 Nov 2021 14:00), Max: 98.5 (16 Nov 2021 00:00)  HR: 72 (16 Nov 2021 14:00) (71 - 76)  BP: 146/62 (16 Nov 2021 14:00) (142/60 - 154/86)  BP(mean): 87 (16 Nov 2021 14:00) (87 - 87)  RR: 14 (16 Nov 2021 14:00) (14 - 18)  SpO2: 100% (16 Nov 2021 14:00) (97% - 100%)  Daily     Daily     GENERAL:  No acute distress  HEENT:  Normocephalic/atraumatic,  no scleral icterus  CHEST:  Normal effort and respiratory rate, no accessory muscle use  HEART:  Regular rate and rhythm  ABDOMEN:  Soft, non-tender, non-distended  EXTREMITIES:  No cyanosis, clubbing, or edema  SKIN:  No rash/erythema/ecchymoses/petechiae/wounds/abscess/warm/dry on exposed skin  NEURO:  No asterixis, no tremor  PSYCH: AAOx3, appropriate affect    LABS:                        7.5    4.09  )-----------( 137      ( 16 Nov 2021 07:27 )             24.1     Mean Cell Volume: 82.5 fL (11-16-21 @ 07:27)    11-16    143  |  104  |  94<H>  ----------------------------<  101<H>  3.7   |  23  |  3.84<H>    Ca    8.9      16 Nov 2021 07:27  Phos  4.4     11-16  Mg     2.20     11-16                                    7.5    4.09  )-----------( 137      ( 16 Nov 2021 07:27 )             24.1                         7.8    5.46  )-----------( 147      ( 15 Nov 2021 20:06 )             25.5                         7.3    4.81  )-----------( 156      ( 15 Nov 2021 09:22 )             23.2                         6.6    4.14  )-----------( 128      ( 15 Nov 2021 07:22 )             21.5                         7.1    4.11  )-----------( 138      ( 14 Nov 2021 07:24 )             22.4       Imaging:           Interval Events: Reports feeling much better today. States had two dark stools, no red/maroon BM. Otherwise no new complaints. Capsule swallowed this morning.     Allergies:  No Known Allergies    Hospital Medications:  amLODIPine   Tablet 10 milliGRAM(s) Oral daily  atorvastatin 80 milliGRAM(s) Oral at bedtime  buMETAnide 1 milliGRAM(s) Oral two times a day  epoetin stephany-epbx (RETACRIT) Injectable 98591 Unit(s) SubCutaneous once  hydrALAZINE 100 milliGRAM(s) Oral three times a day  levothyroxine 50 MICROGram(s) Oral daily  pantoprazole  Injectable 40 milliGRAM(s) IV Push every 12 hours  sodium chloride 0.9% lock flush 3 milliLiter(s) IV Push every 8 hours      PMHX/PSHX:  No pertinent past medical history    DM (diabetes mellitus)    HTN (hypertension)    Hypothyroidism    Cholecystitis    Unilateral inguinal hernia without obstruction or gangrene, recurrence not specified    Anemia    Hyperlipidemia    Pre-diabetes    Aortic stenosis    Gastric AVM    No significant past surgical history    H/O right inguinal hernia repair    S/P small bowel resection        Family history:  No pertinent family history in first degree relatives    FH: hypertension        ROS:     General:  No wt loss, fevers, chills, night sweats, fatigue,   Eyes:  Good vision, no reported pain  ENT:  No sore throat, pain, runny nose, dysphagia  CV:  No pain, palpitations, hypo/hypertension  Pulm:  No dyspnea, cough, tachypnea, wheezing  GI:  No pain, No nausea, No vomiting, No diarrhea, No constipation, No weight loss, No fever, No pruritis, No rectal bleeding, No tarry stools, No dysphagia  :  No pain, bleeding, incontinence, nocturia  Muscle:  No pain, weakness  Neuro:  No weakness, tingling, memory problems  Psych:  No fatigue, insomnia, mood problems, depression  Endocrine:  No polyuria, polydipsia, cold/heat intolerance  Heme:  No petechiae, ecchymosis, easy bruisability  Skin:  No rash, tattoos, scars, edema      PHYSICAL EXAM:   Vital Signs:  Vital Signs Last 24 Hrs  T(C): 36.7 (16 Nov 2021 14:00), Max: 36.9 (16 Nov 2021 00:00)  T(F): 98.1 (16 Nov 2021 14:00), Max: 98.5 (16 Nov 2021 00:00)  HR: 72 (16 Nov 2021 14:00) (71 - 76)  BP: 146/62 (16 Nov 2021 14:00) (142/60 - 154/86)  BP(mean): 87 (16 Nov 2021 14:00) (87 - 87)  RR: 14 (16 Nov 2021 14:00) (14 - 18)  SpO2: 100% (16 Nov 2021 14:00) (97% - 100%)  Daily     Daily     GENERAL:  No acute distress  HEENT:  Normocephalic/atraumatic,  no scleral icterus  CHEST:  Normal effort and respiratory rate, no accessory muscle use  HEART:  Regular rate and rhythm  ABDOMEN:  Soft, non-tender, non-distended  EXTREMITIES:  No cyanosis, clubbing, or edema  SKIN:  No rash/erythema/ecchymoses/petechiae/wounds/abscess/warm/dry on exposed skin  NEURO:  No asterixis, no tremor  PSYCH: AAOx3, appropriate affect    LABS:                        7.5    4.09  )-----------( 137      ( 16 Nov 2021 07:27 )             24.1     Mean Cell Volume: 82.5 fL (11-16-21 @ 07:27)    11-16    143  |  104  |  94<H>  ----------------------------<  101<H>  3.7   |  23  |  3.84<H>    Ca    8.9      16 Nov 2021 07:27  Phos  4.4     11-16  Mg     2.20     11-16                                    7.5    4.09  )-----------( 137      ( 16 Nov 2021 07:27 )             24.1                         7.8    5.46  )-----------( 147      ( 15 Nov 2021 20:06 )             25.5                         7.3    4.81  )-----------( 156      ( 15 Nov 2021 09:22 )             23.2                         6.6    4.14  )-----------( 128      ( 15 Nov 2021 07:22 )             21.5                         7.1    4.11  )-----------( 138      ( 14 Nov 2021 07:24 )             22.4       Imaging:

## 2021-11-17 LAB
ANION GAP SERPL CALC-SCNC: 13 MMOL/L — SIGNIFICANT CHANGE UP (ref 7–14)
BUN SERPL-MCNC: 88 MG/DL — HIGH (ref 7–23)
CALCIUM SERPL-MCNC: 9.1 MG/DL — SIGNIFICANT CHANGE UP (ref 8.4–10.5)
CHLORIDE SERPL-SCNC: 104 MMOL/L — SIGNIFICANT CHANGE UP (ref 98–107)
CO2 SERPL-SCNC: 25 MMOL/L — SIGNIFICANT CHANGE UP (ref 22–31)
CREAT SERPL-MCNC: 3.59 MG/DL — HIGH (ref 0.5–1.3)
GLUCOSE SERPL-MCNC: 91 MG/DL — SIGNIFICANT CHANGE UP (ref 70–99)
HCT VFR BLD CALC: 24.8 % — LOW (ref 39–50)
HGB BLD-MCNC: 7.7 G/DL — LOW (ref 13–17)
MAGNESIUM SERPL-MCNC: 2.2 MG/DL — SIGNIFICANT CHANGE UP (ref 1.6–2.6)
MCHC RBC-ENTMCNC: 25.4 PG — LOW (ref 27–34)
MCHC RBC-ENTMCNC: 31 GM/DL — LOW (ref 32–36)
MCV RBC AUTO: 81.8 FL — SIGNIFICANT CHANGE UP (ref 80–100)
NRBC # BLD: 0 /100 WBCS — SIGNIFICANT CHANGE UP
NRBC # FLD: 0 K/UL — SIGNIFICANT CHANGE UP
PHOSPHATE SERPL-MCNC: 4.2 MG/DL — SIGNIFICANT CHANGE UP (ref 2.5–4.5)
PLATELET # BLD AUTO: 142 K/UL — LOW (ref 150–400)
POTASSIUM SERPL-MCNC: 3.6 MMOL/L — SIGNIFICANT CHANGE UP (ref 3.5–5.3)
POTASSIUM SERPL-SCNC: 3.6 MMOL/L — SIGNIFICANT CHANGE UP (ref 3.5–5.3)
RBC # BLD: 3.03 M/UL — LOW (ref 4.2–5.8)
RBC # FLD: 19.5 % — HIGH (ref 10.3–14.5)
SARS-COV-2 RNA SPEC QL NAA+PROBE: SIGNIFICANT CHANGE UP
SODIUM SERPL-SCNC: 142 MMOL/L — SIGNIFICANT CHANGE UP (ref 135–145)
WBC # BLD: 4.28 K/UL — SIGNIFICANT CHANGE UP (ref 3.8–10.5)
WBC # FLD AUTO: 4.28 K/UL — SIGNIFICANT CHANGE UP (ref 3.8–10.5)

## 2021-11-17 PROCEDURE — 44366 SMALL BOWEL ENDOSCOPY: CPT | Mod: GC

## 2021-11-17 PROCEDURE — 99233 SBSQ HOSP IP/OBS HIGH 50: CPT

## 2021-11-17 RX ADMIN — SODIUM CHLORIDE 3 MILLILITER(S): 9 INJECTION INTRAMUSCULAR; INTRAVENOUS; SUBCUTANEOUS at 06:00

## 2021-11-17 RX ADMIN — Medication 100 MILLIGRAM(S): at 23:18

## 2021-11-17 RX ADMIN — AMLODIPINE BESYLATE 10 MILLIGRAM(S): 2.5 TABLET ORAL at 07:14

## 2021-11-17 RX ADMIN — ATORVASTATIN CALCIUM 80 MILLIGRAM(S): 80 TABLET, FILM COATED ORAL at 23:18

## 2021-11-17 RX ADMIN — PANTOPRAZOLE SODIUM 40 MILLIGRAM(S): 20 TABLET, DELAYED RELEASE ORAL at 19:15

## 2021-11-17 RX ADMIN — Medication 50 MICROGRAM(S): at 06:13

## 2021-11-17 RX ADMIN — BUMETANIDE 1 MILLIGRAM(S): 0.25 INJECTION INTRAMUSCULAR; INTRAVENOUS at 19:16

## 2021-11-17 RX ADMIN — SODIUM CHLORIDE 3 MILLILITER(S): 9 INJECTION INTRAMUSCULAR; INTRAVENOUS; SUBCUTANEOUS at 13:49

## 2021-11-17 RX ADMIN — Medication 100 MILLIGRAM(S): at 06:13

## 2021-11-17 RX ADMIN — BUMETANIDE 1 MILLIGRAM(S): 0.25 INJECTION INTRAMUSCULAR; INTRAVENOUS at 06:13

## 2021-11-17 RX ADMIN — PANTOPRAZOLE SODIUM 40 MILLIGRAM(S): 20 TABLET, DELAYED RELEASE ORAL at 06:13

## 2021-11-17 RX ADMIN — SODIUM CHLORIDE 3 MILLILITER(S): 9 INJECTION INTRAMUSCULAR; INTRAVENOUS; SUBCUTANEOUS at 23:21

## 2021-11-17 NOTE — PROGRESS NOTE ADULT - PROBLEM SELECTOR PLAN 1
s/p 1 unit prbc on 11/15  -GI consulted, s/p VCE 11/16, revealed active bleeding in small bowel, unable to clearly identify anastomotic site  -IV protonix  -Plan for push enteroscopy today per GI after covid test  -trend CBC and transfuse prn to keep hgb>7  -pt has h/o small bowel GIB d/t AVM s/p small bowel resection, had multiple admissions for GIB, s/p intraop surgically assisted enteroscopy (with Dr. Marlow) was performed to the distal ileum. A single AVM was identified that was c/w what was noted on VCE. This area was marked for resection by the surgeon and small bowel resection was performed. Upon removal of the scope there was a possible second very small lesion vs trauma in the small bowel (not intervened).    Also possible anastomotic ulcers seen on ?capsule endoscopy after SBR (9/24/21)

## 2021-11-17 NOTE — DIETITIAN INITIAL EVALUATION ADULT. - OTHER INFO
65 y/o M admitted with Chest pain and PMH of Hypothyroidism,, pre-diabetes, HTN, HLD, CKD not on dialysis, Anemia, Cholecystitis, small bowel resection 9/13/21 secondary to AVM.  Pt NPO today for enteroscopy today per GI due to GI bleed.  Pt AOx4 and reports usually with 75% intake of meals but NPO today for procedure.  Pt reports to be within his UBW range at present.  Current wt- 184.5# (11/14/21) UBW- 180-185#. Skin intact with no edema per nursing flow sheet.    Labs reviewed.  A1c-5.1% (11/11/21)- pt with prediabetes not on meds and only diet controlled.  Pt follows carbohydrate controlled diet at home.  When medically feasible, resume Carbohydrate consistent, Renal diet as tolerated.

## 2021-11-17 NOTE — DIETITIAN INITIAL EVALUATION ADULT. - PROBLEM SELECTOR PLAN 2
Patient with Cr of 6.06.  Baseline approximately 3.  US Kidney and bladder negative for bilateral hydronephrosis.  GFR 9, previously 18 in October 29,2021.  Call for renal consult in AM.  Will order urine electrolytes.  Patient on ferric citrate at home. Will need to have patient bring in his own.

## 2021-11-17 NOTE — DIETITIAN INITIAL EVALUATION ADULT. - PROBLEM SELECTOR PLAN 1
Admit to tele, continue monitoring.  Troponin 90-->84-->82.  CK: 100, CKMB: 2.9  EKG with known RBBB, no TWI or ST elevations.  Patient currently chest pain free and denies shortness of breath.  Troponin possibly elevated in setting of CKD and possible heart failure, however will need to rule out ACS.  Stat EKG and Cardiac enzymes if patient develops chest pain.  Echo from October 28/21 showing EF of 60-65% with Grade II left ventricular diastolic dysfunction.  Consider stress test

## 2021-11-17 NOTE — DIETITIAN INITIAL EVALUATION ADULT. - PROBLEM SELECTOR PLAN 4
Patient with hemoglobin of 8.3, previously 10.4 on oct 29 but overall trend moreso in the 8s  Patient denies BRBPR, blood in stool.  Patient with history of GI bleed from small bowel in September requiring small bowel resection due to AVM.  Will send TIBC, Ferritin, Reticulocyte count.  Will order fecal occult.  Will recheck CBC in AM, if worsening will consider GI consult.  CKD could also be contributing to patient's anemia.  Continue to trend Hemoglobin

## 2021-11-17 NOTE — PROGRESS NOTE ADULT - ASSESSMENT
65 y/o M with PMH of Hypothyroidism,, pre-diabetes, HTN, HLD, CKD not on dialysis, Anemia, small bowel resection 9/13/21 secondary to AVM presents to ED after episode of chest pain associated with shortness of breath. Patient admitted for chest pain, CLOTILDE on CKD and CHF. Developed GIB, abnormal VCE

## 2021-11-17 NOTE — PROGRESS NOTE ADULT - ASSESSMENT
65 y/o M with PMH of Hypothyroidism,, pre-diabetes, HTN, HLD, CKD not on dialysis, Anemia, small bowel resection 9/13/21 secondary to AVM presents to ED after episode of chest pain associated with shortness of breath. Patient admitted for chest pain, CLOTILDE on CKD and CHF.  Renal consulted for CLOTILDE/CKD Mx.    CLOTILDE on CKD4: with uremia  CLOTILDE likely 2/2 cardiorenal/CHF. improving and stable  Creatinine Trend: 3.6<-3.6 <- 3.84 <-3.87 <--, 4.53 <--, 4.93 <--, 5.38 <--, 5.44 <--, 5.65 <--, 6.06 <--  10/29/21 Cr was 3.29  hypervolemia/ CHF -improved  K, bicarb acceptable    plan  cont monitor Serum Creatinine level  c/w Bumex to 1mg BID IV to po   no indication to start RRT/hd at this time  low Na/phos in diet and fluid restriction 1L/day  monitor BMP daily and u/o   dose all meds for eGFR<15ml/min.   avoid ACEi/ARB/NSAIDs/Nephrotoxics.    Hypertension. bp controlled  c/w hydralazine 100 mg TID, Amlodipine    Carvedilol 12.5 mg TID.-per cardio  continue meds with holding parameters.    Anemia. likely 2/2 CKD. + Fe def  hb low,   on iv iron. s/p epo 10k subqx1  f/u w/GI    poc d/w pt  labs, chart reviewed  pl call for any q's  [phone]   office [phone]  ans serv- [phone]  [email or web address] - [facility] ( wkend coverage for Hospital)

## 2021-11-17 NOTE — PROGRESS NOTE ADULT - SUBJECTIVE AND OBJECTIVE BOX
[facility] - S Chester / Agata S /D Rossana/ S Jordyn/ S Natacha/ Kirill Rod / M Pedrito/ O Dolores  service -[phone], office [phone]  ---------------------------------------------------------------------------------------------------------------    Patient seen and examined bedside    Subjective and Objective: No overnight events, sob resolved. No complaints today. feeling better    Allergies: No Known Allergies      Hospital Medications:   MEDICATIONS  (STANDING):  amLODIPine   Tablet 10 milliGRAM(s) Oral daily  atorvastatin 80 milliGRAM(s) Oral at bedtime  buMETAnide 1 milliGRAM(s) Oral two times a day  epoetin stephany-epbx (RETACRIT) Injectable 84096 Unit(s) SubCutaneous once  hydrALAZINE 100 milliGRAM(s) Oral three times a day  levothyroxine 50 MICROGram(s) Oral daily  pantoprazole  Injectable 40 milliGRAM(s) IV Push every 12 hours  sodium chloride 0.9% lock flush 3 milliLiter(s) IV Push every 8 hours    VITALS:  T(F): 97.2 (21 @ 16:25), Max: 99 (21 @ 06:09)  HR: 70 (21 @ 16:25)  BP: 143/58 (21 @ 16:25)  RR: 15 (21 @ 16:25)  SpO2: 96% (21 @ 16:25)  Wt(kg): --     @ 07:01  -   @ 07:00  --------------------------------------------------------  IN: 238 mL / OUT: 850 mL / NET: -612 mL     @ 07:01  -   @ 18:28  --------------------------------------------------------  IN: 0 mL / OUT: 1950 mL / NET: -1950 mL      Height (cm): 170 ( @ 14:21)  Weight (kg): 81.6 ( @ 14:21)  BMI (kg/m2): 28.2 ( @ 14:)  BSA (m2): 1.93 ( @ :)    PHYSICAL EXAM:  Constitutional: NAD  HEENT: anicteric sclera  Neck: No JVD  Respiratory: CTAB, no wheezes, rales or rhonchi  Cardiovascular: S1, S2, RRR  Gastrointestinal: BS+, soft, NT/ND  Extremities: No peripheral edema  Neurological: A/O x 3  Psychiatric: Normal mood, normal affect  : No tang.     LABS:      142  |  104  |  88<H>  ----------------------------<  91  3.6   |  25  |  3.59<H>    Ca    9.1      2021 07:31  Phos  4.2       Mg     2.20           Creatinine Trend: 3.59 <--, 3.60 <--, 3.84 <--, 3.92 <--, 3.87 <--, 4.53 <--, 4.93 <--, 5.38 <--, 5.44 <--, 5.65 <--                        7.7    4.28  )-----------( 142      ( 2021 07:26 )             24.8     Urine Studies:  Urinalysis Basic - ( 2021 12:30 )    Color: Light Yellow / Appearance: Clear / S.012 / pH:   Gluc:  / Ketone: Negative  / Bili: Negative / Urobili: <2 mg/dL   Blood:  / Protein: Trace / Nitrite: Negative   Leuk Esterase: Negative / RBC: 1 /HPF / WBC 0 /HPF   Sq Epi:  / Non Sq Epi: 0 /HPF / Bacteria: Negative      Creatinine, Random Urine: 70 mg/dL ( @ 19:56)  Creatinine, Random Urine: 70 mg/dL ( @ 15:58)  Sodium, Random Urine: 63 mmol/L ( @ 15:58)  Sodium, Random Urine: 66 mmol/L ( @ 13:43)  Potassium, Random Urine: 39.7 mmol/L ( @ 13:43)  Chloride, Random Urine: 70 mmol/L ( @ 13:43)      RADIOLOGY & ADDITIONAL STUDIES:

## 2021-11-17 NOTE — PROGRESS NOTE ADULT - PROBLEM SELECTOR PLAN 2
Renal fxn improving, creat downtrending to 3.59 from peak of 6.06 on admission  Baseline approximately 3-CKD stage 4  US Kidney and bladder negative for bilateral hydronephrosis.  Seen by Renal, apprec recs- suspicion is d/t cardio-renal/CHF  FeUREA is 28%, suggestive of pre-renal etiology  c/w po bumex 1mg bid  Monitor daily weights and U/O, fluid restriction  No plan for RRT at this time as renal fxn improving  Avoid nephrotoxic agents

## 2021-11-17 NOTE — CHART NOTE - NSCHARTNOTEFT_GEN_A_CORE
Yesterday's capsule endoscopy was loaded and reviewed. Results/recommendations in [place] and included below. Report with images to be scanned into [employer] chart.     Findings:       Images of the esophagus, stomach and small bowel were obtained from the        swallowed capsule and reviewed.       Stomach: Limited views given nature of small bowel video capsule        endoscopy (VCE). Mild gastropathy. A few frames seen with streaks of        blood vs possibly concentrated bilious fluid.       Small bowel: Duodenopathy and mucosal discoloration suggestive of        pseudomelanosis was seen in the proximal duodenum. Blood clots are seen        beginning at 02:40:52 (23% TSBT), but majority of fresh blood and        increased clot burden seen at 03:08:48 (34% TSBT). Distal small bowel        with dark fluid and limited views. Unable to clear identify anastomotic        site.       Colon: Limited views given nature of small bowel video capsule endoscopy        (VCE) and prep quality. Dark green stool throughout colon. Two frames of        red blood seen at 11:18:38.                                                        Impression:          - Active bleeding in small bowel, as noted above. Scant                        blood seen in stomach and colon.  Recommendation:      - Maintain NPO                       - Obtain STAT COVID test                       - Pending COVID testing, will tentatively plan for push                        enteroscopy today. If unable to reach site of bleed,                        will arrange for single balloon enteroscopy as discussed                        with advanced endoscopy attending, Dr. Yobani Ramsey.                       - Continue to trend CBC and transfuse PRN to maintain                        Hgb > 7                       - PPI.                                           < end of copied text >

## 2021-11-17 NOTE — PROGRESS NOTE ADULT - PROBLEM SELECTOR PLAN 4
Patient with ProBNP of 17682  Echo from October 28/21 showing EF of 60-65% with Grade II left ventricular diastolic dysfunction  Mild acute diastolic CHF-improving  Change bumex to po 1mg bid  On lasix at home-40mg daily  Apprec Cards recs-suspect renal etiology  Not on ACE-I/ARB due to CLOTILDE  Holding home dose of coreg for now-resume as tolerated

## 2021-11-17 NOTE — DIETITIAN INITIAL EVALUATION ADULT. - PROBLEM SELECTOR PLAN 3
Patient with ProBNP of 49760.  Patient denies history of heart failure.  Patient also with 2+ bilateral pitting edema.  Echo from October 28/21 showing EF of 60-65% with Grade II left ventricular diastolic dysfunction.  Patient s/p Bumex 1 mg IVP in ED.  Patient on Furosemide 40 mg daily at home.  Will continue Bumex 1 mg daily.  Call for Cardiology consult in AM.

## 2021-11-17 NOTE — PROGRESS NOTE ADULT - PROBLEM SELECTOR PLAN 5
Patient with hemoglobin of 8.0 previously 10.4 on oct 29 but overall trend more in the 8s  Patient denies BRBPR, blood in stool.  S/P IRON, iv iron (ferritin 183, tsat 12%)  s/p epogen x1  Patient with history of GIB from small bowel in September requiring small bowel resection due to AVM  Pt with GIB, management as above  VCE as above, SB bleeding, plan for enteroscopy  CKD could also be contributing to patient's anemia  Apprec GI and heme recs

## 2021-11-17 NOTE — PROGRESS NOTE ADULT - SUBJECTIVE AND OBJECTIVE BOX
Dr. Sumaya Nascimento  Pager [phone]    PROGRESS NOTE:     Patient is a 66y old  Male who presents with a chief complaint of GIB, anemia (16 Nov 2021 14:56)      SUBJECTIVE / OVERNIGHT EVENTS: pt denies chest pain or sob   ADDITIONAL REVIEW OF SYSTEMS: afebrile , still some dark stool?    MEDICATIONS  (STANDING):  amLODIPine   Tablet 10 milliGRAM(s) Oral daily  atorvastatin 80 milliGRAM(s) Oral at bedtime  buMETAnide 1 milliGRAM(s) Oral two times a day  epoetin stephany-epbx (RETACRIT) Injectable 94504 Unit(s) SubCutaneous once  hydrALAZINE 100 milliGRAM(s) Oral three times a day  levothyroxine 50 MICROGram(s) Oral daily  pantoprazole  Injectable 40 milliGRAM(s) IV Push every 12 hours  sodium chloride 0.9% lock flush 3 milliLiter(s) IV Push every 8 hours    MEDICATIONS  (PRN):      CAPILLARY BLOOD GLUCOSE      POCT Blood Glucose.: 170 mg/dL (16 Nov 2021 21:49)  POCT Blood Glucose.: 129 mg/dL (16 Nov 2021 17:02)    I&O's Summary    16 Nov 2021 07:01  -  17 Nov 2021 07:00  --------------------------------------------------------  IN: 238 mL / OUT: 850 mL / NET: -612 mL    17 Nov 2021 07:01  -  17 Nov 2021 12:47  --------------------------------------------------------  IN: 0 mL / OUT: 1200 mL / NET: -1200 mL        PHYSICAL EXAM:  Vital Signs Last 24 Hrs  T(C): 36.6 (17 Nov 2021 10:44), Max: 37.2 (17 Nov 2021 06:09)  T(F): 97.9 (17 Nov 2021 10:44), Max: 99 (17 Nov 2021 06:09)  HR: 72 (17 Nov 2021 10:44) (67 - 88)  BP: 150/57 (17 Nov 2021 10:44) (138/75 - 164/67)  BP(mean): 77 (16 Nov 2021 18:00) (77 - 87)  RR: 18 (17 Nov 2021 10:44) (14 - 18)  SpO2: 98% (17 Nov 2021 10:44) (98% - 100%)  CONSTITUTIONAL: NAD, well-developed  RESPIRATORY: Normal respiratory effort; lungs are clear to auscultation bilaterally  CARDIOVASCULAR: Regular rate and rhythm, normal S1 and S2, no murmur/rub/gallop; no lower extremity edema; Peripheral pulses are 2+ bilaterally  ABDOMEN: Nontender to palpation, normoactive bowel sounds, no rebound/guarding; No hepatosplenomegaly  MUSCULOSKELETAL: no clubbing or cyanosis of digits; no joint swelling or tenderness to palpation  PSYCH: A+O to person, place, and time; affect appropriate    LABS:                        7.7    4.28  )-----------( 142      ( 17 Nov 2021 07:26 )             24.8     11-17    142  |  104  |  88<H>  ----------------------------<  91  3.6   |  25  |  3.59<H>    Ca    9.1      17 Nov 2021 07:31  Phos  4.2     11-17  Mg     2.20     11-17                  RADIOLOGY & ADDITIONAL TESTS:  Results Reviewed:   Imaging Personally Reviewed:  Electrocardiogram Personally Reviewed:    COORDINATION OF CARE:  Care Discussed with Consultants/Other Providers [Y/N]: stephany Bro, F/U GI, plan for enteroscopy after covid test, abnormal capsule endoscopy   Prior or Outpatient Records Reviewed [Y/N]:

## 2021-11-17 NOTE — DIETITIAN INITIAL EVALUATION ADULT. - ORAL INTAKE PTA/DIET HISTORY
Pt seen and reports that eats 3 meals per day and wife cooks most meals at home.  Pt tries to follow DM diet pta.

## 2021-11-18 LAB
ANION GAP SERPL CALC-SCNC: 13 MMOL/L — SIGNIFICANT CHANGE UP (ref 7–14)
BUN SERPL-MCNC: 77 MG/DL — HIGH (ref 7–23)
CALCIUM SERPL-MCNC: 9.1 MG/DL — SIGNIFICANT CHANGE UP (ref 8.4–10.5)
CHLORIDE SERPL-SCNC: 103 MMOL/L — SIGNIFICANT CHANGE UP (ref 98–107)
CO2 SERPL-SCNC: 25 MMOL/L — SIGNIFICANT CHANGE UP (ref 22–31)
CREAT SERPL-MCNC: 3.48 MG/DL — HIGH (ref 0.5–1.3)
GLUCOSE SERPL-MCNC: 75 MG/DL — SIGNIFICANT CHANGE UP (ref 70–99)
HCT VFR BLD CALC: 24 % — LOW (ref 39–50)
HGB BLD-MCNC: 7.8 G/DL — LOW (ref 13–17)
MAGNESIUM SERPL-MCNC: 2 MG/DL — SIGNIFICANT CHANGE UP (ref 1.6–2.6)
MCHC RBC-ENTMCNC: 26.4 PG — LOW (ref 27–34)
MCHC RBC-ENTMCNC: 32.5 GM/DL — SIGNIFICANT CHANGE UP (ref 32–36)
MCV RBC AUTO: 81.4 FL — SIGNIFICANT CHANGE UP (ref 80–100)
NRBC # BLD: 0 /100 WBCS — SIGNIFICANT CHANGE UP
NRBC # FLD: 0 K/UL — SIGNIFICANT CHANGE UP
PHOSPHATE SERPL-MCNC: 4 MG/DL — SIGNIFICANT CHANGE UP (ref 2.5–4.5)
PLATELET # BLD AUTO: 142 K/UL — LOW (ref 150–400)
POTASSIUM SERPL-MCNC: 3.6 MMOL/L — SIGNIFICANT CHANGE UP (ref 3.5–5.3)
POTASSIUM SERPL-SCNC: 3.6 MMOL/L — SIGNIFICANT CHANGE UP (ref 3.5–5.3)
RBC # BLD: 2.95 M/UL — LOW (ref 4.2–5.8)
RBC # FLD: 19.3 % — HIGH (ref 10.3–14.5)
SODIUM SERPL-SCNC: 141 MMOL/L — SIGNIFICANT CHANGE UP (ref 135–145)
WBC # BLD: 4.96 K/UL — SIGNIFICANT CHANGE UP (ref 3.8–10.5)
WBC # FLD AUTO: 4.96 K/UL — SIGNIFICANT CHANGE UP (ref 3.8–10.5)

## 2021-11-18 PROCEDURE — 99232 SBSQ HOSP IP/OBS MODERATE 35: CPT | Mod: GC

## 2021-11-18 PROCEDURE — 99233 SBSQ HOSP IP/OBS HIGH 50: CPT

## 2021-11-18 RX ADMIN — Medication 100 MILLIGRAM(S): at 21:51

## 2021-11-18 RX ADMIN — BUMETANIDE 1 MILLIGRAM(S): 0.25 INJECTION INTRAMUSCULAR; INTRAVENOUS at 11:44

## 2021-11-18 RX ADMIN — BUMETANIDE 1 MILLIGRAM(S): 0.25 INJECTION INTRAMUSCULAR; INTRAVENOUS at 17:31

## 2021-11-18 RX ADMIN — ATORVASTATIN CALCIUM 80 MILLIGRAM(S): 80 TABLET, FILM COATED ORAL at 21:51

## 2021-11-18 RX ADMIN — AMLODIPINE BESYLATE 10 MILLIGRAM(S): 2.5 TABLET ORAL at 11:44

## 2021-11-18 RX ADMIN — PANTOPRAZOLE SODIUM 40 MILLIGRAM(S): 20 TABLET, DELAYED RELEASE ORAL at 17:31

## 2021-11-18 RX ADMIN — PANTOPRAZOLE SODIUM 40 MILLIGRAM(S): 20 TABLET, DELAYED RELEASE ORAL at 05:52

## 2021-11-18 RX ADMIN — SODIUM CHLORIDE 3 MILLILITER(S): 9 INJECTION INTRAMUSCULAR; INTRAVENOUS; SUBCUTANEOUS at 21:53

## 2021-11-18 RX ADMIN — Medication 100 MILLIGRAM(S): at 14:48

## 2021-11-18 RX ADMIN — SODIUM CHLORIDE 3 MILLILITER(S): 9 INJECTION INTRAMUSCULAR; INTRAVENOUS; SUBCUTANEOUS at 05:45

## 2021-11-18 RX ADMIN — SODIUM CHLORIDE 3 MILLILITER(S): 9 INJECTION INTRAMUSCULAR; INTRAVENOUS; SUBCUTANEOUS at 17:33

## 2021-11-18 NOTE — PROGRESS NOTE ADULT - ASSESSMENT
67 y/o M with PMH of Hypothyroidism,, pre-diabetes, HTN, HLD, CKD not on dialysis, Anemia, small bowel resection 9/13/21 secondary to AVM presents to ED after episode of chest pain associated with shortness of breath. Patient admitted for chest pain, CLOTILDE on CKD and CHF.  Renal following for CLOTILDE/CKD Mx.    CLOTILDE on CKD4: with uremia  CLOTILDE likely 2/2 cardiorenal/CHF. improving and stable  Creatinine Trend: 3.48<-3.6<-3.6 <- 3.84 <-3.87 <--, 4.53 <--, 4.93 <--, 5.38 <--, 5.44 <--, 5.65 <--, 6.06 <--  10/29/21 Cr was 3.29  hypervolemia/ CHF -improved  K, bicarb acceptable    plan  cont monitor Serum Creatinine level  c/w Bumex to 1mg BID IV to po   no indication to start RRT/hd at this time  low Na/phos in diet and fluid restriction 1L/day  monitor BMP daily and u/o   dose all meds for eGFR<15ml/min.   avoid ACEi/ARB/NSAIDs/Nephrotoxics.    Hypertension. bp controlled  c/w hydralazine 100 mg TID, Amlodipine    Carvedilol 12.5 mg TID.-per cardio  continue meds with holding parameters.    Anemia. likely 2/2 CKD. + Fe def  hb low,   on iv iron. s/p epo 10k subqx1  f/u w/GI    poc d/w pt  labs, chart reviewed  pl call for any q's  [phone] M  office [phone]  ans serv- [phone]  [email or web address] ( [facility] coverage for [facility])

## 2021-11-18 NOTE — PROGRESS NOTE ADULT - ASSESSMENT
66M with PMH of CKD (not on HD), hypothyroidism, pre-DM, HTN, HLD, s/p cholecystectomy c/b bile leak, recurrent admissions for anemia since 1/2021, initially found to have gastric nodules, ectasias, and 3 cm ascending colon polyp s/p polypectomy (1/2021), most recently admitted for VCE-confirmed small bowel bleeding attributed to ectasias s/p intra-op push enteroscopy (9/24/21) w/ SBR of distal/terminal ileum at site of suspected ectasia with post-op anastomotic ulcers seen on repeat VCE.   Now admitted with recurrent anemia and black stool concerning for small bowel bleeding.   Capsule endoscopy 11/16 revealed active bleeding  s/p Push enteroscopy with APC of angioectasia     Impression:  # Acute on chronic anemia due to recurrent slow small intestinal angioectasias and CKD  s/p one unit of PRBC on 11/15 for hgb 6.6 - now stable around 7.8    Recommendations:  - Start Octreotide 100 mg  bid subcutaneous   - Start Iron supplements   - Diet as tolerated   - Monitor hgb and transfuse as needed  - Will consider single balloon enteroscopy pending his response to Octreotide therapy and requirement for blood transfusion.     Traci Mcmahan MD  Gastroenterology Fellow  Pager: [phone]  Please call answering service [phone] / on-call GI fellow after 5pm and before 8am, and on weekends.

## 2021-11-18 NOTE — PROGRESS NOTE ADULT - SUBJECTIVE AND OBJECTIVE BOX
[facility] - S Chester / Agata S /D Rossana/ S Jordyn/ S Natacha/ Kirill Rod / M Pedrito/ O Dolores  service -[phone], office [phone]  ---------------------------------------------------------------------------------------------------------------    Patient seen and examined bedside    Subjective and Objective: No overnight events, sob resolved. No complaints today.     Allergies: No Known Allergies      Hospital Medications:   MEDICATIONS  (STANDING):  amLODIPine   Tablet 10 milliGRAM(s) Oral daily  atorvastatin 80 milliGRAM(s) Oral at bedtime  buMETAnide 1 milliGRAM(s) Oral two times a day  epoetin stephany-epbx (RETACRIT) Injectable 27044 Unit(s) SubCutaneous once  hydrALAZINE 100 milliGRAM(s) Oral three times a day  levothyroxine 50 MICROGram(s) Oral daily  pantoprazole  Injectable 40 milliGRAM(s) IV Push every 12 hours  sodium chloride 0.9% lock flush 3 milliLiter(s) IV Push every 8 hours    VITALS:  T(F): 98.3 (11-18-21 @ 16:47), Max: 98.8 (11-17-21 @ 23:16)  HR: 71 (11-18-21 @ 16:47)  BP: 140/51 (11-18-21 @ 16:47)  RR: 18 (11-18-21 @ 16:47)  SpO2: 97% (11-18-21 @ 16:47)  Wt(kg): --    11-17 @ 07:01  -  11-18 @ 07:00  --------------------------------------------------------  IN: 0 mL / OUT: 2595 mL / NET: -2595 mL    11-18 @ 07:01  -  11-18 @ 19:13  --------------------------------------------------------  IN: 300 mL / OUT: 1200 mL / NET: -900 mL      PHYSICAL EXAM:  Constitutional: NAD  HEENT: anicteric sclera  Neck: No JVD  Respiratory: CTAB, no wheezes, rales or rhonchi  Cardiovascular: S1, S2, RRR  Gastrointestinal: BS+, soft, NT/ND  Extremities: No peripheral edema  Neurological: A/O x 3  Psychiatric: Normal mood, normal affect  : No tang.     LABS:  11-18    141  |  103  |  77<H>  ----------------------------<  75  3.6   |  25  |  3.48<H>    Ca    9.1      18 Nov 2021 08:09  Phos  4.0     11-18  Mg     2.00     11-18      Creatinine Trend: 3.48 <--, 3.59 <--, 3.60 <--, 3.84 <--, 3.92 <--, 3.87 <--, 4.53 <--, 4.93 <--, 5.38 <--, 5.44 <--                        7.8    4.96  )-----------( 142      ( 18 Nov 2021 08:09 )             24.0     Urine Studies:    Creatinine, Random Urine: 70 mg/dL (11-12 @ 19:56)  Creatinine, Random Urine: 70 mg/dL (11-12 @ 15:58)  Sodium, Random Urine: 63 mmol/L (11-12 @ 15:58)      RADIOLOGY & ADDITIONAL STUDIES:

## 2021-11-18 NOTE — PROGRESS NOTE ADULT - PROBLEM SELECTOR PLAN 4
[place] 14[address]  Echo from October 28/21 showing EF of 60-65% with Grade II left ventricular diastolic dysfunction  Mild acute diastolic CHF-improving  Change bumex to po 1mg bid  On lasix at home-40mg daily  Apprec Cards recs-suspect renal etiology  Not on ACE-I/ARB due to CLOTILDE  Holding home dose of coreg for now-resume as tolerated

## 2021-11-18 NOTE — PROGRESS NOTE ADULT - SUBJECTIVE AND OBJECTIVE BOX
Dr. Sumaya Nascimento  Pager [phone]    PROGRESS NOTE:     Patient is a 66y old  Male who presents with a chief complaint of Chest pain (18 Nov 2021 09:43)      SUBJECTIVE / OVERNIGHT EVENTS: pt denies chest pain or sob  ADDITIONAL REVIEW OF SYSTEMS: for enteroscopy today    MEDICATIONS  (STANDING):  amLODIPine   Tablet 10 milliGRAM(s) Oral daily  atorvastatin 80 milliGRAM(s) Oral at bedtime  buMETAnide 1 milliGRAM(s) Oral two times a day  epoetin stephany-epbx (RETACRIT) Injectable 90756 Unit(s) SubCutaneous once  hydrALAZINE 100 milliGRAM(s) Oral three times a day  levothyroxine 50 MICROGram(s) Oral daily  pantoprazole  Injectable 40 milliGRAM(s) IV Push every 12 hours  sodium chloride 0.9% lock flush 3 milliLiter(s) IV Push every 8 hours    MEDICATIONS  (PRN):      CAPILLARY BLOOD GLUCOSE        I&O's Summary    17 Nov 2021 07:01  -  18 Nov 2021 07:00  --------------------------------------------------------  IN: 0 mL / OUT: 2595 mL / NET: -2595 mL    18 Nov 2021 07:01  -  18 Nov 2021 12:18  --------------------------------------------------------  IN: 0 mL / OUT: 600 mL / NET: -600 mL        PHYSICAL EXAM:  Vital Signs Last 24 Hrs  T(C): 36.8 (18 Nov 2021 12:00), Max: 37.1 (17 Nov 2021 23:16)  T(F): 98.3 (18 Nov 2021 12:00), Max: 98.8 (17 Nov 2021 23:16)  HR: 71 (18 Nov 2021 12:00) (66 - 75)  BP: 150/60 (18 Nov 2021 12:00) (122/41 - 156/62)  BP(mean): --  RR: 18 (18 Nov 2021 08:00) (10 - 18)  SpO2: 97% (18 Nov 2021 08:00) (95% - 99%)  CONSTITUTIONAL: NAD, well-developed  RESPIRATORY: Normal respiratory effort; lungs are clear to auscultation bilaterally  CARDIOVASCULAR: Regular rate and rhythm, normal S1 and S2, no murmur/rub/gallop; No lower extremity edema; Peripheral pulses are 2+ bilaterally  ABDOMEN: Nontender to palpation, normoactive bowel sounds, no rebound/guarding; No hepatosplenomegaly  MUSCULOSKELETAL: no clubbing or cyanosis of digits; no joint swelling or tenderness to palpation  PSYCH: A+O to person, place, and time; affect appropriate    LABS:                        7.8    4.96  )-----------( 142      ( 18 Nov 2021 08:09 )             24.0     11-18    141  |  103  |  77<H>  ----------------------------<  75  3.6   |  25  |  3.48<H>    Ca    9.1      18 Nov 2021 08:09  Phos  4.0     11-18  Mg     2.00     11-18                  RADIOLOGY & ADDITIONAL TESTS:  Results Reviewed:   Imaging Personally Reviewed:  Electrocardiogram Personally Reviewed:    COORDINATION OF CARE:  Care Discussed with Consultants/Other Providers [Y/N]:  Prior or Outpatient Records Reviewed [Y/N]:   Dr. Sumaya Nascimento  Pager [phone]    PROGRESS NOTE:     Patient is a 66y old  Male who presents with a chief complaint of Chest pain (18 Nov 2021 09:43)      SUBJECTIVE / OVERNIGHT EVENTS: pt denies chest pain or sob  ADDITIONAL REVIEW OF SYSTEMS: for enteroscopy today    MEDICATIONS  (STANDING):  amLODIPine   Tablet 10 milliGRAM(s) Oral daily  atorvastatin 80 milliGRAM(s) Oral at bedtime  buMETAnide 1 milliGRAM(s) Oral two times a day  epoetin stephany-epbx (RETACRIT) Injectable 21827 Unit(s) SubCutaneous once  hydrALAZINE 100 milliGRAM(s) Oral three times a day  levothyroxine 50 MICROGram(s) Oral daily  pantoprazole  Injectable 40 milliGRAM(s) IV Push every 12 hours  sodium chloride 0.9% lock flush 3 milliLiter(s) IV Push every 8 hours    MEDICATIONS  (PRN):      CAPILLARY BLOOD GLUCOSE        I&O's Summary    17 Nov 2021 07:01  -  18 Nov 2021 07:00  --------------------------------------------------------  IN: 0 mL / OUT: 2595 mL / NET: -2595 mL    18 Nov 2021 07:01  -  18 Nov 2021 12:18  --------------------------------------------------------  IN: 0 mL / OUT: 600 mL / NET: -600 mL        PHYSICAL EXAM:  Vital Signs Last 24 Hrs  T(C): 36.8 (18 Nov 2021 12:00), Max: 37.1 (17 Nov 2021 23:16)  T(F): 98.3 (18 Nov 2021 12:00), Max: 98.8 (17 Nov 2021 23:16)  HR: 71 (18 Nov 2021 12:00) (66 - 75)  BP: 150/60 (18 Nov 2021 12:00) (122/41 - 156/62)  BP(mean): --  RR: 18 (18 Nov 2021 08:00) (10 - 18)  SpO2: 97% (18 Nov 2021 08:00) (95% - 99%)  CONSTITUTIONAL: NAD, well-developed  RESPIRATORY: Normal respiratory effort; lungs are clear to auscultation bilaterally  CARDIOVASCULAR: Regular rate and rhythm, normal S1 and S2, no murmur/rub/gallop; No lower extremity edema; Peripheral pulses are 2+ bilaterally  ABDOMEN: Nontender to palpation, normoactive bowel sounds, no rebound/guarding; No hepatosplenomegaly  MUSCULOSKELETAL: no clubbing or cyanosis of digits; no joint swelling or tenderness to palpation  PSYCH: A+O to person, place, and time; affect appropriate    LABS:                        7.8    4.96  )-----------( 142      ( 18 Nov 2021 08:09 )             24.0     11-18    141  |  103  |  77<H>  ----------------------------<  75  3.6   |  25  |  3.48<H>    Ca    9.1      18 Nov 2021 08:09  Phos  4.0     11-18  Mg     2.00     11-18            RADIOLOGY & ADDITIONAL TESTS:  Results Reviewed:   Imaging Personally Reviewed:  < from: Enteroscopy (11.17.21 @ 14:27) >  Impression:          - Normal esophagus.                       - Normal stomach.                       - Pseudomelanosis.                       - A single non-bleeding angioectasia in the duodenum.                        Treated with argon plasma coagulation (APC).                       - A single non-bleeding angioectasia in the jejunum.                        Treated with argon plasma coagulation (APC).                       - Distal extent reached tattooed.   - No specimens collected.  Recommendation:      - Clear liquid diet.                       - NPO at midnight.                       - Tentatively plan for single balloon enteroscopy by                        advanced endoscopy tomorrow if schedule can accomodate.                       - Please inform GI if patient becomes HD unstable.                       - Daily PPI.      Electrocardiogram Personally Reviewed:    COORDINATION OF CARE:  Care Discussed with Consultants/Other Providers [Y/N]:  Prior or Outpatient Records Reviewed [Y/N]:

## 2021-11-18 NOTE — PROGRESS NOTE ADULT - PROBLEM SELECTOR PLAN 1
s/p 1 unit prbc on 11/15  -GI consulted, s/p VCE 11/16, revealed active bleeding in small bowel, unable to clearly identify anastomotic site  -IV protonix  -plan for single balloon enteroscopy today  -enteroscopy 11/17 revealed AVM's treated with APC  -trend CBC and transfuse prn to keep hgb>7  -pt has h/o small bowel GIB d/t AVM s/p small bowel resection, had multiple admissions for GIB, s/p intraop surgically assisted enteroscopy (with Dr. Marlow) was performed to the distal ileum. A single AVM was identified that was c/w what was noted on VCE. This area was marked for resection by the surgeon and small bowel resection was performed. Upon removal of the scope there was a possible second very small lesion vs trauma in the small bowel (not intervened).    Also possible anastomotic ulcers seen on ?capsule endoscopy after SBR (9/24/21) s/p 1 unit prbc on 11/15  -GI consulted, s/p VCE 11/16, revealed active bleeding in small bowel, unable to clearly identify anastomotic site  -IV protonix  -plan for single balloon enteroscopy today  -s/p enteroscopy 11/17 revealed AVM's treated with APC  Pseudomelanosis. A single non-bleeding angioectasia in the duodenum.   Treated with argon plasma coagulation (APC). A single non-bleeding angioectasia in the jejunum.   Treated with APC  -trend CBC and transfuse prn to keep hgb>7  -pt has h/o small bowel GIB d/t AVM s/p partial small bowel resection, had multiple admissions for GIB, s/p intraop surgically assisted enteroscopy (with Dr. Marlow) was performed to the distal ileum. A single AVM was identified that was c/w what was noted on VCE. This area was marked for resection by the surgeon and small bowel resection was performed. Upon removal of the scope there was a possible second very small lesion vs trauma in the small bowel (not intervened).    Also possible anastomotic ulcers seen on ?capsule endoscopy after partial SBR (9/24/21)

## 2021-11-18 NOTE — PROGRESS NOTE ADULT - ASSESSMENT
67 y/o M with PMH of Hypothyroidism,, pre-diabetes, HTN, HLD, CKD not on dialysis, Anemia, small bowel resection 9/13/21 secondary to AVM presents to ED after episode of chest pain associated with shortness of breath. Patient admitted for chest pain, CLOTILDE on CKD and CHF. Developed GIB, abnormal VCE

## 2021-11-18 NOTE — PROGRESS NOTE ADULT - SUBJECTIVE AND OBJECTIVE BOX
Interval Events:   s/p Enteroscopy with APC of small bowel [facility] Medications:  amLODIPine   Tablet 10 milliGRAM(s) Oral daily  atorvastatin 80 milliGRAM(s) Oral at bedtime  buMETAnide 1 milliGRAM(s) Oral two times a day  epoetin stephany-epbx (RETACRIT) Injectable 39250 Unit(s) SubCutaneous once  hydrALAZINE 100 milliGRAM(s) Oral three times a day  levothyroxine 50 MICROGram(s) Oral daily  pantoprazole  Injectable 40 milliGRAM(s) IV Push every 12 hours  sodium chloride 0.9% lock flush 3 milliLiter(s) IV Push every 8 hours        ROS:   General:  No fevers, chills or night sweats  ENT:  No sore throat or dysphagia  CV:  No pain or palpitations  Resp:  No dyspnea, cough or  wheezing  GI:  as above  Skin:  No rash or edema  Neuro: no weakness   Hematologic: no bleeding  Musculoskeletal: no muscle pain or join pain  Psych: no agitation      PHYSICAL EXAM:   Vital Signs:  Vital Signs Last 24 Hrs  T(C): 37.1 (18 Nov 2021 08:00), Max: 37.1 (17 Nov 2021 23:16)  T(F): 98.7 (18 Nov 2021 08:00), Max: 98.8 (17 Nov 2021 23:16)  HR: 69 (18 Nov 2021 08:00) (66 - 75)  BP: 156/62 (18 Nov 2021 08:00) (122/41 - 156/62)  BP(mean): --  RR: 18 (18 Nov 2021 08:00) (10 - 18)  SpO2: 97% (18 Nov 2021 08:00) (95% - 99%)  Daily Height in cm: 170 (17 Nov 2021 13:12)    Daily     GENERAL:  NAD, Appears stated age  HEENT:  NC/AT,  conjunctivae clear and pink, sclera -anicteric  CHEST:  Normal Effort, Breath sounds clear  HEART:  RRR, S1 + S2, no murmurs  ABDOMEN:  Soft, non-tender, non-distended, normoactive bowel sounds,  no masses  EXTREMITIES:  no cyanosis or edema  SKIN:  Warm & Dry. No rash or erythema  NEURO:  Alert, oriented, no focal deficit    LABS:                        7.8    4.96  )-----------( 142      ( 18 Nov 2021 08:09 )             24.0     Mean Cell Volume: 81.4 fL (11-18-21 @ 08:09)    11-18    141  |  103  |  77<H>  ----------------------------<  75  3.6   |  25  |  3.48<H>    Ca    9.1      18 Nov 2021 08:09  Phos  4.0     11-18  Mg     2.00     11-18                                    7.8    4.96  )-----------( 142      ( 18 Nov 2021 08:09 )             24.0                         7.7    4.28  )-----------( 142      ( 17 Nov 2021 07:26 )             24.8                         7.7    4.14  )-----------( 142      ( 16 Nov 2021 21:43 )             24.6                         7.5    4.09  )-----------( 137      ( 16 Nov 2021 07:27 )             24.1                         7.8    5.46  )-----------( 147      ( 15 Nov 2021 20:06 )             25.5       Imaging:  < from: Enteroscopy (11.17.21 @ 14:27) >  Findings:       The esophagus was grossly normal.       The stomach was grossly normal.       Diffuse mild mucosal changes characterized by discoloration were found        in the duodenal bulb, in the first portion of the duodenum and in the        second portion of the duodenum suggestive of pseudomelanosis.       A single diminutive angioectasia with no bleeding was found in the        distal duodenum. Coagulation for bleeding prevention using argon plasma        at 0.5 liters/minute and 20 godwin was successful.       A single diminutive angioectasia with no bleeding was found in the        proximal jejunum. Coagulation for hemostasis using argon plasma at 0.5        liters/minute and 20 godwin was successful.       The most distal area tattooed with an injection of Spot (carbon black).                  Impression:          - Normal esophagus.                       - Normal stomach.                       - Pseudomelanosis.                       - A single non-bleeding angioectasia in the duodenum.                        Treated with argon plasma coagulation (APC).                       - A single non-bleeding angioectasia in the jejunum.                        Treated with argon plasma coagulation (APC).                       - Distal extent reached tattooed.   - No specimens collected.  Recommendation:      - Clear liquid diet.                       - NPO at midnight.                       - Tentatively plan for single balloon enteroscopy by                        advanced endoscopy tomorrow if schedule can accomodate.                       - Please inform GI if patient becomes HD unstable.                       - Daily PPI.    < end of copied text >

## 2021-11-19 LAB
ANION GAP SERPL CALC-SCNC: 11 MMOL/L — SIGNIFICANT CHANGE UP (ref 7–14)
ANION GAP SERPL CALC-SCNC: 14 MMOL/L — SIGNIFICANT CHANGE UP (ref 7–14)
BUN SERPL-MCNC: 18 MG/DL — SIGNIFICANT CHANGE UP (ref 7–23)
BUN SERPL-MCNC: 70 MG/DL — HIGH (ref 7–23)
CALCIUM SERPL-MCNC: 9 MG/DL — SIGNIFICANT CHANGE UP (ref 8.4–10.5)
CALCIUM SERPL-MCNC: 9.4 MG/DL — SIGNIFICANT CHANGE UP (ref 8.4–10.5)
CHLORIDE SERPL-SCNC: 101 MMOL/L — SIGNIFICANT CHANGE UP (ref 98–107)
CHLORIDE SERPL-SCNC: 105 MMOL/L — SIGNIFICANT CHANGE UP (ref 98–107)
CO2 SERPL-SCNC: 21 MMOL/L — LOW (ref 22–31)
CO2 SERPL-SCNC: 26 MMOL/L — SIGNIFICANT CHANGE UP (ref 22–31)
CREAT SERPL-MCNC: 1.16 MG/DL — SIGNIFICANT CHANGE UP (ref 0.5–1.3)
CREAT SERPL-MCNC: 3.31 MG/DL — HIGH (ref 0.5–1.3)
GLUCOSE SERPL-MCNC: 123 MG/DL — HIGH (ref 70–99)
GLUCOSE SERPL-MCNC: 141 MG/DL — HIGH (ref 70–99)
HCT VFR BLD CALC: 27.6 % — LOW (ref 39–50)
HCT VFR BLD CALC: 43.9 % — SIGNIFICANT CHANGE UP (ref 39–50)
HGB BLD-MCNC: 16.1 G/DL — SIGNIFICANT CHANGE UP (ref 13–17)
HGB BLD-MCNC: 8.8 G/DL — LOW (ref 13–17)
MAGNESIUM SERPL-MCNC: 1.9 MG/DL — SIGNIFICANT CHANGE UP (ref 1.6–2.6)
MAGNESIUM SERPL-MCNC: 2.2 MG/DL — SIGNIFICANT CHANGE UP (ref 1.6–2.6)
MCHC RBC-ENTMCNC: 26 PG — LOW (ref 27–34)
MCHC RBC-ENTMCNC: 31.9 GM/DL — LOW (ref 32–36)
MCHC RBC-ENTMCNC: 33.8 PG — SIGNIFICANT CHANGE UP (ref 27–34)
MCHC RBC-ENTMCNC: 36.7 GM/DL — HIGH (ref 32–36)
MCV RBC AUTO: 81.4 FL — SIGNIFICANT CHANGE UP (ref 80–100)
MCV RBC AUTO: 92.2 FL — SIGNIFICANT CHANGE UP (ref 80–100)
NRBC # BLD: 0 /100 WBCS — SIGNIFICANT CHANGE UP
NRBC # BLD: 0 /100 WBCS — SIGNIFICANT CHANGE UP
NRBC # FLD: 0 K/UL — SIGNIFICANT CHANGE UP
NRBC # FLD: 0 K/UL — SIGNIFICANT CHANGE UP
PHOSPHATE SERPL-MCNC: 1.9 MG/DL — LOW (ref 2.5–4.5)
PHOSPHATE SERPL-MCNC: 3.7 MG/DL — SIGNIFICANT CHANGE UP (ref 2.5–4.5)
PLATELET # BLD AUTO: 109 K/UL — LOW (ref 150–400)
PLATELET # BLD AUTO: 173 K/UL — SIGNIFICANT CHANGE UP (ref 150–400)
POTASSIUM SERPL-MCNC: 3.5 MMOL/L — SIGNIFICANT CHANGE UP (ref 3.5–5.3)
POTASSIUM SERPL-MCNC: 4.2 MMOL/L — SIGNIFICANT CHANGE UP (ref 3.5–5.3)
POTASSIUM SERPL-SCNC: 3.5 MMOL/L — SIGNIFICANT CHANGE UP (ref 3.5–5.3)
POTASSIUM SERPL-SCNC: 4.2 MMOL/L — SIGNIFICANT CHANGE UP (ref 3.5–5.3)
RBC # BLD: 3.39 M/UL — LOW (ref 4.2–5.8)
RBC # BLD: 4.76 M/UL — SIGNIFICANT CHANGE UP (ref 4.2–5.8)
RBC # FLD: 11.9 % — SIGNIFICANT CHANGE UP (ref 10.3–14.5)
RBC # FLD: 19.2 % — HIGH (ref 10.3–14.5)
SODIUM SERPL-SCNC: 137 MMOL/L — SIGNIFICANT CHANGE UP (ref 135–145)
SODIUM SERPL-SCNC: 141 MMOL/L — SIGNIFICANT CHANGE UP (ref 135–145)
WBC # BLD: 4.25 K/UL — SIGNIFICANT CHANGE UP (ref 3.8–10.5)
WBC # BLD: 6.32 K/UL — SIGNIFICANT CHANGE UP (ref 3.8–10.5)
WBC # FLD AUTO: 4.25 K/UL — SIGNIFICANT CHANGE UP (ref 3.8–10.5)
WBC # FLD AUTO: 6.32 K/UL — SIGNIFICANT CHANGE UP (ref 3.8–10.5)

## 2021-11-19 PROCEDURE — 99233 SBSQ HOSP IP/OBS HIGH 50: CPT

## 2021-11-19 PROCEDURE — 99232 SBSQ HOSP IP/OBS MODERATE 35: CPT | Mod: GC

## 2021-11-19 RX ORDER — OCTREOTIDE ACETATE 200 UG/ML
100 INJECTION, SOLUTION INTRAVENOUS; SUBCUTANEOUS
Refills: 0 | Status: DISCONTINUED | OUTPATIENT
Start: 2021-11-19 | End: 2021-11-23

## 2021-11-19 RX ORDER — SODIUM,POTASSIUM PHOSPHATES 278-250MG
1 POWDER IN PACKET (EA) ORAL
Refills: 0 | Status: DISCONTINUED | OUTPATIENT
Start: 2021-11-19 | End: 2021-11-19

## 2021-11-19 RX ORDER — FERROUS SULFATE 325(65) MG
325 TABLET ORAL
Refills: 0 | Status: DISCONTINUED | OUTPATIENT
Start: 2021-11-19 | End: 2021-11-21

## 2021-11-19 RX ORDER — FERROUS SULFATE 325(65) MG
325 TABLET ORAL DAILY
Refills: 0 | Status: DISCONTINUED | OUTPATIENT
Start: 2021-11-19 | End: 2021-11-19

## 2021-11-19 RX ADMIN — PANTOPRAZOLE SODIUM 40 MILLIGRAM(S): 20 TABLET, DELAYED RELEASE ORAL at 06:00

## 2021-11-19 RX ADMIN — Medication 50 MICROGRAM(S): at 05:57

## 2021-11-19 RX ADMIN — BUMETANIDE 1 MILLIGRAM(S): 0.25 INJECTION INTRAMUSCULAR; INTRAVENOUS at 05:57

## 2021-11-19 RX ADMIN — SODIUM CHLORIDE 3 MILLILITER(S): 9 INJECTION INTRAMUSCULAR; INTRAVENOUS; SUBCUTANEOUS at 21:47

## 2021-11-19 RX ADMIN — Medication 100 MILLIGRAM(S): at 13:57

## 2021-11-19 RX ADMIN — Medication 325 MILLIGRAM(S): at 17:41

## 2021-11-19 RX ADMIN — OCTREOTIDE ACETATE 100 MICROGRAM(S): 200 INJECTION, SOLUTION INTRAVENOUS; SUBCUTANEOUS at 17:41

## 2021-11-19 RX ADMIN — Medication 100 MILLIGRAM(S): at 22:39

## 2021-11-19 RX ADMIN — ATORVASTATIN CALCIUM 80 MILLIGRAM(S): 80 TABLET, FILM COATED ORAL at 22:39

## 2021-11-19 RX ADMIN — Medication 100 MILLIGRAM(S): at 05:58

## 2021-11-19 RX ADMIN — AMLODIPINE BESYLATE 10 MILLIGRAM(S): 2.5 TABLET ORAL at 05:57

## 2021-11-19 RX ADMIN — SODIUM CHLORIDE 3 MILLILITER(S): 9 INJECTION INTRAMUSCULAR; INTRAVENOUS; SUBCUTANEOUS at 08:54

## 2021-11-19 RX ADMIN — PANTOPRAZOLE SODIUM 40 MILLIGRAM(S): 20 TABLET, DELAYED RELEASE ORAL at 17:41

## 2021-11-19 RX ADMIN — BUMETANIDE 1 MILLIGRAM(S): 0.25 INJECTION INTRAMUSCULAR; INTRAVENOUS at 17:41

## 2021-11-19 RX ADMIN — SODIUM CHLORIDE 3 MILLILITER(S): 9 INJECTION INTRAMUSCULAR; INTRAVENOUS; SUBCUTANEOUS at 13:57

## 2021-11-19 NOTE — PROGRESS NOTE ADULT - SUBJECTIVE AND OBJECTIVE BOX
[facility] - S [place] / Agata S /D Rossana/ ONEL Cobb/ ONEL Manzano/ Kirill Rod / GIRISH Stahlu/ O Dolores  service [phone], office [phone]  ---------------------------------------------------------------------------------------------------------------    Patient seen and examined bedside    Subjective and Objective: No overnight events, sob resolved. No complaints today. feeling better    Allergies: No Known Allergies      Hospital Medications:   MEDICATIONS  (STANDING):  amLODIPine   Tablet 10 milliGRAM(s) Oral daily  atorvastatin 80 milliGRAM(s) Oral at bedtime  buMETAnide 1 milliGRAM(s) Oral two times a day  epoetin stephany-epbx (RETACRIT) Injectable 56254 Unit(s) SubCutaneous once  ferrous    sulfate 325 milliGRAM(s) Oral two times a day  hydrALAZINE 100 milliGRAM(s) Oral three times a day  levothyroxine 50 MICROGram(s) Oral daily  octreotide  Injectable 100 MICROGram(s) SubCutaneous two times a day  pantoprazole  Injectable 40 milliGRAM(s) IV Push every 12 hours  sodium chloride 0.9% lock flush 3 milliLiter(s) IV Push every 8 hours      REVIEW OF SYSTEMS:  CONSTITUTIONAL: No weakness, fevers or chills  EYES/ENT: No visual changes;  No vertigo or throat pain   NECK: No pain or stiffness  RESPIRATORY: No cough, wheezing, hemoptysis; No shortness of breath  CARDIOVASCULAR: No chest pain or palpitations.  GASTROINTESTINAL: No abdominal or epigastric pain. No nausea, vomiting, or hematemesis; No diarrhea or constipation. No melena or hematochezia.  GENITOURINARY: No dysuria, frequency, foamy urine, urinary urgency, incontinence or hematuria  NEUROLOGICAL: No numbness or weakness  SKIN: No itching, burning, rashes, or lesions   VASCULAR: No bilateral lower extremity edema.   All other review of systems is negative unless indicated above.    VITALS:  T(F): 98.4 (11-19-21 @ 13:57), Max: 98.4 (11-19-21 @ 13:57)  HR: 77 (11-19-21 @ 13:57)  BP: 122/79 (11-19-21 @ 13:57)  RR: 16 (11-19-21 @ 13:57)  SpO2: 100% (11-19-21 @ 13:57)  Wt(kg): --    11-18 @ 07:01  -  11-19 @ 07:00  --------------------------------------------------------  IN: 300 mL / OUT: 2250 mL / NET: -1950 mL          PHYSICAL EXAM:  Constitutional: NAD  HEENT: anicteric sclera, oropharynx clear  Neck: No JVD  Respiratory: CTAB, no wheezes, rales or rhonchi  Cardiovascular: S1, S2, RRR  Gastrointestinal: BS+, soft, NT/ND  Extremities: No cyanosis or clubbing. No peripheral edema  Neurological: A/O x 3, no focal deficits  Psychiatric: Normal mood, normal affect  : No CVA tenderness. No tang.   Skin: No rashes  Vascular Access:    LABS:  11-19    141  |  101  |  70<H>  ----------------------------<  141<H>  3.5   |  26  |  3.31<H>    Ca    9.4      19 Nov 2021 10:50  Phos  3.7     11-19  Mg     1.90     11-19      Creatinine Trend: 3.31 <--, 1.16 <--, 3.48 <--, 3.59 <--, 3.60 <--, 3.84 <--, 3.92 <--, 3.87 <--, 4.53 <--, 4.93 <--, 5.38 <--                        8.8    4.25  )-----------( 173      ( 19 Nov 2021 10:50 )             27.6     Urine Studies:    Creatinine, Random Urine: 70 mg/dL (11-12 @ 19:56)  Creatinine, Random Urine: 70 mg/dL (11-12 @ 15:58)  Sodium, Random Urine: 63 mmol/L (11-12 @ 15:58)      RADIOLOGY & ADDITIONAL STUDIES:   [facility] - S Chester / Agata S /D Rossana/ S Jordyn/ S Natacha/ Kirill Rod / M Pedrito/ O Dolores  service [phone], office [phone]  ---------------------------------------------------------------------------------------------------------------    Patient seen and examined bedside    Subjective and Objective: No overnight events, sob resolved. No complaints today. feeling better    Allergies: No Known Allergies      Hospital Medications:   MEDICATIONS  (STANDING):  amLODIPine   Tablet 10 milliGRAM(s) Oral daily  atorvastatin 80 milliGRAM(s) Oral at bedtime  buMETAnide 1 milliGRAM(s) Oral two times a day  epoetin stephany-epbx (RETACRIT) Injectable 60377 Unit(s) SubCutaneous once  ferrous    sulfate 325 milliGRAM(s) Oral two times a day  hydrALAZINE 100 milliGRAM(s) Oral three times a day  levothyroxine 50 MICROGram(s) Oral daily  octreotide  Injectable 100 MICROGram(s) SubCutaneous two times a day  pantoprazole  Injectable 40 milliGRAM(s) IV Push every 12 hours  sodium chloride 0.9% lock flush 3 milliLiter(s) IV Push every 8 hours    VITALS:  T(F): 98.4 (11-19-21 @ 13:57), Max: 98.4 (11-19-21 @ 13:57)  HR: 77 (11-19-21 @ 13:57)  BP: 122/79 (11-19-21 @ 13:57)  RR: 16 (11-19-21 @ 13:57)  SpO2: 100% (11-19-21 @ 13:57)  Wt(kg): --    11-18 @ 07:01  -  11-19 @ 07:00  --------------------------------------------------------  IN: 300 mL / OUT: 2250 mL / NET: -1950 mL      PHYSICAL EXAM:  Constitutional: NAD  HEENT: anicteric sclera  Neck: No JVD  Respiratory: CTAB, no wheezes, rales or rhonchi  Cardiovascular: S1, S2, RRR  Gastrointestinal: BS+, soft, NT/ND  Extremities: No peripheral edema  Neurological: A/O x 3  Psychiatric: Normal mood, normal affect  : No tang.     LABS:  11-19    141  |  101  |  70<H>  ----------------------------<  141<H>  3.5   |  26  |  3.31<H>    Ca    9.4      19 Nov 2021 10:50  Phos  3.7     11-19  Mg     1.90     11-19      Creatinine Trend: 3.31 <--, 1.16 <--, 3.48 <--, 3.59 <--, 3.60 <--, 3.84 <--, 3.92 <--, 3.87 <--, 4.53 <--, 4.93 <--, 5.38 <--                        8.8    4.25  )-----------( 173      ( 19 Nov 2021 10:50 )             27.6     Urine Studies:    Creatinine, Random Urine: 70 mg/dL (11-12 @ 19:56)  Creatinine, Random Urine: 70 mg/dL (11-12 @ 15:58)  Sodium, Random Urine: 63 mmol/L (11-12 @ 15:58)      RADIOLOGY & ADDITIONAL STUDIES:

## 2021-11-19 NOTE — PROGRESS NOTE ADULT - PROBLEM SELECTOR PLAN 1
s/p 1 unit prbc on 11/15  -GI consulted, s/p VCE 11/16, revealed active bleeding in small bowel, unable to clearly identify anastomotic site  -IV protonix  -s/p enteroscopy 11/17 revealed AVM's treated with APC  Pseudomelanosis. A single non-bleeding angioectasia in the duodenum.   Treated with argon plasma coagulation (APC). A single non-bleeding angioectasia in the jejunum.   Treated with APC  -started on octreotide 100 mg bid sc per renal, po iron, trend h/h, per GI will consider single balloon enteroscopy on Monday if hgb drops  -trend CBC and transfuse prn to keep hgb>7  -pt has h/o small bowel GIB d/t AVM s/p partial small bowel resection, had multiple admissions for GIB, s/p intraop surgically assisted enteroscopy (with Dr. Marlow) was performed to the distal ileum. A single AVM was identified that was c/w what was noted on VCE. This area was marked for resection by the surgeon and small bowel resection was performed. Upon removal of the scope there was a possible second very small lesion vs trauma in the small bowel (not intervened).    Also possible anastomotic ulcers seen on ?capsule endoscopy after partial SBR (9/24/21)

## 2021-11-19 NOTE — PROGRESS NOTE ADULT - SUBJECTIVE AND OBJECTIVE BOX
Interval Events:   awaiting CBC from this morning   No abdominal pain  No melena / bloody bm     Hospital Medications:  amLODIPine   Tablet 10 milliGRAM(s) Oral daily  atorvastatin 80 milliGRAM(s) Oral at bedtime  buMETAnide 1 milliGRAM(s) Oral two times a day  epoetin stephany-epbx (RETACRIT) Injectable 89578 Unit(s) SubCutaneous once  ferrous    sulfate 325 milliGRAM(s) Oral two times a day  hydrALAZINE 100 milliGRAM(s) Oral three times a day  levothyroxine 50 MICROGram(s) Oral daily  octreotide  Injectable 100 MICROGram(s) SubCutaneous two times a day  pantoprazole  Injectable 40 milliGRAM(s) IV Push every 12 hours  sodium chloride 0.9% lock flush 3 milliLiter(s) IV Push every 8 hours        ROS:   General:  No fevers, chills or night sweats  ENT:  No sore throat or dysphagia  CV:  No pain or palpitations  Resp:  No dyspnea, cough or  wheezing  GI:  as above  Skin:  No rash or edema  Neuro: no weakness   Hematologic: no bleeding  Musculoskeletal: no muscle pain or join pain  Psych: no agitation      PHYSICAL EXAM:   Vital Signs:  Vital Signs Last 24 Hrs  T(C): 36.8 (19 Nov 2021 09:28), Max: 36.8 (18 Nov 2021 12:00)  T(F): 98.2 (19 Nov 2021 09:28), Max: 98.3 (18 Nov 2021 12:00)  HR: 78 (19 Nov 2021 09:28) (71 - 78)  BP: 145/60 (19 Nov 2021 09:28) (129/57 - 157/62)  BP(mean): --  RR: 17 (19 Nov 2021 09:28) (17 - 18)  SpO2: 99% (19 Nov 2021 09:28) (97% - 100%)  Daily     Daily     GENERAL:  NAD, Appears stated age  HEENT:  NC/AT,  conjunctivae clear and pink, sclera -anicteric  CHEST:  Normal Effort, Breath sounds clear  HEART:  RRR, S1 + S2, no murmurs  ABDOMEN:  Soft, non-tender, non-distended, normoactive bowel sounds,  no masses  EXTREMITIES:  no cyanosis or edema  SKIN:  Warm & Dry. No rash or erythema  NEURO:  Alert, oriented, no focal deficit    LABS:                        16.1   6.32  )-----------( 109      ( 19 Nov 2021 07:31 )             43.9     Mean Cell Volume: 92.2 fL (11-19-21 @ 07:31)    11-19    137  |  105  |  18  ----------------------------<  123<H>  4.2   |  21<L>  |  1.16    Ca    9.0      19 Nov 2021 07:31  Phos  1.9     11-19  Mg     2.20     11-19                                    16.1   6.32  )-----------( 109      ( 19 Nov 2021 07:31 )             43.9                         7.8    4.96  )-----------( 142      ( 18 Nov 2021 08:09 )             24.0                         7.7    4.28  )-----------( 142      ( 17 Nov 2021 07:26 )             24.8                         7.7    4.14  )-----------( 142      ( 16 Nov 2021 21:43 )             24.6       Imaging:

## 2021-11-19 NOTE — PROGRESS NOTE ADULT - ASSESSMENT
MOLLY ANNA is a 66y Male who presents with a chief complaint of chest pain.    Anemia  - Patient with history of recurrent melena and requirements of blood transfusions over the past year.  - He is supposed to have further workup with colorectal surgery at [facility].  - Patient underwent workup by gastroenterology; s/p enteroscopy that showed AV malformations in the small intestine and treated with argon plasma coagulation.   - Continue to monitor CBC closely. Transfuse to hemoglobin goal of 7.     Otherwise patient is being managed for chest pain, congestive heart failure, and acute kidney injury with chronic kidney disease. Cardiology and nephrology have been consulted.    We will continue to follow.    Tone Quinn MD  Hematology/Oncology  C: [phone]  O: [phone]

## 2021-11-19 NOTE — PROGRESS NOTE ADULT - SUBJECTIVE AND OBJECTIVE BOX
CHIEF COMPLAINT  GI Bleed    HISTORY OF PRESENT ILLNESS  MOLLY ANAN is a 66y Male who presents with a chief complaint of GI Bleed.    No acute events. No complaints.     REVIEW OF SYSTEMS  A complete review of systems was performed; negative except per HPI    PHYSICAL EXAM  T(C): 36.8 (11-19-21 @ 09:28), Max: 36.8 (11-18-21 @ 12:00)  HR: 78 (11-19-21 @ 09:28) (71 - 78)  BP: 145/60 (11-19-21 @ 09:28) (129/57 - 157/62)  RR: 17 (11-19-21 @ 09:28) (17 - 18)  SpO2: 99% (11-19-21 @ 09:28) (97% - 100%)  Constitutional: alert, awake, in no acute distress  Eyes: PERRL, EOMI  HEENT: normocephalic, atraumatic  Neck: supple, non-tender  Cardiovascular: normal perfusion, no peripheral edema  Respiratory: normal respiratory efforts; no increased use of accessory muscles  Gastrointestinal: soft, non-tender  Musculoskeletal: normal range of motion, no deformities noted  Neurological: alert, oriented * 4, no focal deficits, CN II to XI grossly intact  Skin: warm, dry    LABORATORY DATA                        8.8    4.25  )-----------( 173      ( 19 Nov 2021 10:50 )             27.6     11-19    141  |  101  |  x   ----------------------------<  x   3.5   |  x   |  x     Ca    9.0      19 Nov 2021 07:31  Phos  1.9     11-19  Mg     1.90     11-19

## 2021-11-19 NOTE — PROGRESS NOTE ADULT - SUBJECTIVE AND OBJECTIVE BOX
Dr. Sumaya Nascimento  Pager [phone]    PROGRESS NOTE:     Patient is a 66y old  Male who presents with a chief complaint of Chest pain (19 Nov 2021 11:59)      SUBJECTIVE / OVERNIGHT EVENTS: afebrile, no bleeding  ADDITIONAL REVIEW OF SYSTEMS: no chest pain/sob    MEDICATIONS  (STANDING):  amLODIPine   Tablet 10 milliGRAM(s) Oral daily  atorvastatin 80 milliGRAM(s) Oral at bedtime  buMETAnide 1 milliGRAM(s) Oral two times a day  epoetin stephany-epbx (RETACRIT) Injectable 24469 Unit(s) SubCutaneous once  ferrous    sulfate 325 milliGRAM(s) Oral two times a day  hydrALAZINE 100 milliGRAM(s) Oral three times a day  levothyroxine 50 MICROGram(s) Oral daily  octreotide  Injectable 100 MICROGram(s) SubCutaneous two times a day  pantoprazole  Injectable 40 milliGRAM(s) IV Push every 12 hours  sodium chloride 0.9% lock flush 3 milliLiter(s) IV Push every 8 hours    MEDICATIONS  (PRN):      CAPILLARY BLOOD GLUCOSE        I&O's Summary    18 Nov 2021 07:01  -  19 Nov 2021 07:00  --------------------------------------------------------  IN: 300 mL / OUT: 2250 mL / NET: -1950 mL        PHYSICAL EXAM:  Vital Signs Last 24 Hrs  T(C): 36.8 (19 Nov 2021 09:28), Max: 36.8 (18 Nov 2021 16:47)  T(F): 98.2 (19 Nov 2021 09:28), Max: 98.3 (18 Nov 2021 16:47)  HR: 78 (19 Nov 2021 09:28) (71 - 78)  BP: 145/60 (19 Nov 2021 09:28) (129/57 - 157/62)  BP(mean): --  RR: 17 (19 Nov 2021 09:28) (17 - 18)  SpO2: 99% (19 Nov 2021 09:28) (97% - 100%)  CONSTITUTIONAL: NAD, well-developed  RESPIRATORY: Normal respiratory effort; lungs are clear to auscultation bilaterally  CARDIOVASCULAR: Regular rate and rhythm, normal S1 and S2, no murmur/rub/gallop; No lower extremity edema; Peripheral pulses are 2+ bilaterally  ABDOMEN: Nontender to palpation, normoactive bowel sounds, no rebound/guarding; No hepatosplenomegaly  MUSCULOSKELETAL: no clubbing or cyanosis of digits; no joint swelling or tenderness to palpation  PSYCH: A+O to person, place, and time; affect appropriate    LABS:                        8.8    4.25  )-----------( 173      ( 19 Nov 2021 10:50 )             27.6     11-19    141  |  101  |  70<H>  ----------------------------<  141<H>  3.5   |  26  |  3.31<H>    Ca    9.4      19 Nov 2021 10:50  Phos  3.7     11-19  Mg     1.90     11-19                  RADIOLOGY & ADDITIONAL TESTS:  Results Reviewed:   Imaging Personally Reviewed:  Electrocardiogram Personally Reviewed:    COORDINATION OF CARE:  Care Discussed with Consultants/Other Providers [Y/N]: gi fellow dr. osborne, acp, repeat cbc, trend h/h, if down then enteroscopy on monday  Prior or Outpatient Records Reviewed [Y/N]:

## 2021-11-19 NOTE — PROGRESS NOTE ADULT - PROBLEM SELECTOR PLAN 2
Renal fxn improving, creat downtrending to 3.31 from peak of 6.06 on admission  Baseline approximately 3-CKD stage 4  US Kidney and bladder negative for bilateral hydronephrosis.  Seen by Renal, apprec recs- suspicion is d/t cardio-renal/CHF  FeUREA is 28%, suggestive of pre-renal etiology  c/w po bumex 1mg bid  Monitor daily weights and U/O, fluid restriction  No plan for RRT at this time as renal fxn improving  Avoid nephrotoxic agents

## 2021-11-19 NOTE — PROGRESS NOTE ADULT - ASSESSMENT
67 y/o M with PMH of Hypothyroidism,, pre-diabetes, HTN, HLD, CKD not on dialysis, Anemia, small bowel resection 9/13/21 secondary to AVM presents to ED after episode of chest pain associated with shortness of breath. Patient admitted for chest pain, CLOTILDE on CKD and CHF.  Renal following for CLOTILDE/CKD Mx.    CLOTILDE on CKD4:  CLOTILDE likely 2/2 cardiorenal/CHF. improving and rel stable  Creatinine Trend: 3.3<-3.48<-3.6<-3.6 <- 3.84 <-3.87 <--, 4.53 <--, 4.93 <--, 5.38 <--, 5.44 <--, 5.65 <--, 6.06 <--  10/29/21 Cr was 3.29  hypervolemia/ CHF -improved  K, bicarb acceptable    plan  cont monitor Serum Creatinine level  c/w Bumex to 1mg BID po   no indication to start RRT/hd at this time  low Na/phos in diet and fluid restriction 1L/day  monitor BMP daily and u/o   dose all meds for eGFR<15ml/min.   avoid ACEi/ARB/NSAIDs/Nephrotoxics.    Hypertension. bp controlled  c/w hydralazine 100 mg TID, Amlodipine    Carvedilol 12.5 mg TID.-per cardio  continue meds with holding parameters.    Anemia. likely 2/2 CKD. + Fe def  hb low,   on iv iron. s/p epo 10k subqx1  f/u w/GI    poc d/w pt  will f/u outpt upon d/c   labs, chart reviewed  pl call for any q's  [phone] M  office [phone]  ans serv- [phone]  [email or web address] [facility] [employer] ( wkend coverage for Hospital)

## 2021-11-19 NOTE — PROGRESS NOTE ADULT - PROBLEM SELECTOR PLAN 5
Patient with hemoglobin of 8.0 previously 10.4 on oct 29 but overall trend more in the 8s  Patient denies BRBPR, blood in stool.  S/P IRON, iv iron (ferritin 183, tsat 12%), po iron  s/p epogen x1  hgb stable 8.8 today  Patient with history of GIB from small bowel in September requiring small bowel resection due to AVM  Pt with GIB, management as above  VCE as above, SB bleeding, s/p enteroscopy/APC of AVM's, possible single balloon enteroscopy on Monday if hgb drops  CKD could also be contributing to patient's anemia  Apprec GI and heme recs

## 2021-11-19 NOTE — PROGRESS NOTE ADULT - ASSESSMENT
66M with PMH of CKD (not on HD), hypothyroidism, pre-DM, HTN, HLD, s/p cholecystectomy c/b bile leak, recurrent admissions for anemia since 1/2021, initially found to have gastric nodules, ectasias, and 3 cm ascending colon polyp s/p polypectomy (1/2021), most recently admitted for VCE-confirmed small bowel bleeding attributed to ectasias s/p intra-op push enteroscopy (9/24/21) w/ SBR of distal/terminal ileum at site of suspected ectasia with post-op anastomotic ulcers seen on repeat VCE.   Now admitted with recurrent anemia and black stool concerning for small bowel bleeding.   Capsule endoscopy 11/16 revealed active bleeding  s/p Push enteroscopy with APC of angioectasia     Impression:  # Acute on chronic anemia due to recurrent slow small intestinal angioectasias and CKD  s/p one unit of PRBC on 11/15 for hgb 6.6 - now stable around 7.8    Recommendations:  - Octreotide 100 mg  bid subcutaneous   - Iron supplements   - Diet as tolerated   - Monitor hgb and transfuse as needed  - Will consider single balloon enteroscopy pending his response to Octreotide therapy and requirement for blood transfusion.     Traci Mcmahan MD  Gastroenterology Fellow  Pager: [phone]  Please call answering service [phone] / on-call GI fellow after 5pm and before 8am, and on weekends.

## 2021-11-19 NOTE — PROGRESS NOTE ADULT - PROBLEM SELECTOR PLAN 4
Patient with ProBNP of 81335  Echo from October 28/21 showing EF of 60-65% with Grade II left ventricular diastolic dysfunction  Mild acute diastolic CHF-improving  Change bumex to po 1mg bid  On lasix at home-40mg daily  Apprec Cards recs-suspect renal etiology  Not on ACE-I/ARB due to CLOTILDE  Holding home dose of coreg for now-resume as tolerated

## 2021-11-20 LAB
ANION GAP SERPL CALC-SCNC: 11 MMOL/L — SIGNIFICANT CHANGE UP (ref 7–14)
BUN SERPL-MCNC: 65 MG/DL — HIGH (ref 7–23)
CALCIUM SERPL-MCNC: 9.6 MG/DL — SIGNIFICANT CHANGE UP (ref 8.4–10.5)
CHLORIDE SERPL-SCNC: 100 MMOL/L — SIGNIFICANT CHANGE UP (ref 98–107)
CO2 SERPL-SCNC: 28 MMOL/L — SIGNIFICANT CHANGE UP (ref 22–31)
CREAT SERPL-MCNC: 3.49 MG/DL — HIGH (ref 0.5–1.3)
GLUCOSE SERPL-MCNC: 119 MG/DL — HIGH (ref 70–99)
HCT VFR BLD CALC: 30.1 % — LOW (ref 39–50)
HGB BLD-MCNC: 9.2 G/DL — LOW (ref 13–17)
MAGNESIUM SERPL-MCNC: 1.9 MG/DL — SIGNIFICANT CHANGE UP (ref 1.6–2.6)
MCHC RBC-ENTMCNC: 25.5 PG — LOW (ref 27–34)
MCHC RBC-ENTMCNC: 30.6 GM/DL — LOW (ref 32–36)
MCV RBC AUTO: 83.4 FL — SIGNIFICANT CHANGE UP (ref 80–100)
NRBC # BLD: 0 /100 WBCS — SIGNIFICANT CHANGE UP
NRBC # FLD: 0 K/UL — SIGNIFICANT CHANGE UP
PHOSPHATE SERPL-MCNC: 3.6 MG/DL — SIGNIFICANT CHANGE UP (ref 2.5–4.5)
PLATELET # BLD AUTO: 178 K/UL — SIGNIFICANT CHANGE UP (ref 150–400)
POTASSIUM SERPL-MCNC: 3.9 MMOL/L — SIGNIFICANT CHANGE UP (ref 3.5–5.3)
POTASSIUM SERPL-SCNC: 3.9 MMOL/L — SIGNIFICANT CHANGE UP (ref 3.5–5.3)
RBC # BLD: 3.61 M/UL — LOW (ref 4.2–5.8)
RBC # FLD: 19 % — HIGH (ref 10.3–14.5)
SODIUM SERPL-SCNC: 139 MMOL/L — SIGNIFICANT CHANGE UP (ref 135–145)
WBC # BLD: 5.13 K/UL — SIGNIFICANT CHANGE UP (ref 3.8–10.5)
WBC # FLD AUTO: 5.13 K/UL — SIGNIFICANT CHANGE UP (ref 3.8–10.5)

## 2021-11-20 PROCEDURE — 99232 SBSQ HOSP IP/OBS MODERATE 35: CPT

## 2021-11-20 RX ORDER — CARVEDILOL PHOSPHATE 80 MG/1
12.5 CAPSULE, EXTENDED RELEASE ORAL EVERY 12 HOURS
Refills: 0 | Status: DISCONTINUED | OUTPATIENT
Start: 2021-11-20 | End: 2021-11-23

## 2021-11-20 RX ORDER — BUMETANIDE 0.25 MG/ML
1 INJECTION INTRAMUSCULAR; INTRAVENOUS DAILY
Refills: 0 | Status: DISCONTINUED | OUTPATIENT
Start: 2021-11-20 | End: 2021-11-23

## 2021-11-20 RX ADMIN — CARVEDILOL PHOSPHATE 12.5 MILLIGRAM(S): 80 CAPSULE, EXTENDED RELEASE ORAL at 17:40

## 2021-11-20 RX ADMIN — PANTOPRAZOLE SODIUM 40 MILLIGRAM(S): 20 TABLET, DELAYED RELEASE ORAL at 17:40

## 2021-11-20 RX ADMIN — Medication 325 MILLIGRAM(S): at 06:36

## 2021-11-20 RX ADMIN — Medication 50 MICROGRAM(S): at 06:35

## 2021-11-20 RX ADMIN — Medication 325 MILLIGRAM(S): at 17:39

## 2021-11-20 RX ADMIN — BUMETANIDE 1 MILLIGRAM(S): 0.25 INJECTION INTRAMUSCULAR; INTRAVENOUS at 17:39

## 2021-11-20 RX ADMIN — BUMETANIDE 1 MILLIGRAM(S): 0.25 INJECTION INTRAMUSCULAR; INTRAVENOUS at 06:36

## 2021-11-20 RX ADMIN — SODIUM CHLORIDE 3 MILLILITER(S): 9 INJECTION INTRAMUSCULAR; INTRAVENOUS; SUBCUTANEOUS at 14:32

## 2021-11-20 RX ADMIN — PANTOPRAZOLE SODIUM 40 MILLIGRAM(S): 20 TABLET, DELAYED RELEASE ORAL at 06:35

## 2021-11-20 RX ADMIN — Medication 100 MILLIGRAM(S): at 21:29

## 2021-11-20 RX ADMIN — OCTREOTIDE ACETATE 100 MICROGRAM(S): 200 INJECTION, SOLUTION INTRAVENOUS; SUBCUTANEOUS at 06:36

## 2021-11-20 RX ADMIN — SODIUM CHLORIDE 3 MILLILITER(S): 9 INJECTION INTRAMUSCULAR; INTRAVENOUS; SUBCUTANEOUS at 06:47

## 2021-11-20 RX ADMIN — Medication 100 MILLIGRAM(S): at 06:35

## 2021-11-20 RX ADMIN — OCTREOTIDE ACETATE 100 MICROGRAM(S): 200 INJECTION, SOLUTION INTRAVENOUS; SUBCUTANEOUS at 17:39

## 2021-11-20 RX ADMIN — Medication 100 MILLIGRAM(S): at 14:32

## 2021-11-20 RX ADMIN — ATORVASTATIN CALCIUM 80 MILLIGRAM(S): 80 TABLET, FILM COATED ORAL at 21:29

## 2021-11-20 RX ADMIN — SODIUM CHLORIDE 3 MILLILITER(S): 9 INJECTION INTRAMUSCULAR; INTRAVENOUS; SUBCUTANEOUS at 22:46

## 2021-11-20 RX ADMIN — AMLODIPINE BESYLATE 10 MILLIGRAM(S): 2.5 TABLET ORAL at 06:35

## 2021-11-20 NOTE — PROGRESS NOTE ADULT - PROBLEM SELECTOR PLAN 5
Patient with hemoglobin of 8.0 previously 10.4 on oct 29 but overall trend more in the 8s  Patient denies BRBPR, blood in stool.  S/P iv iron (ferritin 183, tsat 12%), po iron  s/p epogen x1  hgb stable 9.2  Patient with history of GIB from small bowel in September requiring small bowel resection due to AVM  Pt with GIB, management as above  VCE as above, SB bleeding, s/p enteroscopy/APC of AVM's, possible single balloon enteroscopy on Monday if hgb drops  CKD could also be contributing to patient's anemia  Apprec GI and heme recs

## 2021-11-20 NOTE — PROGRESS NOTE ADULT - ASSESSMENT
67 y/o M with PMH of Hypothyroidism,, pre-diabetes, HTN, HLD, CKD not on dialysis, Anemia, small bowel resection 9/13/21 secondary to AVM presents to ED after episode of chest pain associated with shortness of breath. Patient admitted for chest pain, CLOTILDE on CKD and CHF.  Renal following for CLOTILDE/CKD Mx.    CLOTILDE on CKD4:  CLOTILDE likely 2/2 cardiorenal/CHF. improving and rel stable  Creatinine Trend: 3.49<-3.3<-3.48<-3.6<-3.6 <- 3.84 <-3.87 <--, 4.53 <--, 4.93 <--, 5.38 <--, 5.44 <--, 5.65 <--, 6.06 <--  10/29/21 Cr was 3.29  hypervolemia/ CHF -improved  K, bicarb acceptable    plan  cont monitor Serum Creatinine level  c/w Bumex to 1mg BID po   no indication to start RRT/hd at this time  low Na/phos in diet and fluid restriction 1L/day  monitor BMP daily and u/o   dose all meds for eGFR<15ml/min.   avoid ACEi/ARB/NSAIDs/Nephrotoxics.    Hypertension. bp controlled  c/w hydralazine 100 mg TID, Amlodipine    Carvedilol 12.5 mg TID.-per cardio  continue meds with holding parameters.    Anemia. likely 2/2 CKD. + Fe def  hb low,   on iv iron. s/p epo 10k subqx1  f/u w/GI    Dr Cobb  [phone]  office [phone]  ans [facility]- [phone]  [email or web address] ( wkend coverage for Hospital)

## 2021-11-20 NOTE — PROGRESS NOTE ADULT - SUBJECTIVE AND OBJECTIVE BOX
Patient seen and examined  no complaints    No Known Allergies    Hospital Medications:   MEDICATIONS  (STANDING):  amLODIPine   Tablet 10 milliGRAM(s) Oral daily  atorvastatin 80 milliGRAM(s) Oral at bedtime  buMETAnide 1 milliGRAM(s) Oral daily  epoetin stephany-epbx (RETACRIT) Injectable 71457 Unit(s) SubCutaneous once  ferrous    sulfate 325 milliGRAM(s) Oral two times a day  hydrALAZINE 100 milliGRAM(s) Oral three times a day  levothyroxine 50 MICROGram(s) Oral daily  octreotide  Injectable 100 MICROGram(s) SubCutaneous two times a day  pantoprazole  Injectable 40 milliGRAM(s) IV Push every 12 hours  sodium chloride 0.9% lock flush 3 milliLiter(s) IV Push every 8 hours      VITALS:  T(F): 98.1 (11-20-21 @ 10:12), Max: 98.5 (11-20-21 @ 02:30)  HR: 71 (11-20-21 @ 10:12)  BP: 152/54 (11-20-21 @ 10:12)  RR: 15 (11-20-21 @ 10:12)  SpO2: 100% (11-20-21 @ 10:12)  Wt(kg): --    11-19 @ 07:01  -  11-20 @ 07:00  --------------------------------------------------------  IN: 200 mL / OUT: 0 mL / NET: 200 mL        PHYSICAL EXAM:  Constitutional: NAD  HEENT: anicteric sclera  Neck: No JVD  Respiratory: CTAB, no wheezes, rales or rhonchi  Cardiovascular: S1, S2, RRR  Gastrointestinal: BS+, soft, NT/ND  Extremities: No peripheral edema  Neurological: A/O x 3  Psychiatric: Normal mood, normal affect  : No tang.     LABS:  11-20    139  |  100  |  65<H>  ----------------------------<  119<H>  3.9   |  28  |  3.49<H>    Ca    9.6      20 Nov 2021 06:57  Phos  3.6     11-20  Mg     1.90     11-20      Creatinine Trend: 3.49 <--, 3.31 <--, 1.16 <--, 3.48 <--, 3.59 <--, 3.60 <--, 3.84 <--, 3.92 <--, 3.87 <--, 4.53 <--                        9.2    5.13  )-----------( 178      ( 20 Nov 2021 06:57 )             30.1     Urine Studies:      RADIOLOGY & ADDITIONAL STUDIES:

## 2021-11-20 NOTE — PROGRESS NOTE ADULT - SUBJECTIVE AND OBJECTIVE BOX
Dr. Sumaya Nascimento  Pager [phone]    PROGRESS NOTE:     Patient is a 66y old  Male who presents with a chief complaint of Chest pain (20 Nov 2021 10:33)      SUBJECTIVE / OVERNIGHT EVENTS: pt denies chest pain or sob   ADDITIONAL REVIEW OF SYSTEMS: afebrile     MEDICATIONS  (STANDING):  amLODIPine   Tablet 10 milliGRAM(s) Oral daily  atorvastatin 80 milliGRAM(s) Oral at bedtime  buMETAnide 1 milliGRAM(s) Oral daily  epoetin stephany-epbx (RETACRIT) Injectable 06465 Unit(s) SubCutaneous once  ferrous    sulfate 325 milliGRAM(s) Oral two times a day  hydrALAZINE 100 milliGRAM(s) Oral three times a day  levothyroxine 50 MICROGram(s) Oral daily  octreotide  Injectable 100 MICROGram(s) SubCutaneous two times a day  pantoprazole  Injectable 40 milliGRAM(s) IV Push every 12 hours  sodium chloride 0.9% lock flush 3 milliLiter(s) IV Push every 8 hours    MEDICATIONS  (PRN):      CAPILLARY BLOOD GLUCOSE        I&O's Summary    19 Nov 2021 07:01  -  20 Nov 2021 07:00  --------------------------------------------------------  IN: 200 mL / OUT: 0 mL / NET: 200 mL        PHYSICAL EXAM:  Vital Signs Last 24 Hrs  T(C): 36.7 (20 Nov 2021 10:12), Max: 36.9 (19 Nov 2021 13:57)  T(F): 98.1 (20 Nov 2021 10:12), Max: 98.5 (20 Nov 2021 02:30)  HR: 71 (20 Nov 2021 10:12) (71 - 77)  BP: 152/54 (20 Nov 2021 10:12) (122/79 - 156/62)  BP(mean): --  RR: 15 (20 Nov 2021 10:12) (15 - 17)  SpO2: 100% (20 Nov 2021 10:12) (99% - 100%)  CONSTITUTIONAL: NAD, well-developed  RESPIRATORY: Normal respiratory effort; lungs are clear to auscultation bilaterally  CARDIOVASCULAR: Regular rate and rhythm, normal S1 and S2, no murmur/rub/gallop; No lower extremity edema; Peripheral pulses are 2+ bilaterally  ABDOMEN: Nontender to palpation, normoactive bowel sounds, no rebound/guarding; No hepatosplenomegaly  MUSCULOSKELETAL: no clubbing or cyanosis of digits; no joint swelling or tenderness to palpation  PSYCH: A+O to person, place, and time; affect appropriate      LABS:                        9.2    5.13  )-----------( 178      ( 20 Nov 2021 06:57 )             30.1     11-20    139  |  100  |  65<H>  ----------------------------<  119<H>  3.9   |  28  |  3.49<H>    Ca    9.6      20 Nov 2021 06:57  Phos  3.6     11-20  Mg     1.90     11-20                  RADIOLOGY & ADDITIONAL TESTS:  Results Reviewed:   Imaging Personally Reviewed:  Electrocardiogram Personally Reviewed:    COORDINATION OF CARE:  Care Discussed with Consultants/Other Providers [Y/N]:  Prior or Outpatient Records Reviewed [Y/N]:

## 2021-11-20 NOTE — PROGRESS NOTE ADULT - PROBLEM SELECTOR PLAN 4
Patient with ProBNP of 81487  Echo from October 28/21 showing EF of 60-65% with Grade II left ventricular diastolic dysfunction  Mild acute diastolic CHF-improving  now euvolemic, change bumex to po 1mg qd  On lasix at home-40mg daily  Not on ACE-I/ARB due to CLOTILDE  Holding home dose of coreg for now-resume as tolerated Patient with ProBNP of 89838  Echo from October 28/21 showing EF of 60-65% with Grade II left ventricular diastolic dysfunction  Mild acute diastolic CHF-improving  now euvolemic, change bumex to po 1mg qd  On lasix at home-40mg daily  Not on ACE-I/ARB due to CLOTILDE  resume home coreg

## 2021-11-21 LAB
ANION GAP SERPL CALC-SCNC: 14 MMOL/L — SIGNIFICANT CHANGE UP (ref 7–14)
BUN SERPL-MCNC: 69 MG/DL — HIGH (ref 7–23)
CALCIUM SERPL-MCNC: 8.9 MG/DL — SIGNIFICANT CHANGE UP (ref 8.4–10.5)
CHLORIDE SERPL-SCNC: 98 MMOL/L — SIGNIFICANT CHANGE UP (ref 98–107)
CO2 SERPL-SCNC: 26 MMOL/L — SIGNIFICANT CHANGE UP (ref 22–31)
CREAT SERPL-MCNC: 3.65 MG/DL — HIGH (ref 0.5–1.3)
GLUCOSE SERPL-MCNC: 116 MG/DL — HIGH (ref 70–99)
HCT VFR BLD CALC: 28.6 % — LOW (ref 39–50)
HGB BLD-MCNC: 8.9 G/DL — LOW (ref 13–17)
MAGNESIUM SERPL-MCNC: 1.8 MG/DL — SIGNIFICANT CHANGE UP (ref 1.6–2.6)
MCHC RBC-ENTMCNC: 25.6 PG — LOW (ref 27–34)
MCHC RBC-ENTMCNC: 31.1 GM/DL — LOW (ref 32–36)
MCV RBC AUTO: 82.4 FL — SIGNIFICANT CHANGE UP (ref 80–100)
NRBC # BLD: 0 /100 WBCS — SIGNIFICANT CHANGE UP
NRBC # FLD: 0 K/UL — SIGNIFICANT CHANGE UP
PHOSPHATE SERPL-MCNC: 3.4 MG/DL — SIGNIFICANT CHANGE UP (ref 2.5–4.5)
PLATELET # BLD AUTO: 184 K/UL — SIGNIFICANT CHANGE UP (ref 150–400)
POTASSIUM SERPL-MCNC: 3.9 MMOL/L — SIGNIFICANT CHANGE UP (ref 3.5–5.3)
POTASSIUM SERPL-SCNC: 3.9 MMOL/L — SIGNIFICANT CHANGE UP (ref 3.5–5.3)
RBC # BLD: 3.47 M/UL — LOW (ref 4.2–5.8)
RBC # FLD: 19 % — HIGH (ref 10.3–14.5)
SODIUM SERPL-SCNC: 138 MMOL/L — SIGNIFICANT CHANGE UP (ref 135–145)
WBC # BLD: 4.69 K/UL — SIGNIFICANT CHANGE UP (ref 3.8–10.5)
WBC # FLD AUTO: 4.69 K/UL — SIGNIFICANT CHANGE UP (ref 3.8–10.5)

## 2021-11-21 PROCEDURE — 99232 SBSQ HOSP IP/OBS MODERATE 35: CPT

## 2021-11-21 RX ORDER — FERROUS SULFATE 325(65) MG
325 TABLET ORAL DAILY
Refills: 0 | Status: DISCONTINUED | OUTPATIENT
Start: 2021-11-22 | End: 2021-11-23

## 2021-11-21 RX ADMIN — Medication 100 MILLIGRAM(S): at 22:27

## 2021-11-21 RX ADMIN — SODIUM CHLORIDE 3 MILLILITER(S): 9 INJECTION INTRAMUSCULAR; INTRAVENOUS; SUBCUTANEOUS at 05:28

## 2021-11-21 RX ADMIN — BUMETANIDE 1 MILLIGRAM(S): 0.25 INJECTION INTRAMUSCULAR; INTRAVENOUS at 17:57

## 2021-11-21 RX ADMIN — CARVEDILOL PHOSPHATE 12.5 MILLIGRAM(S): 80 CAPSULE, EXTENDED RELEASE ORAL at 17:58

## 2021-11-21 RX ADMIN — PANTOPRAZOLE SODIUM 40 MILLIGRAM(S): 20 TABLET, DELAYED RELEASE ORAL at 05:19

## 2021-11-21 RX ADMIN — OCTREOTIDE ACETATE 100 MICROGRAM(S): 200 INJECTION, SOLUTION INTRAVENOUS; SUBCUTANEOUS at 05:19

## 2021-11-21 RX ADMIN — Medication 325 MILLIGRAM(S): at 05:18

## 2021-11-21 RX ADMIN — CARVEDILOL PHOSPHATE 12.5 MILLIGRAM(S): 80 CAPSULE, EXTENDED RELEASE ORAL at 05:17

## 2021-11-21 RX ADMIN — Medication 50 MICROGRAM(S): at 05:17

## 2021-11-21 RX ADMIN — ATORVASTATIN CALCIUM 80 MILLIGRAM(S): 80 TABLET, FILM COATED ORAL at 22:27

## 2021-11-21 RX ADMIN — PANTOPRAZOLE SODIUM 40 MILLIGRAM(S): 20 TABLET, DELAYED RELEASE ORAL at 17:57

## 2021-11-21 RX ADMIN — SODIUM CHLORIDE 3 MILLILITER(S): 9 INJECTION INTRAMUSCULAR; INTRAVENOUS; SUBCUTANEOUS at 22:00

## 2021-11-21 RX ADMIN — OCTREOTIDE ACETATE 100 MICROGRAM(S): 200 INJECTION, SOLUTION INTRAVENOUS; SUBCUTANEOUS at 17:57

## 2021-11-21 RX ADMIN — Medication 100 MILLIGRAM(S): at 05:18

## 2021-11-21 RX ADMIN — AMLODIPINE BESYLATE 10 MILLIGRAM(S): 2.5 TABLET ORAL at 05:18

## 2021-11-21 RX ADMIN — Medication 100 MILLIGRAM(S): at 13:28

## 2021-11-21 RX ADMIN — SODIUM CHLORIDE 3 MILLILITER(S): 9 INJECTION INTRAMUSCULAR; INTRAVENOUS; SUBCUTANEOUS at 13:26

## 2021-11-21 NOTE — PROGRESS NOTE ADULT - ASSESSMENT
65 y/o M with PMH of Hypothyroidism,, pre-diabetes, HTN, HLD, CKD not on dialysis, Anemia, small bowel resection 9/13/21 secondary to AVM presents to ED after episode of chest pain associated with shortness of breath. Patient admitted for chest pain, CLOTILDE on CKD and CHF.  Renal following for CLOTILDE/CKD Mx.    CLOTILDE on CKD4:  CLOTILDE likely 2/2 cardiorenal/CHF. improving and rel stable  Creatinine Trend: 3.65 <- 3.49<-3.3<-3.48<-3.6<-3.6 <- 3.84 <-3.87 <--, 4.53 <--, 4.93 <--, 5.38 <--, 5.44 <--, 5.65 <--, 6.06 <--  10/29/21 Cr was 3.29  hypervolemia/ CHF -improved  K, bicarb acceptable    plan  cont monitor Serum Creatinine level  c/w Bumex to 1mg QD po   no indication to start RRT/hd at this time  low Na/phos in diet and fluid restriction 1L/day  monitor BMP daily and u/o   dose all meds for eGFR<15ml/min.   avoid ACEi/ARB/NSAIDs/Nephrotoxics.    Hypertension. bp controlled  c/w hydralazine 100 mg TID, Amlodipine    Carvedilol 12.5 mg TID.-per cardio  continue meds with holding parameters.    Anemia. likely 2/2 CKD. + Fe def  hb low,   on iv iron. s/p epo 10k subqx1  f/u w/GI      For any question, call:  Cell # [phone]  Pager # [phone]  Callback # [phone]

## 2021-11-21 NOTE — PROGRESS NOTE ADULT - PROBLEM SELECTOR PLAN 4
Patient with ProBNP of 09924  Echo from October 28/21 showing EF of 60-65% with Grade II left ventricular diastolic dysfunction  Mild acute diastolic CHF-improving  now euvolemic, changed bumex to 1mg po qd  On lasix at home-40mg daily  Not on ACE-I/ARB due to CLOTILDE  resume home coreg

## 2021-11-21 NOTE — PROGRESS NOTE ADULT - ASSESSMENT
MOLLY ANNA is a 66y Male who presents with a chief complaint of chest pain.    Anemia  - Patient with history of recurrent melena and requirements of blood transfusions over the past year.  - He is supposed to have further workup with colorectal surgery at [facility].  - Patient underwent workup by gastroenterology; s/p enteroscopy that showed AV malformations in the small intestine and treated with argon plasma coagulation.   - planning for entersocopy tomorrow   - Continue to monitor CBC closely. Transfuse to hemoglobin goal of 7.     Otherwise patient is being managed for chest pain, congestive heart failure, and acute kidney injury with chronic kidney disease. Cardiology and nephrology have been consulted.    We will continue to follow.    Dequan Silveira MD  Hematology Oncology   O:   C: [phone]

## 2021-11-21 NOTE — PROGRESS NOTE ADULT - PROBLEM SELECTOR PLAN 5
Patient with hemoglobin of 8.0 previously 10.4 on oct 29 but overall trend more in the 8s  Patient denies BRBPR, blood in stool.  S/P iv iron (ferritin 183, tsat 12%), po iron  s/p epogen x1  hgb stable 8.9  Patient with history of GIB from small bowel in September requiring small bowel resection due to AVM  Pt with GIB, management as above  VCE as above, SB bleeding, s/p enteroscopy/APC of AVM's, possible single balloon enteroscopy on Monday if hgb drops  CKD could also be contributing to patient's anemia  Apprec GI and heme recs

## 2021-11-21 NOTE — PROGRESS NOTE ADULT - PROBLEM SELECTOR PLAN 1
s/p 1 unit prbc on 11/15  -GI consulted, s/p VCE 11/16, revealed active bleeding in small bowel, unable to clearly identify anastomotic site  -IV protonix  -s/p enteroscopy 11/17 revealed AVM's treated with APC  Pseudomelanosis. A single non-bleeding angioectasia in the duodenum.   Treated with argon plasma coagulation (APC). A single non-bleeding angioectasia in the jejunum.   Treated with APC  -started on octreotide 100 mg bid sc per renal, po iron, trend h/h, per GI will consider single balloon enteroscopy on Monday if hgb drops  -COVID swab today  -trend CBC and transfuse prn to keep hgb>7  -pt has h/o small bowel GIB d/t AVM s/p partial small bowel resection, had multiple admissions for GIB, s/p intraop surgically assisted enteroscopy (with Dr. Marlow) was performed to the distal ileum. A single AVM was identified that was c/w what was noted on VCE. This area was marked for resection by the surgeon and small bowel resection was performed. Upon removal of the scope there was a possible second very small lesion vs trauma in the small bowel (not intervened).  Also possible anastomotic ulcers seen on ?capsule endoscopy after partial SBR (9/24/21)  -Dispo: DC plan home on Monday if no GI intervention and h/h stable

## 2021-11-21 NOTE — PROGRESS NOTE ADULT - PROBLEM SELECTOR PLAN 2
Renal fxn improving, creat downtrending to 3.6 from peak of 6.06 on admission  Baseline approximately 3-CKD stage 4  US Kidney and bladder negative for bilateral hydronephrosis.  Seen by Renal, apprec recs- suspicion is d/t cardio-renal/CHF  FeUREA is 28%, suggestive of pre-renal etiology  c/w po bumex 1mg qd  Monitor daily weights and U/O, fluid restriction  No plan for RRT at this time as renal fxn improving  Avoid nephrotoxic agents

## 2021-11-21 NOTE — PROGRESS NOTE ADULT - SUBJECTIVE AND OBJECTIVE BOX
Dr. Sumaya Nascimento  Pager [phone]    PROGRESS NOTE:     Patient is a 66y old  Male who presents with a chief complaint of Chest pain (21 Nov 2021 12:01)      SUBJECTIVE / OVERNIGHT EVENTS: pt denies chest pain or sob   ADDITIONAL REVIEW OF SYSTEMS: afebrile     MEDICATIONS  (STANDING):  amLODIPine   Tablet 10 milliGRAM(s) Oral daily  atorvastatin 80 milliGRAM(s) Oral at bedtime  buMETAnide 1 milliGRAM(s) Oral daily  carvedilol 12.5 milliGRAM(s) Oral every 12 hours  epoetin stephany-epbx (RETACRIT) Injectable 58817 Unit(s) SubCutaneous once  ferrous    sulfate 325 milliGRAM(s) Oral two times a day  hydrALAZINE 100 milliGRAM(s) Oral three times a day  levothyroxine 50 MICROGram(s) Oral daily  octreotide  Injectable 100 MICROGram(s) SubCutaneous two times a day  pantoprazole  Injectable 40 milliGRAM(s) IV Push every 12 hours  sodium chloride 0.9% lock flush 3 milliLiter(s) IV Push every 8 hours    MEDICATIONS  (PRN):      CAPILLARY BLOOD GLUCOSE        I&O's Summary    20 Nov 2021 07:01  -  21 Nov 2021 07:00  --------------------------------------------------------  IN: 100 mL / OUT: 150 mL / NET: -50 mL        PHYSICAL EXAM:  Vital Signs Last 24 Hrs  T(C): 36.8 (21 Nov 2021 09:15), Max: 36.8 (20 Nov 2021 14:04)  T(F): 98.2 (21 Nov 2021 09:15), Max: 98.2 (20 Nov 2021 14:04)  HR: 66 (21 Nov 2021 09:15) (65 - 77)  BP: 141/47 (21 Nov 2021 09:15) (136/58 - 152/63)  BP(mean): --  RR: 17 (21 Nov 2021 09:15) (16 - 17)  SpO2: 100% (21 Nov 2021 09:15) (99% - 100%)  CONSTITUTIONAL: NAD, well-developed  RESPIRATORY: Normal respiratory effort; lungs are clear to auscultation bilaterally  CARDIOVASCULAR: Regular rate and rhythm, normal S1 and S2, no murmur/rub/gallop; No lower extremity edema; Peripheral pulses are 2+ bilaterally  ABDOMEN: Nontender to palpation, normoactive bowel sounds, no rebound/guarding; No hepatosplenomegaly  MUSCULOSKELETAL: no clubbing or cyanosis of digits; no joint swelling or tenderness to palpation  PSYCH: A+O to person, place, and time; affect appropriate        LABS:                        8.9    4.69  )-----------( 184      ( 21 Nov 2021 06:35 )             28.6     11-21    138  |  98  |  69<H>  ----------------------------<  116<H>  3.9   |  26  |  3.65<H>    Ca    8.9      21 Nov 2021 06:35  Phos  3.4     11-21  Mg     1.80     11-21          RADIOLOGY & ADDITIONAL TESTS:  Results Reviewed:   Imaging Personally Reviewed:  Electrocardiogram Personally Reviewed:    COORDINATION OF CARE:  Care Discussed with Consultants/Other Providers [Y/N]: acp Usha, covid swab, possible enteroscopy tomorrow  Prior or Outpatient Records Reviewed [Y/N]:

## 2021-11-21 NOTE — PROGRESS NOTE ADULT - SUBJECTIVE AND OBJECTIVE BOX
[facility] - EMMETT Briggs / EMMETT Parmar / GENA Tracy/ EMMETT Cobb/ EMMETT Manzano/ ESTEE Rod / KAMINI Major / LATASHA Bernal  ---------------------------------------------------------------------------------------------------------------  seen and examined today for CLOTILDE  Interval : NAD  VITALS:  T(F): 97.9 (11-21-21 @ 05:17), Max: 98.2 (11-20-21 @ 14:04)  HR: 68 (11-21-21 @ 05:17)  BP: 152/63 (11-21-21 @ 05:17)  RR: 17 (11-21-21 @ 05:17)  SpO2: 100% (11-21-21 @ 05:17)  Wt(kg): --    11-20 @ 07:01  -  11-21 @ 07:00  --------------------------------------------------------  IN: 100 mL / OUT: 150 mL / NET: -50 mL      Physical Exam :-  Constitutional: NAD  Neck: Supple.  Respiratory: Bilateral equal breath sounds,  Cardiovascular: S1, S2 normal,  Gastrointestinal: Bowel Sounds present, soft, non tender.  Extremities: No edema  Neurological: Alert and Oriented x 3, no focal deficits  Psychiatric: Normal mood, normal affect  Data:-  Allergies :   No Known Allergies    Hospital Medications:   MEDICATIONS  (STANDING):  amLODIPine   Tablet 10 milliGRAM(s) Oral daily  atorvastatin 80 milliGRAM(s) Oral at bedtime  buMETAnide 1 milliGRAM(s) Oral daily  carvedilol 12.5 milliGRAM(s) Oral every 12 hours  epoetin stephany-epbx (RETACRIT) Injectable 55612 Unit(s) SubCutaneous once  ferrous    sulfate 325 milliGRAM(s) Oral two times a day  hydrALAZINE 100 milliGRAM(s) Oral three times a day  levothyroxine 50 MICROGram(s) Oral daily  octreotide  Injectable 100 MICROGram(s) SubCutaneous two times a day  pantoprazole  Injectable 40 milliGRAM(s) IV Push every 12 hours  sodium chloride 0.9% lock flush 3 milliLiter(s) IV Push every 8 hours    11-21    138  |  98  |  69<H>  ----------------------------<  116<H>  3.9   |  26  |  3.65<H>    Ca    8.9      21 Nov 2021 06:35  Phos  3.4     11-21  Mg     1.80     11-21      Creatinine Trend: 3.65 <--, 3.49 <--, 3.31 <--, 1.16 <--, 3.48 <--, 3.59 <--, 3.60 <--, 3.84 <--, 3.92 <--, 3.87 <--                        8.9    4.69  )-----------( 184      ( 21 Nov 2021 06:35 )             28.6

## 2021-11-21 NOTE — PROGRESS NOTE ADULT - SUBJECTIVE AND OBJECTIVE BOX
Patient seen and examined at bedside. feels well. no longer having black stools     MEDICATIONS  (STANDING):  amLODIPine   Tablet 10 milliGRAM(s) Oral daily  atorvastatin 80 milliGRAM(s) Oral at bedtime  buMETAnide 1 milliGRAM(s) Oral daily  carvedilol 12.5 milliGRAM(s) Oral every 12 hours  epoetin stephany-epbx (RETACRIT) Injectable 18845 Unit(s) SubCutaneous once  ferrous    sulfate 325 milliGRAM(s) Oral two times a day  hydrALAZINE 100 milliGRAM(s) Oral three times a day  levothyroxine 50 MICROGram(s) Oral daily  octreotide  Injectable 100 MICROGram(s) SubCutaneous two times a day  pantoprazole  Injectable 40 milliGRAM(s) IV Push every 12 hours  sodium chloride 0.9% lock flush 3 milliLiter(s) IV Push every 8 hours    MEDICATIONS  (PRN):        Vital Signs Last 24 Hrs  T(C): 36.8 (21 Nov 2021 09:15), Max: 36.8 (20 Nov 2021 14:04)  T(F): 98.2 (21 Nov 2021 09:15), Max: 98.2 (20 Nov 2021 14:04)  HR: 66 (21 Nov 2021 09:15) (65 - 77)  BP: 141/47 (21 Nov 2021 09:15) (136/58 - 152/63)  BP(mean): --  RR: 17 (21 Nov 2021 09:15) (16 - 17)  SpO2: 100% (21 Nov 2021 09:15) (99% - 100%)      PHYSICAL EXAM:     GENERAL:  Appears stated age, well-groomed  CHEST:  CTA b/l  HEART:  S1 s2+   ABDOMEN:  Soft, non-tender, non-distended  NEURO:  Alert, oriented, no asterixis                            8.9    4.69  )-----------( 184      ( 21 Nov 2021 06:35 )             28.6       11-21    138  |  98  |  69<H>  ----------------------------<  116<H>  3.9   |  26  |  3.65<H>    Ca    8.9      21 Nov 2021 06:35  Phos  3.4     11-21  Mg     1.80     11-21

## 2021-11-21 NOTE — CHART NOTE - NSCHARTNOTEFT_GEN_A_CORE
Brief GI update  ==========  Patient is planned for possible single balloon enteroscopy tomorrow.     - Please keep NPO after midnight  - Please repeat COVID PCR today  - CBC, CMP, coags at 12AM    Thank you for involving us in the care of this patient. Please reach out if any further questions.    Stew Chand, PGY-5  GI Fellow    Available on [employer] Teams  Pager [phone] ([facility]) or [phone] ([facility])  After 5PM/Weekends, please contact the on-call GI fellow: [phone]  Available through [employer] Teams

## 2021-11-22 LAB
ANION GAP SERPL CALC-SCNC: 13 MMOL/L — SIGNIFICANT CHANGE UP (ref 7–14)
BUN SERPL-MCNC: 63 MG/DL — HIGH (ref 7–23)
CALCIUM SERPL-MCNC: 9 MG/DL — SIGNIFICANT CHANGE UP (ref 8.4–10.5)
CHLORIDE SERPL-SCNC: 97 MMOL/L — LOW (ref 98–107)
CO2 SERPL-SCNC: 25 MMOL/L — SIGNIFICANT CHANGE UP (ref 22–31)
CREAT SERPL-MCNC: 3.68 MG/DL — HIGH (ref 0.5–1.3)
GLUCOSE SERPL-MCNC: 106 MG/DL — HIGH (ref 70–99)
HCT VFR BLD CALC: 27 % — LOW (ref 39–50)
HGB BLD-MCNC: 8.7 G/DL — LOW (ref 13–17)
MAGNESIUM SERPL-MCNC: 1.8 MG/DL — SIGNIFICANT CHANGE UP (ref 1.6–2.6)
MCHC RBC-ENTMCNC: 26.4 PG — LOW (ref 27–34)
MCHC RBC-ENTMCNC: 32.2 GM/DL — SIGNIFICANT CHANGE UP (ref 32–36)
MCV RBC AUTO: 82.1 FL — SIGNIFICANT CHANGE UP (ref 80–100)
NRBC # BLD: 0 /100 WBCS — SIGNIFICANT CHANGE UP
NRBC # FLD: 0 K/UL — SIGNIFICANT CHANGE UP
PHOSPHATE SERPL-MCNC: 3.7 MG/DL — SIGNIFICANT CHANGE UP (ref 2.5–4.5)
PLATELET # BLD AUTO: 176 K/UL — SIGNIFICANT CHANGE UP (ref 150–400)
POTASSIUM SERPL-MCNC: 4.1 MMOL/L — SIGNIFICANT CHANGE UP (ref 3.5–5.3)
POTASSIUM SERPL-SCNC: 4.1 MMOL/L — SIGNIFICANT CHANGE UP (ref 3.5–5.3)
RBC # BLD: 3.29 M/UL — LOW (ref 4.2–5.8)
RBC # FLD: 19.2 % — HIGH (ref 10.3–14.5)
SARS-COV-2 RNA SPEC QL NAA+PROBE: SIGNIFICANT CHANGE UP
SODIUM SERPL-SCNC: 135 MMOL/L — SIGNIFICANT CHANGE UP (ref 135–145)
WBC # BLD: 4.72 K/UL — SIGNIFICANT CHANGE UP (ref 3.8–10.5)
WBC # FLD AUTO: 4.72 K/UL — SIGNIFICANT CHANGE UP (ref 3.8–10.5)

## 2021-11-22 PROCEDURE — 44366 SMALL BOWEL ENDOSCOPY: CPT

## 2021-11-22 PROCEDURE — 99239 HOSP IP/OBS DSCHRG MGMT >30: CPT

## 2021-11-22 RX ORDER — ERYTHROPOIETIN 10000 [IU]/ML
10000 INJECTION, SOLUTION INTRAVENOUS; SUBCUTANEOUS ONCE
Refills: 0 | Status: COMPLETED | OUTPATIENT
Start: 2021-11-22 | End: 2021-11-22

## 2021-11-22 RX ADMIN — SODIUM CHLORIDE 3 MILLILITER(S): 9 INJECTION INTRAMUSCULAR; INTRAVENOUS; SUBCUTANEOUS at 21:00

## 2021-11-22 RX ADMIN — SODIUM CHLORIDE 3 MILLILITER(S): 9 INJECTION INTRAMUSCULAR; INTRAVENOUS; SUBCUTANEOUS at 13:07

## 2021-11-22 RX ADMIN — OCTREOTIDE ACETATE 100 MICROGRAM(S): 200 INJECTION, SOLUTION INTRAVENOUS; SUBCUTANEOUS at 06:13

## 2021-11-22 RX ADMIN — Medication 325 MILLIGRAM(S): at 20:56

## 2021-11-22 RX ADMIN — AMLODIPINE BESYLATE 10 MILLIGRAM(S): 2.5 TABLET ORAL at 06:12

## 2021-11-22 RX ADMIN — Medication 50 MICROGRAM(S): at 06:12

## 2021-11-22 RX ADMIN — ATORVASTATIN CALCIUM 80 MILLIGRAM(S): 80 TABLET, FILM COATED ORAL at 20:57

## 2021-11-22 RX ADMIN — BUMETANIDE 1 MILLIGRAM(S): 0.25 INJECTION INTRAMUSCULAR; INTRAVENOUS at 20:57

## 2021-11-22 RX ADMIN — CARVEDILOL PHOSPHATE 12.5 MILLIGRAM(S): 80 CAPSULE, EXTENDED RELEASE ORAL at 06:12

## 2021-11-22 RX ADMIN — OCTREOTIDE ACETATE 100 MICROGRAM(S): 200 INJECTION, SOLUTION INTRAVENOUS; SUBCUTANEOUS at 20:55

## 2021-11-22 RX ADMIN — ERYTHROPOIETIN 10000 UNIT(S): 10000 INJECTION, SOLUTION INTRAVENOUS; SUBCUTANEOUS at 23:19

## 2021-11-22 RX ADMIN — PANTOPRAZOLE SODIUM 40 MILLIGRAM(S): 20 TABLET, DELAYED RELEASE ORAL at 06:12

## 2021-11-22 RX ADMIN — SODIUM CHLORIDE 3 MILLILITER(S): 9 INJECTION INTRAMUSCULAR; INTRAVENOUS; SUBCUTANEOUS at 06:21

## 2021-11-22 RX ADMIN — Medication 100 MILLIGRAM(S): at 13:12

## 2021-11-22 RX ADMIN — Medication 100 MILLIGRAM(S): at 06:12

## 2021-11-22 RX ADMIN — Medication 100 MILLIGRAM(S): at 21:16

## 2021-11-22 RX ADMIN — PANTOPRAZOLE SODIUM 40 MILLIGRAM(S): 20 TABLET, DELAYED RELEASE ORAL at 20:55

## 2021-11-22 NOTE — CHART NOTE - NSCHARTNOTEFT_GEN_A_CORE
BRIEF PROCEDURE NOTE : SINGLE BALLOON ASSISTED ENTEROSCOPY     FINDINGS:   - Normal esophagus.   - Normal stomach.   - One non-bleeding duodenal ulcer with a clean ulcer base (Joe Class III). Suspect NSAID-induced ulceration.   - The entire examined jejunum was normal. The enteroscope was advanced to the mid to distal jejunum.  There was no evidence of active bleeding nor stigmata of recent bleeding identified on exam.       RECOMMENDATIONS:   - Return patient to [facility] for ongoing care.   - Continue to monitor CBC and iron studies. Monitor for overt signs of GI bleeding.   - Should patient demonstrate signs/symptoms suggestive of ongoing GI bleed, consider outpatient retrograde single-balloon enteroscopy for evaluation of distal extent of small bowel.   - Resume iron supplement as prescribed.   - Okay to advance diet today as tolerated.         See ProVation Note for full procedure report and official recommendations.

## 2021-11-22 NOTE — PROGRESS NOTE ADULT - ASSESSMENT
65 y/o M with PMH of Hypothyroidism,, pre-diabetes, HTN, HLD, CKD not on dialysis, Anemia, small bowel resection 9/13/21 secondary to AVM presents to ED after episode of chest pain associated with shortness of breath. Patient admitted for chest pain, CLOTILDE on CKD and CHF.  Renal following for CLOTILDE/CKD Mx.    CLOTILDE on CKD4:  CLOTILDE likely 2/2 cardiorenal/CHF. improving and rel stable  Creatinine Trend: 3.65 <- 3.49<-3.3<-3.48<-3.6<-3.6 <- 3.84 <-3.87 <--, 4.53 <--, 4.93 <--, 5.38 <--, 5.44 <--, 5.65 <--, 6.06 <--  10/29/21 Cr was 3.29  hypervolemia/ CHF -improved  K, bicarb acceptable    plan  cont monitor Serum Creatinine level  c/w Bumex to 1mg QD po   no indication to start RRT/hd at this time  low Na/phos in diet and fluid restriction 1L/day  monitor BMP daily and u/o   dose all meds for eGFR<15ml/min.   avoid ACEi/ARB/NSAIDs/Nephrotoxics.    Hypertension. bp controlled  c/w hydralazine 100 mg TID, Amlodipine    Carvedilol 12.5 mg TID.-per cardio  continue meds with holding parameters.    Anemia. likely 2/2 CKD. + Fe def  hb low,   on iv iron. s/p epo 10k subqx1  f/u w/GI      labs, chart reviewed  poc pt  pl call for any q's  cell [phone]  office [phone]  ans serv- [phone]  [email or web address] [facility] ( wkend coverage for Hospital)        67 y/o M with PMH of Hypothyroidism,, pre-diabetes, HTN, HLD, CKD not on dialysis, Anemia, small bowel resection 9/13/21 secondary to AVM presents to ED after episode of chest pain associated with shortness of breath. Patient admitted for chest pain, CLOTILDE on CKD and CHF.  Renal following for CLOTILDE/CKD Mx.    CLOTILDE on CKD4:  CLOTILDE likely 2/2 cardiorenal/CHF.   Creatinine Trend:  6.06 on admission >improved and rel stable at 3.7  10/29/21 Cr was 3.29  hypervolemia/ CHF -improved  K, bicarb acceptable    plan  cont monitor Serum Creatinine level  c/w Bumex 1mg QD po, dec from bid  no indication to start RRT/hd at this time  low Na/phos in diet and fluid restriction 1L/day  monitor BMP daily and u/o   dose all meds for eGFR<15ml/min.   avoid ACEi/ARB/NSAIDs/Nephrotoxics.    Hypertension. bp controlled  c/w hydralazine 100 mg TID, Amlodipine    Carvedilol 12.5 mg TID.-per cardio  continue meds with holding parameters.    Anemia. likely 2/2 CKD. + Fe def  hb low,   s/p iv iron. s/p epo 10k subqx1 11/15-redose today-ordered  f/u w/GI-plan for scopes today      labs, chart reviewed  poc pt  pl call for any q's  cell [phone]  office [phone]  ans serv- [phone]  [email or web address] [email or web address] ( wkend coverage for Hospital)

## 2021-11-22 NOTE — PROGRESS NOTE ADULT - PROBLEM SELECTOR PLAN 5
Patient with hemoglobin of 8.0 previously 10.4 on oct 29 but overall trend more in the 8s  Patient denies BRBPR, blood in stool.  S/P iv iron (ferritin 183, tsat 12%), po iron  s/p epogen x1  hgb stable 8.9  Patient with history of GIB from small bowel in September requiring small bowel resection due to AVM  Pt with GIB, management as above  VCE as above, SB bleeding, s/p enteroscopy/APC of AVM's, possible single balloon enteroscopy   CKD could also be contributing to patient's anemia  Apprec GI and heme recs

## 2021-11-22 NOTE — PROGRESS NOTE ADULT - SUBJECTIVE AND OBJECTIVE BOX
Patient is a 66y old  Male who presents with a chief complaint of Chest pain (21 Nov 2021 12:07)      SUBJECTIVE / OVERNIGHT EVENTS: no events, denies overt bleed, feels at baseline     ROS:  No HA/DZ  No Vision changes   No CP, SOB  No N/V/D  No Edema  No Rash  NO weakness, numbness    MEDICATIONS  (STANDING):  amLODIPine   Tablet 10 milliGRAM(s) Oral daily  atorvastatin 80 milliGRAM(s) Oral at bedtime  buMETAnide 1 milliGRAM(s) Oral daily  carvedilol 12.5 milliGRAM(s) Oral every 12 hours  epoetin stephany-epbx (RETACRIT) Injectable 21293 Unit(s) SubCutaneous once  ferrous    sulfate 325 milliGRAM(s) Oral daily  hydrALAZINE 100 milliGRAM(s) Oral three times a day  levothyroxine 50 MICROGram(s) Oral daily  octreotide  Injectable 100 MICROGram(s) SubCutaneous two times a day  pantoprazole  Injectable 40 milliGRAM(s) IV Push every 12 hours  sodium chloride 0.9% lock flush 3 milliLiter(s) IV Push every 8 hours    MEDICATIONS  (PRN):      T(C): 36.7 (11-22-21 @ 10:07)  HR: 66 (11-22-21 @ 10:07)  BP: 137/60 (11-22-21 @ 10:07)  RR: 18 (11-22-21 @ 10:07)  SpO2: 99% (11-22-21 @ 10:07)  CAPILLARY BLOOD GLUCOSE        I&O's Summary    21 Nov 2021 07:01  -  22 Nov 2021 07:00  --------------------------------------------------------  IN: 240 mL / OUT: 1000 mL / NET: -760 mL    22 Nov 2021 07:01  -  22 Nov 2021 10:39  --------------------------------------------------------  IN: 0 mL / OUT: 0 mL / NET: 0 mL        PHYSICAL EXAM:  GENERAL: NAD, well-developed, AOx3  HEAD:  Atraumatic, Normocephalic  EYES: EOMI, PERRL, conjunctiva and sclera clear  NECK: Supple, No JVD  CHEST/LUNG: Clear to auscultation bilaterally  HEART: Regular rate and rhythm; No murmurs, rubs, or gallops, No Edema  ABDOMEN: Soft, Nontender, Nondistended; Bowel sounds present  EXTREMITIES:  2+ Peripheral Pulses, No clubbing, cyanosis  PSYCH: No SI/HI  NEUROLOGY: non-focal  SKIN: No rashes or lesions    LABS:                        8.7    4.72  )-----------( 176      ( 22 Nov 2021 07:57 )             27.0     11-22    135  |  97<L>  |  63<H>  ----------------------------<  106<H>  4.1   |  25  |  3.68<H>    Ca    9.0      22 Nov 2021 07:57  Phos  3.7     11-22  Mg     1.80     11-22                    RADIOLOGY & ADDITIONAL TESTS:    Imaging Personally Reviewed:    Consultant(s) Notes Reviewed:      Care Discussed with Consultants/Other Providers:

## 2021-11-22 NOTE — PROGRESS NOTE ADULT - PROBLEM SELECTOR PLAN 1
- acute blood loss anemia s/p 1 unit prbc on 11/15  -GI consulted, s/p VCE 11/16, revealed active bleeding in small bowel, unable to clearly identify anastomotic site  -IV protonix  -s/p enteroscopy 11/17 revealed AVM's treated with APC  Pseudomelanosis. A single non-bleeding angiectasia in the duodenum.   Treated with argon plasma coagulation (APC). A single non-bleeding angiectasia in the jejunum.   Treated with APC  -started on octreotide 100 mg bid sc per renal, po iron, trend h/h, per GI will consider single balloon enteroscopy today - for now hgb stable and no further overt bleed   -COVID swab today  -trend CBC and transfuse prn to keep hgb>7  -pt has h/o small bowel GIB d/t AVM s/p partial small bowel resection, had multiple admissions for GIB, s/p intraop surgically assisted enteroscopy (with Dr. Marlow) was performed to the distal ileum. A single AVM was identified that was c/w what was noted on VCE. This area was marked for resection by the surgeon and small bowel resection was performed. Upon removal of the scope there was a possible second very small lesion vs trauma in the small bowel (not intervened).  Also possible anastomotic ulcers seen on ?capsule endoscopy after partial SBR (9/24/21)  -Dispo: DC plan home if no GI intervention as h/h stable

## 2021-11-22 NOTE — PROGRESS NOTE ADULT - SUBJECTIVE AND OBJECTIVE BOX
[facility] - S [place] / Agata S /D Rossana/ S Jordyn/ S Natacha/ Kirill Rod / GIRISH Major/ O Dolores  service -[phone], office [phone]  ---------------------------------------------------------------------------------------------------------------    Patient seen and examined bedside    Subjective and Objective: No overnight events, sob resolved. No complaints today. feeling better    Allergies: No Known Allergies      Hospital Medications:   MEDICATIONS  (STANDING):  amLODIPine   Tablet 10 milliGRAM(s) Oral daily  atorvastatin 80 milliGRAM(s) Oral at bedtime  buMETAnide 1 milliGRAM(s) Oral daily  carvedilol 12.5 milliGRAM(s) Oral every 12 hours  epoetin stephany-epbx (RETACRIT) Injectable 21783 Unit(s) SubCutaneous once  ferrous    sulfate 325 milliGRAM(s) Oral daily  hydrALAZINE 100 milliGRAM(s) Oral three times a day  levothyroxine 50 MICROGram(s) Oral daily  octreotide  Injectable 100 MICROGram(s) SubCutaneous two times a day  pantoprazole  Injectable 40 milliGRAM(s) IV Push every 12 hours  sodium chloride 0.9% lock flush 3 milliLiter(s) IV Push every 8 hours      VITALS:  T(F): 97.8 (11-22-21 @ 13:08), Max: 98.7 (11-22-21 @ 06:05)  HR: 68 (11-22-21 @ 13:08)  BP: 142/68 (11-22-21 @ 13:08)  RR: 18 (11-22-21 @ 13:08)  SpO2: 100% (11-22-21 @ 13:08)  Wt(kg): --    11-21 @ 07:01  -  11-22 @ 07:00  --------------------------------------------------------  IN: 240 mL / OUT: 1000 mL / NET: -760 mL    11-22 @ 07:01  -  11-22 @ 15:34  --------------------------------------------------------  IN: 2 mL / OUT: 0 mL / NET: 2 mL          PHYSICAL EXAM:  Constitutional: NAD  HEENT: anicteric sclera  Neck: No JVD  Respiratory: CTAB, no wheezes, rales or rhonchi  Cardiovascular: S1, S2, RRR  Gastrointestinal: BS+, soft, NT/ND  Extremities: No peripheral edema  Neurological: A/O x3  Psychiatric: Normal mood, normal affect  : No tang.    LABS:  11-22    135  |  97<L>  |  63<H>  ----------------------------<  106<H>  4.1   |  25  |  3.68<H>    Ca    9.0      22 Nov 2021 07:57  Phos  3.7     11-22  Mg     1.80     11-22      Creatinine Trend: 3.68 <--, 3.65 <--, 3.49 <--, 3.31 <--, 1.16 <--, 3.48 <--, 3.59 <--, 3.60 <--, 3.84 <--, 3.92 <--                        8.7    4.72  )-----------( 176      ( 22 Nov 2021 07:57 )             27.0     Urine Studies:        RADIOLOGY & ADDITIONAL STUDIES:   [facility] - [facility] / Agata S /D Rossana/ S Jordyn/ S Natacha/ Kirill Rod / GIRISH Major/ O Dolores  service -[phone], office [phone]  ---------------------------------------------------------------------------------------------------------------    Patient seen and examined bedside    Subjective and Objective: No overnight events, sob resolved. No complaints today. feeling better    Allergies: No Known Allergies      Hospital Medications:   MEDICATIONS  (STANDING):  amLODIPine   Tablet 10 milliGRAM(s) Oral daily  atorvastatin 80 milliGRAM(s) Oral at bedtime  buMETAnide 1 milliGRAM(s) Oral daily  carvedilol 12.5 milliGRAM(s) Oral every 12 hours  epoetin stephany-epbx (RETACRIT) Injectable 06239 Unit(s) SubCutaneous once  ferrous    sulfate 325 milliGRAM(s) Oral daily  hydrALAZINE 100 milliGRAM(s) Oral three times a day  levothyroxine 50 MICROGram(s) Oral daily  octreotide  Injectable 100 MICROGram(s) SubCutaneous two times a day  pantoprazole  Injectable 40 milliGRAM(s) IV Push every 12 hours  sodium chloride 0.9% lock flush 3 milliLiter(s) IV Push every 8 hours      VITALS:  T(F): 97.8 (11-22-21 @ 13:08), Max: 98.7 (11-22-21 @ 06:05)  HR: 68 (11-22-21 @ 13:08)  BP: 142/68 (11-22-21 @ 13:08)  RR: 18 (11-22-21 @ 13:08)  SpO2: 100% (11-22-21 @ 13:08)  Wt(kg): --    11-21 @ 07:01  -  11-22 @ 07:00  --------------------------------------------------------  IN: 240 mL / OUT: 1000 mL / NET: -760 mL    11-22 @ 07:01  -  11-22 @ 15:34  --------------------------------------------------------  IN: 2 mL / OUT: 0 mL / NET: 2 mL    PHYSICAL EXAM:  Constitutional: NAD  HEENT: anicteric sclera  Neck: No JVD  Respiratory: CTAB, no wheezes, rales or rhonchi  Cardiovascular: S1, S2, RRR  Gastrointestinal: BS+, soft, NT/ND  Extremities: No peripheral edema  Neurological: A/O x3  Psychiatric: Normal mood, normal affect  : No tang.    LABS:  11-22    135  |  97<L>  |  63<H>  ----------------------------<  106<H>  4.1   |  25  |  3.68<H>    Ca    9.0      22 Nov 2021 07:57  Phos  3.7     11-22  Mg     1.80     11-22      Creatinine Trend: 3.68 <--, 3.65 <--, 3.49 <--, 3.31 <--, 1.16 <--, 3.48 <--, 3.59 <--, 3.60 <--, 3.84 <--, 3.92 <--                        8.7    4.72  )-----------( 176      ( 22 Nov 2021 07:57 )             27.0     Urine Studies:        RADIOLOGY & ADDITIONAL STUDIES:

## 2021-11-22 NOTE — PROGRESS NOTE ADULT - ASSESSMENT
67 y/o M with PMH of Hypothyroidism,, pre-diabetes, HTN, HLD, CKD4 not on dialysis, Anemia, small bowel resection 9/13/21 secondary to AVM presents to ED after episode of chest pain associated with shortness of breath. Patient admitted for chest pain, CLOTILDE on CKD4 and acute CHF. course c/b GIB, with abnormal VCE

## 2021-11-22 NOTE — PROGRESS NOTE ADULT - PROBLEM SELECTOR PLAN 10
hold prophylactic heparin d/t GIB  IPC  stable for DC if no plan for enteroscopy and no objection by renal   Discussed with patient and ACP hold prophylactic heparin d/t GIB  IPC  stable for DC if no plan for enteroscopy and no objection by renal   Discussed with patient and ACP   no objection to proceeded with enteroscopy from our perspective

## 2021-11-22 NOTE — PROGRESS NOTE ADULT - PROBLEM SELECTOR PLAN 4
Patient with ProBNP of [id number]  Echo from October 28/21 showing EF of 60-65% with Grade II left ventricular diastolic dysfunction  Mild acute diastolic CHF-improving  now euvolemic, changed bumex to 1mg po qd  On lasix at home-40mg daily  Not on ACE-I/ARB due to CLOTILDE  resume home coreg Patient with ProBNP of 70877  Echo from October 28/21 showing EF of 60-65% with Grade II left ventricular diastolic dysfunction  Mild acute diastolic CHF- resolved   now euvolemic, changed bumex to 1mg po qd  On lasix at home-40mg daily  Not on ACE-I/ARB due to CLOTILDE  resume home coreg

## 2021-11-22 NOTE — CHART NOTE - NSCHARTNOTEFT_GEN_A_CORE
65 y/o M PMH DM, HTN, HLD, CKD (b/l ~3), anemia, Gastric AVM s/p small bowel resection presenting with sudden onset CP and VOL. CP atypical in nature, substernal but without classic aggrevating/relieving factors. Currently chest pain free. Low level troponin iso nonoliguric renal failure, EKG with known RBBB, low concern for ACS at this time and would not treat for this. Patient was volume overloaded, diuresed, and now euvolemic. He is chest pain free, has no SOB, and can perform > 4 METS    Notified by hospitalist - patient is going for push enteroscopy today.     Patient is ok to proceed with push enteroscopy without further workup.  Previously mentioned stress test can be done as an outpatient basis.    Jenna Armando MD  Cardiology Fellow, PGY-5  [phone]  For all other Cardiology service contact information, go to [email or web address] and use "[email or web address]" to login.

## 2021-11-22 NOTE — CHART NOTE - NSCHARTNOTEFT_GEN_A_CORE
Spoke with Dr. Claudio, no concerns for ischemia. Pt can follow up outpatient for stress test. Pt may proceed with endoscopy without further testing    [facility]  [id number] Spoke with Dr. Claudio, no concerns for ischemia. Pt can follow up outpatient for stress test. Pt may proceed with endoscopy without further testing    [facility]   [id number]

## 2021-11-22 NOTE — PROGRESS NOTE ADULT - PROBLEM SELECTOR PLAN 3
RESOLVED  Continue telemetry monitoring  EKG with known RBBB, no TWI or ST elevations.  Patient currently chest pain free and denies shortness of breath  Troponin possibly elevated in setting of CKD and possible heart failure, not ACS  Echo from October 28/21 showing EF of 60-65% with Grade II left ventricular diastolic dysfunction  cancel echo as he just had echo not long ago  Consider nuclear stress test inpatient Vs outpatient  Apprec Cards recs, believe is more renal related RESOLVED  Continue telemetry monitoring  EKG with known RBBB, no TWI or ST elevations.  Patient currently chest pain free and denies shortness of breath  Troponin possibly elevated in setting of CKD and possible heart failure, not ACS  Echo from October 28/21 showing EF of 60-65% with Grade II left ventricular diastolic dysfunction  cancel echo as he just had echo not long ago  further w/u as out-pt per cards   Apprec Cards recs, believe is more renal related

## 2021-11-23 ENCOUNTER — TRANSCRIPTION ENCOUNTER (OUTPATIENT)
Age: 66
End: 2021-11-23

## 2021-11-23 VITALS
RESPIRATION RATE: 18 BRPM | TEMPERATURE: 98 F | HEART RATE: 69 BPM | SYSTOLIC BLOOD PRESSURE: 127 MMHG | OXYGEN SATURATION: 97 % | DIASTOLIC BLOOD PRESSURE: 58 MMHG

## 2021-11-23 PROCEDURE — 99232 SBSQ HOSP IP/OBS MODERATE 35: CPT

## 2021-11-23 PROCEDURE — 99232 SBSQ HOSP IP/OBS MODERATE 35: CPT | Mod: GC

## 2021-11-23 RX ORDER — PANTOPRAZOLE SODIUM 20 MG/1
40 TABLET, DELAYED RELEASE ORAL
Refills: 0 | Status: DISCONTINUED | OUTPATIENT
Start: 2021-11-23 | End: 2021-11-23

## 2021-11-23 RX ORDER — BUMETANIDE 0.25 MG/ML
1 INJECTION INTRAMUSCULAR; INTRAVENOUS
Qty: 30 | Refills: 0
Start: 2021-11-23 | End: 2021-12-22

## 2021-11-23 RX ORDER — CARVEDILOL PHOSPHATE 80 MG/1
1 CAPSULE, EXTENDED RELEASE ORAL
Qty: 60 | Refills: 0
Start: 2021-11-23 | End: 2021-12-22

## 2021-11-23 RX ORDER — FERROUS SULFATE 325(65) MG
1 TABLET ORAL
Qty: 30 | Refills: 0
Start: 2021-11-23 | End: 2021-12-22

## 2021-11-23 RX ORDER — CARVEDILOL PHOSPHATE 80 MG/1
12.5 CAPSULE, EXTENDED RELEASE ORAL
Qty: 0 | Refills: 0 | DISCHARGE

## 2021-11-23 RX ORDER — ALLOPURINOL 300 MG
1 TABLET ORAL
Qty: 0 | Refills: 0 | DISCHARGE

## 2021-11-23 RX ORDER — BUMETANIDE 0.25 MG/ML
1 INJECTION INTRAMUSCULAR; INTRAVENOUS
Qty: 0 | Refills: 0 | DISCHARGE
Start: 2021-11-23

## 2021-11-23 RX ORDER — FERROUS SULFATE 325(65) MG
1 TABLET ORAL
Qty: 0 | Refills: 0 | DISCHARGE
Start: 2021-11-23

## 2021-11-23 RX ORDER — FERRIC CITRATE 210 MG/1
0 TABLET, COATED ORAL
Qty: 0 | Refills: 0 | DISCHARGE

## 2021-11-23 RX ADMIN — CARVEDILOL PHOSPHATE 12.5 MILLIGRAM(S): 80 CAPSULE, EXTENDED RELEASE ORAL at 17:05

## 2021-11-23 RX ADMIN — SODIUM CHLORIDE 3 MILLILITER(S): 9 INJECTION INTRAMUSCULAR; INTRAVENOUS; SUBCUTANEOUS at 13:52

## 2021-11-23 RX ADMIN — Medication 50 MICROGRAM(S): at 05:46

## 2021-11-23 RX ADMIN — PANTOPRAZOLE SODIUM 40 MILLIGRAM(S): 20 TABLET, DELAYED RELEASE ORAL at 05:46

## 2021-11-23 RX ADMIN — Medication 325 MILLIGRAM(S): at 13:57

## 2021-11-23 RX ADMIN — CARVEDILOL PHOSPHATE 12.5 MILLIGRAM(S): 80 CAPSULE, EXTENDED RELEASE ORAL at 05:46

## 2021-11-23 RX ADMIN — Medication 100 MILLIGRAM(S): at 13:57

## 2021-11-23 RX ADMIN — SODIUM CHLORIDE 3 MILLILITER(S): 9 INJECTION INTRAMUSCULAR; INTRAVENOUS; SUBCUTANEOUS at 06:35

## 2021-11-23 RX ADMIN — Medication 100 MILLIGRAM(S): at 05:48

## 2021-11-23 RX ADMIN — BUMETANIDE 1 MILLIGRAM(S): 0.25 INJECTION INTRAMUSCULAR; INTRAVENOUS at 17:05

## 2021-11-23 RX ADMIN — OCTREOTIDE ACETATE 100 MICROGRAM(S): 200 INJECTION, SOLUTION INTRAVENOUS; SUBCUTANEOUS at 05:46

## 2021-11-23 RX ADMIN — AMLODIPINE BESYLATE 10 MILLIGRAM(S): 2.5 TABLET ORAL at 05:46

## 2021-11-23 RX ADMIN — PANTOPRAZOLE SODIUM 40 MILLIGRAM(S): 20 TABLET, DELAYED RELEASE ORAL at 13:57

## 2021-11-23 NOTE — PROGRESS NOTE ADULT - PROBLEM SELECTOR PROBLEM 6
Hypertension
Hypertension
Hyperlipidemia
Hypertension
Hyperlipidemia
Hypertension
Hyperlipidemia
Hypertension
Hyperlipidemia
Hypertension
Hypertension

## 2021-11-23 NOTE — DISCHARGE NOTE PROVIDER - HOSPITAL COURSE
66 M PMHx hypothyroidism, pre-DM (A1c 5.1), HTN, HLD, CKD not on dialysis, anemia, small bowel resection 9/13/21 secondary to AVM presents to ED after episode of chest pain. Pt states that he was going to clinic because of swelling in legs, he reports while walking be developed midsternal chest pressure and shortness of breath. Pt states that he was given ASA by EMS which helped relieve pain. Patient denies previous episodes of chest pain. Currently patient denies chest pain and shortness of breath. Patient reports intermittently having lower extremity swelling over the last 3 weeks. Pt reports that over the last few days lower extremity swelling has become worse despite taking Lasix at home. HST 90 < 94, proBNP 69246. Cr noted 6.06. US renal negative for hydronephrosis. CXR showed cardiomegaly and trace bilateral pleural effusions but no significant pulmonary edema to suggest congestive heart failure. S/P 1 mg IVP of Bumex in ED. EKG with known RBBB, no TWI or ST elevations. Cardio consulted. TTE done 10/2021 with EF 60-65% with grade 2 LV diastolic dysfunction. Started on Bumex 1 gm daily. Repeat TTE with normal LVSF. Small pericardial effusion. Effusion measures 1.1 cm posterior to LV. No RA/RV collapse. Norvasc held in setting of CHF. TSH 5.27, FT4 1.3. Pt monitored with improvement    Nephro consulted for CLOTILDE on CKD. Likely cardio-renal. Now improved.     Labs notable for drop in Hgb from 10.4 to 8.3. Denies signs of bleeding. S/P Epogen. Iron studies c/w HORACE - ferrous sulfate started. FOBT positive. GI consulted. S/P 1 U PRBC 11/15. Capsule endoscopy 11/16 with active bleed. S/P push enteroscopy with APC of angioectasia. S/P single balloon enteroscopy 11/22 with non-bleeding angioectasia S/P APC. Hgb stable.     Pt monitored and remained hemodynamically stable. Spoke with attending, Dr. Claudio, pt is medically stable for discharge to home.

## 2021-11-23 NOTE — PROGRESS NOTE ADULT - PROBLEM SELECTOR PROBLEM 1
Chest pain
Chest pain
GI bleed
GI bleed
Chest pain
GI bleed
Chest pain
GI bleed
GI bleed

## 2021-11-23 NOTE — PROGRESS NOTE ADULT - PROBLEM SELECTOR PLAN 4
Patient with ProBNP of 85116  Echo from October 28/21 showing EF of 60-65% with Grade II left ventricular diastolic dysfunction  Mild acute diastolic CHF- resolved   now euvolemic, changed bumex to 1mg po qd  was On lasix at home-40mg daily  Not on ACE-I/ARB due to CLOTILDE  resume home coreg

## 2021-11-23 NOTE — PROGRESS NOTE ADULT - ATTENDING COMMENTS
Personally saw and examined patient  labs and vitals reviewed  agree with above assessment and plan  unclear etiology of symptoms, suspect renal pathology here more than cardiac, as recent TTE showing preserved LV function, and Cr elevated to 5.6  appreciate further renal recs, pt may require RRT.   pt denies CP.
Patient seen and examined with the GI fellow. I agree with the above assessment and plan. Thank you for allowing us to care for your patient.    Plan for octreotide. Trend CBC.
Acute on chronic anemia due to recurrent slow small intestinal angioectasias and CKD  s/p one unit of PRBC on 11/15 for hgb 6.6 - now stable around 7.8.    Suggest: Octreotide.
Post single balloon enteroscopy doing well. No lesions found.
Melenic stools today.   Hgb stable in 7s.   Capsule pending.   Continue PPI.   Further interventions pending VCE results.

## 2021-11-23 NOTE — DISCHARGE NOTE NURSING/CASE MANAGEMENT/SOCIAL WORK - PATIENT PORTAL LINK FT
You can access the FollowMyHealth Patient Portal offered by [facility] by registering at the following website: [email or web address]. By joining [facility]’s FollowMyHealth portal, you will also be able to view your health information using other applications (apps) compatible with our system.

## 2021-11-23 NOTE — PROGRESS NOTE ADULT - PROBLEM SELECTOR PLAN 3
RESOLVED  Continue telemetry monitoring  EKG with known RBBB, no TWI or ST elevations.  Patient currently chest pain free and denies shortness of breath  Troponin possibly elevated in setting of CKD and possible heart failure, not ACS  Echo from October 28/21 showing EF of 60-65% with Grade II left ventricular diastolic dysfunction  cancel echo as he just had echo not long ago  further w/u as out-pt per cards   Apprec Cards recs, believe is more renal related

## 2021-11-23 NOTE — DISCHARGE NOTE PROVIDER - NSDCMRMEDTOKEN_GEN_ALL_CORE_FT
allopurinol 100 mg oral tablet: 1 tab(s) orally once a day  aluminum sulfate topical:   amLODIPine 10 mg oral tablet: 1 tab(s) orally once a day  atorvastatin 80 mg oral tablet: 1 tab(s) orally once a day  Coreg 12.5 mg oral tablet: 12.5 milligram(s) orally 3 times a day  ferric citrate 210 mg oral tablet:   ferrous sulfate 325 mg (65 mg elemental iron) oral tablet: 1 tab(s) orally once a day  furosemide 40 mg oral tablet: 1 tab(s) orally once a day  hydrALAZINE 100 mg oral tablet: 1 tab(s) orally 3 times a day  levothyroxine 50 mcg (0.05 mg) oral tablet: 1 tab(s) orally once a day  metroNIDAZOLE 0.75% topical gel: Apply topically to affected area 2 times a day  mometasone 0.1% topical cream: Apply topically to affected area once a day  pantoprazole 40 mg oral delayed release tablet: 1 tab(s) orally once a day  sildenafil 50 mg oral tablet: 1 tab(s) orally , As Needed   aluminum sulfate topical:   amLODIPine 10 mg oral tablet: 1 tab(s) orally once a day  atorvastatin 80 mg oral tablet: 1 tab(s) orally once a day  bumetanide 1 mg oral tablet: 1 tab(s) orally once a day  Coreg 12.5 mg oral tablet: 12.5 milligram(s) orally 3 times a day  ferric citrate 210 mg oral tablet:   ferrous sulfate 325 mg (65 mg elemental iron) oral tablet: 1 tab(s) orally once a day  furosemide 40 mg oral tablet: 1 tab(s) orally once a day  hydrALAZINE 100 mg oral tablet: 1 tab(s) orally 3 times a day  levothyroxine 50 mcg (0.05 mg) oral tablet: 1 tab(s) orally once a day  metroNIDAZOLE 0.75% topical gel: Apply topically to affected area 2 times a day  mometasone 0.1% topical cream: Apply topically to affected area once a day  pantoprazole 40 mg oral delayed release tablet: 1 tab(s) orally once a day   aluminum sulfate topical:   amLODIPine 10 mg oral tablet: 1 tab(s) orally once a day  atorvastatin 80 mg oral tablet: 1 tab(s) orally once a day  bumetanide 1 mg oral tablet: 1 tab(s) orally once a day  carvedilol 12.5 mg oral tablet: 1 tab(s) orally every 12 hours  ferrous sulfate 325 mg (65 mg elemental iron) oral tablet: 1 tab(s) orally once a day  hydrALAZINE 100 mg oral tablet: 1 tab(s) orally 3 times a day  levothyroxine 50 mcg (0.05 mg) oral tablet: 1 tab(s) orally once a day  metroNIDAZOLE 0.75% topical gel: Apply topically to affected area 2 times a day  mometasone 0.1% topical cream: Apply topically to affected area once a day  pantoprazole 40 mg oral delayed release tablet: 1 tab(s) orally once a day

## 2021-11-23 NOTE — PROGRESS NOTE ADULT - PROBLEM SELECTOR PLAN 6
Continue monitoring-BP acceptable at present  At home pt on hydralazine 100 mg TID, Amlodipine 10 mg daily, Carvedilol 12.5 mg TID.  Will continue Hydralazine with holding parameters  Hold coreg. resumed amlodipine
HDL is low at 27  Continue atorvastatin 80 mg daily.
Continue monitoring-BP acceptable at present  At home pt on hydralazine 100 mg TID, Amlodipine 10 mg daily, Carvedilol 12.5 mg TID.  c/w Hydralazine, amlodipine, coreg
HDL is low at 27  Continue atorvastatin 80 mg daily.
Continue monitoring-BP acceptable at present  At home pt on hydralazine 100 mg TID, Amlodipine 10 mg daily, Carvedilol 12.5 mg TID.  Will continue Hydralazine with holding parameters  Hold coreg. resumed amlodipine
Continue monitoring-BP acceptable at present  At home pt on hydralazine 100 mg TID, Amlodipine 10 mg daily, Carvedilol 12.5 mg TID.  c/w Hydralazine, amlodipine, coreg
Continue monitoring-BP acceptable at present  At home pt on hydralazine 100 mg TID, Amlodipine 10 mg daily, Carvedilol 12.5 mg TID.  c/w Hydralazine, amlodipine, coreg
Continue monitoring-BP acceptable at present  At home pt on hydralazine 100 mg TID, Amlodipine 10 mg daily, Carvedilol 12.5 mg TID.  Will continue Hydralazine with holding parameters  Hold coreg. resumed amlodipine
Continue monitoring-BP acceptable at present  At home pt on hydralazine 100 mg TID, Amlodipine 10 mg daily, Carvedilol 12.5 mg TID.  Will continue Hydralazine with holding parameters  Hold coreg. resumed amlodipine
HDL is low at 27  Continue atorvastatin 80 mg daily.
HDL is low at 27  Continue atorvastatin 80 mg daily.
Continue monitoring-BP acceptable at present  At home pt on hydralazine 100 mg TID, Amlodipine 10 mg daily, Carvedilol 12.5 mg TID.  Will continue Hydralazine with holding parameters  Hold coreg. resumed amlodipine
Continue monitoring-BP acceptable at present  At home pt on hydralazine 100 mg TID, Amlodipine 10 mg daily, Carvedilol 12.5 mg TID.  c/w Hydralazine, amlodipine  resume coreg 12.5 bid

## 2021-11-23 NOTE — PROGRESS NOTE ADULT - REASON FOR ADMISSION
Chest pain
GIB, anemia
Chest pain

## 2021-11-23 NOTE — PROGRESS NOTE ADULT - PROVIDER SPECIALTY LIST ADULT
Cardiology
Nephrology
Gastroenterology
Heme/Onc
Nephrology
Gastroenterology
Heme/Onc
Hospitalist
Hospitalist
Nephrology
Gastroenterology
Gastroenterology
Heme/Onc
Nephrology
Hospitalist

## 2021-11-23 NOTE — PROGRESS NOTE ADULT - PROBLEM SELECTOR PROBLEM 2
Acute kidney injury superimposed on CKD

## 2021-11-23 NOTE — PROGRESS NOTE ADULT - PROBLEM SELECTOR PLAN 8
TSH is minimally elevated, repeat TFT's as outpatient  FT4 is normal  Continue levothyroxine 50 mcg daily.
Hemoglobin a1c is 5.1, could be falsely low in the setting of anemia  C/w diabetic diet
TSH is minimally elevated, repeat TFT's as outpatient  FT4 is normal  Continue levothyroxine 50 mcg daily.
TSH is minimally elevated, repeat TFT's as outpatient  FT4 is normal  Continue levothyroxine 50 mcg daily.
Hemoglobin a1c is 5.1, could be falsely low in the setting of anemia  C/w diabetic diet
TSH is minimally elevated, repeat TFT's as outpatient  FT4 is normal  Continue levothyroxine 50 mcg daily.
Hemoglobin a1c is 5.1, could be falsely low in the setting of anemia  C/w diabetic diet
Hemoglobin a1c is 5.1, could be falsely low in the setting of anemia  C/w diabetic diet

## 2021-11-23 NOTE — PROGRESS NOTE ADULT - SUBJECTIVE AND OBJECTIVE BOX
Patient is a 66y old  Male who presents with a chief complaint of Chest pain (23 Nov 2021 08:45)      SUBJECTIVE / OVERNIGHT EVENTS: no events, feels well     ROS:  No HA/DZ  No Vision changes   No CP, SOB  No N/V/D  No Edema  No Rash  NO weakness, numbness    MEDICATIONS  (STANDING):  amLODIPine   Tablet 10 milliGRAM(s) Oral daily  atorvastatin 80 milliGRAM(s) Oral at bedtime  buMETAnide 1 milliGRAM(s) Oral daily  carvedilol 12.5 milliGRAM(s) Oral every 12 hours  epoetin stephany-epbx (RETACRIT) Injectable 58138 Unit(s) SubCutaneous once  ferrous    sulfate 325 milliGRAM(s) Oral daily  hydrALAZINE 100 milliGRAM(s) Oral three times a day  levothyroxine 50 MICROGram(s) Oral daily  octreotide  Injectable 100 MICROGram(s) SubCutaneous two times a day  pantoprazole    Tablet 40 milliGRAM(s) Oral before breakfast  sodium chloride 0.9% lock flush 3 milliLiter(s) IV Push every 8 hours    MEDICATIONS  (PRN):      T(C): 36.5 (11-23-21 @ 09:15)  HR: 68 (11-23-21 @ 09:15)  BP: 153/55 (11-23-21 @ 09:15)  RR: 18 (11-23-21 @ 09:15)  SpO2: 97% (11-23-21 @ 09:15)  CAPILLARY BLOOD GLUCOSE        I&O's Summary    22 Nov 2021 07:01  -  23 Nov 2021 07:00  --------------------------------------------------------  IN: 252 mL / OUT: 500 mL / NET: -248 mL        PHYSICAL EXAM:  GENERAL: NAD, well-developed, AOx3  HEAD:  Atraumatic, Normocephalic  EYES: EOMI, PERRL, conjunctiva and sclera clear  NECK: Supple, No JVD  CHEST/LUNG: Clear to auscultation bilaterally  HEART: Regular rate and rhythm; No murmurs, rubs, or gallops, No Edema  ABDOMEN: Soft, Nontender, Nondistended; Bowel sounds present  EXTREMITIES:  2+ Peripheral Pulses, No clubbing, cyanosis  PSYCH: No SI/HI  NEUROLOGY: non-focal  SKIN: No rashes or lesions    LABS:                        8.7    4.72  )-----------( 176      ( 22 Nov 2021 07:57 )             27.0     11-22    135  |  97<L>  |  63<H>  ----------------------------<  106<H>  4.1   |  25  |  3.68<H>    Ca    9.0      22 Nov 2021 07:57  Phos  3.7     11-22  Mg     1.80     11-22                    RADIOLOGY & ADDITIONAL TESTS:    Imaging Personally Reviewed:    Consultant(s) Notes Reviewed:      Care Discussed with Consultants/Other Providers:

## 2021-11-23 NOTE — PROGRESS NOTE ADULT - PROBLEM SELECTOR PLAN 1
- acute blood loss anemia s/p 1 unit prbc on 11/15  -GI consulted, s/p VCE 11/16, revealed active bleeding in small bowel, unable to clearly identify anastomotic site  -s/p enteroscopy 11/17 revealed AVM's treated with APC  Pseudomelanosis. A single non-bleeding angiectasia in the duodenum.   Treated with argon plasma coagulation (APC). A single non-bleeding angiectasia in the jejunum.   Treated with APC  -started on octreotide 100 mg bid sc per renal, po iron, trend h/h, s/p single balloon enteroscopy - non bleeding duodenal ulcer   - counts stable, no overt bleed

## 2021-11-23 NOTE — DISCHARGE NOTE PROVIDER - NSDCCPCAREPLAN_GEN_ALL_CORE_FT
PRINCIPAL DISCHARGE DIAGNOSIS  Diagnosis: CHF (congestive heart failure)  Assessment and Plan of Treatment: Continue recommended medication regimen. Monitor for signs/symptoms of fluid overload and electrolyte abnormalities, such as, shortness of breath, cough, swelling, chest discomfort, changes in heart rate, dizziness, fainting, or changes in mental status. Follow-up with your PCP/cardiologist outpatient after you've been discharged from the hospital.        SECONDARY DISCHARGE DIAGNOSES  Diagnosis: Anemia  Assessment and Plan of Treatment: You were seen by Gastroenterology in-hospital.  You had an endoscopic evaluation showing non-bleeding angioectasia in the duodenum and jejunum treated with argon plasma coagulation. Continue to monitor for signs/symptom including but not limited to melena, bright red blood per rectum, pale skin, fatigue, dizziness, increased heart rate, or chest pain. Follow up outpatient with Gastroenterology (Dr. Marlow) in 1-2 weeks for further management and possible consideration for  outpatient retrograde single-balloon enteroscopy for evaluation of distal extent of small bowel if further bleeding noted.    Diagnosis: Hypertension  Assessment and Plan of Treatment: Continue blood pressure medication regimen as directed. Monitor for any visual changes, headaches or dizziness.  Monitor blood pressure regularly.  Follow up with your PCP for further management for high blood pressure.      Diagnosis: Prediabetes  Assessment and Plan of Treatment: Continue consistent carbohydrate diet.  Monitor blood glucose levels routinely. Follow up outpatient PCP in 1-2 weeks for further management.       Diagnosis: Hyperlipidemia  Assessment and Plan of Treatment: Continue cholesterol control medications. Continue DASH diet. Follow up with your PCP within 1 week of discharge for further management and monitoring of lipid and cholesterol panels.      Diagnosis: Hypothyroidism  Assessment and Plan of Treatment: Continue your thyroid medications as recommended and follow-up with your outpatient provider for continual thyroid function testing and further medication management.      Diagnosis: Acute kidney injury superimposed on CKD  Assessment and Plan of Treatment: You were seen by Nephrology in-hospital. Improved. In order to prevent further disease progression, continue to follow recommendations made by your primary provider/nephrologist. Continue a diet that is low in sodium and avoid foods that are concentrated in potassium and phosphorus. Continue your medications/supplementations as directed and avoid over-the-counter drugs that are harmful to kidneys, such as, Non-Steroidal Anti-Inflammatory Drugs (NSAIDs). Follow-up as outpatient to monitor your kidney function, as well as, vitamin D, Calcium, potassium, and phosphorus levels.      Diagnosis: CHF (congestive heart failure)  Assessment and Plan of Treatment: Continue recommended medication regimen. Monitor for signs/symptoms of fluid overload and electrolyte abnormalities, such as, shortness of breath, cough, swelling, chest discomfort, changes in heart rate, dizziness, fainting, or changes in mental status. Follow-up with your PCP/cardiologist outpatient after you've been discharged from the hospital.       PRINCIPAL DISCHARGE DIAGNOSIS  Diagnosis: CHF (congestive heart failure)  Assessment and Plan of Treatment: Continue recommended medication regimen. Monitor for signs/symptoms of fluid overload and electrolyte abnormalities, such as, shortness of breath, cough, swelling, chest discomfort, changes in heart rate, dizziness, fainting, or changes in mental status. Follow-up with your PCP/cardiologist outpatient after you've been discharged from the hospital.        SECONDARY DISCHARGE DIAGNOSES  Diagnosis: Anemia  Assessment and Plan of Treatment: You were seen by Gastroenterology in-hospital.  You had an endoscopic evaluation showing non-bleeding angioectasia in the duodenum and jejunum treated with argon plasma coagulation. Continue to monitor for signs/symptom including but not limited to melena, bright red blood per rectum, pale skin, fatigue, dizziness, increased heart rate, or chest pain. Follow up outpatient with Gastroenterology (Dr. Marlow) in 1-2 weeks for further management and possible consideration for  outpatient retrograde single-balloon enteroscopy for evaluation of distal extent of small bowel if further bleeding noted.    Diagnosis: Hypertension  Assessment and Plan of Treatment: Continue blood pressure medication regimen as directed. Monitor for any visual changes, headaches or dizziness.  Monitor blood pressure regularly.  Follow up with your PCP for further management for high blood pressure.      Diagnosis: Prediabetes  Assessment and Plan of Treatment: Continue consistent carbohydrate diet.  Monitor blood glucose levels routinely. Follow up outpatient PCP in 1-2 weeks for further management.       Diagnosis: Hyperlipidemia  Assessment and Plan of Treatment: Continue cholesterol control medications. Continue DASH diet. Follow up with your PCP within 1 week of discharge for further management and monitoring of lipid and cholesterol panels.      Diagnosis: Hypothyroidism  Assessment and Plan of Treatment: Continue your thyroid medications as recommended and follow-up with your outpatient provider for continual thyroid function testing and further medication management.      Diagnosis: Acute kidney injury superimposed on CKD  Assessment and Plan of Treatment: You were seen by Nephrology in-hospital. Improved. In order to prevent further disease progression, continue to follow recommendations made by your primary provider/nephrologist. Continue a diet that is low in sodium and avoid foods that are concentrated in potassium and phosphorus. Continue your medications/supplementations as directed and avoid over-the-counter drugs that are harmful to kidneys, such as, Non-Steroidal Anti-Inflammatory Drugs (NSAIDs). Follow-up as outpatient to monitor your kidney function, as well as, vitamin D, Calcium, potassium, and phosphorus levels.

## 2021-11-23 NOTE — PROGRESS NOTE ADULT - PROBLEM SELECTOR PROBLEM 7
Hyperlipidemia
Hypothyroidism
Hyperlipidemia
Hyperlipidemia
Hypothyroidism
Hypothyroidism
Hyperlipidemia
Hyperlipidemia
Hypothyroidism
Hyperlipidemia

## 2021-11-23 NOTE — PROGRESS NOTE ADULT - PROBLEM SELECTOR PROBLEM 9
Prediabetes
Need for prophylactic measure
Prediabetes
Need for prophylactic measure
Prediabetes

## 2021-11-23 NOTE — PROGRESS NOTE ADULT - PROBLEM SELECTOR PLAN 9
Heparin subcutaneous 5000 units q12h    Discussed with patient and ACP
Hemoglobin a1c is 5.1, could be falsely low in the setting of anemia  C/w diabetic diet
Heparin subcutaneous 5000 units q12h    Discussed with patient and ACP
Hemoglobin a1c is 5.1, could be falsely low in the setting of anemia  C/w diabetic diet
Heparin subcutaneous 5000 units q12h    Discussed with patient and ACP
Hemoglobin a1c is 5.1, could be falsely low in the setting of anemia  C/w diabetic diet
Heparin subcutaneous 5000 units q12h    Discussed with patient and ACP
Hemoglobin a1c is 5.1, could be falsely low in the setting of anemia  C/w diabetic diet

## 2021-11-23 NOTE — CHART NOTE - NSCHARTNOTESELECT_GEN_ALL_CORE
ACP/Event Note
Brief Procedure Note/Event Note
ACP/Event Note
Event Note
GI Fellow - VCE/Event Note
VCE Results

## 2021-11-23 NOTE — PROGRESS NOTE ADULT - PROBLEM SELECTOR PROBLEM 4
Anemia
CHF (congestive heart failure)
CHF (congestive heart failure)
Anemia
CHF (congestive heart failure)
Anemia
CHF (congestive heart failure)
Anemia
CHF (congestive heart failure)
CHF (congestive heart failure)

## 2021-11-23 NOTE — DISCHARGE NOTE PROVIDER - CARE PROVIDER_API CALL
Phil Marlow  Gastroenterology  [address] [place] [address]  Phone: [phone]  Fax: [phone]  Follow Up Time:

## 2021-11-23 NOTE — PROGRESS NOTE ADULT - SUBJECTIVE AND OBJECTIVE BOX
Interval Events:   No abdominal pain  No nausea /vomiting / diarrhea   No melena / bloody bm     Hospital Medications:  amLODIPine   Tablet 10 milliGRAM(s) Oral daily  atorvastatin 80 milliGRAM(s) Oral at bedtime  buMETAnide 1 milliGRAM(s) Oral daily  carvedilol 12.5 milliGRAM(s) Oral every 12 hours  epoetin stephany-epbx (RETACRIT) Injectable 27865 Unit(s) SubCutaneous once  ferrous    sulfate 325 milliGRAM(s) Oral daily  hydrALAZINE 100 milliGRAM(s) Oral three times a day  levothyroxine 50 MICROGram(s) Oral daily  octreotide  Injectable 100 MICROGram(s) SubCutaneous two times a day  pantoprazole  Injectable 40 milliGRAM(s) IV Push every 12 hours  sodium chloride 0.9% lock flush 3 milliLiter(s) IV Push every 8 hours        ROS:   General:  No fevers, chills or night sweats  ENT:  No sore throat or dysphagia  CV:  No pain or palpitations  Resp:  No dyspnea, cough or  wheezing  GI:  as above  Skin:  No rash or edema  Neuro: no weakness   Hematologic: no bleeding  Musculoskeletal: no muscle pain or join pain  Psych: no agitation      PHYSICAL EXAM:   Vital Signs:  Vital Signs Last 24 Hrs  T(C): 37 (23 Nov 2021 05:00), Max: 37 (23 Nov 2021 05:00)  T(F): 98.6 (23 Nov 2021 05:00), Max: 98.6 (23 Nov 2021 05:00)  HR: 71 (23 Nov 2021 05:00) (57 - 71)  BP: 151/50 (23 Nov 2021 05:00) (115/50 - 151/50)  BP(mean): 75 (22 Nov 2021 19:20) (70 - 75)  RR: 18 (23 Nov 2021 05:00) (12 - 18)  SpO2: 98% (23 Nov 2021 05:00) (95% - 100%)  Daily Height in cm: 167 (22 Nov 2021 17:37)    Daily     GENERAL:  NAD, Appears stated age  HEENT:  NC/AT,  conjunctivae clear and pink, sclera -anicteric  CHEST:  Normal Effort, Breath sounds clear  HEART:  RRR, S1 + S2, no murmurs  ABDOMEN:  Soft, non-tender, non-distended, normoactive bowel sounds,  no masses  EXTREMITIES:  no cyanosis or edema  SKIN:  Warm & Dry. No rash or erythema  NEURO:  Alert, oriented, no focal deficit    LABS:                        8.7    4.72  )-----------( 176      ( 22 Nov 2021 07:57 )             27.0       11-22    135  |  97<L>  |  63<H>  ----------------------------<  106<H>  4.1   |  25  |  3.68<H>    Ca    9.0      22 Nov 2021 07:57  Phos  3.7     11-22  Mg     1.80     11-22                                    8.7    4.72  )-----------( 176      ( 22 Nov 2021 07:57 )             27.0                         8.9    4.69  )-----------( 184      ( 21 Nov 2021 06:35 )             28.6       Imaging:      BRIEF PROCEDURE NOTE : SINGLE BALLOON ASSISTED ENTEROSCOPY     FINDINGS:   - Normal esophagus.   - Normal stomach.   - One non-bleeding duodenal ulcer with a clean ulcer base (Joe Class III). Suspect NSAID-induced ulceration.   - The entire examined jejunum was normal. The enteroscope was advanced to the mid to distal jejunum.  There was no evidence of active bleeding nor stigmata of recent bleeding identified on exam.

## 2021-11-23 NOTE — PROGRESS NOTE ADULT - ASSESSMENT
66M with PMH of CKD (not on HD), hypothyroidism, pre-DM, HTN, HLD, s/p cholecystectomy c/b bile leak, recurrent admissions for anemia since 1/2021, initially found to have gastric nodules, ectasias, and 3 cm ascending colon polyp s/p polypectomy (1/2021), most recently admitted for VCE-confirmed small bowel bleeding attributed to ectasias s/p intra-op push enteroscopy (9/24/21) w/ SBR of distal/terminal ileum at site of suspected ectasia with post-op anastomotic ulcers seen on repeat VCE.   Now admitted with recurrent anemia and black stool concerning for small bowel bleeding.   Capsule endoscopy 11/16 revealed active bleeding  s/p Push enteroscopy with APC of angioectasia   s/p single balloon enteroscopy 11/22/21    Impression:  # Acute on chronic anemia due to recurrent slow small intestinal angioectasias and CKD  s/p one unit of PRBC on 11/15 for hgb 6.6 - now stable around 7.8    Recommendations:  -   - Should patient demonstrate signs/symptoms suggestive of ongoing GI bleed, consider outpatient retrograde single-balloon enteroscopy for evaluation of distal extent of small bowel.   - Iron supplement  - Okay to advance diet today as tolerated.     Traci Mcmahan MD  Gastroenterology Fellow  Pager: [phone]  Please call answering service [phone] / on-call GI fellow after 5pm and before 8am, and on weekends.   66M with PMH of CKD (not on HD), hypothyroidism, pre-DM, HTN, HLD, s/p cholecystectomy c/b bile leak, recurrent admissions for anemia since 1/2021, initially found to have gastric nodules, ectasias, and 3 cm ascending colon polyp s/p polypectomy (1/2021), most recently admitted for VCE-confirmed small bowel bleeding attributed to ectasias s/p intra-op push enteroscopy (9/24/21) w/ SBR of distal/terminal ileum at site of suspected ectasia with post-op anastomotic ulcers seen on repeat VCE.   Now admitted with recurrent anemia and black stool concerning for small bowel bleeding.   Capsule endoscopy 11/16 revealed active bleeding  s/p Push enteroscopy with APC of angioectasia   s/p single balloon enteroscopy 11/22/21    Impression:  # Acute on chronic anemia due to recurrent slow small intestinal angioectasias and CKD  s/p one unit of PRBC on 11/15 for hgb 6.6 - now stable around 7.8    Recommendations:  - Okay to discharge patient home from GI standpoint.  - Should patient demonstrate signs/symptoms suggestive of ongoing GI bleed, consider outpatient retrograde single-balloon enteroscopy for evaluation of distal extent of small bowel.   - Iron supplement  - Okay to advance diet today as tolerated.     Traci Mcmahan MD  Gastroenterology Fellow  Pager: [phone]  Please call answering service [phone] / on-call GI fellow after 5pm and before 8am, and on weekends.   66M with PMH of CKD (not on HD), hypothyroidism, pre-DM, HTN, HLD, s/p cholecystectomy c/b bile leak, recurrent admissions for anemia since 1/2021, initially found to have gastric nodules, ectasias, and 3 cm ascending colon polyp s/p polypectomy (1/2021), most recently admitted for VCE-confirmed small bowel bleeding attributed to ectasias s/p intra-op push enteroscopy (9/24/21) w/ SBR of distal/terminal ileum at site of suspected ectasia with post-op anastomotic ulcers seen on repeat VCE.   Now admitted with recurrent anemia and black stool concerning for small bowel bleeding.   Capsule endoscopy 11/16 revealed active bleeding  s/p Push enteroscopy with APC of angioectasia   s/p single balloon enteroscopy 11/22/21    Impression:  # Acute on chronic anemia due to recurrent slow small intestinal angioectasias and CKD  s/p one unit of PRBC on 11/15 for hgb 6.6 - now stable around 7.8    Recommendations:  - Okay to discharge patient home from GI standpoint.  - Should patient demonstrate signs/symptoms suggestive of ongoing GI bleed, consider outpatient retrograde single-balloon enteroscopy for evaluation of distal extent of small bowel.   - Follow up with GI as outpatient as needed Dr. Marlow - GI attending Clinic [phone]  - Iron supplement  - Okay to advance diet today as tolerated.     Traci Mcmahan MD  Gastroenterology Fellow  Pager: [phone]  Please call answering service [phone] / on-call GI fellow after 5pm and before 8am, and on weekends.

## 2021-11-23 NOTE — PROGRESS NOTE ADULT - ASSESSMENT
65 y/o M with PMH of Hypothyroidism,, pre-diabetes, HTN, HLD, CKD4 not on dialysis, Anemia, small bowel resection 9/13/21 secondary to AVM presents to ED after episode of chest pain associated with shortness of breath. Patient admitted for chest pain, CLOTILDE on CKD4 and acute CHF. course c/b GIB, with abnormal VCE

## 2021-11-23 NOTE — PROGRESS NOTE ADULT - PROBLEM SELECTOR PLAN 10
hold prophylactic heparin d/t GIB  IPC  stable for DC hime today - cleared by GI  Discussed with patient and ACP   Time spent 35 min

## 2021-11-23 NOTE — PROGRESS NOTE ADULT - SUBJECTIVE AND OBJECTIVE BOX
[facility] - EMMETT Briggs / EMMETT Parmar / GENA Tracy/ EMMETT Cobb/ EMMETT Manzano/ ESTEE Rod / KAMINI Major / LATASHA Bernal  ---------------------------------------------------------------------------------------------------------------  seen and examined today for Tate on CKD  Interval : serum creatinine stable  VITALS:  T(F): 97.7 (11-23-21 @ 09:15), Max: 98.6 (11-23-21 @ 05:00)  HR: 62 (11-23-21 @ 13:55)  BP: 140/56 (11-23-21 @ 13:55)  RR: 18 (11-23-21 @ 09:15)  SpO2: 97% (11-23-21 @ 09:15)  Wt(kg): --    11-22 @ 07:01  -  11-23 @ 07:00  --------------------------------------------------------  IN: 252 mL / OUT: 500 mL / NET: -248 mL      Physical Exam :-  Constitutional: NAD  Neck: Supple.  Respiratory: Bilateral equal breath sounds,  Cardiovascular: S1, S2 normal,  Gastrointestinal: Bowel Sounds present, soft, non tender.  Extremities: No edema  Neurological: Alert and Oriented x 3, no focal deficits  Psychiatric: Normal mood, normal affect  Data:-  Allergies :   No Known Allergies    Hospital Medications:   MEDICATIONS  (STANDING):  amLODIPine   Tablet 10 milliGRAM(s) Oral daily  atorvastatin 80 milliGRAM(s) Oral at bedtime  buMETAnide 1 milliGRAM(s) Oral daily  carvedilol 12.5 milliGRAM(s) Oral every 12 hours  epoetin stephany-epbx (RETACRIT) Injectable 02051 Unit(s) SubCutaneous once  ferrous    sulfate 325 milliGRAM(s) Oral daily  hydrALAZINE 100 milliGRAM(s) Oral three times a day  levothyroxine 50 MICROGram(s) Oral daily  octreotide  Injectable 100 MICROGram(s) SubCutaneous two times a day  pantoprazole    Tablet 40 milliGRAM(s) Oral before breakfast  sodium chloride 0.9% lock flush 3 milliLiter(s) IV Push every 8 hours    11-22    135  |  97<L>  |  63<H>  ----------------------------<  106<H>  4.1   |  25  |  3.68<H>    Ca    9.0      22 Nov 2021 07:57  Phos  3.7     11-22  Mg     1.80     11-22      Creatinine Trend: 3.68 <--, 3.65 <--, 3.49 <--, 3.31 <--, 1.16 <--, 3.48 <--, 3.59 <--, 3.60 <--                        8.7    4.72  )-----------( 176      ( 22 Nov 2021 07:57 )             27.0

## 2021-11-23 NOTE — PROGRESS NOTE ADULT - PROBLEM SELECTOR PROBLEM 5
Hypertension
Anemia
Hypertension
Anemia

## 2021-11-23 NOTE — PROGRESS NOTE ADULT - ASSESSMENT
67 y/o M with PMH of Hypothyroidism,, pre-diabetes, HTN, HLD, CKD not on dialysis, Anemia, small bowel resection 9/13/21 secondary to AVM presents to ED after episode of chest pain associated with shortness of breath. Patient admitted for chest pain, CLOTILDE on CKD and CHF.  Renal following for CLOTILDE/CKD Mx.    CLOTILDE on CKD4:  CLOTILDE likely 2/2 cardiorenal/CHF.   Creatinine Trend:  6.06 on admission >improved and rel stable at 3.7  10/29/21 Cr was 3.29  hypervolemia/ CHF -improved  K, bicarb acceptable    plan  cont monitor Serum Creatinine level  c/w Bumex 1mg QD po, dec from bid  no indication to start RRT/hd at this time  low Na/phos in diet and fluid restriction 1L/day  monitor BMP daily and u/o   dose all meds for eGFR<15ml/min.   avoid ACEi/ARB/NSAIDs/Nephrotoxics.    Hypertension. bp controlled  c/w hydralazine 100 mg TID, Amlodipine    Carvedilol 12.5 mg TID.-per cardio  continue meds with holding parameters.    Anemia. likely 2/2 CKD. + Fe def  hb low,   s/p iv iron. s/p epo 10k subqx1 11/15-redose today-ordered  f/u w/GI-plan for scopes today      labs, chart reviewed  poc pt  For any question, call:  Cell # [phone]  Pager # [phone]  Callback # [phone]

## 2021-11-23 NOTE — PROGRESS NOTE ADULT - PROBLEM SELECTOR PLAN 7
HDL is low at 27  Continue atorvastatin 80 mg daily.
HDL is low at 27  Continue atorvastatin 80 mg daily.
TSH is minimally elevated, repeat TFT's as outpatient  F/up FT4  Continue levothyroxine 50 mcg daily.
HDL is low at 27  Continue atorvastatin 80 mg daily.
TSH is minimally elevated, repeat TFT's as outpatient  FT4 is normal  Continue levothyroxine 50 mcg daily.
TSH is minimally elevated, repeat TFT's as outpatient  FT4 is normal  Continue levothyroxine 50 mcg daily.
HDL is low at 27  Continue atorvastatin 80 mg daily.
TSH is minimally elevated, repeat TFT's as outpatient  FT4 is normal  Continue levothyroxine 50 mcg daily.

## 2021-11-23 NOTE — PROGRESS NOTE ADULT - PROBLEM/PLAN-10
DISPLAY PLAN FREE TEXT
Calm
DISPLAY PLAN FREE TEXT
DISPLAY PLAN FREE TEXT

## 2021-11-23 NOTE — PROGRESS NOTE ADULT - PROBLEM SELECTOR PROBLEM 3
CHF (congestive heart failure)
CHF (congestive heart failure)
Chest pain
Chest pain
CHF (congestive heart failure)
Chest pain
CHF (congestive heart failure)
Chest pain
Chest pain

## 2021-11-23 NOTE — PROGRESS NOTE ADULT - PROBLEM SELECTOR PROBLEM 8
Hypothyroidism
Prediabetes
Hypothyroidism
Prediabetes
Hypothyroidism
Prediabetes
Prediabetes
Hypothyroidism

## 2021-12-22 PROCEDURE — 84484 ASSAY OF TROPONIN QUANT: CPT

## 2021-12-22 PROCEDURE — 82962 GLUCOSE BLOOD TEST: CPT

## 2021-12-22 PROCEDURE — 84300 ASSAY OF URINE SODIUM: CPT

## 2021-12-22 PROCEDURE — 85027 COMPLETE CBC AUTOMATED: CPT

## 2021-12-22 PROCEDURE — 86901 BLOOD TYPING SEROLOGIC RH(D): CPT

## 2021-12-22 PROCEDURE — 84100 ASSAY OF PHOSPHORUS: CPT

## 2021-12-22 PROCEDURE — 36415 COLL VENOUS BLD VENIPUNCTURE: CPT

## 2021-12-22 PROCEDURE — 80048 BASIC METABOLIC PNL TOTAL CA: CPT

## 2021-12-22 PROCEDURE — 93005 ELECTROCARDIOGRAM TRACING: CPT

## 2021-12-22 PROCEDURE — 87635 SARS-COV-2 COVID-19 AMP PRB: CPT

## 2021-12-22 PROCEDURE — 85025 COMPLETE CBC W/AUTO DIFF WBC: CPT

## 2021-12-22 PROCEDURE — 93970 EXTREMITY STUDY: CPT

## 2021-12-22 PROCEDURE — 82570 ASSAY OF URINE CREATININE: CPT

## 2021-12-22 PROCEDURE — 86923 COMPATIBILITY TEST ELECTRIC: CPT

## 2021-12-22 PROCEDURE — 71045 X-RAY EXAM CHEST 1 VIEW: CPT

## 2021-12-22 PROCEDURE — 84443 ASSAY THYROID STIM HORMONE: CPT

## 2021-12-22 PROCEDURE — 91110 GI TRC IMG INTRAL ESOPH-ILE: CPT

## 2021-12-22 PROCEDURE — 86769 SARS-COV-2 COVID-19 ANTIBODY: CPT

## 2021-12-22 PROCEDURE — P9016: CPT

## 2021-12-22 PROCEDURE — 86900 BLOOD TYPING SEROLOGIC ABO: CPT

## 2021-12-22 PROCEDURE — 86850 RBC ANTIBODY SCREEN: CPT

## 2021-12-22 PROCEDURE — 83036 HEMOGLOBIN GLYCOSYLATED A1C: CPT

## 2021-12-22 PROCEDURE — 84436 ASSAY OF TOTAL THYROXINE: CPT

## 2021-12-22 PROCEDURE — 83930 ASSAY OF BLOOD OSMOLALITY: CPT

## 2021-12-22 PROCEDURE — 80053 COMPREHEN METABOLIC PANEL: CPT

## 2021-12-22 PROCEDURE — 85730 THROMBOPLASTIN TIME PARTIAL: CPT

## 2021-12-22 PROCEDURE — 93306 TTE W/DOPPLER COMPLETE: CPT

## 2021-12-22 PROCEDURE — 97161 PT EVAL LOW COMPLEX 20 MIN: CPT

## 2021-12-22 PROCEDURE — 84540 ASSAY OF URINE/UREA-N: CPT

## 2021-12-22 PROCEDURE — 83735 ASSAY OF MAGNESIUM: CPT

## 2021-12-22 PROCEDURE — 83880 ASSAY OF NATRIURETIC PEPTIDE: CPT

## 2021-12-22 PROCEDURE — 36430 TRANSFUSION BLD/BLD COMPNT: CPT

## 2021-12-22 PROCEDURE — 99285 EMERGENCY DEPT VISIT HI MDM: CPT | Mod: 25

## 2021-12-22 PROCEDURE — 85610 PROTHROMBIN TIME: CPT

## 2022-01-19 NOTE — PATIENT PROFILE ADULT. - CAREGIVER ADDRESS
Progress Note    Admit Date:  1/12/2022    59 y.o. male with DMII, Chronic sCHF, CAD s/p stent placement x3, hx of MI, asthma, HTN, HLD, GERD, lumbar spondylosis and DDD s/p bilateral dorsal ramus medial branch ablation and interstim implant, depression who presented to [facility] ED with complaint of generalized weakness ongoing for approximately 2 months and frequent falls. In ED, CT of the head showed no acute abnormality. Chest x-ray with no acute cardiopulmonary process. Patient did test positive for COVID. He is vaccinated x2, no booster. Patient admitted to med-surg for further care. PT/OT consulted. Mr. Ary Dominguez developed rapid HR what appears to be Afib on TELE monitor and his pacer inappropriately delivered shock x 5 and this was witnessed by me and nursing staff. Pt awake, conscious during these episodes and by the end of the 5th shock, he started getting slightly confused    Given cardizem 10 mg IV x 1 and metoprolol 5 mg with control of HR  EKG after this episode showed sinus tach    Progressively developed confusion , fevers , started on precedex gtt      Pt transferred to ICU    Pt very confused and became agitated with removing lines. Now on precedex gtt, ct head neg   No further shocks from ICD. His ICD has been adjusted by cardiology   Now remains in NSR  , intermittent high rates    Subjective:    Pt seen up in bed , more awake, alert and better oriented today . Following commands.  Remains in NSR  Passed SLP eval  Sugars going up       Objective:   Patient Vitals for the past 4 hrs:   BP Temp Temp src Pulse Resp SpO2   01/19/22 0751 -- -- -- -- -- 99 %   01/19/22 0621 126/62 -- -- 81 16 99 %   01/19/22 0532 (!) 120/58 -- -- 63 27 100 %   01/19/22 0430 (!) 111/57 97.4 °F (36.3 °C) Axillary 63 25 99 %            Intake/Output Summary (Last 24 hours) at 1/19/2022 0759  Last data filed at 1/19/2022 0430  Gross per 24 hour   Intake 100 ml   Output 2190 ml   Net -2090 ml       Physical Exam:  Gen: middle aged obese male more awake. Alert and better oriented today     . Appears to be not in any distress  Mucous Membranes:  Pink , anicteric  NG in place  Neck: No JVD, no carotid bruit, no thyromegaly  Chest:  Clear to auscultation bilaterally, diminished in bases   Cardiovascular:  regular,  S1S2 heard, no murmurs or gallops- left sided ICD +  Abdomen:  Soft, undistended, non tender, no organomegaly, BS present  Extremities: No edema or cyanosis. Distal pulses well felt  Neurological : improving mentation but still with intermittent confusion  Right sided increased stiffness noted today   No facial weakness. Speech clear      Data:  CBC:   Recent Labs     01/17/22  0538 01/18/22  0517 01/19/22  0604   WBC 2.9* 2.3* 3.4*   HGB 13.3* 12.6* 13.4*   HCT 38.6* 37.7* 39.2*   MCV 84.1 87.1 84.1   PLT 62* 51* 62*     BMP:   Recent Labs     01/17/22  0538 01/17/22  0538 01/18/22  0517 01/19/22  0604   *  --  129* 132*   K 3.4*   < > 3.9 3.6  3.6   CL 94*  --  94* 95*   CO2 20*  --  21 23   PHOS  --   --  3.0 2.7   BUN 14  --  12 11   CREATININE 0.8*  --  0.7* 0.7*    < > = values in this interval not displayed. LIVER PROFILE:   Recent Labs     01/17/22  1326   AST 30   ALT 22   BILIDIR <0.2   BILITOT 0.5   ALKPHOS 58     PT/INR:   Recent Labs     01/16/22  1426   PROTIME 10.6   INR 0.94       CULTURES  Results for Lianet Gan (MRN [id number]) as of 1/13/2022 13:29    Ref. Range 1/12/2022 19:35   SARS-CoV-2, NAAT Latest Ref Range: Not Detected  DETECTED (AA)          RADIOLOGY  XR CHEST PORTABLE   Final Result   NG tube with the tip and side-port in the stomach. Bibasilar atelectasis. XR CHEST PORTABLE   Final Result   NG tube is difficult to visualize, tip is likely below the diaphragm. Repeat   exam could be considered if clinically indicated. XR CHEST PORTABLE   Final Result   New elevated right hemidiaphragm, likely atelectasis or possibly infiltrate.    Subpulmonic effusion not excluded. No overt failure. CT HEAD WO CONTRAST   Final Result   1. No acute intracranial abnormality. CT Head WO Contrast   Final Result   No acute intracranial abnormality. XR CHEST PORTABLE   Final Result   No acute cardiopulmonary process. Pertinent previous results reviewed   Diagnostic cardiac cath 7/13/21  CONCLUSION:  Successful stenting of the true circumflex into the obtuse  marginal branch, successful stenting of the left posterior descending  coronary artery and successful stenting of the obtuse marginal branch,  all 80% stenosis reduced to 0 with these three drug-eluting stents. JAMES-3 blood flow was present in the circumflex artery and all its  branches.  The LAD is normal with minor irregularities only and the left  main is normal.  Nondominant right coronary artery.  LV ejection  fraction of 40% with inferior wall and inferoposterior hypokinesia.     ECHO 11/27/19  Summary   The left ventricular systolic function is moderately reduced with an   ejection fraction of 35 -40 %.   There is hypokinesis of the inferior and basal inferior walls.   There is akinesis of the inferolateral wall.   Left ventricular cavity size is moderately dilated.   Compared to previous study from 2-2-2015 no changes noted in left   ventricular function.   Mild mitral regurgitation. Assessment/Plan:      # ICD firing - he had 5 witnessed shocks and then more bursts (4-5 additional episodes) of AICD discharges and concern for malfunctioning ICD  - s.p  [employer] [place] interrogation and now ICD is adjusted . - Cardiology consulted. -now back in NSR  - resumed  BB - toprol XL stopped due to low BP - decreased to  metoprolol 25BID with close monitoring. Afibb with RVR  - new onset with above ICD issues  - resume home cardizem and BB if tolerated.  Can use IV meds intermittently  - now in NSR   - started eliquis but holding with low PLT       Acute Encephalopathy - possible sec to covid encephalopathy   - NOT POA - developed slowly most pronounced after ICD firing    CT head non focal x 2   He denies any drug or alcohol use. precedex gtt for agitation used  - added seroquel  Ammonia level wnl   Consider MRI brain - unable to do with incompatible pacer  Started on rocephin meningitis dose   Supportive care for now - improving slowly   Stop prcedex gtt  Continue seroquel. Supportive care       #COVID 19 infection   -Positive 1/12/22  -only complaints is a cough  -patient is vaccinated x2- no booster  - minimal  Hypoxia at 2 L.   - D2 decadron D3  -Monitor, continuous pulse ox and telemetry        #DMII  - not controlled noted to have >1000 glucose in urine  -Hold home oral meds- resume lantus increase as tolerated  -Low dose SSI  -resume diet today . Remove NG      #Thrombocytopenia  -Plt dropping to 61  -Monitor CBC , hold eliquis        #CAD s/p stent placement x3  #Hx of MI  Hx of VTACH s.p ICD  - ASA, statin, plavix       #Chronic sCHF no AE   -Last echo 11/27/19, EF 35-40%  -Pacemaker/ICD in place  -Continue ACEi, toprol XL, cardizem  - patient takes Lasix on Monday and Thursday - holding at this time        #Asthma no AE  -Symbicort  -albuterol prn  - on room air       #HLD  -continue statin       #GERD  -continue ppi       #Lumbar spondylosis and DDD   -s/p bilateral dorsal ramus medial branch ablation and interstim implant  -PDMP displays on chronic gabapentin, continue  - follows with Dr. Clint Lopez         #ALIN  - unsure if patient is compliant with CPAP ? If he still has CPAP at home       Morbid Obesity  - Body mass index is 41.37 kg/m². - Complicating assessment and treatment.  Placing patient at risk for multiple co-morbidities as well as early death and contributing to the patient's presentation.   - Counseled on weight loss.        DVT Prophylaxis: eliquis  Resume diet  Remove [facility] fot PCU       Diet: Diet NPO  ADULT TUBE FEEDING; Nasogastric; Standard with Fiber; Continuous; 20; Yes; 20; Q 4 hours; 60; 100; Q 4 hours  Code Status: Full Code      Gloria Govea MD, 1/19/2022 7:59 AM [address] , [address] [place] [address]

## 2022-04-18 NOTE — ED PROVIDER NOTE - CROS ED CONS ALL NEG
negative... Pain Refusal Text: I offered to prescribe pain medication but the patient refused to take this medication.

## 2022-04-28 PROBLEM — R73.03 PREDIABETES: Chronic | Status: ACTIVE | Noted: 2021-11-10

## 2022-04-28 PROBLEM — E78.5 HYPERLIPIDEMIA, UNSPECIFIED: Chronic | Status: ACTIVE | Noted: 2021-11-10

## 2022-04-28 PROBLEM — K31.819 ANGIODYSPLASIA OF STOMACH AND DUODENUM WITHOUT BLEEDING: Chronic | Status: ACTIVE | Noted: 2021-11-10

## 2022-04-28 PROBLEM — I35.0 NONRHEUMATIC AORTIC (VALVE) STENOSIS: Chronic | Status: ACTIVE | Noted: 2021-11-10

## 2022-05-13 ENCOUNTER — APPOINTMENT (OUTPATIENT)
Dept: GASTROENTEROLOGY | Facility: CLINIC | Age: 67
End: 2022-05-13

## 2022-09-20 ENCOUNTER — EMERGENCY (EMERGENCY)
Facility: HOSPITAL | Age: 67
LOS: 1 days | Discharge: HOME | End: 2022-09-20
Attending: EMERGENCY MEDICINE
Payer: COMMERCIAL

## 2022-09-20 VITALS
DIASTOLIC BLOOD PRESSURE: 63 MMHG | SYSTOLIC BLOOD PRESSURE: 154 MMHG | OXYGEN SATURATION: 98 % | WEIGHT: 173.94 LBS | HEART RATE: 65 BPM | RESPIRATION RATE: 17 BRPM | TEMPERATURE: 98 F | HEIGHT: 67 IN

## 2022-09-20 DIAGNOSIS — Z98.890 OTHER SPECIFIED POSTPROCEDURAL STATES: Chronic | ICD-10-CM

## 2022-09-20 DIAGNOSIS — Z90.49 ACQUIRED ABSENCE OF OTHER SPECIFIED PARTS OF DIGESTIVE TRACT: Chronic | ICD-10-CM

## 2022-09-20 LAB
ALBUMIN SERPL ELPH-MCNC: 3.5 G/DL — SIGNIFICANT CHANGE UP (ref 3.5–5)
ALP SERPL-CCNC: 88 U/L — SIGNIFICANT CHANGE UP (ref 40–120)
ALT FLD-CCNC: 18 U/L DA — SIGNIFICANT CHANGE UP (ref 10–60)
ANION GAP SERPL CALC-SCNC: 9 MMOL/L — SIGNIFICANT CHANGE UP (ref 5–17)
ANISOCYTOSIS BLD QL: SLIGHT — SIGNIFICANT CHANGE UP
APTT BLD: 37.3 SEC — HIGH (ref 27.5–35.5)
AST SERPL-CCNC: 12 U/L — SIGNIFICANT CHANGE UP (ref 10–40)
BASOPHILS # BLD AUTO: 0.03 K/UL — SIGNIFICANT CHANGE UP (ref 0–0.2)
BASOPHILS NFR BLD AUTO: 0.5 % — SIGNIFICANT CHANGE UP (ref 0–2)
BILIRUB SERPL-MCNC: 0.6 MG/DL — SIGNIFICANT CHANGE UP (ref 0.2–1.2)
BLD GP AB SCN SERPL QL: SIGNIFICANT CHANGE UP
BUN SERPL-MCNC: 68 MG/DL — HIGH (ref 7–18)
CALCIUM SERPL-MCNC: 8.9 MG/DL — SIGNIFICANT CHANGE UP (ref 8.4–10.5)
CHLORIDE SERPL-SCNC: 106 MMOL/L — SIGNIFICANT CHANGE UP (ref 96–108)
CO2 SERPL-SCNC: 24 MMOL/L — SIGNIFICANT CHANGE UP (ref 22–31)
CREAT SERPL-MCNC: 3.54 MG/DL — HIGH (ref 0.5–1.3)
EGFR: 18 ML/MIN/1.73M2 — LOW
EOSINOPHIL # BLD AUTO: 0.31 K/UL — SIGNIFICANT CHANGE UP (ref 0–0.5)
EOSINOPHIL NFR BLD AUTO: 5.7 % — SIGNIFICANT CHANGE UP (ref 0–6)
GLUCOSE SERPL-MCNC: 129 MG/DL — HIGH (ref 70–99)
HCT VFR BLD CALC: 19.7 % — CRITICAL LOW (ref 39–50)
HGB BLD-MCNC: 6.2 G/DL — CRITICAL LOW (ref 13–17)
HYPOCHROMIA BLD QL: SLIGHT — SIGNIFICANT CHANGE UP
IMM GRANULOCYTES NFR BLD AUTO: 0.2 % — SIGNIFICANT CHANGE UP (ref 0–0.9)
INR BLD: 1.16 RATIO — SIGNIFICANT CHANGE UP (ref 0.88–1.16)
LG PLATELETS BLD QL AUTO: SLIGHT — SIGNIFICANT CHANGE UP
LYMPHOCYTES # BLD AUTO: 1.07 K/UL — SIGNIFICANT CHANGE UP (ref 1–3.3)
LYMPHOCYTES # BLD AUTO: 19.5 % — SIGNIFICANT CHANGE UP (ref 13–44)
MAGNESIUM SERPL-MCNC: 2.2 MG/DL — SIGNIFICANT CHANGE UP (ref 1.6–2.6)
MANUAL SMEAR VERIFICATION: SIGNIFICANT CHANGE UP
MCHC RBC-ENTMCNC: 31.5 GM/DL — LOW (ref 32–36)
MCHC RBC-ENTMCNC: 31.8 PG — SIGNIFICANT CHANGE UP (ref 27–34)
MCV RBC AUTO: 101 FL — HIGH (ref 80–100)
MICROCYTES BLD QL: SLIGHT — SIGNIFICANT CHANGE UP
MONOCYTES # BLD AUTO: 0.47 K/UL — SIGNIFICANT CHANGE UP (ref 0–0.9)
MONOCYTES NFR BLD AUTO: 8.6 % — SIGNIFICANT CHANGE UP (ref 2–14)
NEUTROPHILS # BLD AUTO: 3.59 K/UL — SIGNIFICANT CHANGE UP (ref 1.8–7.4)
NEUTROPHILS NFR BLD AUTO: 65.5 % — SIGNIFICANT CHANGE UP (ref 43–77)
NRBC # BLD: 0 /100 WBCS — SIGNIFICANT CHANGE UP (ref 0–0)
NT-PROBNP SERPL-SCNC: 3784 PG/ML — HIGH (ref 0–125)
PLAT MORPH BLD: NORMAL — SIGNIFICANT CHANGE UP
PLATELET # BLD AUTO: 147 K/UL — LOW (ref 150–400)
POIKILOCYTOSIS BLD QL AUTO: SLIGHT — SIGNIFICANT CHANGE UP
POLYCHROMASIA BLD QL SMEAR: SLIGHT — SIGNIFICANT CHANGE UP
POTASSIUM SERPL-MCNC: 4.6 MMOL/L — SIGNIFICANT CHANGE UP (ref 3.5–5.3)
POTASSIUM SERPL-SCNC: 4.6 MMOL/L — SIGNIFICANT CHANGE UP (ref 3.5–5.3)
PROT SERPL-MCNC: 6.8 G/DL — SIGNIFICANT CHANGE UP (ref 6–8.3)
PROTHROM AB SERPL-ACNC: 13.8 SEC — HIGH (ref 10.5–13.4)
RBC # BLD: 1.95 M/UL — LOW (ref 4.2–5.8)
RBC # FLD: 16.8 % — HIGH (ref 10.3–14.5)
RBC BLD AUTO: ABNORMAL
SODIUM SERPL-SCNC: 139 MMOL/L — SIGNIFICANT CHANGE UP (ref 135–145)
SPHEROCYTES BLD QL SMEAR: SLIGHT — SIGNIFICANT CHANGE UP
WBC # BLD: 5.48 K/UL — SIGNIFICANT CHANGE UP (ref 3.8–10.5)
WBC # FLD AUTO: 5.48 K/UL — SIGNIFICANT CHANGE UP (ref 3.8–10.5)

## 2022-09-20 PROCEDURE — 99285 EMERGENCY DEPT VISIT HI MDM: CPT | Mod: 25

## 2022-09-20 PROCEDURE — 80053 COMPREHEN METABOLIC PANEL: CPT

## 2022-09-20 PROCEDURE — 83880 ASSAY OF NATRIURETIC PEPTIDE: CPT

## 2022-09-20 PROCEDURE — 85610 PROTHROMBIN TIME: CPT

## 2022-09-20 PROCEDURE — 36430 TRANSFUSION BLD/BLD COMPNT: CPT

## 2022-09-20 PROCEDURE — 86900 BLOOD TYPING SEROLOGIC ABO: CPT

## 2022-09-20 PROCEDURE — 86901 BLOOD TYPING SEROLOGIC RH(D): CPT

## 2022-09-20 PROCEDURE — 85730 THROMBOPLASTIN TIME PARTIAL: CPT

## 2022-09-20 PROCEDURE — 83735 ASSAY OF MAGNESIUM: CPT

## 2022-09-20 PROCEDURE — 36415 COLL VENOUS BLD VENIPUNCTURE: CPT

## 2022-09-20 PROCEDURE — P9040: CPT

## 2022-09-20 PROCEDURE — 96374 THER/PROPH/DIAG INJ IV PUSH: CPT

## 2022-09-20 PROCEDURE — 86850 RBC ANTIBODY SCREEN: CPT

## 2022-09-20 PROCEDURE — 86923 COMPATIBILITY TEST ELECTRIC: CPT

## 2022-09-20 PROCEDURE — 99284 EMERGENCY DEPT VISIT MOD MDM: CPT

## 2022-09-20 PROCEDURE — 85025 COMPLETE CBC W/AUTO DIFF WBC: CPT

## 2022-09-20 RX ORDER — FUROSEMIDE 40 MG
60 TABLET ORAL ONCE
Refills: 0 | Status: COMPLETED | OUTPATIENT
Start: 2022-09-20 | End: 2022-09-20

## 2022-09-20 RX ADMIN — Medication 60 MILLIGRAM(S): at 21:58

## 2022-09-20 NOTE — ED ADULT NURSE NOTE - NSIMPLEMENTINTERV_GEN_ALL_ED
Please investigate what happened Implemented All Universal Safety Interventions:  [place] to call system. Call bell, personal items and telephone within reach. Instruct patient to call for assistance. Room bathroom lighting operational. Non-slip footwear when patient is off stretcher. Physically safe environment: no spills, clutter or unnecessary equipment. Stretcher in lowest position, wheels locked, appropriate side rails in place.

## 2022-09-20 NOTE — ED ADULT NURSE NOTE - OBJECTIVE STATEMENT
68 yo male came to the ED as his primary care doctor recommended him for further evaluation of abnormal Hemoglobin level, 6.5. Patient endorses weakness. Patient denies dizziness, headache, nausea, vomiting, fever, and chills at this time.

## 2022-09-20 NOTE — ED PROVIDER NOTE - CLINICAL SUMMARY MEDICAL DECISION MAKING FREE TEXT BOX
pt with h/o Gastric AVM, sent for blood transfusion.  Pt has an appt with nephro in 1 mo.  Will transfuse & send home.

## 2022-09-20 NOTE — ED PROVIDER NOTE - PATIENT PORTAL LINK FT
You can access the FollowMyHealth Patient Portal offered by [facility] by registering at the following website: [email or web address]. By joining [employer]’s FollowMyHealth portal, you will also be able to view your health information using other applications (apps) compatible with our system.

## 2022-09-20 NOTE — ED PROVIDER NOTE - OBJECTIVE STATEMENT
67 y.o. male with h/o Gastric AVM, last blood transfusion 1 yr. ago, pt went to clinic for blood work yest., pt has an appt with PCP on 9/27, Pt received a phone call today that his Hgb is low, advised to go to ED.  Pt c/o feeling a little weak for past 2-3 days, no CP, palpitations, sob.  Pt with good appetite, Pt with black stool for ~1mo., last colonoscopy few yrs ago 67 y.o. male with h/o Gastric AVM, last blood transfusion 1 yr. ago, pt went to clinic for blood work yest., pt has an appt with PCP on 9/27, Pt received a phone call today that his Hgb is low, advised to go to ED.  Pt c/o feeling a little weak for past 2-3 days, no CP, palpitations, sob.  Pt with good appetite, Pt with black stool for ~1mo., last colonoscopy few yrs ago, last EGD last yr.

## 2022-09-20 NOTE — ED PROVIDER NOTE - PROGRESS NOTE DETAILS
pt s/p 2 units PRBC, feeling much better, will d/c home.  Advised to f/u with PMD & hematology, also nephrology pt s/p 2 units PRBC, feeling much better, will d/c home.  Advised to f/u with PMD & hematology, also nephrology.  Pt did not take his B/P meds this evening.  Advised to take meds @ home.

## 2022-09-20 NOTE — ED PROVIDER NOTE - CARE PLAN
1 Principal Discharge DX:	Anemia  Secondary Diagnosis:	Chronic kidney disease, unspecified CKD stage

## 2022-09-21 VITALS
HEART RATE: 63 BPM | TEMPERATURE: 98 F | DIASTOLIC BLOOD PRESSURE: 71 MMHG | OXYGEN SATURATION: 98 % | SYSTOLIC BLOOD PRESSURE: 183 MMHG | RESPIRATION RATE: 18 BRPM

## 2022-10-07 ENCOUNTER — APPOINTMENT (OUTPATIENT)
Dept: UROLOGY | Facility: CLINIC | Age: 67
End: 2022-10-07

## 2022-10-21 ENCOUNTER — APPOINTMENT (OUTPATIENT)
Dept: GASTROENTEROLOGY | Facility: CLINIC | Age: 67
End: 2022-10-21

## 2022-11-03 NOTE — PROGRESS NOTE ADULT - PROBLEM/PLAN-2
This writer attempted to contact Sven on 11/03/22      Reason for call Med Rec and unable to leave message.      If patient calls back:   Called patient to go over Medication Reconciliation from hospital visit on 10/28/2022. Will try to reach out to patient again.        Toshia Velazco, RN      Current Outpatient Medications   Medication     acetaminophen (TYLENOL) 325 MG tablet     allopurinol (ZYLOPRIM) 100 MG tablet     atorvastatin (LIPITOR) 80 MG tablet     Azelaic Acid (FINACEA) 15 % gel     blood glucose (NO BRAND SPECIFIED) test strip     clotrimazole-betamethasone (LOTRISONE) 1-0.05 % external cream     enoxaparin ANTICOAGULANT (LOVENOX) 40 MG/0.4ML syringe     fluticasone (FLONASE) 50 MCG/ACT nasal spray     glimepiride (AMARYL) 4 MG tablet     ketoconazole (NIZORAL) 2 % external shampoo     losartan-hydrochlorothiazide (HYZAAR) 100-25 MG tablet     metFORMIN (GLUCOPHAGE) 1000 MG tablet     methocarbamol (ROBAXIN) 750 MG tablet     metoprolol succinate ER (TOPROL-XL) 50 MG 24 hr tablet     metroNIDAZOLE (METROCREAM) 0.75 % external cream     metroNIDAZOLE 0.75 % EX external gel     MULTIPLE VITAMINS OR     nitroGLYcerin (NITROSTAT) 0.4 MG sublingual tablet     omega-3 acid ethyl esters (LOVAZA) 1 g capsule     omeprazole (PRILOSEC) 20 MG DR capsule     oxyCODONE (ROXICODONE) 5 MG tablet     Oxymetazoline HCl (AFRIN NASAL SPRAY NA)     OZEMPIC, 1 MG/DOSE, 4 MG/3ML SOPN     simethicone (MYLICON) 80 MG chewable tablet     terazosin (HYTRIN) 5 MG capsule     No current facility-administered medications for this visit.       
DISPLAY PLAN FREE TEXT

## 2023-01-01 NOTE — ED ADULT NURSE NOTE - NSHOSCREENINGQ1_ED_ALL_ED
PRINCIPAL DISCHARGE DIAGNOSIS  Diagnosis:  infant, 1,500-1,749 grams, 35-36 completed weeks  Assessment and Plan of Treatment: - Follow-up with your pediatrician within 48 hours of discharge.   Routine Home Care Instructions:  - Please call us for help if you feel sad, blue or overwhelmed for more than a few days after discharge  - Umbilical cord care:        - Please keep your baby's cord clean and dry (do not apply alcohol)        - Please keep your baby's diaper below the umbilical cord until it has fallen off (~10-14 days)        - Please do not submerge your baby in a bath until the cord has fallen off (sponge bath instead)  - Feed your child when they are hungry (about 8-12x a day), wake baby to feed if needed.   Please contact your pediatrician and return to the hospital if you notice any of the following:   - Fever  (T > 100.4)  - Reduced amount of wet diapers (< 5-6 per day) or no wet diaper in 12 hours  - Increased fussiness, irritability, or crying inconsolably  - Lethargy (excessively sleepy, difficult to arouse)  - Breathing difficulties (noisy breathing, breathing fast, using belly and neck muscles to breath)  - Changes in the baby’s color (yellow, blue, pale, gray)  - Seizure or loss of consciousness       PRINCIPAL DISCHARGE DIAGNOSIS  Diagnosis:  infant, 1,500-1,749 grams, 35-36 completed weeks  Assessment and Plan of Treatment: - Follow-up with your pediatrician within 48 hours of discharge.   Routine Home Care Instructions:  - Please call us for help if you feel sad, blue or overwhelmed for more than a few days after discharge  - Umbilical cord care:        - Please keep your baby's cord clean and dry (do not apply alcohol)        - Please keep your baby's diaper below the umbilical cord until it has fallen off (~10-14 days)        - Please do not submerge your baby in a bath until the cord has fallen off (sponge bath instead)  - Feed your child when they are hungry (about 8-12x a day), wake baby to feed if needed.   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 His blood glucose has been normal. Please follow up with your pediatrician if you see any signs of low blood sugar including if your baby is jittery or irritable.    Diagnosis: Need for observation and evaluation of  for sepsis  Assessment and Plan of Treatment: Your baby was observed for signs of serious infection. He was treated with antibiotics (ampicillin and gentamycin), which were discontinued once blood cultures showed no growth at 48hrs. He was closely monitored for any signs of infection and remained asymptomatic.    Diagnosis: Respiratory failure in   Assessment and Plan of Treatment: Your baby needed help with his breathing initially, so he was placed on CPAP. By day of life 1, he was able to breathe on his own and maintain good oxygen levels. His respiratory status was monitored closely throughout his NICU admission and remained stable.    Diagnosis: Breech birth  Assessment and Plan of Treatment: Because your baby was born in the breech position, your baby may need a hip ultrasound when your baby is six weeks old. This is to identify a condition called "congenital hip dysplasia." On exam at the hospital, your baby did not appear to have this condition. Still, babies who are born breech are more likely to develop this condition so your baby may need to have the ultrasound to follow-up on this.  Please call the Radiology Department of [facility] at [phone] to schedule a hip ultrasound in 4-6 weeks, or ask your pediatrician to refer you to another center.     PRINCIPAL DISCHARGE DIAGNOSIS  Diagnosis:  infant, 1,500-1,749 grams, 35-36 completed weeks  Assessment and Plan of Treatment: - Follow-up with your pediatrician within 48 hours of discharge.   Routine Home Care Instructions:  - Please call us for help if you feel sad, blue or overwhelmed for more than a few days after discharge  - Umbilical cord care:        - Please keep your baby's cord clean and dry (do not apply alcohol)        - Please keep your baby's diaper below the umbilical cord until it has fallen off (~10-14 days)        - Please do not submerge your baby in a bath until the cord has fallen off (sponge bath instead)  - Feed your child when they are hungry (about 8-12x a day), wake baby to feed if needed.   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 His blood glucose has been normal. Please follow up with your pediatrician if you see any signs of low blood sugar including if your baby is jittery or irritable.    Diagnosis: Need for observation and evaluation of  for sepsis  Assessment and Plan of Treatment: Your baby was observed for signs of serious infection. He was treated with antibiotics (ampicillin and gentamycin), which were discontinued once blood cultures showed no growth at 48hrs. He was closely monitored for any signs of infection and remained asymptomatic.    Diagnosis: Respiratory failure in   Assessment and Plan of Treatment: Your baby needed help with his breathing initially, so he was placed on CPAP. By day of life 1, he was able to breathe on his own and maintain good oxygen levels. His respiratory status was monitored closely throughout his NICU admission and remained stable.    Diagnosis: Breech birth  Assessment and Plan of Treatment: Because your baby was born in the breech position, your baby may need a hip ultrasound when your baby is six weeks old. This is to identify a condition called "congenital hip dysplasia." On exam at the hospital, your baby did not appear to have this condition. Still, babies who are born breech are more likely to develop this condition so your baby may need to have the ultrasound to follow-up on this.  Please call the Radiology Department of [facility] at [phone] to schedule a hip ultrasound at 44-46 weeks corrected gestational age, or ask your pediatrician to refer you to another center.     No

## 2023-03-23 ENCOUNTER — APPOINTMENT (OUTPATIENT)
Dept: UROLOGY | Facility: CLINIC | Age: 68
End: 2023-03-23
Payer: MEDICARE

## 2023-03-23 DIAGNOSIS — R97.20 ELEVATED PROSTATE, SPECIFIC ANTIGEN [PSA]: ICD-10-CM

## 2023-03-23 PROCEDURE — 99213 OFFICE O/P EST LOW 20 MIN: CPT

## 2023-03-27 NOTE — ASSESSMENT
[FreeTextEntry1] : doing well\par check psa \par if rising will get mri to eval for target fusion bx

## 2023-03-27 NOTE — HISTORY OF PRESENT ILLNESS
[FreeTextEntry1] : Elevated PSA \par Patient is here with an elevated PSA. He has no personal history and no family history of prostate cancer.He has no prior genitourinary history of hematuria, hematospermia, prostatitis, UTI, erectile dysfunction, urolithiasis, epididymal orchitis. \par No dysuria or hematuria\par ucx low count ecoli completed abx \par \par psa 4.92 2020\par 2018 3.42 \par s/p prostate biopsy  \par path benign\par

## 2023-03-27 NOTE — PHYSICAL EXAM
[General Appearance - Well Nourished] : well nourished [General Appearance - Well Developed] : well developed [Normal Appearance] : normal appearance [Well Groomed] : well groomed [General Appearance - In No Acute Distress] : no acute distress [Abdomen Soft] : soft [Abdomen Tenderness] : non-tender [Costovertebral Angle Tenderness] : no ~M costovertebral angle tenderness [Urethral Meatus] : meatus normal [Urinary Bladder Findings] : the bladder was normal on palpation [Scrotum] : the scrotum was normal [Testes Mass (___cm)] : there were no testicular masses [No Prostate Nodules] : no prostate nodules [Edema] : no peripheral edema [] : no respiratory distress [Exaggerated Use Of Accessory Muscles For Inspiration] : no accessory muscle use [Respiration, Rhythm And Depth] : normal respiratory rhythm and effort [Oriented To Time, Place, And Person] : oriented to person, place, and time [Affect] : the affect was normal [Mood] : the mood was normal [Not Anxious] : not anxious [Normal Station and Gait] : the gait and station were normal for the patient's age [No Focal Deficits] : no focal deficits [No Palpable Adenopathy] : no palpable adenopathy

## 2023-03-28 NOTE — PATIENT PROFILE ADULT - NS PRO AD NO ADVANCE DIRECTIVE
3/29/2023    [address]   [address] [place] [address] Radha     Dear Ms. Nora,    We are contacting you because you have a future appointment scheduled with Dr. Jose Mcgovern. We are trying a new scheduling process at our clinic. We are asking you to arrive 15 minutes prior to your appointment. Your appointment is scheduled on Friday,May 5th at 8:10AM.  We would like you to arrive at 7:55AM for your appointment. You will receive a confirmation call the day before your appointment. We strive to continue to improve patient satisfaction with this new scheduling process. If you need to cancel for any reason, we appreciate the courtesy of at least 24 hour notice. If you have any questions, please do not hesitate to contact our office at [phone] and we will be happy to assist you. We look forward to your visit!         Appointment [facility]  [facility] [address], [address] [facility]  Phone:  [phone]
No

## 2023-04-03 ENCOUNTER — APPOINTMENT (OUTPATIENT)
Dept: GASTROENTEROLOGY | Facility: CLINIC | Age: 68
End: 2023-04-03
Payer: MEDICARE

## 2023-04-03 VITALS
TEMPERATURE: 97.8 F | RESPIRATION RATE: 16 BRPM | OXYGEN SATURATION: 97 % | HEART RATE: 90 BPM | DIASTOLIC BLOOD PRESSURE: 62 MMHG | SYSTOLIC BLOOD PRESSURE: 120 MMHG | BODY MASS INDEX: 23.7 KG/M2 | HEIGHT: 67 IN | WEIGHT: 151 LBS

## 2023-04-03 LAB — PSA SERPL-MCNC: 6.65 NG/ML

## 2023-04-03 PROCEDURE — 99204 OFFICE O/P NEW MOD 45 MIN: CPT

## 2023-04-03 PROCEDURE — 99214 OFFICE O/P EST MOD 30 MIN: CPT

## 2023-04-03 NOTE — HISTORY OF PRESENT ILLNESS
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 Pt stated does not know why he is here. States referred by PCP Dr. Sarah Vergara. No records available. \par \par Upon further questioning, pt states was recently hospitalized at [facility] ?one week ago for dark stools and anemia/GI bleed. States required transfusions and reports had an EGD and colonoscopy there. States has records but forgot to bring them today. Does not know the EGD and colonoscopy results. Denies any further melena, hematochezia, or other complaints. No abd pain, n/v/d/c, fever/chills, fatigue, or other issues. \par \par Upon review of chart, has had multiple episodes of GI bleeding in the past, had EGD with Dr. Caballero with avms s/p apc in 2021, then hospitalized at [facility], underwent push enteroscopies and single balloon enteroscopies, eventually resulting in surgical assisted enteroscopy with ileal AVM tattooed and resulting ileal resection. \par \par PMHx: Above.\par Medications: See chart. \par Allergies: NKA.\par Surgical Hx: Ileal resection.\par SH: Denies tobacco, etoh, or drug use.\par FH: Denies fhx of GI disorder.

## 2023-04-03 NOTE — PHYSICAL EXAM

## 2023-04-03 NOTE — ASSESSMENT
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 Pt stated does not know why he is here. States referred by PCP Dr. Sarah Vergara. No records available. \par \par Upon further questioning, pt states was recently hospitalized at [facility] ?one week ago for dark stools and anemia/GI bleed. States required transfusions and reports had an EGD and colonoscopy there. States has records but forgot to bring them today. Does not know the EGD and colonoscopy results. \par \par Upon review of chart, has had multiple episodes of GI bleeding in the past, had EGD with Dr. Caballero with avms s/p apc in 2021, then hospitalized at [facility], underwent push enteroscopies and single balloon enteroscopies, eventually resulting in surgical assisted enteroscopy with ileal AVM tattooed and resulting ileal resection. \par \par - Pt reports recent hospitalization and EGD/colon but does not recall results, states has reports at home, unclear if recurrent avms or other source of bleeding identified. Does not recall performing physician. Instructed to bring reports for me to review - pt to f/u next week. \par - Instructed to f/u with his hematologist Dr. Romero (which he reports has seen in the past) for repeat CBC, possible iron infusions if need.

## 2023-04-10 ENCOUNTER — APPOINTMENT (OUTPATIENT)
Dept: GASTROENTEROLOGY | Facility: CLINIC | Age: 68
End: 2023-04-10
Payer: MEDICARE

## 2023-04-10 VITALS
WEIGHT: 151 LBS | TEMPERATURE: 96.4 F | SYSTOLIC BLOOD PRESSURE: 123 MMHG | OXYGEN SATURATION: 95 % | RESPIRATION RATE: 16 BRPM | HEART RATE: 85 BPM | HEIGHT: 67 IN | DIASTOLIC BLOOD PRESSURE: 63 MMHG | BODY MASS INDEX: 23.7 KG/M2

## 2023-04-10 DIAGNOSIS — K92.1 MELENA: ICD-10-CM

## 2023-04-10 DIAGNOSIS — K55.21 ANGIODYSPLASIA OF COLON WITH HEMORRHAGE: ICD-10-CM

## 2023-04-10 DIAGNOSIS — D64.9 ANEMIA, UNSPECIFIED: ICD-10-CM

## 2023-04-10 PROCEDURE — 99213 OFFICE O/P EST LOW 20 MIN: CPT

## 2023-04-10 RX ORDER — POLYETHYLENE GLYCOL 3350 AND ELECTROLYTES WITH LEMON FLAVOR 236; 22.74; 6.74; 5.86; 2.97 G/4L; G/4L; G/4L; G/4L; G/4L
236 POWDER, FOR SOLUTION ORAL
Qty: 1 | Refills: 0 | Status: ACTIVE | COMMUNITY
Start: 2023-04-10 | End: 1900-01-01

## 2023-04-10 NOTE — PHYSICAL EXAM
[Alert] : alert [Normal Voice/Communication] : normal voice/communication [Healthy Appearing] : healthy appearing [No Acute Distress] : no acute distress [Sclera] : the sclera and conjunctiva were normal [Hearing Threshold Finger Rub Not [place]] : hearing was normal [Normal Lips/Gums] : the lips and gums were normal [Oropharynx] : the oropharynx was normal [Normal Appearance] : the appearance of the neck was normal [No Neck Mass] : no neck mass was observed [No Respiratory Distress] : no respiratory distress [No Acc Muscle Use] : no accessory muscle use [Respiration, Rhythm And Depth] : normal respiratory rhythm and effort [Auscultation Breath Sounds / Voice Sounds] : lungs were clear to auscultation bilaterally [Heart Rate And Rhythm] : heart rate was normal and rhythm regular [Normal S1, S2] : normal S1 and S2 [Murmurs] : no murmurs [Bowel Sounds] : normal bowel sounds [Abdomen Tenderness] : non-tender [No Masses] : no abdominal mass palpated [Abdomen Soft] : soft [] : no hepatosplenomegaly [Oriented To Time, Place, And Person] : oriented to person, place, and time

## 2023-04-10 NOTE — ASSESSMENT
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 Does note started on iron infusions and reports chronic intermittent dark stools. Had recent EGD/colon but report not available. Has had multiple episodes of GI bleeding in the past, had EGD with Dr. Caballero with avms s/p apc in 2021, then hospitalized at [facility], underwent push enteroscopies and single balloon enteroscopies, eventually resulting in surgical assisted enteroscopy with ileal AVM tattooed and resulting ileal resection. \par - Discussed hx of AVM disease, could be accounting now for her recurring intermittent dark stools and iron deficiency. Will plan for repeat push enteroscopy and colonoscopy (plan for ileal intubation to assess anastomosis if feasible).\par - Schedule push/ileocolonoscopy. \par - Golytely prep instructions given.

## 2023-04-10 NOTE — HISTORY OF PRESENT ILLNESS
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 Reports feeling wel today, denies any abd pain, n/v/d/c, melena, hematochezia, fever/chills, or other issues. Does note started on iron infusions and reports chronic intermittent dark stools. Had recent EGD/colon but report not available. Has had multiple episodes of GI bleeding in the past, had EGD with Dr. Caballero with avms s/p apc in 2021, then hospitalized at [facility], underwent push enteroscopies and single balloon enteroscopies, eventually resulting in surgical assisted enteroscopy with ileal AVM tattooed and resulting ileal resection. \par

## 2023-04-20 ENCOUNTER — APPOINTMENT (OUTPATIENT)
Dept: GASTROENTEROLOGY | Facility: HOSPITAL | Age: 68
End: 2023-04-20

## 2023-05-05 ENCOUNTER — APPOINTMENT (OUTPATIENT)
Dept: VASCULAR SURGERY | Facility: CLINIC | Age: 68
End: 2023-05-05

## 2023-05-24 NOTE — ED ADULT NURSE NOTE - NS ED NURSE TRANSPORT WITH
DR SMITH'S POST-OP INSTRUCTIONS AFTER ANORECTAL SURGERY    1) Keep stools soft and avoid straining:  Eat 25-30 grams of fiber per day (use a fiber supplement such as Benefiber, Konsyl,  Metamucil, or store brand)  Drink plenty of water (4-6 glasses per day)  MiraLax as needed  Colace stool softeners as needed  Eat lots of fruits and vegetables and walk for at least 45 min per day. 2) Pain can be controlled with a combination of:  Tylenol - you may take up to 3000 mg per day (in the absence of liver disease)  NSAID medications such as motrin, ibuprofen, or advil (only if approved by your primary care physician)   Narcotic medications if needed, such as Percocet, Oxycodone, Lortab, Norco, etc.   Be careful, because narcotic medications can impair you and make you severely constipated, which can aggravate your wounds and incisions. No driving or operating machinery if taking narcotics, or if you cannot safely maneuver the vehicle without pain. You have been provided a prescription for oxycodone 5 mg every 6 hours as needed for pain. Please use this prescription over the next 5 days to treat acute surgical pain along with tylenol as stated above. If the prescribed oxycodone and tylenol does not provide adequate pain relief over the next 5 days please call Dr. Malcolm Zavala office. 3) You should expect to have pain and discomfort for 2-3 weeks depending on what operation was done. You may also have some drainage of mucus and a small amount of bleeding. It is recommended to take 1-2 weeks off from work, depending on your comfort and specifics of your job. Please call the office if you need a note from the doctor. 4) Do sitz baths (warm soaks) for 5 min twice daily and after bowel movements, or use the stream from a shower head to gently cleanse your bottom. 5) You do not have any lifting restrictions, though you may find that certain movements and positions will cause increased pain.  Walking is Cardiac Monitor/Defib/ACLS/Rescue Kit/O2/BVM/pulse ox/ACLS Rescue Kit

## 2023-06-28 NOTE — CONSULT NOTE ADULT - SUBJECTIVE AND OBJECTIVE BOX
Medicare Wellness Visit  Plan for Preventive Care    A good way for you to stay healthy is to use preventive care.  Medicare covers many services that can help you stay healthy.* The goal of these services is to find any health problems as quickly as possible. Finding problems early can help make them easier to treat.  Your personal plan below lists the services you may need and when they are due.      Health Maintenance Summary     Diabetes Foot Exam (Yearly)  Overdue since 1/8/2021    DTaP/Tdap/Td Vaccine (2 - Td or Tdap)  Overdue since 12/26/2022    Medicare Advantage- Medicare Wellness Visit (Yearly - January to December)  Overdue since 1/1/2023    DM/CKD GFR (Yearly)  Due soon on 8/15/2023    Diabetes Eye Exam (Yearly)  Next due on 10/12/2023    Diabetes A1C (Every 6 Months)  Next due on 12/10/2023    Breast Cancer Screening (Every 2 Years)  Next due on 3/16/2024    Colorectal Cancer Risk - Colonoscopy (Every 5 Years)  Next due on 6/7/2027    Hepatitis C Screening   Completed    Shingles Vaccine   Completed    Pneumococcal Vaccine 65+   Completed    Osteoporosis Screening   Completed    Influenza Vaccine   Completed    COVID-19 Vaccine   Completed    Meningococcal Vaccine   Aged Out    Hepatitis B Vaccine (For Physician/APC Discussion)   Aged Out    HPV Vaccine   Aged Out    Colorectal Cancer Screen-   Discontinued           Preventive Care for Women and Men    Heart Screenings (Cardiovascular):  · Blood tests are used to check your cholesterol, lipid and triglyceride levels. High levels can increase your risk for heart disease and stroke. High levels can be treated with medications, diet and exercise. Lowering your levels can help keep your heart and blood vessels healthy.  Your provider will order these tests if they are needed.    · An ultrasound is done to see if you have an abdominal aortic aneurysm (AAA).  This is an enlargement of one of the main blood vessels that delivers blood to the body.   In the  [place], 9,000 deaths are caused by AAA.  You may not even know you have this problem and as many as 1 in 3 people will have a serious problem if it is not treated.  Early diagnosis allows for more effective treatment and cure.  If you have a family history of AAA or are a male age 65-75 who has smoked, you are at higher risk of an AAA.  Your provider can order this test, if needed.    Colorectal Screening:  · There are many tests that are used to check for cancer of your colon and rectum. You and your provider should discuss what test is best for you and when to have it done.  Options include:  · Screening Colonoscopy: exam of the entire colon, seen through a flexible lighted tube.  · Flexible Sigmoidoscopy: exam of the last third (sigmoid portion) of the colon and rectum, seen through a flexible lighted tube.  · Cologuard DNA stool test: a sample of your stool is used to screen for cancer and unseen blood in your stool.  · Fecal Occult Blood Test: a sample of your stool is studied to find any unseen blood    Flu Shot:  · An immunization that helps to prevent influenza (the flu). You should get this every year. The best time to get the shot is in the fall.    Pneumococcal Shot:  • Vaccines help prevent pneumococcal disease, which is any type of illness caused by Streptococcus pneumoniae bacteria. There are two kinds of pneumococcal vaccines available in the [place]:   o Pneumococcal conjugate vaccines (PCV20 or Itvmxhs29®)  o Pneumococcal polysaccharide vaccine (PPSV23 or Ahneacpyq91®)  · For those who have never received any pneumococcal conjugate vaccine, CDC recommends PVC20 for adults 65 years or older and adults 19 through 64 years old with certain medical conditions or risk factors.   · For those who have previously received PCV13, this should be followed by a dose of PPSV23.     Hepatitis B Shot:  · An immunization that helps to protect people from getting Hepatitis B. Hepatitis B is a virus that  spreads through contact with infected blood or body fluids. Many people with the virus do not have symptoms.  The virus can lead to serious problems, such as liver disease. Some people are at higher risk than others. Your doctor will tell you if you need this shot.     Diabetes Screening:  · A test to measure sugar (glucose) in your blood is called a fasting blood sugar. Fasting means you cannot have food or drink for at least 8 hours before the test. This test can detect diabetes long before you may notice symptoms.    Glaucoma Screening:  · Glaucoma screening is performed by your eye doctor. The test measures the fluid pressure inside your eyes to determine if you have glaucoma.     Hepatitis C Screening:  · A blood test to see if you have the hepatitis C virus.  Hepatitis C attacks the liver and is a major cause of chronic liver disease.  Medicare will cover a single screening for all adults born between 1945 & 1965, or high risk patients (people who have injected illegal drugs or people who have had blood transfusions).  High risk patients who continue to inject illegal drugs can be screened for Hepatitis C every year.    Smoking and Tobacco-Use Cessation Counseling:  · Tobacco is the single greatest cause of disease and early death in our country today. Medication and counseling together can increase a person’s chance of quitting for good.   · Medicare covers two quitting attempts per year, with four counseling sessions per attempt (eight sessions in a 12 month period)    Preventive Screening tests for Women    Screening Mammograms and Breast Exams:  · An x-ray of your breasts to check for breast cancer before you or your doctor may be able to feel it.  If breast cancer is found early it can usually be treated with success.    Pelvic Exams and Pap Tests:  · An exam to check for cervical and vaginal cancer. A Pap test is a lab test in which cells are taken from your cervix and sent to the lab to look for signs  of cervical cancer. If cancer of the cervix is found early, chances for a cure are good. Testing can generally end at age 65, or if a woman has a hysterectomy for a benign condition. Your provider may recommend more frequent testing if certain abnormal results are found.    Bone Mass Measurements:  · A painless x-ray of your bone density to see if you are at risk for a broken bone. Bone density refers to the thickness of bones or how tightly the bone tissue is packed.    Preventive Screening tests for Men    Prostate Screening:  · Should you have a prostate cancer test (PSA)?  It is up to you to decide if you want a prostate cancer test. Talk to your clinician to find out if the test is right for you.  Things for you to consider and talk about should include:  · Benefits and harms of the test  · Your family history  · How your race/ethnicity may influence the test  · If the test may impact other medical conditions you have  · Your values on screenings and treatments    *Medicare pays for many preventive services to keep you healthy. For some of these services, you might have to pay a deductible, coinsurance, and / or copayment.  The amounts vary depending on the type of services you need and the kind of Medicare health plan you have.    For further details on screenings offered by Medicare please visit: https://www.medicare.gov/coverage/preventive-screening-services      HPI: 66M Hx CKD (not on HD), hypothyroidism, pre-DM, HTN, HLD, s/p cholecystectomy c/b bile leak, recurrent admissions for anemia since 1/21 found to have gastric nodules w/ AVM + 3cm ascending colon polyp s/p removal (1/21), then most recently admissions (6/21, 10/21) for small bowel GI bleed on capsule endoscopy (suspected AVMs) s/p intraop push enteroscopy w/ SBR of distal/terminal ileum at site of suspected AVM. Post-op surgical anastomosis c/b ulcers seen on capsule endoscopy. Pt now p/w chest pain, LE swelling + GI consulted for black stools + anemia.     Pt endorses worsening midsternal chest pain, LE edema, COELHO x 2-3 weeks. Endorses he had yellow stools after last hospitalization (10/21), however in the last possibly week or so he's started to have dark black stools again. Denies abd pain, n/v/d/f/c. (+) Weight loss of unknown amount.       OSH records limited (see below), also discussed case with Dr. Phil Marlow (who performed intraop push enteroscopy) to inform discussion.     1/21-22/21: pt p/w Hb in 5s, underwent EGD/colon with Dr. Zhang Gross  EGD: "noted with 2 nodules, 1 in stomach and1 in duodenum with possible AVM fulgurated and biopsied."   Colonoscopy: "3cm multilobulated sessile polyp in Rt colon lifted with ORISE. Bottom of polyp fulgarated with APC and area tattooed with ink"    4/2021: another admission for anemia    6/2021: presented after capsule endoscopy: "GI doctor who did capsule endoscopy  yesterday was told he has brisk bleeding in small bowel." Push enteroscopy performed, reaching only prox jejunum (no further extent could be reached with single balloon) --> plan was if rebleeds to pursue surgical means.     9/24/21: underwent capsule endoscopy (results not visible in system), after which pt was taken for intraop surgically assisted enteroscopy (with Dr. Marlow) was performed to the distal ileum. In this portion of the small intestine a single AV was identified that was consistent with what was noted on VCE. This area was marked for resection by the surgeon and small bowel resection was performed. Upon removal of the scope there was a possible second very small lesion vs trauma in the small bowel just after the ligament of treitz. This was left alone.     Per Dr. Marlow, after resection patient had c/f anastomotic ulcer --> capsule endoscopy was performed (no results available) + confirmed anastomotic ulcers which were not active bleeding --> decision was made to medically manage.     Now pt with Hb 8.3 <-- last known Hb (10/29/21) was 10.4 (unclear if this was 2/2 transfusion) <-- Hb (10/28/21) 8.5         Allergies:  No Known Allergies      Home Medications:    Hospital Medications:  amLODIPine   Tablet 10 milliGRAM(s) Oral daily  atorvastatin 80 milliGRAM(s) Oral at bedtime  buMETAnide Injectable 1 milliGRAM(s) IV Push two times a day  ferrous    sulfate 325 milliGRAM(s) Oral daily  heparin   Injectable 5000 Unit(s) SubCutaneous every 12 hours  hydrALAZINE 100 milliGRAM(s) Oral three times a day  iron sucrose IVPB 200 milliGRAM(s) IV Intermittent every 24 hours  levothyroxine 50 MICROGram(s) Oral daily  pantoprazole  Injectable 40 milliGRAM(s) IV Push daily  sodium chloride 0.9% lock flush 3 milliLiter(s) IV Push every 8 hours      PMHX/PSHX:  No pertinent past medical history    DM (diabetes mellitus)    HTN (hypertension)    Hypothyroidism    Cholecystitis    Unilateral inguinal hernia without obstruction or gangrene, recurrence not specified    Anemia    Hyperlipidemia    Pre-diabetes    Aortic stenosis    Gastric AVM    No significant past surgical history    H/O right inguinal hernia repair    S/P small bowel resection        Family history:  No pertinent family history in first degree relatives    FH: hypertension        Denies family history of colon cancer/polyps, stomach cancer/polyps, pancreatic cancer/masses, liver cancer/disease, ovarian cancer and endometrial cancer.    Social History:     Tob: Denies  EtOH: Denies  Illicit Drugs: Denies    ROS:     General:  No wt loss, fevers, chills, night sweats, fatigue  Eyes:  Good vision, no reported pain  ENT:  No sore throat, pain, runny nose, dysphagia  CV:  No pain, palpitations, hypo/hypertension  Pulm:  No dyspnea, cough, tachypnea, wheezing  GI: see above  :  No pain, bleeding, incontinence, nocturia  Muscle:  No pain, weakness  Neuro:  No weakness, tingling, memory problems  Psych:  No fatigue, insomnia, mood problems, depression  Endocrine:  No polyuria, polydipsia, cold/heat intolerance  Heme:  No petechiae, ecchymosis, easy bruisability  Skin:  No rash, tattoos, scars, edema      PHYSICAL EXAM:   GENERAL:  No acute distress, chronically ill appearing  HEENT:  Normocephalic/atraumatic, no scleral icterus  CHEST:  Clear to auscultation bilaterally, no wheezes/rales/ronchi, no accessory muscle use  HEART:  Regular rate and rhythm, no murmurs/rubs/gallops  ABDOMEN:  Soft, non-tender, non-distended, normoactive bowel sounds  EXTREMITIES: No cyanosis, clubbing, or edema  SKIN:  No rash/warm/dry  NEURO:  Alert and oriented x 3      Vital Signs:  Vital Signs Last 24 Hrs  T(C): 36.9 (15 Nov 2021 10:48), Max: 36.9 (15 Nov 2021 10:48)  T(F): 98.5 (15 Nov 2021 10:48), Max: 98.5 (15 Nov 2021 10:48)  HR: 73 (15 Nov 2021 10:48) (72 - 78)  BP: 150/66 (15 Nov 2021 10:48) (137/62 - 162/64)  BP(mean): 98 (15 Nov 2021 01:00) (97 - 98)  RR: 18 (15 Nov 2021 10:48) (12 - 18)  SpO2: 100% (15 Nov 2021 10:48) (97% - 100%)  Daily     Daily     LABS:                        7.3    4.81  )-----------( 156      ( 15 Nov 2021 09:22 )             23.2     Mean Cell Volume: 82.6 fL (11-15-21 @ 09:22)    11-15    138  |  103  |  98<H>  ----------------------------<  91  3.8   |  22  |  3.87<H>    Ca    8.7      15 Nov 2021 07:22  Phos  4.7     11-15  Mg     2.40     11-15                                    7.3    4.81  )-----------( 156      ( 15 Nov 2021 09:22 )             23.2                         6.6    4.14  )-----------( 128      ( 15 Nov 2021 07:22 )             21.5                         7.1    4.11  )-----------( 138      ( 14 Nov 2021 07:24 )             22.4                         7.5    4.74  )-----------( 131      ( 13 Nov 2021 07:25 )             23.9       Imaging:

## 2023-06-29 NOTE — ED ADULT NURSE NOTE - DOES PATIENT HAVE ADVANCE DIRECTIVE
-- DO NOT REPLY / DO NOT REPLY ALL --  -- Message is from Engagement Center Operations (ECO) --    General Patient Message: Calling to get the name of a physical therapist in [facility]. Please call regarding this message       Caller Information       Type Contact Phone/Fax    06/29/2023 02:28 PM CDT Phone (Incoming) Yanira Salmeron (Self) [phone] (H)        Alternative phone number: no    Can a detailed message be left? Yes    Message Turnaround:     Is it Working Hours? Yes - Working Hours     [place]:    Please give this turnaround time to the caller:   \"This message will be sent to [state Provider's name]. The clinical team will fulfill your request as soon as they review your message.\"                
Called patient and gave her the phone number for outpatient PT at [phone].  
No

## 2023-07-15 NOTE — ED PROVIDER NOTE - NS_EDPROVIDERDISPOUSERTYPE_ED_A_ED
Attending Attestation (For Attendings USE Only)...
84 yr old female with a pmh of HTN, HLD, CHF, lymphoma, MDD/anxiety, asthma, and DVT on warfarin who presents with left calf pain (cramping) that started 7/11/2023. Pt presented to her PCP who advised she come to the ED for further evaluation. In ED, imaging revealed Arterial duplex shows The left dorsalis pedis artery was thrombosed. CT angio was performed revealing Moderate to severe stenosis of distal right SFA, Moderate stenosis of the distal left SFA, Moderate stenosis of the bilateral popliteal arteries, Severe luminal calcification of the bilateral infrapopliteal arteries as above, right worse than left which markedly limits evaluation, probably occluded or severely stenotic as above. Pt was seen by vascular surgery, initially started on Heparin Drip but transitioned back to home warfarin, with no vascular intervention warranted at this time. Over hospital course, pt remained afebrile, HD stable, symptomatically improved and with therapeutic INR.      Problem/Plan - 1:  ·  Problem: PAD (peripheral artery disease).   ·  Plan: Acute on chronic  Imaging as above  Vascular consulted: Heparin drip, no further intervention at this time.  seen by vascular and appears chronic ischemia and nonactive intervention at this time and heparin was stopped and now on coumadin and PVR study and furthwe work up as per vascular   Outpatient follow up      Problem/Plan - 2:  ·  Problem: History of DVT in adulthood.   ·  Plan: Chronic unstable due to INR  INR 2.34 on presentation -> subtherapeutic  Heparin drip as above.     Problem/Plan - 3:  ·  Problem: Benign essential HTN.   ·  Plan: Chronic moderate exacerbation  /76  Not on antihypertensives  Continue to monitor and start as appropriate.     Problem/Plan - 4:  ·  Problem: HLD (hyperlipidemia).   ·  Plan: Chronic stable  Continue rosuvastatin 20mg daily formulary  Lipid panel in AM.

## 2023-10-16 NOTE — DISCHARGE NOTE ADULT - NS AS DC PROVIDER CONTACT Y/N MULTI
DISPLAY PLAN FREE TEXT Yes [place]ycl[place] Counseling:  I discussed with the patient the risks of imiquimod including but not limited to erythema, scaling, itching, weeping, crusting, and pain.  Patient understands that the inflammatory response to imiquimod is variable from person to person and was educated regarded proper titration schedule.  If flu-like symptoms develop, patient knows to discontinue the medication and contact us.

## 2023-10-17 NOTE — H&P ADULT - NSHPLANGPREFER_GEN_A_CORE
Gastrointestinal Bleeding: Care Instructions  Your Care Instructions     The digestive or gastrointestinal tract goes from the mouth to the anus. It is often called the GI tract. Bleeding can happen anywhere in the GI tract. It may be caused by an ulcer, an infection, or cancer. It may also be caused by medicines such as aspirin or ibuprofen. Light bleeding may not cause any symptoms at first. But if you continue to bleed for a while, you may feel very weak or tired. Sudden, heavy bleeding means you need to see a doctor right away. This kind of bleeding can be very dangerous. But it can usually be cured or controlled. The doctor may do some tests to find the cause of your bleeding. Follow-up care is a key part of your treatment and safety. Be sure to make and go to all appointments, and call your doctor if you are having problems. It's also a good idea to know your test results and keep a list of the medicines you take. How can you care for yourself at home? Be safe with medicines. Take your medicines exactly as prescribed. Call your doctor if you think you are having a problem with your medicine. You will get more details on the specific medicines your doctor prescribes. Do not take aspirin or other anti-inflammatory medicines, such as naproxen (Aleve) or ibuprofen (Advil, Motrin), without talking to your doctor first. Ask your doctor if it is okay to use acetaminophen (Tylenol). Do not drink alcohol. The bleeding may make you lose iron. So it's important to eat foods that have a lot of iron. These include red meat, shellfish, poultry, and eggs. They also include beans, raisins, whole-grain breads, and leafy green vegetables. If you want help planning meals, you can make an appointment with a dietitian. When should you call for help? Call 911 anytime you think you may need emergency care. For example, call if:    You have sudden, severe belly pain.      You vomit blood or what looks like coffee Citizen of [place]

## 2023-12-27 NOTE — H&P ADULT - PROBLEM SELECTOR PLAN 1
[age] patient presented due to weakness  noted to have Hgb of 3.9  2 PRBCs ordered to be given in ED  FOBT positive  f/u post transfusion CBC   monitor CBC closely   continue PPI drip   will keep NPO for now  GI, Dr. Caballero consulted patient presented due to generalized weakness  noted to have Hgb of 3.9  2 PRBCs ordered to be given in ED  FOBT positive  f/u post transfusion CBC   monitor CBC closely   continue PPI drip   will keep NPO for now > clears if stable   GI, Dr. Caballero consulted  likely for repeat scoping when more stable and COVID cleared

## 2024-04-04 NOTE — PROGRESS NOTE ADULT - PROBLEM SELECTOR PLAN 1
s/p 1 unit prbc on 11/15  -GI consulted, s/p VCE 11/16, revealed active bleeding in small bowel, unable to clearly identify anastomotic site  -IV protonix  -s/p enteroscopy 11/17 revealed AVM's treated with APC  Pseudomelanosis. A single non-bleeding angioectasia in the duodenum.   Treated with argon plasma coagulation (APC). A single non-bleeding angioectasia in the jejunum.   Treated with APC  -started on octreotide 100 mg bid sc per renal, po iron, trend h/h, per GI will consider single balloon enteroscopy on Monday if hgb drops  -trend CBC and transfuse prn to keep hgb>7  -pt has h/o small bowel GIB d/t AVM s/p partial small bowel resection, had multiple admissions for GIB, s/p intraop surgically assisted enteroscopy (with Dr. Marlow) was performed to the distal ileum. A single AVM was identified that was c/w what was noted on VCE. This area was marked for resection by the surgeon and small bowel resection was performed. Upon removal of the scope there was a possible second very small lesion vs trauma in the small bowel (not intervened).    Also possible anastomotic ulcers seen on ?capsule endoscopy after partial SBR (9/24/21) audio

## 2024-07-30 NOTE — PROGRESS NOTE ADULT - PROBLEM/PLAN-9
"Preventive Care Visit  [facility]  Darell Robles MD, Family Medicine  Jul 30, 2024      Assessment & Plan     Encounter for Medicare annual wellness exam  - CBC with platelets and differential; Future  - Basic metabolic panel; Future  - Hemoglobin A1c; Future  - Lipid Profile (Chol, Trig, HDL, LDL calc); Future  - Hepatitis C Screen Reflex to HCV RNA Quant and Genotype; Future  - TSH with free T4 reflex; Future  - Preventative screening guidelines provided in AVS  - Return in 1 year for annual physical    Healthcare maintenance  - BP: Well-controlled  - BMI: Reviewed.  Appropriate.  Discussed healthy diet and excise recommendations.  Estimated body mass index is 21.8 kg/m  as calculated from the following:    Height as of this encounter: 1.702 m (5' 7\").    Weight as of this encounter: 63.1 kg (139 lb 3.2 oz).  - ASCVD risk screening: Update A1c/lipid screen.  Currently on ASA/statin.   The 10-year ASCVD risk score (Mariajose DOTSON, et al., 2019) is: 20.2%    Values used to calculate the score:      Age: 72 years      Sex: Female      Is Non- : No      Diabetic: No      Tobacco smoker: No      Systolic Blood Pressure: 132 mmHg      Is BP treated: Yes      HDL Cholesterol: 74 mg/dL      Total Cholesterol: 219 mg/dL  - Mood: Denies concerns.      7/30/2024     8:44 AM 5/22/2024     1:06 PM   PHQ-2 ( 1999 Pfizer)   Q1: Little interest or pleasure in doing things 0 0   Q2: Feeling down, depressed or hopeless 0 0   PHQ-2 Score 0 0   Q1: Little interest or pleasure in doing things Not at all    Q2: Feeling down, depressed or hopeless Not at all    PHQ-2 Score 0    - Tobacco/substance screening: No tobacco or illicit substance use.     Tobacco Use      Smoking status: Never        Passive exposure: Never      Smokeless tobacco: Never  - Infectious disease screening: Low risk but will screen as below  - Cancer screening:  -Family history:  -Lung: N/A  -Breast: Upcoming mammogram scheduled "
8/20/2024  -Colon: Normal colonoscopy 5/30/2019; not planning on any further screening colonoscopies  -Cervix: Shared decision making for no further screening Pap smears  - Immunizations: Discussed shingles vaccination.  Recommend seasonal vaccines this fall.  - DEXA: 6/8/21; 5 year repeat    Benign essential hypertension  Well-controlled on current regimen.  - Basic metabolic panel; Future  - TSH with free T4 reflex; Future    Prediabetes  Last A1c 5.8%.  - Hemoglobin A1c; Future  - TSH with free T4 reflex; Future    Hyperlipidemia, unspecified hyperlipidemia type  Annual lipid screen today, titrate statin as indicated.  - Lipid Profile (Chol, Trig, HDL, LDL calc); Future  - Simvastatin 40 mg daily    Encounter for screening mammogram for malignant neoplasm of breast  Mammogram scheduled for 8/28/2024.    Screening examination for infectious disease  Encounter for screening for other viral diseases  - Hepatitis C Screen Reflex to HCV RNA Quant and Genotype; Future    Patient has been advised of split billing requirements and indicates understanding: Yes        Counseling  Appropriate preventive services were addressed with this patient via screening, questionnaire, or discussion as appropriate for fall prevention, nutrition, physical activity, Tobacco-use cessation, weight loss and cognition.  Checklist reviewing preventive services available has been given to the patient.  Reviewed patient's diet, addressing concerns and/or questions.   She is at risk for lack of exercise and has been provided with information to increase physical activity for the benefit of her well-being.     Follow up 1 year for annual physical.    Jennifer Stone is a 72 year old, presenting for the following:  Wellness Visit, Lipids, and Hypertension        7/30/2024     8:39 AM   Additional Questions   Roomed by Ric Zhang   Accompanied by None         7/30/2024     8:39 AM   Patient Reported Additional Medications   Patient 
reports taking the following new medications None         Health Care Directive  Patient has a Health Care Directive on file  Advance care planning document is on file and is current.    HPI  Hyperlipidemia Follow-Up  Are you regularly taking any medication or supplement to lower your cholesterol?   Yes- Simvastatin   Are you having muscle aches or other side effects that you think could be caused by your cholesterol lowering medication?  No  Hypertension Follow-up  Do you check your blood pressure regularly outside of the clinic? No   Are you following a low salt diet? Yes  Are your blood pressures ever more than 140 on the top number (systolic) OR more   than 90 on the bottom number (diastolic), for example 140/90? Patient does not check BP outside of the clinic         7/30/2024   General Health   How would you rate your overall physical health? Good   Feel stress (tense, anxious, or unable to sleep) Not at all            7/30/2024   Nutrition   Diet: Regular (no restrictions)            7/30/2024   Exercise   Days per week of moderate/strenous exercise 2 days      (!) EXERCISE CONCERN      7/30/2024   Social Factors   Frequency of gathering with friends or relatives Once a week   Worry food won't last until get money to buy more No   Food not last or not have enough money for food? No   Do you have housing? (Housing is defined as stable permanent housing and does not include staying ouside in a car, in a tent, in an abandoned building, in an overnight shelter, or couch-surfing.) Yes   Are you worried about losing your housing? No   Lack of transportation? No   Unable to get utilities (heat,electricity)? No            7/30/2024   Fall Risk   Fallen 2 or more times in the past year? No    No   Trouble with walking or balance? No    No       Multiple values from one day are sorted in reverse-chronological order          7/30/2024   Activities of Daily Living- Home Safety   Needs help with the following daily 
activites None of the above   Safety concerns in the home None of the above            7/30/2024   Dental   Dentist two times every year? Yes            7/30/2024   Hearing Screening   Hearing concerns? None of the above            7/30/2024   Driving Risk Screening   Patient/family members have concerns about driving No            7/30/2024   General Alertness/Fatigue Screening   Have you been more tired than usual lately? No            7/30/2024   Urinary Incontinence Screening   Bothered by leaking urine in past 6 months No            7/30/2024   TB Screening   Were you born outside of the [place]? No            Today's PHQ-2 Score:       7/30/2024     8:44 AM   PHQ-2 ( 1999 Pfizer)   Q1: Little interest or pleasure in doing things 0   Q2: Feeling down, depressed or hopeless 0   PHQ-2 Score 0   Q1: Little interest or pleasure in doing things Not at all   Q2: Feeling down, depressed or hopeless Not at all   PHQ-2 Score 0           7/30/2024   Substance Use   Alcohol more than 3/day or more than 7/wk No   Do you have a current opioid prescription? No   How severe/bad is pain from 1 to 10? 0/10 (No Pain)   Do you use any other substances recreationally? No        Social History     Tobacco Use    Smoking status: Never     Passive exposure: Never    Smokeless tobacco: Never   Vaping Use    Vaping status: Never Used           8/2/2023   LAST FHS-7 RESULTS   1st degree relative breast or ovarian cancer No   Any relative bilateral breast cancer No   Any male have breast cancer No   Any ONE woman have BOTH breast AND ovarian cancer No   Any woman with breast cancer before 50yrs No   2 or more relatives with breast AND/OR ovarian cancer No   2 or more relatives with breast AND/OR bowel cancer No           Mammogram Screening - Mammogram every 1-2 years updated in Health Maintenance based on mutual decision making    ASCVD Risk   The 10-year ASCVD risk score (Mariajose DOTSON, et al., 2019) is: 20.2%    Values used to 
calculate the score:      Age: 72 years      Sex: Female      Is Non- : No      Diabetic: No      Tobacco smoker: No      Systolic Blood Pressure: 132 mmHg      Is BP treated: Yes      HDL Cholesterol: 74 mg/dL      Total Cholesterol: 219 mg/dL    Fracture Risk Assessment Tool  Link to Frax Calculator  Use the information below to complete the Frax calculator  : 1951  Sex: female  Weight (kg): 63.1 kg (actual weight)  Height (cm): 170.2 cm  Previous Fragility Fracture:  No  History of parent with fractured hip:  No  Current Smoking:  No  Patient has been on glucocorticoids for more than 3 months (5mg/day or more): No  Rheumatoid Arthritis on Problem List:  No  Secondary Osteoporosis on Problem List:  No  Consumes 3 or more units of alcohol per day: No  Femoral Neck BMD (g/cm2)            Reviewed and updated as needed this visit by Provider                    Lab work is in process  Current providers sharing in care for this patient include:  Patient Care Team:  Darell Robles MD as PCP - General (Family Medicine)  Darell Robles MD as Assigned PCP    The following health maintenance items are reviewed in Epic and correct as of today:  Health Maintenance   Topic Date Due    HEPATITIS C SCREENING  Never done    ZOSTER IMMUNIZATION (1 of 2) Never done    RSV VACCINE (Pregnancy & 60+) (1 - 1-dose 60+ series) Never done    IPV IMMUNIZATION (3 of 3 - Adult catch-up series) 2014    COVID-19 Vaccine ( -  season) 2024    LIPID  2024    INFLUENZA VACCINE (1) 2024    DTAP/TDAP/TD IMMUNIZATION (2 - Td or Tdap) 01/15/2025    MEDICARE ANNUAL WELLNESS VISIT  2025    FALL RISK ASSESSMENT  2025    MAMMO SCREENING  2025    DEXA  2026    GLUCOSE  2026    ADVANCE CARE PLANNING  2028    COLORECTAL CANCER SCREENING  2029    PHQ-2 (once per calendar year)  Completed    Pneumococcal Vaccine: 65+ Years  Completed    HPV IMMUNIZATION  
"Aged Out    MENINGITIS IMMUNIZATION  Aged Out    RSV MONOCLONAL ANTIBODY  Aged Out         Review of Systems  Constitutional, HEENT, cardiovascular, pulmonary, gi and gu systems are negative, except as otherwise noted.     Objective    Exam  /82 (BP Location: Right arm, Patient Position: Sitting, Cuff Size: Adult Regular)   Pulse 70   Temp 97  F (36.1  C) (Tympanic)   Resp 16   Ht 1.702 m (5' 7\")   Wt 63.1 kg (139 lb 3.2 oz)   SpO2 96%   BMI 21.80 kg/m     Estimated body mass index is 21.8 kg/m  as calculated from the following:    Height as of this encounter: 1.702 m (5' 7\").    Weight as of this encounter: 63.1 kg (139 lb 3.2 oz).    Physical Exam  GENERAL: alert and no distress  EYES: Eyes grossly normal to inspection, PERRL and conjunctivae and sclerae normal  HENT: ear canals and TM's normal, nose and mouth without ulcers or lesions  NECK: no adenopathy, no asymmetry, masses, or scars  RESP: lungs clear to auscultation - no rales, rhonchi or wheezes  CV: regular rate and rhythm, normal S1 S2, no S3 or S4, no murmur, click or rub, no peripheral edema  ABDOMEN: soft, nontender, no hepatosplenomegaly, no masses and bowel sounds normal  MS: no gross musculoskeletal defects noted, no edema  SKIN: no suspicious lesions or rashes  NEURO: Normal strength and tone, mentation intact and speech normal  PSYCH: mentation appears normal, affect normal/bright        7/30/2024   Mini Cog   Clock Draw Score 2 Normal   3 Item Recall 3 objects recalled   Mini Cog Total Score 5                 Signed Electronically by: Darell Robles MD    "
DISPLAY PLAN FREE TEXT
DISPLAY PLAN FREE TEXT

## 2024-09-05 NOTE — ED PROVIDER NOTE - NS ED MD DISPO SPECIAL CONSIDERATION1
Pt concerned about painless \"lump\" to left posterior neck, first noted last night.   
Low Suspicion of COVID-19

## 2024-12-01 NOTE — DIETITIAN INITIAL EVALUATION ADULT. - REASON INDICATOR FOR ASSESSMENT
EMS report: pt mother reports fever. Gave tylenol 1000mg around 2330. Was seen here yesterday foir same     Triage Assessment (Adult)       Row Name 12/01/24 0125          Triage Assessment    Airway WDL WDL        Respiratory WDL    Respiratory WDL WDL        Skin Circulation/Temperature WDL    Skin Circulation/Temperature WDL WDL        Cardiac WDL    Cardiac WDL WDL        Peripheral/Neurovascular WDL    Peripheral Neurovascular WDL WDL        Cognitive/Neuro/Behavioral WDL    Cognitive/Neuro/Behavioral WDL WDL                      LOS

## 2025-04-30 NOTE — DISCHARGE NOTE PROVIDER - NS AS DC PROVIDER CONTACT Y/N MULTI
Patient reports slight improvement in lower extremity edema after IV diuretics yesterday. Patient will take a dose of metolazone 1/2 hour before bumex today and KCL 20 meq TID today. Updated labs on Friday.   Yes